# Patient Record
Sex: FEMALE | Race: WHITE | Employment: UNEMPLOYED | URBAN - METROPOLITAN AREA
[De-identification: names, ages, dates, MRNs, and addresses within clinical notes are randomized per-mention and may not be internally consistent; named-entity substitution may affect disease eponyms.]

---

## 2018-04-11 ENCOUNTER — OFFICE VISIT (OUTPATIENT)
Dept: FAMILY MEDICINE CLINIC | Age: 43
End: 2018-04-11
Payer: MEDICAID

## 2018-04-11 VITALS
HEART RATE: 80 BPM | TEMPERATURE: 98.1 F | HEIGHT: 64 IN | WEIGHT: 293 LBS | OXYGEN SATURATION: 97 % | DIASTOLIC BLOOD PRESSURE: 77 MMHG | RESPIRATION RATE: 16 BRPM | SYSTOLIC BLOOD PRESSURE: 127 MMHG | BODY MASS INDEX: 50.02 KG/M2

## 2018-04-11 DIAGNOSIS — B37.31 VAGINAL CANDIDA: ICD-10-CM

## 2018-04-11 DIAGNOSIS — J40 BRONCHITIS: Primary | ICD-10-CM

## 2018-04-11 DIAGNOSIS — H66.93 BILATERAL OTITIS MEDIA, UNSPECIFIED OTITIS MEDIA TYPE: ICD-10-CM

## 2018-04-11 PROCEDURE — 99212 OFFICE O/P EST SF 10 MIN: CPT | Performed by: NURSE PRACTITIONER

## 2018-04-11 PROCEDURE — 99213 OFFICE O/P EST LOW 20 MIN: CPT | Performed by: NURSE PRACTITIONER

## 2018-04-11 RX ORDER — METHYLPREDNISOLONE 4 MG/1
TABLET ORAL
Qty: 1 KIT | Refills: 0 | Status: SHIPPED | OUTPATIENT
Start: 2018-04-11 | End: 2018-04-18 | Stop reason: ALTCHOICE

## 2018-04-11 RX ORDER — DOXYCYCLINE HYCLATE 100 MG
100 TABLET ORAL 2 TIMES DAILY
Qty: 20 TABLET | Refills: 0 | Status: SHIPPED | OUTPATIENT
Start: 2018-04-11 | End: 2018-04-21

## 2018-04-11 RX ORDER — FLUCONAZOLE 150 MG/1
150 TABLET ORAL ONCE
Qty: 1 TABLET | Refills: 1 | Status: SHIPPED | OUTPATIENT
Start: 2018-04-11 | End: 2018-04-11

## 2018-04-11 RX ORDER — ALBUTEROL SULFATE 90 UG/1
2 AEROSOL, METERED RESPIRATORY (INHALATION) EVERY 6 HOURS PRN
Qty: 1 INHALER | Refills: 3 | Status: SHIPPED | OUTPATIENT
Start: 2018-04-11 | End: 2018-04-18 | Stop reason: ALTCHOICE

## 2018-04-11 RX ORDER — ACETAMINOPHEN 500 MG
1000 TABLET ORAL EVERY 6 HOURS PRN
COMMUNITY
End: 2019-09-16

## 2018-04-11 ASSESSMENT — PATIENT HEALTH QUESTIONNAIRE - PHQ9
SUM OF ALL RESPONSES TO PHQ QUESTIONS 1-9: 0
2. FEELING DOWN, DEPRESSED OR HOPELESS: 0
SUM OF ALL RESPONSES TO PHQ9 QUESTIONS 1 & 2: 0
1. LITTLE INTEREST OR PLEASURE IN DOING THINGS: 0

## 2018-04-18 ENCOUNTER — HOSPITAL ENCOUNTER (OUTPATIENT)
Dept: GENERAL RADIOLOGY | Age: 43
Discharge: HOME | End: 2018-04-20
Payer: MEDICAID

## 2018-04-18 ENCOUNTER — OFFICE VISIT (OUTPATIENT)
Dept: FAMILY MEDICINE CLINIC | Age: 43
End: 2018-04-18
Payer: MEDICAID

## 2018-04-18 VITALS
BODY MASS INDEX: 50.02 KG/M2 | RESPIRATION RATE: 16 BRPM | WEIGHT: 293 LBS | SYSTOLIC BLOOD PRESSURE: 117 MMHG | HEIGHT: 64 IN | TEMPERATURE: 98.3 F | OXYGEN SATURATION: 94 % | HEART RATE: 99 BPM | DIASTOLIC BLOOD PRESSURE: 82 MMHG

## 2018-04-18 DIAGNOSIS — N63.10 LUMP OF RIGHT BREAST: ICD-10-CM

## 2018-04-18 DIAGNOSIS — N63.10 BREAST MASS, RIGHT: ICD-10-CM

## 2018-04-18 DIAGNOSIS — N63.0 LUMP OR MASS IN BREAST: ICD-10-CM

## 2018-04-18 DIAGNOSIS — J20.9 ACUTE BRONCHITIS, UNSPECIFIED ORGANISM: ICD-10-CM

## 2018-04-18 PROCEDURE — 99214 OFFICE O/P EST MOD 30 MIN: CPT | Performed by: NURSE PRACTITIONER

## 2018-04-18 PROCEDURE — 76642 ULTRASOUND BREAST LIMITED: CPT

## 2018-04-18 PROCEDURE — 99212 OFFICE O/P EST SF 10 MIN: CPT | Performed by: NURSE PRACTITIONER

## 2018-04-18 PROCEDURE — G0279 TOMOSYNTHESIS, MAMMO: HCPCS

## 2018-04-18 PROCEDURE — 77065 DX MAMMO INCL CAD UNI: CPT

## 2018-04-21 ASSESSMENT — ENCOUNTER SYMPTOMS
WHEEZING: 0
DIARRHEA: 0
SHORTNESS OF BREATH: 0
CONSTIPATION: 0
BLURRED VISION: 0
VOMITING: 0
NAUSEA: 0
COUGH: 0
DOUBLE VISION: 0

## 2018-04-25 ASSESSMENT — ENCOUNTER SYMPTOMS
COUGH: 0
WHEEZING: 0
DIARRHEA: 0
CONSTIPATION: 0
DOUBLE VISION: 0
VOMITING: 0
BLURRED VISION: 0
SHORTNESS OF BREATH: 0
NAUSEA: 0

## 2018-05-05 ENCOUNTER — HOSPITAL ENCOUNTER (EMERGENCY)
Age: 43
Discharge: HOME | End: 2018-05-05
Attending: EMERGENCY MEDICINE
Payer: MEDICAID

## 2018-05-05 VITALS
RESPIRATION RATE: 18 BRPM | BODY MASS INDEX: 50.02 KG/M2 | DIASTOLIC BLOOD PRESSURE: 82 MMHG | HEART RATE: 96 BPM | SYSTOLIC BLOOD PRESSURE: 160 MMHG | OXYGEN SATURATION: 100 % | TEMPERATURE: 97.8 F | HEIGHT: 64 IN | WEIGHT: 293 LBS

## 2018-05-05 DIAGNOSIS — N60.11 FIBROCYSTIC BREAST CHANGES, RIGHT: Primary | ICD-10-CM

## 2018-05-05 LAB
ALBUMIN SERPL-MCNC: 3.8 G/DL (ref 3.5–5.2)
ALP BLD-CCNC: 83 U/L (ref 35–104)
ALT SERPL-CCNC: 28 U/L (ref 0–32)
ANION GAP SERPL CALCULATED.3IONS-SCNC: 13 MMOL/L (ref 7–16)
AST SERPL-CCNC: 14 U/L (ref 0–31)
BACTERIA: ABNORMAL /HPF
BILIRUB SERPL-MCNC: 0.3 MG/DL (ref 0–1.2)
BILIRUBIN URINE: NEGATIVE
BLOOD, URINE: ABNORMAL
BUN BLDV-MCNC: 11 MG/DL (ref 6–20)
CALCIUM SERPL-MCNC: 8.9 MG/DL (ref 8.6–10.2)
CHLORIDE BLD-SCNC: 102 MMOL/L (ref 98–107)
CLARITY: CLEAR
CO2: 21 MMOL/L (ref 22–29)
COLOR: YELLOW
CREAT SERPL-MCNC: 0.7 MG/DL (ref 0.5–1)
GFR AFRICAN AMERICAN: >60
GFR NON-AFRICAN AMERICAN: >60 ML/MIN/1.73
GLUCOSE BLD-MCNC: 120 MG/DL (ref 74–109)
GLUCOSE URINE: NEGATIVE MG/DL
HCT VFR BLD CALC: 43.8 % (ref 34–48)
HEMOGLOBIN: 14.1 G/DL (ref 11.5–15.5)
KETONES, URINE: NEGATIVE MG/DL
LEUKOCYTE ESTERASE, URINE: NEGATIVE
MCH RBC QN AUTO: 28 PG (ref 26–35)
MCHC RBC AUTO-ENTMCNC: 32.2 % (ref 32–34.5)
MCV RBC AUTO: 86.9 FL (ref 80–99.9)
NITRITE, URINE: NEGATIVE
PDW BLD-RTO: 13.3 FL (ref 11.5–15)
PH UA: 6 (ref 5–9)
PLATELET # BLD: 462 E9/L (ref 130–450)
PMV BLD AUTO: 9.6 FL (ref 7–12)
POTASSIUM SERPL-SCNC: 3.9 MMOL/L (ref 3.5–5)
PROTEIN UA: NEGATIVE MG/DL
RBC # BLD: 5.04 E12/L (ref 3.5–5.5)
RBC UA: ABNORMAL /HPF (ref 0–2)
SODIUM BLD-SCNC: 136 MMOL/L (ref 132–146)
SPECIFIC GRAVITY UA: >=1.03 (ref 1–1.03)
TOTAL PROTEIN: 8 G/DL (ref 6.4–8.3)
UROBILINOGEN, URINE: 0.2 E.U./DL
WBC # BLD: 16.3 E9/L (ref 4.5–11.5)
WBC UA: ABNORMAL /HPF (ref 0–5)

## 2018-05-05 PROCEDURE — 99283 EMERGENCY DEPT VISIT LOW MDM: CPT

## 2018-05-05 PROCEDURE — 81001 URINALYSIS AUTO W/SCOPE: CPT

## 2018-05-05 PROCEDURE — 85027 COMPLETE CBC AUTOMATED: CPT

## 2018-05-05 PROCEDURE — 36415 COLL VENOUS BLD VENIPUNCTURE: CPT

## 2018-05-05 PROCEDURE — 80053 COMPREHEN METABOLIC PANEL: CPT

## 2018-05-05 RX ORDER — 0.9 % SODIUM CHLORIDE 0.9 %
1000 INTRAVENOUS SOLUTION INTRAVENOUS ONCE
Status: DISCONTINUED | OUTPATIENT
Start: 2018-05-05 | End: 2018-05-05

## 2018-05-05 RX ORDER — HYDROCODONE BITARTRATE AND ACETAMINOPHEN 5; 325 MG/1; MG/1
1 TABLET ORAL EVERY 6 HOURS PRN
Qty: 5 TABLET | Refills: 0 | Status: SHIPPED | OUTPATIENT
Start: 2018-05-05 | End: 2018-05-08

## 2018-05-05 ASSESSMENT — PAIN SCALES - GENERAL: PAINLEVEL_OUTOF10: 7

## 2018-05-05 ASSESSMENT — PAIN DESCRIPTION - DESCRIPTORS: DESCRIPTORS: OTHER (COMMENT)

## 2018-05-05 ASSESSMENT — PAIN DESCRIPTION - FREQUENCY: FREQUENCY: INTERMITTENT

## 2018-05-05 ASSESSMENT — PAIN DESCRIPTION - LOCATION: LOCATION: BREAST

## 2018-05-10 ENCOUNTER — OFFICE VISIT (OUTPATIENT)
Dept: FAMILY MEDICINE CLINIC | Age: 43
End: 2018-05-10
Payer: MEDICAID

## 2018-05-10 VITALS
RESPIRATION RATE: 18 BRPM | SYSTOLIC BLOOD PRESSURE: 126 MMHG | OXYGEN SATURATION: 95 % | WEIGHT: 293 LBS | TEMPERATURE: 97.9 F | HEIGHT: 64 IN | DIASTOLIC BLOOD PRESSURE: 84 MMHG | HEART RATE: 80 BPM | BODY MASS INDEX: 50.02 KG/M2

## 2018-05-10 DIAGNOSIS — F32.A DEPRESSION, UNSPECIFIED DEPRESSION TYPE: ICD-10-CM

## 2018-05-10 DIAGNOSIS — F41.9 ANXIETY: Primary | ICD-10-CM

## 2018-05-10 PROCEDURE — 1036F TOBACCO NON-USER: CPT | Performed by: NURSE PRACTITIONER

## 2018-05-10 PROCEDURE — 99212 OFFICE O/P EST SF 10 MIN: CPT | Performed by: NURSE PRACTITIONER

## 2018-05-10 PROCEDURE — 99214 OFFICE O/P EST MOD 30 MIN: CPT | Performed by: NURSE PRACTITIONER

## 2018-05-10 PROCEDURE — G8417 CALC BMI ABV UP PARAM F/U: HCPCS | Performed by: NURSE PRACTITIONER

## 2018-05-10 PROCEDURE — G8427 DOCREV CUR MEDS BY ELIG CLIN: HCPCS | Performed by: NURSE PRACTITIONER

## 2018-05-10 RX ORDER — ESCITALOPRAM OXALATE 10 MG/1
10 TABLET ORAL DAILY
Qty: 30 TABLET | Refills: 1 | Status: SHIPPED | OUTPATIENT
Start: 2018-05-10 | End: 2018-07-09 | Stop reason: SDUPTHER

## 2018-05-10 RX ORDER — HYDROXYZINE PAMOATE 25 MG/1
25 CAPSULE ORAL NIGHTLY PRN
Qty: 30 CAPSULE | Refills: 0 | Status: SHIPPED | OUTPATIENT
Start: 2018-05-10 | End: 2018-05-24

## 2018-05-10 RX ORDER — SULFAMETHOXAZOLE AND TRIMETHOPRIM 800; 160 MG/1; MG/1
1 TABLET ORAL 2 TIMES DAILY
Refills: 0 | COMMUNITY
Start: 2018-04-30 | End: 2018-06-12 | Stop reason: ALTCHOICE

## 2018-05-10 ASSESSMENT — ENCOUNTER SYMPTOMS
CONSTIPATION: 0
VOMITING: 0
COUGH: 0
DIARRHEA: 0
WHEEZING: 0
BLURRED VISION: 0
NAUSEA: 0
SHORTNESS OF BREATH: 0
DOUBLE VISION: 0

## 2018-05-10 ASSESSMENT — ANXIETY QUESTIONNAIRES
5. BEING SO RESTLESS THAT IT IS HARD TO SIT STILL: 0-NOT AT ALL SURE
7. FEELING AFRAID AS IF SOMETHING AWFUL MIGHT HAPPEN: 3-NEARLY EVERY DAY
2. NOT BEING ABLE TO STOP OR CONTROL WORRYING: 3-NEARLY EVERY DAY
4. TROUBLE RELAXING: 2-OVER HALF THE DAYS
GAD7 TOTAL SCORE: 17
3. WORRYING TOO MUCH ABOUT DIFFERENT THINGS: 3-NEARLY EVERY DAY
6. BECOMING EASILY ANNOYED OR IRRITABLE: 3-NEARLY EVERY DAY
1. FEELING NERVOUS, ANXIOUS, OR ON EDGE: 3-NEARLY EVERY DAY

## 2018-05-14 ENCOUNTER — OFFICE VISIT (OUTPATIENT)
Dept: BREAST CENTER | Age: 43
End: 2018-05-14
Payer: MEDICAID

## 2018-05-14 VITALS
HEART RATE: 92 BPM | WEIGHT: 293 LBS | SYSTOLIC BLOOD PRESSURE: 136 MMHG | DIASTOLIC BLOOD PRESSURE: 72 MMHG | TEMPERATURE: 98 F | BODY MASS INDEX: 50.02 KG/M2 | HEIGHT: 64 IN | OXYGEN SATURATION: 98 % | RESPIRATION RATE: 16 BRPM

## 2018-05-14 DIAGNOSIS — N61.1 BREAST ABSCESS OF FEMALE: ICD-10-CM

## 2018-05-14 DIAGNOSIS — R92.2 DENSE BREAST TISSUE ON MAMMOGRAM: ICD-10-CM

## 2018-05-14 DIAGNOSIS — N63.10 MASS OF BREAST, RIGHT: ICD-10-CM

## 2018-05-14 DIAGNOSIS — Z80.3 FAMILY HISTORY OF BREAST CANCER IN MOTHER: ICD-10-CM

## 2018-05-14 DIAGNOSIS — N63.10 BREAST MASS, RIGHT: ICD-10-CM

## 2018-05-14 DIAGNOSIS — Z12.39 BREAST CANCER SCREENING, HIGH RISK PATIENT: Primary | ICD-10-CM

## 2018-05-14 PROCEDURE — G8417 CALC BMI ABV UP PARAM F/U: HCPCS | Performed by: SURGERY

## 2018-05-14 PROCEDURE — 99204 OFFICE O/P NEW MOD 45 MIN: CPT | Performed by: SURGERY

## 2018-05-14 PROCEDURE — 1036F TOBACCO NON-USER: CPT | Performed by: SURGERY

## 2018-05-14 PROCEDURE — G8427 DOCREV CUR MEDS BY ELIG CLIN: HCPCS | Performed by: SURGERY

## 2018-05-14 PROCEDURE — 99203 OFFICE O/P NEW LOW 30 MIN: CPT | Performed by: NURSE PRACTITIONER

## 2018-05-14 ASSESSMENT — ENCOUNTER SYMPTOMS
RHINORRHEA: 0
SHORTNESS OF BREATH: 0
BLOOD IN STOOL: 0
VOMITING: 0
CONSTIPATION: 0
CHOKING: 0
ABDOMINAL DISTENTION: 0
ABDOMINAL PAIN: 0
WHEEZING: 0
SINUS PRESSURE: 0
EYE ITCHING: 0
SORE THROAT: 0
BACK PAIN: 0
EYE DISCHARGE: 0
TROUBLE SWALLOWING: 0
SINUS PAIN: 0
CHEST TIGHTNESS: 0
NAUSEA: 0
VOICE CHANGE: 0
DIARRHEA: 0
COUGH: 0

## 2018-05-17 ENCOUNTER — HOSPITAL ENCOUNTER (OUTPATIENT)
Dept: GENERAL RADIOLOGY | Age: 43
Discharge: HOME | End: 2018-05-19
Payer: MEDICAID

## 2018-05-17 DIAGNOSIS — N63.10 BREAST MASS, RIGHT: ICD-10-CM

## 2018-05-17 DIAGNOSIS — N63.10 MASS OF BREAST, RIGHT: ICD-10-CM

## 2018-05-17 DIAGNOSIS — R92.2 DENSE BREAST TISSUE ON MAMMOGRAM: ICD-10-CM

## 2018-05-17 PROCEDURE — 2500000003 HC RX 250 WO HCPCS

## 2018-05-17 PROCEDURE — 2720000010 US BREAST BIOPSY W LOC DEVICE 1ST LESION RIGHT

## 2018-05-17 PROCEDURE — 77065 DX MAMMO INCL CAD UNI: CPT

## 2018-05-17 PROCEDURE — 88305 TISSUE EXAM BY PATHOLOGIST: CPT

## 2018-05-18 ENCOUNTER — TELEPHONE (OUTPATIENT)
Dept: ONCOLOGY | Age: 43
End: 2018-05-18

## 2018-05-22 ENCOUNTER — TELEPHONE (OUTPATIENT)
Dept: BREAST CENTER | Age: 43
End: 2018-05-22

## 2018-06-01 ENCOUNTER — HOSPITAL ENCOUNTER (OUTPATIENT)
Dept: GENERAL RADIOLOGY | Age: 43
End: 2018-06-01
Payer: MEDICAID

## 2018-06-01 ENCOUNTER — OFFICE VISIT (OUTPATIENT)
Dept: FAMILY MEDICINE CLINIC | Age: 43
End: 2018-06-01
Payer: MEDICAID

## 2018-06-01 ENCOUNTER — HOSPITAL ENCOUNTER (OUTPATIENT)
Age: 43
Discharge: HOME | End: 2018-06-03
Payer: MEDICAID

## 2018-06-01 ENCOUNTER — HOSPITAL ENCOUNTER (OUTPATIENT)
Dept: GENERAL RADIOLOGY | Age: 43
Discharge: HOME | End: 2018-06-03
Payer: MEDICAID

## 2018-06-01 ENCOUNTER — OFFICE VISIT (OUTPATIENT)
Dept: BREAST CENTER | Age: 43
End: 2018-06-01
Payer: MEDICAID

## 2018-06-01 ENCOUNTER — TELEPHONE (OUTPATIENT)
Dept: FAMILY MEDICINE CLINIC | Age: 43
End: 2018-06-01

## 2018-06-01 VITALS
HEIGHT: 63 IN | WEIGHT: 293 LBS | BODY MASS INDEX: 51.91 KG/M2 | HEART RATE: 103 BPM | DIASTOLIC BLOOD PRESSURE: 88 MMHG | RESPIRATION RATE: 16 BRPM | SYSTOLIC BLOOD PRESSURE: 122 MMHG | OXYGEN SATURATION: 98 % | TEMPERATURE: 98.8 F

## 2018-06-01 VITALS
OXYGEN SATURATION: 97 % | HEART RATE: 92 BPM | HEIGHT: 63 IN | TEMPERATURE: 98.3 F | DIASTOLIC BLOOD PRESSURE: 84 MMHG | BODY MASS INDEX: 51.91 KG/M2 | WEIGHT: 293 LBS | SYSTOLIC BLOOD PRESSURE: 116 MMHG

## 2018-06-01 DIAGNOSIS — N63.10 BREAST MASS, RIGHT: Primary | ICD-10-CM

## 2018-06-01 DIAGNOSIS — N64.4 BREAST PAIN, RIGHT: ICD-10-CM

## 2018-06-01 DIAGNOSIS — E55.9 VITAMIN D DEFICIENCY: ICD-10-CM

## 2018-06-01 DIAGNOSIS — E78.5 HYPERLIPIDEMIA, UNSPECIFIED HYPERLIPIDEMIA TYPE: ICD-10-CM

## 2018-06-01 DIAGNOSIS — F41.9 ANXIETY: ICD-10-CM

## 2018-06-01 DIAGNOSIS — R53.83 FATIGUE, UNSPECIFIED TYPE: ICD-10-CM

## 2018-06-01 DIAGNOSIS — N64.4 BREAST PAIN, RIGHT: Primary | ICD-10-CM

## 2018-06-01 DIAGNOSIS — F43.9 STRESS AT HOME: ICD-10-CM

## 2018-06-01 LAB
ALBUMIN SERPL-MCNC: 3.8 G/DL (ref 3.5–5.2)
ALP BLD-CCNC: 66 U/L (ref 35–104)
ALT SERPL-CCNC: 32 U/L (ref 0–32)
ANION GAP SERPL CALCULATED.3IONS-SCNC: 17 MMOL/L (ref 7–16)
AST SERPL-CCNC: 26 U/L (ref 0–31)
BASOPHILS ABSOLUTE: 0.04 E9/L (ref 0–0.2)
BASOPHILS RELATIVE PERCENT: 0.2 % (ref 0–2)
BILIRUB SERPL-MCNC: 1.2 MG/DL (ref 0–1.2)
BUN BLDV-MCNC: 8 MG/DL (ref 6–20)
CALCIUM SERPL-MCNC: 9 MG/DL (ref 8.6–10.2)
CHLORIDE BLD-SCNC: 101 MMOL/L (ref 98–107)
CHOLESTEROL, TOTAL: 148 MG/DL (ref 0–199)
CO2: 22 MMOL/L (ref 22–29)
CREAT SERPL-MCNC: 0.6 MG/DL (ref 0.5–1)
EOSINOPHILS ABSOLUTE: 0.14 E9/L (ref 0.05–0.5)
EOSINOPHILS RELATIVE PERCENT: 0.9 % (ref 0–6)
GFR AFRICAN AMERICAN: >60
GFR NON-AFRICAN AMERICAN: >60 ML/MIN/1.73
GLUCOSE BLD-MCNC: 94 MG/DL (ref 74–109)
HCT VFR BLD CALC: 46.2 % (ref 34–48)
HDLC SERPL-MCNC: 51 MG/DL
HEMOGLOBIN: 13.9 G/DL (ref 11.5–15.5)
IMMATURE GRANULOCYTES #: 0.07 E9/L
IMMATURE GRANULOCYTES %: 0.4 % (ref 0–5)
LDL CHOLESTEROL CALCULATED: 79 MG/DL (ref 0–99)
LYMPHOCYTES ABSOLUTE: 2.49 E9/L (ref 1.5–4)
LYMPHOCYTES RELATIVE PERCENT: 15.6 % (ref 20–42)
MCH RBC QN AUTO: 27.8 PG (ref 26–35)
MCHC RBC AUTO-ENTMCNC: 30.1 % (ref 32–34.5)
MCV RBC AUTO: 92.4 FL (ref 80–99.9)
MONOCYTES ABSOLUTE: 1.16 E9/L (ref 0.1–0.95)
MONOCYTES RELATIVE PERCENT: 7.2 % (ref 2–12)
NEUTROPHILS ABSOLUTE: 12.11 E9/L (ref 1.8–7.3)
NEUTROPHILS RELATIVE PERCENT: 75.7 % (ref 43–80)
PDW BLD-RTO: 14.6 FL (ref 11.5–15)
PLATELET # BLD: 381 E9/L (ref 130–450)
PMV BLD AUTO: 10.5 FL (ref 7–12)
POTASSIUM SERPL-SCNC: 3.9 MMOL/L (ref 3.5–5)
RBC # BLD: 5 E12/L (ref 3.5–5.5)
SODIUM BLD-SCNC: 140 MMOL/L (ref 132–146)
TOTAL PROTEIN: 7.9 G/DL (ref 6.4–8.3)
TRIGL SERPL-MCNC: 90 MG/DL (ref 0–149)
TSH SERPL DL<=0.05 MIU/L-ACNC: 2.47 UIU/ML (ref 0.27–4.2)
VITAMIN D 25-HYDROXY: 19 NG/ML (ref 30–100)
VLDLC SERPL CALC-MCNC: 18 MG/DL
WBC # BLD: 16 E9/L (ref 4.5–11.5)

## 2018-06-01 PROCEDURE — G8417 CALC BMI ABV UP PARAM F/U: HCPCS | Performed by: SURGERY

## 2018-06-01 PROCEDURE — G8427 DOCREV CUR MEDS BY ELIG CLIN: HCPCS | Performed by: NURSE PRACTITIONER

## 2018-06-01 PROCEDURE — G8417 CALC BMI ABV UP PARAM F/U: HCPCS | Performed by: NURSE PRACTITIONER

## 2018-06-01 PROCEDURE — 99215 OFFICE O/P EST HI 40 MIN: CPT | Performed by: SURGERY

## 2018-06-01 PROCEDURE — G8427 DOCREV CUR MEDS BY ELIG CLIN: HCPCS | Performed by: SURGERY

## 2018-06-01 PROCEDURE — 1036F TOBACCO NON-USER: CPT | Performed by: SURGERY

## 2018-06-01 PROCEDURE — 80053 COMPREHEN METABOLIC PANEL: CPT

## 2018-06-01 PROCEDURE — 99214 OFFICE O/P EST MOD 30 MIN: CPT | Performed by: NURSE PRACTITIONER

## 2018-06-01 PROCEDURE — 80061 LIPID PANEL: CPT

## 2018-06-01 PROCEDURE — 85025 COMPLETE CBC W/AUTO DIFF WBC: CPT

## 2018-06-01 PROCEDURE — 84443 ASSAY THYROID STIM HORMONE: CPT

## 2018-06-01 PROCEDURE — 76642 ULTRASOUND BREAST LIMITED: CPT

## 2018-06-01 PROCEDURE — 1036F TOBACCO NON-USER: CPT | Performed by: NURSE PRACTITIONER

## 2018-06-01 PROCEDURE — 82306 VITAMIN D 25 HYDROXY: CPT

## 2018-06-01 PROCEDURE — 99203 OFFICE O/P NEW LOW 30 MIN: CPT | Performed by: SURGERY

## 2018-06-01 RX ORDER — CLINDAMYCIN HYDROCHLORIDE 150 MG/1
300 CAPSULE ORAL 3 TIMES DAILY
Qty: 42 CAPSULE | Refills: 0 | Status: SHIPPED | OUTPATIENT
Start: 2018-06-01 | End: 2018-06-08

## 2018-06-01 ASSESSMENT — ENCOUNTER SYMPTOMS
SORE THROAT: 0
VOMITING: 0
SINUS PAIN: 0
EYE DISCHARGE: 0
EYE ITCHING: 0
DIARRHEA: 0
WHEEZING: 0
BACK PAIN: 0
SHORTNESS OF BREATH: 0
SINUS PRESSURE: 0
CHOKING: 0
VOICE CHANGE: 0
BLURRED VISION: 0
ABDOMINAL PAIN: 0
CONSTIPATION: 0
DIARRHEA: 0
VOMITING: 0
BLOOD IN STOOL: 0
RHINORRHEA: 0
CHEST TIGHTNESS: 0
ABDOMINAL DISTENTION: 0
CONSTIPATION: 0
WHEEZING: 0
TROUBLE SWALLOWING: 0
NAUSEA: 0
COUGH: 0
DOUBLE VISION: 0
NAUSEA: 0
SHORTNESS OF BREATH: 0
COUGH: 0

## 2018-06-01 NOTE — PROGRESS NOTES
Chief Complaint   Patient presents with    Anxiety     f/u medication    Breast Pain     Rt       HPI:  Patient presents today for  Establishment of PCP. Past Medical History:   Diagnosis Date    Anxiety     GERD (gastroesophageal reflux disease)     Obesity      Patient is known to me as I have seen her in a different office. Right breast pain again - reports that she had a biopsy done to her right breast on 5/17/19. Was told that it was not a malignancy. She is still having a \"pulling\" sensation to that breast. Feels as though her right breast is larger and more firm that it previously was. She is teary eyed today. Concerned that she might still have cancer. Anxiety - I have recently started patient on Lexapro. She reports that it is  working for her, however she has been under a lot of emotional stress lately. Her son has been recently diagnosed with type 1 diabetes. She is  from her children's father, and she recently found out that her mothers cancer has spread. Prior to Visit Medications    Medication Sig Taking? Authorizing Provider   escitalopram (LEXAPRO) 10 MG tablet Take 1 tablet by mouth daily Yes JIHAN Suggs CNP   acetaminophen (TYLENOL) 500 MG tablet Take 1,000 mg by mouth every 6 hours as needed for Pain Yes Historical Provider, MD   dicloxacillin (DYNAPEN) 500 MG capsule Take 1 capsule by mouth 4 times daily  Historical Provider, MD   sulfamethoxazole-trimethoprim (BACTRIM DS;SEPTRA DS) 800-160 MG per tablet Take 1 tablet by mouth 2 times daily  Historical Provider, MD   vitamin D (ERGOCALCIFEROL) 60350 units CAPS capsule Take 1 tab by mouth weekly  Juhi Perdomo MD         No Known Allergies      Review of Systems  Review of Systems   Constitutional: Positive for malaise/fatigue and weight loss. Negative for chills and fever. HENT: Negative for congestion and nosebleeds. Eyes: Negative for blurred vision and double vision.    Respiratory: Negative deficiency    - Vitamin D 25 Hydrox, D2 & D3; Future    Will discuss health maintenance at next OV  Return in about 4 weeks (around 6/29/2018) for Lab follow up, anxiety, Depression follow up.       JIHAN Whittaker - CNP

## 2018-06-04 ENCOUNTER — TELEPHONE (OUTPATIENT)
Dept: BREAST CENTER | Age: 43
End: 2018-06-04

## 2018-06-05 ENCOUNTER — HOSPITAL ENCOUNTER (OUTPATIENT)
Age: 43
Discharge: HOME | End: 2018-06-07
Payer: MEDICAID

## 2018-06-05 ENCOUNTER — OFFICE VISIT (OUTPATIENT)
Dept: BREAST CENTER | Age: 43
End: 2018-06-05
Payer: MEDICAID

## 2018-06-05 VITALS
DIASTOLIC BLOOD PRESSURE: 78 MMHG | OXYGEN SATURATION: 98 % | TEMPERATURE: 98.7 F | HEART RATE: 100 BPM | BODY MASS INDEX: 51.91 KG/M2 | HEIGHT: 63 IN | WEIGHT: 293 LBS | SYSTOLIC BLOOD PRESSURE: 126 MMHG | RESPIRATION RATE: 16 BRPM

## 2018-06-05 DIAGNOSIS — N61.1 BREAST ABSCESS OF FEMALE: Primary | ICD-10-CM

## 2018-06-05 DIAGNOSIS — N63.10 BREAST MASS, RIGHT: ICD-10-CM

## 2018-06-05 PROCEDURE — 99213 OFFICE O/P EST LOW 20 MIN: CPT | Performed by: SURGERY

## 2018-06-05 PROCEDURE — 87070 CULTURE OTHR SPECIMN AEROBIC: CPT

## 2018-06-05 PROCEDURE — 19020 MASTOTOMY EXPL DRG ABSC DP: CPT | Performed by: SURGERY

## 2018-06-05 PROCEDURE — 87205 SMEAR GRAM STAIN: CPT

## 2018-06-05 PROCEDURE — 87075 CULTR BACTERIA EXCEPT BLOOD: CPT

## 2018-06-05 RX ORDER — LIDOCAINE HYDROCHLORIDE 10 MG/ML
20 INJECTION, SOLUTION INFILTRATION; PERINEURAL ONCE
Status: COMPLETED | OUTPATIENT
Start: 2018-06-05 | End: 2018-06-05

## 2018-06-05 RX ADMIN — LIDOCAINE HYDROCHLORIDE 5 ML: 10 INJECTION, SOLUTION INFILTRATION; PERINEURAL at 15:24

## 2018-06-05 ASSESSMENT — ENCOUNTER SYMPTOMS
NAUSEA: 0
CONSTIPATION: 0
TROUBLE SWALLOWING: 0
RHINORRHEA: 0
DIARRHEA: 0
SINUS PAIN: 0
VOMITING: 0
BACK PAIN: 0
SHORTNESS OF BREATH: 0
EYE DISCHARGE: 0
BLOOD IN STOOL: 0
ABDOMINAL DISTENTION: 0
CHOKING: 0
SORE THROAT: 0
ABDOMINAL PAIN: 0
WHEEZING: 0
SINUS PRESSURE: 0
EYE ITCHING: 0
VOICE CHANGE: 0
COUGH: 0
CHEST TIGHTNESS: 0

## 2018-06-06 LAB — GRAM STAIN ORDERABLE: NORMAL

## 2018-06-07 LAB
GRAM STAIN RESULT: NORMAL
WOUND/ABSCESS: NORMAL

## 2018-06-11 LAB
ANAEROBIC CULTURE: ABNORMAL
ANAEROBIC CULTURE: ABNORMAL
ORGANISM: ABNORMAL

## 2018-06-12 ENCOUNTER — OFFICE VISIT (OUTPATIENT)
Dept: BREAST CENTER | Age: 43
End: 2018-06-12
Payer: MEDICAID

## 2018-06-12 VITALS
HEIGHT: 63 IN | OXYGEN SATURATION: 98 % | TEMPERATURE: 98.2 F | SYSTOLIC BLOOD PRESSURE: 136 MMHG | RESPIRATION RATE: 16 BRPM | WEIGHT: 293 LBS | HEART RATE: 86 BPM | DIASTOLIC BLOOD PRESSURE: 82 MMHG | BODY MASS INDEX: 51.91 KG/M2

## 2018-06-12 DIAGNOSIS — N61.1 BREAST ABSCESS OF FEMALE: ICD-10-CM

## 2018-06-12 PROCEDURE — 99213 OFFICE O/P EST LOW 20 MIN: CPT | Performed by: NURSE PRACTITIONER

## 2018-06-12 PROCEDURE — 99024 POSTOP FOLLOW-UP VISIT: CPT | Performed by: NURSE PRACTITIONER

## 2018-06-12 ASSESSMENT — ENCOUNTER SYMPTOMS
EYE ITCHING: 0
BACK PAIN: 0
NAUSEA: 0
WHEEZING: 0
ABDOMINAL PAIN: 0
VOMITING: 0
RHINORRHEA: 0
ABDOMINAL DISTENTION: 0
EYE DISCHARGE: 0
SORE THROAT: 0
COUGH: 0
VOICE CHANGE: 0
TROUBLE SWALLOWING: 0
SINUS PRESSURE: 0
CHEST TIGHTNESS: 0
BLOOD IN STOOL: 0
CHOKING: 0
DIARRHEA: 0
SHORTNESS OF BREATH: 0
SINUS PAIN: 0
CONSTIPATION: 0

## 2018-06-19 ENCOUNTER — ANESTHESIA EVENT (OUTPATIENT)
Dept: OPERATING ROOM | Age: 43
DRG: 363 | End: 2018-06-19
Payer: MEDICAID

## 2018-06-19 ENCOUNTER — OFFICE VISIT (OUTPATIENT)
Dept: BREAST CENTER | Age: 43
DRG: 363 | End: 2018-06-19
Payer: MEDICAID

## 2018-06-19 ENCOUNTER — TELEPHONE (OUTPATIENT)
Dept: BREAST CENTER | Age: 43
End: 2018-06-19

## 2018-06-19 ENCOUNTER — HOSPITAL ENCOUNTER (INPATIENT)
Age: 43
LOS: 3 days | Discharge: HOME HEALTH | DRG: 363 | End: 2018-06-22
Attending: SURGERY | Admitting: SURGERY
Payer: MEDICAID

## 2018-06-19 ENCOUNTER — OFFICE VISIT (OUTPATIENT)
Dept: SURGERY | Age: 43
DRG: 363 | End: 2018-06-19
Payer: MEDICAID

## 2018-06-19 ENCOUNTER — TELEPHONE (OUTPATIENT)
Dept: SURGERY | Age: 43
End: 2018-06-19

## 2018-06-19 VITALS
WEIGHT: 293 LBS | RESPIRATION RATE: 16 BRPM | TEMPERATURE: 98.7 F | BODY MASS INDEX: 51.91 KG/M2 | SYSTOLIC BLOOD PRESSURE: 130 MMHG | OXYGEN SATURATION: 98 % | HEIGHT: 63 IN | DIASTOLIC BLOOD PRESSURE: 74 MMHG | HEART RATE: 102 BPM

## 2018-06-19 DIAGNOSIS — N61.1 ABSCESS OF RIGHT BREAST: Primary | ICD-10-CM

## 2018-06-19 DIAGNOSIS — N61.1 BREAST ABSCESS OF FEMALE: ICD-10-CM

## 2018-06-19 LAB
ANION GAP SERPL CALCULATED.3IONS-SCNC: 17 MMOL/L (ref 7–16)
BASOPHILS ABSOLUTE: 0.06 E9/L (ref 0–0.2)
BASOPHILS RELATIVE PERCENT: 0.3 % (ref 0–2)
BUN BLDV-MCNC: 13 MG/DL (ref 6–20)
CALCIUM SERPL-MCNC: 8.9 MG/DL (ref 8.6–10.2)
CHLORIDE BLD-SCNC: 96 MMOL/L (ref 98–107)
CO2: 21 MMOL/L (ref 22–29)
CREAT SERPL-MCNC: 0.7 MG/DL (ref 0.5–1)
EOSINOPHILS ABSOLUTE: 0.06 E9/L (ref 0.05–0.5)
EOSINOPHILS RELATIVE PERCENT: 0.3 % (ref 0–6)
GFR AFRICAN AMERICAN: >60
GFR NON-AFRICAN AMERICAN: >60 ML/MIN/1.73
GLUCOSE BLD-MCNC: 92 MG/DL (ref 74–109)
HCT VFR BLD CALC: 42.8 % (ref 34–48)
HEMOGLOBIN: 13.8 G/DL (ref 11.5–15.5)
IMMATURE GRANULOCYTES #: 0.11 E9/L
IMMATURE GRANULOCYTES %: 0.6 % (ref 0–5)
LYMPHOCYTES ABSOLUTE: 2.69 E9/L (ref 1.5–4)
LYMPHOCYTES RELATIVE PERCENT: 14.2 % (ref 20–42)
MCH RBC QN AUTO: 26.8 PG (ref 26–35)
MCHC RBC AUTO-ENTMCNC: 32.2 % (ref 32–34.5)
MCV RBC AUTO: 83.1 FL (ref 80–99.9)
MONOCYTES ABSOLUTE: 1.33 E9/L (ref 0.1–0.95)
MONOCYTES RELATIVE PERCENT: 7 % (ref 2–12)
NEUTROPHILS ABSOLUTE: 14.66 E9/L (ref 1.8–7.3)
NEUTROPHILS RELATIVE PERCENT: 77.6 % (ref 43–80)
PDW BLD-RTO: 13.9 FL (ref 11.5–15)
PLATELET # BLD: 488 E9/L (ref 130–450)
PMV BLD AUTO: 9.5 FL (ref 7–12)
POTASSIUM REFLEX MAGNESIUM: 3.8 MMOL/L (ref 3.5–5)
RBC # BLD: 5.15 E12/L (ref 3.5–5.5)
SODIUM BLD-SCNC: 134 MMOL/L (ref 132–146)
WBC # BLD: 18.9 E9/L (ref 4.5–11.5)

## 2018-06-19 PROCEDURE — 6360000002 HC RX W HCPCS: Performed by: STUDENT IN AN ORGANIZED HEALTH CARE EDUCATION/TRAINING PROGRAM

## 2018-06-19 PROCEDURE — 99024 POSTOP FOLLOW-UP VISIT: CPT | Performed by: SURGERY

## 2018-06-19 PROCEDURE — 80048 BASIC METABOLIC PNL TOTAL CA: CPT

## 2018-06-19 PROCEDURE — 1200000000 HC SEMI PRIVATE

## 2018-06-19 PROCEDURE — 85025 COMPLETE CBC W/AUTO DIFF WBC: CPT

## 2018-06-19 PROCEDURE — 87040 BLOOD CULTURE FOR BACTERIA: CPT

## 2018-06-19 PROCEDURE — 2580000003 HC RX 258: Performed by: STUDENT IN AN ORGANIZED HEALTH CARE EDUCATION/TRAINING PROGRAM

## 2018-06-19 PROCEDURE — 36415 COLL VENOUS BLD VENIPUNCTURE: CPT

## 2018-06-19 PROCEDURE — 2580000003 HC RX 258: Performed by: SURGERY

## 2018-06-19 PROCEDURE — 6360000002 HC RX W HCPCS: Performed by: SURGERY

## 2018-06-19 PROCEDURE — 6370000000 HC RX 637 (ALT 250 FOR IP): Performed by: STUDENT IN AN ORGANIZED HEALTH CARE EDUCATION/TRAINING PROGRAM

## 2018-06-19 PROCEDURE — 99213 OFFICE O/P EST LOW 20 MIN: CPT | Performed by: NURSE PRACTITIONER

## 2018-06-19 PROCEDURE — 99212 OFFICE O/P EST SF 10 MIN: CPT | Performed by: SURGERY

## 2018-06-19 RX ORDER — SODIUM CHLORIDE 0.9 % (FLUSH) 0.9 %
10 SYRINGE (ML) INJECTION PRN
Status: DISCONTINUED | OUTPATIENT
Start: 2018-06-19 | End: 2018-06-22 | Stop reason: HOSPADM

## 2018-06-19 RX ORDER — ONDANSETRON 2 MG/ML
4 INJECTION INTRAMUSCULAR; INTRAVENOUS ONCE
Status: COMPLETED | OUTPATIENT
Start: 2018-06-20 | End: 2018-06-20

## 2018-06-19 RX ORDER — HYDROCODONE BITARTRATE AND ACETAMINOPHEN 5; 325 MG/1; MG/1
1 TABLET ORAL EVERY 6 HOURS PRN
Status: DISCONTINUED | OUTPATIENT
Start: 2018-06-19 | End: 2018-06-22 | Stop reason: HOSPADM

## 2018-06-19 RX ORDER — SODIUM CHLORIDE 0.9 % (FLUSH) 0.9 %
10 SYRINGE (ML) INJECTION EVERY 12 HOURS SCHEDULED
Status: DISCONTINUED | OUTPATIENT
Start: 2018-06-19 | End: 2018-06-22 | Stop reason: HOSPADM

## 2018-06-19 RX ORDER — DIPHENHYDRAMINE HYDROCHLORIDE 50 MG/ML
25 INJECTION INTRAMUSCULAR; INTRAVENOUS ONCE
Status: DISCONTINUED | OUTPATIENT
Start: 2018-06-19 | End: 2018-06-19

## 2018-06-19 RX ORDER — SODIUM CHLORIDE 9 MG/ML
INJECTION, SOLUTION INTRAVENOUS CONTINUOUS
Status: DISCONTINUED | OUTPATIENT
Start: 2018-06-20 | End: 2018-06-21

## 2018-06-19 RX ORDER — DIPHENHYDRAMINE HYDROCHLORIDE 50 MG/ML
25 INJECTION INTRAMUSCULAR; INTRAVENOUS EVERY 6 HOURS PRN
Status: DISCONTINUED | OUTPATIENT
Start: 2018-06-19 | End: 2018-06-22 | Stop reason: HOSPADM

## 2018-06-19 RX ORDER — ONDANSETRON 2 MG/ML
4 INJECTION INTRAMUSCULAR; INTRAVENOUS ONCE
Status: DISCONTINUED | OUTPATIENT
Start: 2018-06-19 | End: 2018-06-19

## 2018-06-19 RX ORDER — DIPHENHYDRAMINE HYDROCHLORIDE 50 MG/ML
25 INJECTION INTRAMUSCULAR; INTRAVENOUS ONCE
Status: COMPLETED | OUTPATIENT
Start: 2018-06-20 | End: 2018-06-20

## 2018-06-19 RX ORDER — ONDANSETRON 2 MG/ML
4 INJECTION INTRAMUSCULAR; INTRAVENOUS EVERY 6 HOURS PRN
Status: DISCONTINUED | OUTPATIENT
Start: 2018-06-19 | End: 2018-06-22 | Stop reason: HOSPADM

## 2018-06-19 RX ORDER — ESCITALOPRAM OXALATE 10 MG/1
10 TABLET ORAL NIGHTLY
Status: DISCONTINUED | OUTPATIENT
Start: 2018-06-19 | End: 2018-06-22 | Stop reason: HOSPADM

## 2018-06-19 RX ADMIN — Medication 10 ML: at 21:35

## 2018-06-19 RX ADMIN — Medication 10 ML: at 18:52

## 2018-06-19 RX ADMIN — VANCOMYCIN HYDROCHLORIDE 1750 MG: 10 INJECTION, POWDER, LYOPHILIZED, FOR SOLUTION INTRAVENOUS at 18:43

## 2018-06-19 RX ADMIN — DIPHENHYDRAMINE HYDROCHLORIDE 25 MG: 50 INJECTION, SOLUTION INTRAMUSCULAR; INTRAVENOUS at 21:35

## 2018-06-19 RX ADMIN — ESCITALOPRAM OXALATE 10 MG: 10 TABLET, FILM COATED ORAL at 23:12

## 2018-06-19 RX ADMIN — ONDANSETRON 4 MG: 2 INJECTION INTRAMUSCULAR; INTRAVENOUS at 21:02

## 2018-06-19 ASSESSMENT — PAIN SCALES - GENERAL: PAINLEVEL_OUTOF10: 8

## 2018-06-19 ASSESSMENT — PAIN DESCRIPTION - ORIENTATION: ORIENTATION: RIGHT

## 2018-06-19 ASSESSMENT — PAIN DESCRIPTION - PAIN TYPE: TYPE: ACUTE PAIN

## 2018-06-19 ASSESSMENT — PAIN DESCRIPTION - LOCATION: LOCATION: CHEST

## 2018-06-20 ENCOUNTER — ANESTHESIA (OUTPATIENT)
Dept: OPERATING ROOM | Age: 43
DRG: 363 | End: 2018-06-20
Payer: MEDICAID

## 2018-06-20 VITALS
OXYGEN SATURATION: 98 % | TEMPERATURE: 98.2 F | DIASTOLIC BLOOD PRESSURE: 88 MMHG | SYSTOLIC BLOOD PRESSURE: 158 MMHG | RESPIRATION RATE: 20 BRPM

## 2018-06-20 LAB — HCG(URINE) PREGNANCY TEST: NEGATIVE

## 2018-06-20 PROCEDURE — 87070 CULTURE OTHR SPECIMN AEROBIC: CPT

## 2018-06-20 PROCEDURE — 2720000010 HC SURG SUPPLY STERILE: Performed by: SURGERY

## 2018-06-20 PROCEDURE — 19020 MASTOTOMY EXPL DRG ABSC DP: CPT | Performed by: SURGERY

## 2018-06-20 PROCEDURE — 81025 URINE PREGNANCY TEST: CPT

## 2018-06-20 PROCEDURE — 7100000000 HC PACU RECOVERY - FIRST 15 MIN: Performed by: SURGERY

## 2018-06-20 PROCEDURE — 6360000002 HC RX W HCPCS: Performed by: STUDENT IN AN ORGANIZED HEALTH CARE EDUCATION/TRAINING PROGRAM

## 2018-06-20 PROCEDURE — 3600000002 HC SURGERY LEVEL 2 BASE: Performed by: SURGERY

## 2018-06-20 PROCEDURE — 2500000003 HC RX 250 WO HCPCS

## 2018-06-20 PROCEDURE — 87075 CULTR BACTERIA EXCEPT BLOOD: CPT

## 2018-06-20 PROCEDURE — 6360000002 HC RX W HCPCS

## 2018-06-20 PROCEDURE — 87205 SMEAR GRAM STAIN: CPT

## 2018-06-20 PROCEDURE — 3700000001 HC ADD 15 MINUTES (ANESTHESIA): Performed by: SURGERY

## 2018-06-20 PROCEDURE — 0HBT0ZX EXCISION OF RIGHT BREAST, OPEN APPROACH, DIAGNOSTIC: ICD-10-PCS | Performed by: SURGERY

## 2018-06-20 PROCEDURE — 2580000003 HC RX 258: Performed by: SURGERY

## 2018-06-20 PROCEDURE — 6370000000 HC RX 637 (ALT 250 FOR IP): Performed by: STUDENT IN AN ORGANIZED HEALTH CARE EDUCATION/TRAINING PROGRAM

## 2018-06-20 PROCEDURE — 3700000000 HC ANESTHESIA ATTENDED CARE: Performed by: SURGERY

## 2018-06-20 PROCEDURE — 3600000012 HC SURGERY LEVEL 2 ADDTL 15MIN: Performed by: SURGERY

## 2018-06-20 PROCEDURE — 2W14X6Z COMPRESSION OF CHEST WALL USING PRESSURE DRESSING: ICD-10-PCS | Performed by: SURGERY

## 2018-06-20 PROCEDURE — 2580000003 HC RX 258: Performed by: INTERNAL MEDICINE

## 2018-06-20 PROCEDURE — 2580000003 HC RX 258: Performed by: STUDENT IN AN ORGANIZED HEALTH CARE EDUCATION/TRAINING PROGRAM

## 2018-06-20 PROCEDURE — 6360000002 HC RX W HCPCS: Performed by: SURGERY

## 2018-06-20 PROCEDURE — 6360000002 HC RX W HCPCS: Performed by: INTERNAL MEDICINE

## 2018-06-20 PROCEDURE — 97605 NEG PRS WND THER DME<=50SQCM: CPT | Performed by: SURGERY

## 2018-06-20 PROCEDURE — 7100000001 HC PACU RECOVERY - ADDTL 15 MIN: Performed by: SURGERY

## 2018-06-20 PROCEDURE — 88302 TISSUE EXAM BY PATHOLOGIST: CPT

## 2018-06-20 PROCEDURE — 88305 TISSUE EXAM BY PATHOLOGIST: CPT

## 2018-06-20 PROCEDURE — 1200000000 HC SEMI PRIVATE

## 2018-06-20 RX ORDER — MIDAZOLAM HYDROCHLORIDE 1 MG/ML
INJECTION INTRAMUSCULAR; INTRAVENOUS PRN
Status: DISCONTINUED | OUTPATIENT
Start: 2018-06-20 | End: 2018-06-20 | Stop reason: SDUPTHER

## 2018-06-20 RX ORDER — PROPOFOL 10 MG/ML
INJECTION, EMULSION INTRAVENOUS PRN
Status: DISCONTINUED | OUTPATIENT
Start: 2018-06-20 | End: 2018-06-20 | Stop reason: SDUPTHER

## 2018-06-20 RX ORDER — GLYCOPYRROLATE 1 MG/5 ML
SYRINGE (ML) INTRAVENOUS PRN
Status: DISCONTINUED | OUTPATIENT
Start: 2018-06-20 | End: 2018-06-20 | Stop reason: SDUPTHER

## 2018-06-20 RX ORDER — DEXAMETHASONE SODIUM PHOSPHATE 10 MG/ML
INJECTION INTRAMUSCULAR; INTRAVENOUS PRN
Status: DISCONTINUED | OUTPATIENT
Start: 2018-06-20 | End: 2018-06-20 | Stop reason: SDUPTHER

## 2018-06-20 RX ORDER — NEOSTIGMINE METHYLSULFATE 1 MG/ML
INJECTION, SOLUTION INTRAVENOUS PRN
Status: DISCONTINUED | OUTPATIENT
Start: 2018-06-20 | End: 2018-06-20 | Stop reason: SDUPTHER

## 2018-06-20 RX ORDER — MORPHINE SULFATE 2 MG/ML
2 INJECTION, SOLUTION INTRAMUSCULAR; INTRAVENOUS EVERY 4 HOURS PRN
Status: DISCONTINUED | OUTPATIENT
Start: 2018-06-20 | End: 2018-06-22 | Stop reason: HOSPADM

## 2018-06-20 RX ORDER — ONDANSETRON 2 MG/ML
4 INJECTION INTRAMUSCULAR; INTRAVENOUS
Status: DISCONTINUED | OUTPATIENT
Start: 2018-06-20 | End: 2018-06-20 | Stop reason: HOSPADM

## 2018-06-20 RX ORDER — ONDANSETRON 2 MG/ML
INJECTION INTRAMUSCULAR; INTRAVENOUS PRN
Status: DISCONTINUED | OUTPATIENT
Start: 2018-06-20 | End: 2018-06-20 | Stop reason: SDUPTHER

## 2018-06-20 RX ORDER — FENTANYL CITRATE 50 UG/ML
INJECTION, SOLUTION INTRAMUSCULAR; INTRAVENOUS PRN
Status: DISCONTINUED | OUTPATIENT
Start: 2018-06-20 | End: 2018-06-20 | Stop reason: SDUPTHER

## 2018-06-20 RX ORDER — ROCURONIUM BROMIDE 10 MG/ML
INJECTION, SOLUTION INTRAVENOUS PRN
Status: DISCONTINUED | OUTPATIENT
Start: 2018-06-20 | End: 2018-06-20 | Stop reason: SDUPTHER

## 2018-06-20 RX ORDER — LIDOCAINE HYDROCHLORIDE 20 MG/ML
INJECTION, SOLUTION INFILTRATION; PERINEURAL PRN
Status: DISCONTINUED | OUTPATIENT
Start: 2018-06-20 | End: 2018-06-20 | Stop reason: SDUPTHER

## 2018-06-20 RX ADMIN — PIPERACILLIN SODIUM AND TAZOBACTAM SODIUM 3.38 G: 3; .375 INJECTION, POWDER, LYOPHILIZED, FOR SOLUTION INTRAVENOUS at 00:43

## 2018-06-20 RX ADMIN — ESCITALOPRAM OXALATE 10 MG: 10 TABLET, FILM COATED ORAL at 21:22

## 2018-06-20 RX ADMIN — HYDROCODONE BITARTRATE AND ACETAMINOPHEN 1 TABLET: 5; 325 TABLET ORAL at 12:18

## 2018-06-20 RX ADMIN — DEXAMETHASONE SODIUM PHOSPHATE 10 MG: 10 INJECTION INTRAMUSCULAR; INTRAVENOUS at 08:37

## 2018-06-20 RX ADMIN — ONDANSETRON 4 MG: 2 INJECTION INTRAMUSCULAR; INTRAVENOUS at 23:41

## 2018-06-20 RX ADMIN — MIDAZOLAM 2 MG: 1 INJECTION INTRAMUSCULAR; INTRAVENOUS at 08:13

## 2018-06-20 RX ADMIN — ONDANSETRON 4 MG: 2 INJECTION INTRAMUSCULAR; INTRAVENOUS at 09:08

## 2018-06-20 RX ADMIN — Medication 10 ML: at 11:45

## 2018-06-20 RX ADMIN — FENTANYL CITRATE 25 MCG: 50 INJECTION, SOLUTION INTRAMUSCULAR; INTRAVENOUS at 08:19

## 2018-06-20 RX ADMIN — HYDROCODONE BITARTRATE AND ACETAMINOPHEN 1 TABLET: 5; 325 TABLET ORAL at 18:34

## 2018-06-20 RX ADMIN — SODIUM CHLORIDE: 9 INJECTION, SOLUTION INTRAVENOUS at 21:24

## 2018-06-20 RX ADMIN — ENOXAPARIN SODIUM 40 MG: 40 INJECTION SUBCUTANEOUS at 13:34

## 2018-06-20 RX ADMIN — Medication 3 MG: at 09:09

## 2018-06-20 RX ADMIN — ROCURONIUM BROMIDE 40 MG: 10 INJECTION, SOLUTION INTRAVENOUS at 08:19

## 2018-06-20 RX ADMIN — FENTANYL CITRATE 25 MCG: 50 INJECTION, SOLUTION INTRAMUSCULAR; INTRAVENOUS at 09:27

## 2018-06-20 RX ADMIN — ONDANSETRON 4 MG: 2 INJECTION INTRAMUSCULAR; INTRAVENOUS at 07:05

## 2018-06-20 RX ADMIN — DIPHENHYDRAMINE HYDROCHLORIDE 25 MG: 50 INJECTION, SOLUTION INTRAMUSCULAR; INTRAVENOUS at 07:05

## 2018-06-20 RX ADMIN — SODIUM CHLORIDE: 9 INJECTION, SOLUTION INTRAVENOUS at 07:40

## 2018-06-20 RX ADMIN — Medication 10 ML: at 21:24

## 2018-06-20 RX ADMIN — Medication 0.6 MG: at 09:09

## 2018-06-20 RX ADMIN — PIPERACILLIN SODIUM AND TAZOBACTAM SODIUM 3.38 G: 3; .375 INJECTION, POWDER, LYOPHILIZED, FOR SOLUTION INTRAVENOUS at 11:45

## 2018-06-20 RX ADMIN — FENTANYL CITRATE 25 MCG: 50 INJECTION, SOLUTION INTRAMUSCULAR; INTRAVENOUS at 08:38

## 2018-06-20 RX ADMIN — VANCOMYCIN HYDROCHLORIDE 1750 MG: 10 INJECTION, POWDER, LYOPHILIZED, FOR SOLUTION INTRAVENOUS at 07:40

## 2018-06-20 RX ADMIN — FENTANYL CITRATE 25 MCG: 50 INJECTION, SOLUTION INTRAMUSCULAR; INTRAVENOUS at 09:15

## 2018-06-20 RX ADMIN — LIDOCAINE HYDROCHLORIDE 100 MG: 20 INJECTION, SOLUTION INFILTRATION; PERINEURAL at 08:19

## 2018-06-20 RX ADMIN — PROPOFOL 200 MG: 10 INJECTION, EMULSION INTRAVENOUS at 08:19

## 2018-06-20 RX ADMIN — SODIUM CHLORIDE: 9 INJECTION, SOLUTION INTRAVENOUS at 00:21

## 2018-06-20 RX ADMIN — DIPHENHYDRAMINE HYDROCHLORIDE 25 MG: 50 INJECTION, SOLUTION INTRAMUSCULAR; INTRAVENOUS at 23:41

## 2018-06-20 RX ADMIN — Medication 10 ML: at 00:21

## 2018-06-20 ASSESSMENT — PULMONARY FUNCTION TESTS
PIF_VALUE: 30
PIF_VALUE: 0
PIF_VALUE: 6
PIF_VALUE: 27
PIF_VALUE: 0
PIF_VALUE: 31
PIF_VALUE: 29
PIF_VALUE: 28
PIF_VALUE: 30
PIF_VALUE: 30
PIF_VALUE: 1
PIF_VALUE: 29
PIF_VALUE: 30
PIF_VALUE: 0
PIF_VALUE: 27
PIF_VALUE: 30
PIF_VALUE: 28
PIF_VALUE: 30
PIF_VALUE: 0
PIF_VALUE: 30
PIF_VALUE: 28
PIF_VALUE: 30
PIF_VALUE: 0
PIF_VALUE: 27
PIF_VALUE: 30
PIF_VALUE: 2
PIF_VALUE: 28
PIF_VALUE: 28
PIF_VALUE: 25
PIF_VALUE: 30
PIF_VALUE: 27
PIF_VALUE: 30
PIF_VALUE: 29
PIF_VALUE: 29
PIF_VALUE: 3
PIF_VALUE: 28
PIF_VALUE: 3
PIF_VALUE: 30
PIF_VALUE: 4
PIF_VALUE: 30
PIF_VALUE: 24
PIF_VALUE: 30
PIF_VALUE: 24
PIF_VALUE: 28
PIF_VALUE: 30
PIF_VALUE: 28
PIF_VALUE: 24
PIF_VALUE: 30
PIF_VALUE: 3
PIF_VALUE: 27
PIF_VALUE: 30
PIF_VALUE: 28
PIF_VALUE: 30
PIF_VALUE: 2
PIF_VALUE: 28
PIF_VALUE: 26
PIF_VALUE: 30
PIF_VALUE: 34
PIF_VALUE: 29
PIF_VALUE: 30

## 2018-06-20 ASSESSMENT — PAIN SCALES - GENERAL
PAINLEVEL_OUTOF10: 7
PAINLEVEL_OUTOF10: 0
PAINLEVEL_OUTOF10: 8
PAINLEVEL_OUTOF10: 0
PAINLEVEL_OUTOF10: 7
PAINLEVEL_OUTOF10: 0
PAINLEVEL_OUTOF10: 2
PAINLEVEL_OUTOF10: 5

## 2018-06-20 ASSESSMENT — PAIN DESCRIPTION - ORIENTATION: ORIENTATION: RIGHT

## 2018-06-20 ASSESSMENT — PAIN DESCRIPTION - DESCRIPTORS: DESCRIPTORS: DISCOMFORT

## 2018-06-20 ASSESSMENT — PAIN DESCRIPTION - LOCATION: LOCATION: BREAST

## 2018-06-20 ASSESSMENT — PAIN DESCRIPTION - PAIN TYPE: TYPE: ACUTE PAIN;SURGICAL PAIN

## 2018-06-21 LAB
ALBUMIN SERPL-MCNC: 2.8 G/DL (ref 3.5–5.2)
ALP BLD-CCNC: 75 U/L (ref 35–104)
ALT SERPL-CCNC: 73 U/L (ref 0–32)
ANION GAP SERPL CALCULATED.3IONS-SCNC: 14 MMOL/L (ref 7–16)
AST SERPL-CCNC: 33 U/L (ref 0–31)
BILIRUB SERPL-MCNC: 0.4 MG/DL (ref 0–1.2)
BUN BLDV-MCNC: 7 MG/DL (ref 6–20)
CALCIUM SERPL-MCNC: 8.1 MG/DL (ref 8.6–10.2)
CHLORIDE BLD-SCNC: 104 MMOL/L (ref 98–107)
CO2: 22 MMOL/L (ref 22–29)
CREAT SERPL-MCNC: 0.7 MG/DL (ref 0.5–1)
GFR AFRICAN AMERICAN: >60
GFR NON-AFRICAN AMERICAN: >60 ML/MIN/1.73
GLUCOSE BLD-MCNC: 105 MG/DL (ref 74–109)
HCT VFR BLD CALC: 36 % (ref 34–48)
HEMOGLOBIN: 11.9 G/DL (ref 11.5–15.5)
MCH RBC QN AUTO: 27.8 PG (ref 26–35)
MCHC RBC AUTO-ENTMCNC: 33.1 % (ref 32–34.5)
MCV RBC AUTO: 84.1 FL (ref 80–99.9)
PDW BLD-RTO: 14 FL (ref 11.5–15)
PLATELET # BLD: 462 E9/L (ref 130–450)
PMV BLD AUTO: 10.2 FL (ref 7–12)
POTASSIUM SERPL-SCNC: 3.8 MMOL/L (ref 3.5–5)
RBC # BLD: 4.28 E12/L (ref 3.5–5.5)
SODIUM BLD-SCNC: 140 MMOL/L (ref 132–146)
TOTAL PROTEIN: 6.7 G/DL (ref 6.4–8.3)
VANCOMYCIN TROUGH: 12.7 MCG/ML (ref 5–16)
WBC # BLD: 19.9 E9/L (ref 4.5–11.5)

## 2018-06-21 PROCEDURE — 6360000002 HC RX W HCPCS: Performed by: SURGERY

## 2018-06-21 PROCEDURE — 6370000000 HC RX 637 (ALT 250 FOR IP): Performed by: STUDENT IN AN ORGANIZED HEALTH CARE EDUCATION/TRAINING PROGRAM

## 2018-06-21 PROCEDURE — 80053 COMPREHEN METABOLIC PANEL: CPT

## 2018-06-21 PROCEDURE — 6360000002 HC RX W HCPCS: Performed by: STUDENT IN AN ORGANIZED HEALTH CARE EDUCATION/TRAINING PROGRAM

## 2018-06-21 PROCEDURE — 80202 ASSAY OF VANCOMYCIN: CPT

## 2018-06-21 PROCEDURE — 2580000003 HC RX 258: Performed by: STUDENT IN AN ORGANIZED HEALTH CARE EDUCATION/TRAINING PROGRAM

## 2018-06-21 PROCEDURE — 36415 COLL VENOUS BLD VENIPUNCTURE: CPT

## 2018-06-21 PROCEDURE — 1200000000 HC SEMI PRIVATE

## 2018-06-21 PROCEDURE — 85027 COMPLETE CBC AUTOMATED: CPT

## 2018-06-21 PROCEDURE — 2580000003 HC RX 258: Performed by: INTERNAL MEDICINE

## 2018-06-21 PROCEDURE — 6360000002 HC RX W HCPCS: Performed by: INTERNAL MEDICINE

## 2018-06-21 PROCEDURE — 2580000003 HC RX 258: Performed by: SURGERY

## 2018-06-21 RX ADMIN — PIPERACILLIN SODIUM AND TAZOBACTAM SODIUM 3.38 G: 3; .375 INJECTION, POWDER, LYOPHILIZED, FOR SOLUTION INTRAVENOUS at 00:08

## 2018-06-21 RX ADMIN — VANCOMYCIN HYDROCHLORIDE 1750 MG: 10 INJECTION, POWDER, LYOPHILIZED, FOR SOLUTION INTRAVENOUS at 13:55

## 2018-06-21 RX ADMIN — HYDROCODONE BITARTRATE AND ACETAMINOPHEN 1 TABLET: 5; 325 TABLET ORAL at 16:34

## 2018-06-21 RX ADMIN — Medication 10 ML: at 20:03

## 2018-06-21 RX ADMIN — ESCITALOPRAM OXALATE 10 MG: 10 TABLET, FILM COATED ORAL at 20:03

## 2018-06-21 RX ADMIN — HYDROCODONE BITARTRATE AND ACETAMINOPHEN 1 TABLET: 5; 325 TABLET ORAL at 07:05

## 2018-06-21 RX ADMIN — PIPERACILLIN SODIUM AND TAZOBACTAM SODIUM 3.38 G: 3; .375 INJECTION, POWDER, LYOPHILIZED, FOR SOLUTION INTRAVENOUS at 16:34

## 2018-06-21 RX ADMIN — PIPERACILLIN SODIUM AND TAZOBACTAM SODIUM 3.38 G: 3; .375 INJECTION, POWDER, LYOPHILIZED, FOR SOLUTION INTRAVENOUS at 08:46

## 2018-06-21 RX ADMIN — ENOXAPARIN SODIUM 40 MG: 40 INJECTION SUBCUTANEOUS at 08:47

## 2018-06-21 RX ADMIN — VANCOMYCIN HYDROCHLORIDE 1750 MG: 10 INJECTION, POWDER, LYOPHILIZED, FOR SOLUTION INTRAVENOUS at 00:08

## 2018-06-21 ASSESSMENT — PAIN DESCRIPTION - FREQUENCY
FREQUENCY: INTERMITTENT
FREQUENCY: INTERMITTENT

## 2018-06-21 ASSESSMENT — PAIN SCALES - GENERAL
PAINLEVEL_OUTOF10: 7
PAINLEVEL_OUTOF10: 0
PAINLEVEL_OUTOF10: 7

## 2018-06-21 ASSESSMENT — PAIN DESCRIPTION - DESCRIPTORS
DESCRIPTORS: DISCOMFORT
DESCRIPTORS: DISCOMFORT;SORE

## 2018-06-21 ASSESSMENT — PAIN DESCRIPTION - LOCATION
LOCATION: BREAST
LOCATION: BREAST

## 2018-06-21 ASSESSMENT — PAIN DESCRIPTION - ORIENTATION
ORIENTATION: RIGHT
ORIENTATION: RIGHT

## 2018-06-21 ASSESSMENT — PAIN DESCRIPTION - ONSET: ONSET: ON-GOING

## 2018-06-21 ASSESSMENT — PAIN DESCRIPTION - PAIN TYPE
TYPE: ACUTE PAIN
TYPE: ACUTE PAIN

## 2018-06-22 ENCOUNTER — TELEPHONE (OUTPATIENT)
Dept: BREAST CENTER | Age: 43
End: 2018-06-22

## 2018-06-22 VITALS
TEMPERATURE: 98.6 F | RESPIRATION RATE: 18 BRPM | BODY MASS INDEX: 50.85 KG/M2 | HEIGHT: 63 IN | OXYGEN SATURATION: 97 % | WEIGHT: 287 LBS | HEART RATE: 80 BPM | SYSTOLIC BLOOD PRESSURE: 98 MMHG | DIASTOLIC BLOOD PRESSURE: 62 MMHG

## 2018-06-22 LAB
ANAEROBIC CULTURE: NORMAL
CULTURE SURGICAL: NORMAL
GRAM STAIN RESULT: NORMAL
VANCOMYCIN TROUGH: 14.8 MCG/ML (ref 5–16)

## 2018-06-22 PROCEDURE — 6360000002 HC RX W HCPCS: Performed by: SURGERY

## 2018-06-22 PROCEDURE — 6370000000 HC RX 637 (ALT 250 FOR IP): Performed by: STUDENT IN AN ORGANIZED HEALTH CARE EDUCATION/TRAINING PROGRAM

## 2018-06-22 PROCEDURE — 2580000003 HC RX 258: Performed by: SURGERY

## 2018-06-22 PROCEDURE — 2580000003 HC RX 258: Performed by: INTERNAL MEDICINE

## 2018-06-22 PROCEDURE — 6360000002 HC RX W HCPCS: Performed by: INTERNAL MEDICINE

## 2018-06-22 PROCEDURE — 2580000003 HC RX 258: Performed by: STUDENT IN AN ORGANIZED HEALTH CARE EDUCATION/TRAINING PROGRAM

## 2018-06-22 PROCEDURE — 6360000002 HC RX W HCPCS: Performed by: STUDENT IN AN ORGANIZED HEALTH CARE EDUCATION/TRAINING PROGRAM

## 2018-06-22 RX ORDER — LEVOFLOXACIN 750 MG/1
750 TABLET ORAL DAILY
Qty: 11 TABLET | Refills: 0 | Status: SHIPPED | OUTPATIENT
Start: 2018-06-22 | End: 2018-07-03

## 2018-06-22 RX ORDER — DOXYCYCLINE HYCLATE 100 MG/1
100 CAPSULE ORAL 2 TIMES DAILY
Qty: 22 CAPSULE | Refills: 0 | Status: SHIPPED | OUTPATIENT
Start: 2018-06-22 | End: 2018-07-03

## 2018-06-22 RX ORDER — HYDROCODONE BITARTRATE AND ACETAMINOPHEN 5; 325 MG/1; MG/1
1 TABLET ORAL EVERY 6 HOURS PRN
Qty: 28 TABLET | Refills: 0 | Status: SHIPPED | OUTPATIENT
Start: 2018-06-22 | End: 2018-06-29

## 2018-06-22 RX ADMIN — HYDROCODONE BITARTRATE AND ACETAMINOPHEN 1 TABLET: 5; 325 TABLET ORAL at 09:55

## 2018-06-22 RX ADMIN — ONDANSETRON 4 MG: 2 INJECTION INTRAMUSCULAR; INTRAVENOUS at 06:18

## 2018-06-22 RX ADMIN — PIPERACILLIN SODIUM AND TAZOBACTAM SODIUM 3.38 G: 3; .375 INJECTION, POWDER, LYOPHILIZED, FOR SOLUTION INTRAVENOUS at 00:51

## 2018-06-22 RX ADMIN — DIPHENHYDRAMINE HYDROCHLORIDE 25 MG: 50 INJECTION, SOLUTION INTRAMUSCULAR; INTRAVENOUS at 06:21

## 2018-06-22 RX ADMIN — PIPERACILLIN SODIUM AND TAZOBACTAM SODIUM 3.38 G: 3; .375 INJECTION, POWDER, LYOPHILIZED, FOR SOLUTION INTRAVENOUS at 09:54

## 2018-06-22 RX ADMIN — Medication 10 ML: at 00:51

## 2018-06-22 RX ADMIN — ENOXAPARIN SODIUM 40 MG: 40 INJECTION SUBCUTANEOUS at 09:54

## 2018-06-22 RX ADMIN — HYDROCODONE BITARTRATE AND ACETAMINOPHEN 1 TABLET: 5; 325 TABLET ORAL at 01:13

## 2018-06-22 RX ADMIN — Medication 10 ML: at 09:59

## 2018-06-22 RX ADMIN — Medication 10 ML: at 06:18

## 2018-06-22 RX ADMIN — VANCOMYCIN HYDROCHLORIDE 1750 MG: 10 INJECTION, POWDER, LYOPHILIZED, FOR SOLUTION INTRAVENOUS at 06:53

## 2018-06-22 ASSESSMENT — PAIN DESCRIPTION - ORIENTATION
ORIENTATION: RIGHT
ORIENTATION: RIGHT

## 2018-06-22 ASSESSMENT — PAIN DESCRIPTION - LOCATION
LOCATION: BREAST
LOCATION: BREAST

## 2018-06-22 ASSESSMENT — PAIN DESCRIPTION - PAIN TYPE
TYPE: ACUTE PAIN
TYPE: ACUTE PAIN

## 2018-06-22 ASSESSMENT — PAIN SCALES - GENERAL
PAINLEVEL_OUTOF10: 7
PAINLEVEL_OUTOF10: 7
PAINLEVEL_OUTOF10: 4

## 2018-06-22 ASSESSMENT — PAIN DESCRIPTION - DESCRIPTORS
DESCRIPTORS: ACHING;DISCOMFORT;NAGGING
DESCRIPTORS: ACHING

## 2018-06-22 ASSESSMENT — PAIN DESCRIPTION - ONSET
ONSET: GRADUAL
ONSET: GRADUAL

## 2018-06-22 ASSESSMENT — PAIN DESCRIPTION - FREQUENCY
FREQUENCY: INTERMITTENT
FREQUENCY: INTERMITTENT

## 2018-06-23 LAB
CULTURE SURGICAL: ABNORMAL
CULTURE SURGICAL: ABNORMAL
GRAM STAIN RESULT: ABNORMAL
ORGANISM: ABNORMAL

## 2018-06-24 LAB
BLOOD CULTURE, ROUTINE: NORMAL
CULTURE, BLOOD 2: NORMAL

## 2018-06-25 DIAGNOSIS — R11.2 NAUSEA AND VOMITING, INTRACTABILITY OF VOMITING NOT SPECIFIED, UNSPECIFIED VOMITING TYPE: Primary | ICD-10-CM

## 2018-06-25 LAB — ANAEROBIC CULTURE: NORMAL

## 2018-06-25 RX ORDER — ONDANSETRON 4 MG/1
4 TABLET, FILM COATED ORAL EVERY 8 HOURS PRN
Qty: 30 TABLET | Refills: 1 | Status: SHIPPED | OUTPATIENT
Start: 2018-06-25 | End: 2019-02-25

## 2018-06-27 ENCOUNTER — TELEPHONE (OUTPATIENT)
Dept: SURGERY | Age: 43
End: 2018-06-27

## 2018-07-05 ENCOUNTER — TELEPHONE (OUTPATIENT)
Dept: FAMILY MEDICINE CLINIC | Age: 43
End: 2018-07-05

## 2018-07-10 ENCOUNTER — OFFICE VISIT (OUTPATIENT)
Dept: SURGERY | Age: 43
DRG: 363 | End: 2018-07-10
Payer: MEDICAID

## 2018-07-10 ENCOUNTER — PREP FOR PROCEDURE (OUTPATIENT)
Dept: SURGERY | Age: 43
End: 2018-07-10

## 2018-07-10 VITALS
TEMPERATURE: 98.5 F | RESPIRATION RATE: 16 BRPM | OXYGEN SATURATION: 97 % | HEART RATE: 92 BPM | SYSTOLIC BLOOD PRESSURE: 122 MMHG | DIASTOLIC BLOOD PRESSURE: 72 MMHG | BODY MASS INDEX: 52.61 KG/M2 | WEIGHT: 293 LBS

## 2018-07-10 DIAGNOSIS — N61.1 ABSCESS OF RIGHT BREAST: ICD-10-CM

## 2018-07-10 PROCEDURE — 1111F DSCHRG MED/CURRENT MED MERGE: CPT | Performed by: SURGERY

## 2018-07-10 PROCEDURE — 1036F TOBACCO NON-USER: CPT | Performed by: SURGERY

## 2018-07-10 PROCEDURE — G8417 CALC BMI ABV UP PARAM F/U: HCPCS | Performed by: SURGERY

## 2018-07-10 PROCEDURE — G8427 DOCREV CUR MEDS BY ELIG CLIN: HCPCS | Performed by: SURGERY

## 2018-07-10 PROCEDURE — 99213 OFFICE O/P EST LOW 20 MIN: CPT | Performed by: SURGERY

## 2018-07-10 PROCEDURE — 99211 OFF/OP EST MAY X REQ PHY/QHP: CPT | Performed by: SURGERY

## 2018-07-10 RX ORDER — SODIUM CHLORIDE 0.9 % (FLUSH) 0.9 %
10 SYRINGE (ML) INJECTION EVERY 12 HOURS SCHEDULED
Status: CANCELLED | OUTPATIENT
Start: 2018-07-10 | End: 2019-07-10

## 2018-07-10 RX ORDER — SODIUM CHLORIDE 9 MG/ML
INJECTION, SOLUTION INTRAVENOUS CONTINUOUS
Status: CANCELLED | OUTPATIENT
Start: 2018-07-10 | End: 2019-07-10

## 2018-07-10 RX ORDER — HYDROCODONE BITARTRATE AND ACETAMINOPHEN 5; 325 MG/1; MG/1
1 TABLET ORAL EVERY 6 HOURS PRN
Status: ON HOLD | COMMUNITY
End: 2018-07-13 | Stop reason: HOSPADM

## 2018-07-10 RX ORDER — SODIUM CHLORIDE 0.9 % (FLUSH) 0.9 %
10 SYRINGE (ML) INJECTION PRN
Status: CANCELLED | OUTPATIENT
Start: 2018-07-10 | End: 2019-07-10

## 2018-07-10 ASSESSMENT — ENCOUNTER SYMPTOMS
EYES NEGATIVE: 1
GASTROINTESTINAL NEGATIVE: 1
RESPIRATORY NEGATIVE: 1

## 2018-07-10 NOTE — TELEPHONE ENCOUNTER
She should taper the lexapro if she is going to stop taking them. I am hoping that they are able to provide her with another antibiotic. Please phone me instead of messaging me on here if there is a problem since I am on vacation.  Thank you

## 2018-07-10 NOTE — H&P
Temp 98.5 °F (36.9 °C) (Oral)   Resp 16   Wt 297 lb (134.7 kg)   LMP 06/18/2018   SpO2 97%   BMI 52.61 kg/m²   Physical Exam   Constitutional: She is oriented to person, place, and time. She appears well-developed and well-nourished. HENT:   Head: Normocephalic and atraumatic. Eyes: EOM are normal. Pupils are equal, round, and reactive to light. Neck: Normal range of motion. No tracheal deviation present. Cardiovascular: Normal rate and regular rhythm. Pulmonary/Chest: Effort normal and breath sounds normal. Right breast exhibits mass, skin change and tenderness. Right breast exhibits no inverted nipple and no nipple discharge. Breasts are asymmetrical. There is breast swelling. Genitourinary: There is breast tenderness. Musculoskeletal: Normal range of motion. She exhibits no edema. Neurological: She is alert and oriented to person, place, and time. Skin: Skin is warm and dry. Psychiatric: She has a normal mood and affect. Her behavior is normal. Judgment and thought content normal.     ASSESSMENT/PLAN:  1.   Right breast abscess with new area of abscess/necrosis  --plan for I&D in OR   --f/u with ID    Bashir Caro  7/10/2018  7:42 PM

## 2018-07-10 NOTE — PROGRESS NOTES
with you for the remainder of the day due to having had anesthesia. PARKING INSTRUCTIONS:   [x] Arrival [id number]   · [x] Parking lot 1 is located on [facility] (the corner of [facility] and [facility]). To enter, press the button and the gate will lift. A free token will be provided to exit the lot. One car per patient is allowed to park in this lot. All other cars are to park on [address] [place] [address] Street either in the parking garage or the handicap lot. [] Free  parking is available on [address] [place] [address]. · [] To reach the [facility] lobby from [address] [place] [address], upon entering the hospital, take elevator B to the 3rd floor. EDUCATION INSTRUCTIONS:      [] Knee or hip replacement booklet & exercise pamphlets given. [] Kaitlynn 78 placed in chart. [] Pre-admission Testing educational folder given  [] Incentive Spirometry,coughing & deep breathing exercises reviewed. []Medication information sheet(s)   []Fluoroscopy-Xray used in surgery reviewed with patient. Educational pamphlet placed in chart. []Pain: Post-op pain is normal and to be expected. You will be asked to rate your pain from 0-10(a zero is not acceptable-education is needed). Your post-op pain goal is:  [] Ask your nurse for your pain medication. [] Joint camp offered. [] Joint replacement booklets given. []Other     MEDICATION INSTRUCTIONS:   [x]Bring a complete list of your medications, please write the last time you took the medicine, give this list to the nurse. [x] Take the following medications the morning of surgery with 1-2 ounces of water: pain pill if needed  [x] Stop herbal supplements and vitamins 5 days before your surgery. [] DO NOT take any diabetic medicine the morning of surgery. Follow instructions for insulin the day before surgery. [] If you are diabetic and your blood sugar is low or you feel symptomatic, you may drink 1-2 ounces of apple juice or take a glucose tablet.   The

## 2018-07-10 NOTE — PROGRESS NOTES
[facility], [place]        Muriel Rodriguez  :  1975 MRN: [id number]       17 Time Session Began: noon Time Session Ended: 12:50pm    Session Type: 32812    Others Present: none    Intervention: Cognitive behavioral therapy    Patient Level of Functioning: Minimal Improvement    Current GAF  [Global Assessment of Functioning] (1-100): 58    Suicide/Homicide/Violence Ideation: No    If Yes, explain: none    No current outpatient prescriptions on file.       Change in Medication(s) Reported: no If Yes, explain: none    Patient/Family Education Provided:yes Patient/Family Displays Understanding: yes        Chief complaint in patient's own words: \"I get frustrated when things do not go my way\"    Progress Note containing chief complaint and symptoms/problems related to the complaint:    DARP (DATA, ACTION, RESPONSE, PLAN):   D.Patient discussed stress with her children's school district. Patient discussed many changes she does not like and feeling like the district is not listening to parents. Patient is looking at other schools for her children next year.  Patient discussed stress with school program and feeling that it is \"disorganized\".   Patient discussed working on getting along better with her mother and grandmother as this has been a major source of stress for her.  A. Writer focused on what patient can do to cope with her stress. Discussed looking at what she can change vs what she can not change.  R. Patient at times feels that only bad things happen to her, we discussed perspective and points of view.  P. Writer to follow with patient in 2 weeks.     Need for Community Resources Assessed: yes    Resources Needed:no    If Yes, what resources: none    Primary Diagnosis: Attention Deficit w/o Hyperactivity, depression  : 0 Unspecified    Treatment Plan: not changed    Discharge Plan: N/A    Next Appointment: 3/17/17 2pm      Phuong Wu LCSW         Relation Age of Onset    Cancer Maternal Grandmother 79        breast    Diabetes Maternal Grandmother     High Blood Pressure Maternal Grandmother     High Cholesterol Maternal Grandmother     High Cholesterol Mother     Cancer Mother 54        breast, colon--breast first then 3 years colon    Cancer Maternal Grandfather 60        throat     Social History   Substance Use Topics    Smoking status: Former Smoker     Years: 4.00     Quit date: 8/29/2008    Smokeless tobacco: Never Used    Alcohol use 0.0 oz/week      Comment: rarely     No Known Allergies  Current Outpatient Prescriptions   Medication Sig Dispense Refill    linezolid (ZYVOX) 600 MG tablet Take 1 tablet by mouth 2 times daily for 14 days 28 tablet 0    vitamin D (ERGOCALCIFEROL) 86262 units CAPS capsule Take 1 tab by mouth weekly (Patient taking differently: once a week Take 1 tab by mouth weekly) 12 capsule 2    escitalopram (LEXAPRO) 10 MG tablet TAKE 1 TABLET BY MOUTH DAILY 30 tablet 2    HYDROcodone-acetaminophen (NORCO) 5-325 MG per tablet Take 1 tablet by mouth every 6 hours as needed for Pain. .      ondansetron (ZOFRAN) 4 MG tablet Take 1 tablet by mouth every 8 hours as needed for Nausea or Vomiting 30 tablet 1    HYDROXYZINE HCL PO Take 25 mg by mouth nightly as needed       acetaminophen (TYLENOL) 500 MG tablet Take 1,000 mg by mouth every 6 hours as needed for Pain       No current facility-administered medications for this visit. Review of Systems   Constitutional: Negative for chills and fever. HENT: Negative. Eyes: Negative. Respiratory: Negative. Cardiovascular: Negative. Gastrointestinal: Negative. Genitourinary: Negative. Musculoskeletal: Negative. Skin:        New induration and erythema of the right breast   Neurological: Negative. Endo/Heme/Allergies: Negative. Psychiatric/Behavioral: Negative.       /72 (Site: Right Arm, Position: Sitting, Cuff Size: Large Adult)   Pulse 92

## 2018-07-11 ENCOUNTER — TELEPHONE (OUTPATIENT)
Dept: SURGERY | Age: 43
End: 2018-07-11

## 2018-07-11 ENCOUNTER — HOSPITAL ENCOUNTER (INPATIENT)
Age: 43
LOS: 5 days | Discharge: HOME HEALTH | DRG: 363 | End: 2018-07-16
Attending: SURGERY | Admitting: SURGERY
Payer: MEDICAID

## 2018-07-11 ENCOUNTER — ANESTHESIA EVENT (OUTPATIENT)
Dept: OPERATING ROOM | Age: 43
DRG: 363 | End: 2018-07-11
Payer: MEDICAID

## 2018-07-11 ENCOUNTER — ANESTHESIA (OUTPATIENT)
Dept: OPERATING ROOM | Age: 43
DRG: 363 | End: 2018-07-11
Payer: MEDICAID

## 2018-07-11 VITALS
DIASTOLIC BLOOD PRESSURE: 74 MMHG | SYSTOLIC BLOOD PRESSURE: 130 MMHG | OXYGEN SATURATION: 86 % | RESPIRATION RATE: 14 BRPM

## 2018-07-11 DIAGNOSIS — Z01.812 PRE-OPERATIVE LABORATORY EXAMINATION: Primary | ICD-10-CM

## 2018-07-11 DIAGNOSIS — F41.9 ANXIETY: ICD-10-CM

## 2018-07-11 DIAGNOSIS — G89.18 POST-OP PAIN: ICD-10-CM

## 2018-07-11 DIAGNOSIS — N61.1 BREAST ABSCESS: ICD-10-CM

## 2018-07-11 LAB — PREGNANCY TEST URINE, POC: NEGATIVE

## 2018-07-11 PROCEDURE — 7100000001 HC PACU RECOVERY - ADDTL 15 MIN: Performed by: SURGERY

## 2018-07-11 PROCEDURE — 1200000000 HC SEMI PRIVATE

## 2018-07-11 PROCEDURE — 87070 CULTURE OTHR SPECIMN AEROBIC: CPT

## 2018-07-11 PROCEDURE — 87015 SPECIMEN INFECT AGNT CONCNTJ: CPT

## 2018-07-11 PROCEDURE — 3700000000 HC ANESTHESIA ATTENDED CARE: Performed by: SURGERY

## 2018-07-11 PROCEDURE — 87206 SMEAR FLUORESCENT/ACID STAI: CPT

## 2018-07-11 PROCEDURE — 2700000000 HC OXYGEN THERAPY PER DAY

## 2018-07-11 PROCEDURE — 2W14X6Z COMPRESSION OF CHEST WALL USING PRESSURE DRESSING: ICD-10-PCS | Performed by: SURGERY

## 2018-07-11 PROCEDURE — 3600000012 HC SURGERY LEVEL 2 ADDTL 15MIN: Performed by: SURGERY

## 2018-07-11 PROCEDURE — 7100000000 HC PACU RECOVERY - FIRST 15 MIN: Performed by: SURGERY

## 2018-07-11 PROCEDURE — 6360000002 HC RX W HCPCS

## 2018-07-11 PROCEDURE — 87102 FUNGUS ISOLATION CULTURE: CPT

## 2018-07-11 PROCEDURE — 2500000003 HC RX 250 WO HCPCS

## 2018-07-11 PROCEDURE — 87077 CULTURE AEROBIC IDENTIFY: CPT

## 2018-07-11 PROCEDURE — 3700000001 HC ADD 15 MINUTES (ANESTHESIA): Performed by: SURGERY

## 2018-07-11 PROCEDURE — 2500000003 HC RX 250 WO HCPCS: Performed by: SURGERY

## 2018-07-11 PROCEDURE — 0HBT0ZZ EXCISION OF RIGHT BREAST, OPEN APPROACH: ICD-10-PCS | Performed by: SURGERY

## 2018-07-11 PROCEDURE — 2580000003 HC RX 258: Performed by: SURGERY

## 2018-07-11 PROCEDURE — 87186 SC STD MICRODIL/AGAR DIL: CPT

## 2018-07-11 PROCEDURE — 19020 MASTOTOMY EXPL DRG ABSC DP: CPT | Performed by: SURGERY

## 2018-07-11 PROCEDURE — 88304 TISSUE EXAM BY PATHOLOGIST: CPT

## 2018-07-11 PROCEDURE — 87116 MYCOBACTERIA CULTURE: CPT

## 2018-07-11 PROCEDURE — 2580000003 HC RX 258

## 2018-07-11 PROCEDURE — 87075 CULTR BACTERIA EXCEPT BLOOD: CPT

## 2018-07-11 PROCEDURE — 2720000010 HC SURG SUPPLY STERILE: Performed by: SURGERY

## 2018-07-11 PROCEDURE — 3600000002 HC SURGERY LEVEL 2 BASE: Performed by: SURGERY

## 2018-07-11 PROCEDURE — 6360000002 HC RX W HCPCS: Performed by: SURGERY

## 2018-07-11 PROCEDURE — 6360000002 HC RX W HCPCS: Performed by: ANESTHESIOLOGY

## 2018-07-11 PROCEDURE — 88305 TISSUE EXAM BY PATHOLOGIST: CPT

## 2018-07-11 PROCEDURE — 87205 SMEAR GRAM STAIN: CPT

## 2018-07-11 RX ORDER — LIDOCAINE HYDROCHLORIDE 20 MG/ML
INJECTION, SOLUTION EPIDURAL; INFILTRATION; INTRACAUDAL; PERINEURAL PRN
Status: DISCONTINUED | OUTPATIENT
Start: 2018-07-11 | End: 2018-07-11 | Stop reason: SDUPTHER

## 2018-07-11 RX ORDER — ONDANSETRON 2 MG/ML
4 INJECTION INTRAMUSCULAR; INTRAVENOUS EVERY 6 HOURS PRN
Status: DISCONTINUED | OUTPATIENT
Start: 2018-07-11 | End: 2018-07-16 | Stop reason: HOSPADM

## 2018-07-11 RX ORDER — SODIUM CHLORIDE 9 MG/ML
INJECTION, SOLUTION INTRAVENOUS CONTINUOUS PRN
Status: DISCONTINUED | OUTPATIENT
Start: 2018-07-11 | End: 2018-07-11 | Stop reason: SDUPTHER

## 2018-07-11 RX ORDER — ONDANSETRON 2 MG/ML
INJECTION INTRAMUSCULAR; INTRAVENOUS
Status: DISPENSED
Start: 2018-07-11 | End: 2018-07-12

## 2018-07-11 RX ORDER — MEPERIDINE HYDROCHLORIDE 50 MG/ML
12.5 INJECTION INTRAMUSCULAR; INTRAVENOUS; SUBCUTANEOUS EVERY 5 MIN PRN
Status: DISCONTINUED | OUTPATIENT
Start: 2018-07-11 | End: 2018-07-11 | Stop reason: HOSPADM

## 2018-07-11 RX ORDER — MIDAZOLAM HYDROCHLORIDE 1 MG/ML
INJECTION INTRAMUSCULAR; INTRAVENOUS PRN
Status: DISCONTINUED | OUTPATIENT
Start: 2018-07-11 | End: 2018-07-11 | Stop reason: SDUPTHER

## 2018-07-11 RX ORDER — HYDROCODONE BITARTRATE AND ACETAMINOPHEN 5; 325 MG/1; MG/1
2 TABLET ORAL EVERY 4 HOURS PRN
Status: DISCONTINUED | OUTPATIENT
Start: 2018-07-11 | End: 2018-07-16 | Stop reason: HOSPADM

## 2018-07-11 RX ORDER — ROCURONIUM BROMIDE 10 MG/ML
INJECTION, SOLUTION INTRAVENOUS PRN
Status: DISCONTINUED | OUTPATIENT
Start: 2018-07-11 | End: 2018-07-11 | Stop reason: SDUPTHER

## 2018-07-11 RX ORDER — OXYCODONE HYDROCHLORIDE AND ACETAMINOPHEN 5; 325 MG/1; MG/1
1 TABLET ORAL
Status: DISCONTINUED | OUTPATIENT
Start: 2018-07-11 | End: 2018-07-11 | Stop reason: HOSPADM

## 2018-07-11 RX ORDER — CLINDAMYCIN PHOSPHATE 900 MG/50ML
900 INJECTION INTRAVENOUS ONCE
Status: COMPLETED | OUTPATIENT
Start: 2018-07-11 | End: 2018-07-11

## 2018-07-11 RX ORDER — ACETAMINOPHEN 325 MG/1
650 TABLET ORAL EVERY 4 HOURS PRN
Status: DISCONTINUED | OUTPATIENT
Start: 2018-07-11 | End: 2018-07-16 | Stop reason: HOSPADM

## 2018-07-11 RX ORDER — DIPHENHYDRAMINE HYDROCHLORIDE 50 MG/ML
12.5 INJECTION INTRAMUSCULAR; INTRAVENOUS
Status: DISCONTINUED | OUTPATIENT
Start: 2018-07-11 | End: 2018-07-11 | Stop reason: HOSPADM

## 2018-07-11 RX ORDER — MORPHINE SULFATE 2 MG/ML
2 INJECTION, SOLUTION INTRAMUSCULAR; INTRAVENOUS
Status: DISCONTINUED | OUTPATIENT
Start: 2018-07-11 | End: 2018-07-13

## 2018-07-11 RX ORDER — SODIUM CHLORIDE 0.9 % (FLUSH) 0.9 %
10 SYRINGE (ML) INJECTION EVERY 12 HOURS SCHEDULED
Status: DISCONTINUED | OUTPATIENT
Start: 2018-07-11 | End: 2018-07-14 | Stop reason: SDUPTHER

## 2018-07-11 RX ORDER — FENTANYL CITRATE 50 UG/ML
50 INJECTION, SOLUTION INTRAMUSCULAR; INTRAVENOUS EVERY 5 MIN PRN
Status: COMPLETED | OUTPATIENT
Start: 2018-07-11 | End: 2018-07-11

## 2018-07-11 RX ORDER — HYDROCODONE BITARTRATE AND ACETAMINOPHEN 5; 325 MG/1; MG/1
1 TABLET ORAL EVERY 4 HOURS PRN
Status: DISCONTINUED | OUTPATIENT
Start: 2018-07-11 | End: 2018-07-16 | Stop reason: HOSPADM

## 2018-07-11 RX ORDER — FENTANYL CITRATE 50 UG/ML
25 INJECTION, SOLUTION INTRAMUSCULAR; INTRAVENOUS EVERY 5 MIN PRN
Status: DISCONTINUED | OUTPATIENT
Start: 2018-07-11 | End: 2018-07-11 | Stop reason: HOSPADM

## 2018-07-11 RX ORDER — DIPHENHYDRAMINE HYDROCHLORIDE 50 MG/ML
25 INJECTION INTRAMUSCULAR; INTRAVENOUS 2 TIMES DAILY
Status: DISCONTINUED | OUTPATIENT
Start: 2018-07-11 | End: 2018-07-16 | Stop reason: HOSPADM

## 2018-07-11 RX ORDER — ONDANSETRON 2 MG/ML
INJECTION INTRAMUSCULAR; INTRAVENOUS PRN
Status: DISCONTINUED | OUTPATIENT
Start: 2018-07-11 | End: 2018-07-11 | Stop reason: SDUPTHER

## 2018-07-11 RX ORDER — SODIUM CHLORIDE 0.9 % (FLUSH) 0.9 %
10 SYRINGE (ML) INJECTION EVERY 12 HOURS SCHEDULED
Status: DISCONTINUED | OUTPATIENT
Start: 2018-07-11 | End: 2018-07-11 | Stop reason: HOSPADM

## 2018-07-11 RX ORDER — PROPOFOL 10 MG/ML
INJECTION, EMULSION INTRAVENOUS PRN
Status: DISCONTINUED | OUTPATIENT
Start: 2018-07-11 | End: 2018-07-11 | Stop reason: SDUPTHER

## 2018-07-11 RX ORDER — PROMETHAZINE HYDROCHLORIDE 25 MG/ML
6.25 INJECTION, SOLUTION INTRAMUSCULAR; INTRAVENOUS
Status: DISCONTINUED | OUTPATIENT
Start: 2018-07-11 | End: 2018-07-11 | Stop reason: HOSPADM

## 2018-07-11 RX ORDER — ONDANSETRON 2 MG/ML
4 INJECTION INTRAMUSCULAR; INTRAVENOUS ONCE
Status: COMPLETED | OUTPATIENT
Start: 2018-07-11 | End: 2018-07-11

## 2018-07-11 RX ORDER — DEXAMETHASONE SODIUM PHOSPHATE 10 MG/ML
INJECTION, SOLUTION INTRAMUSCULAR; INTRAVENOUS PRN
Status: DISCONTINUED | OUTPATIENT
Start: 2018-07-11 | End: 2018-07-11 | Stop reason: SDUPTHER

## 2018-07-11 RX ORDER — ESCITALOPRAM OXALATE 10 MG/1
10 TABLET ORAL DAILY
Status: DISCONTINUED | OUTPATIENT
Start: 2018-07-12 | End: 2018-07-16 | Stop reason: HOSPADM

## 2018-07-11 RX ORDER — SODIUM CHLORIDE 0.9 % (FLUSH) 0.9 %
10 SYRINGE (ML) INJECTION PRN
Status: DISCONTINUED | OUTPATIENT
Start: 2018-07-11 | End: 2018-07-11 | Stop reason: HOSPADM

## 2018-07-11 RX ORDER — LINEZOLID 600 MG/1
600 TABLET, FILM COATED ORAL 2 TIMES DAILY
Status: DISCONTINUED | OUTPATIENT
Start: 2018-07-11 | End: 2018-07-12

## 2018-07-11 RX ORDER — SODIUM CHLORIDE 9 MG/ML
INJECTION, SOLUTION INTRAVENOUS CONTINUOUS
Status: DISCONTINUED | OUTPATIENT
Start: 2018-07-11 | End: 2018-07-11

## 2018-07-11 RX ORDER — SUCCINYLCHOLINE CHLORIDE 20 MG/ML
INJECTION INTRAMUSCULAR; INTRAVENOUS PRN
Status: DISCONTINUED | OUTPATIENT
Start: 2018-07-11 | End: 2018-07-11 | Stop reason: SDUPTHER

## 2018-07-11 RX ORDER — FENTANYL CITRATE 50 UG/ML
INJECTION, SOLUTION INTRAMUSCULAR; INTRAVENOUS PRN
Status: DISCONTINUED | OUTPATIENT
Start: 2018-07-11 | End: 2018-07-11 | Stop reason: SDUPTHER

## 2018-07-11 RX ORDER — MORPHINE SULFATE 4 MG/ML
4 INJECTION, SOLUTION INTRAMUSCULAR; INTRAVENOUS
Status: DISCONTINUED | OUTPATIENT
Start: 2018-07-11 | End: 2018-07-13

## 2018-07-11 RX ORDER — SODIUM CHLORIDE 0.9 % (FLUSH) 0.9 %
10 SYRINGE (ML) INJECTION PRN
Status: DISCONTINUED | OUTPATIENT
Start: 2018-07-11 | End: 2018-07-14 | Stop reason: SDUPTHER

## 2018-07-11 RX ADMIN — FENTANYL CITRATE 50 MCG: 50 INJECTION, SOLUTION INTRAMUSCULAR; INTRAVENOUS at 20:33

## 2018-07-11 RX ADMIN — FENTANYL CITRATE 50 MCG: 50 INJECTION, SOLUTION INTRAMUSCULAR; INTRAVENOUS at 19:00

## 2018-07-11 RX ADMIN — FENTANYL CITRATE 50 MCG: 50 INJECTION, SOLUTION INTRAMUSCULAR; INTRAVENOUS at 20:54

## 2018-07-11 RX ADMIN — SUCCINYLCHOLINE CHLORIDE 140 MG: 20 INJECTION, SOLUTION INTRAMUSCULAR; INTRAVENOUS at 18:19

## 2018-07-11 RX ADMIN — FENTANYL CITRATE 50 MCG: 50 INJECTION, SOLUTION INTRAMUSCULAR; INTRAVENOUS at 18:40

## 2018-07-11 RX ADMIN — ONDANSETRON 4 MG: 2 INJECTION INTRAMUSCULAR; INTRAVENOUS at 18:20

## 2018-07-11 RX ADMIN — MIDAZOLAM HYDROCHLORIDE 2 MG: 1 INJECTION, SOLUTION INTRAMUSCULAR; INTRAVENOUS at 18:17

## 2018-07-11 RX ADMIN — PROPOFOL 180 MG: 10 INJECTION, EMULSION INTRAVENOUS at 18:19

## 2018-07-11 RX ADMIN — CLINDAMYCIN PHOSPHATE 900 MG: 900 INJECTION INTRAVENOUS at 18:22

## 2018-07-11 RX ADMIN — FENTANYL CITRATE 50 MCG: 50 INJECTION, SOLUTION INTRAMUSCULAR; INTRAVENOUS at 19:58

## 2018-07-11 RX ADMIN — FENTANYL CITRATE 50 MCG: 50 INJECTION, SOLUTION INTRAMUSCULAR; INTRAVENOUS at 20:41

## 2018-07-11 RX ADMIN — SODIUM CHLORIDE: 9 INJECTION, SOLUTION INTRAVENOUS at 20:05

## 2018-07-11 RX ADMIN — FENTANYL CITRATE 50 MCG: 50 INJECTION, SOLUTION INTRAMUSCULAR; INTRAVENOUS at 21:00

## 2018-07-11 RX ADMIN — FENTANYL CITRATE 50 MCG: 50 INJECTION, SOLUTION INTRAMUSCULAR; INTRAVENOUS at 18:19

## 2018-07-11 RX ADMIN — ONDANSETRON 4 MG: 2 INJECTION INTRAMUSCULAR; INTRAVENOUS at 20:29

## 2018-07-11 RX ADMIN — SODIUM CHLORIDE: 9 INJECTION, SOLUTION INTRAVENOUS at 18:16

## 2018-07-11 RX ADMIN — LIDOCAINE HYDROCHLORIDE 80 MG: 20 INJECTION, SOLUTION EPIDURAL; INFILTRATION; INTRACAUDAL; PERINEURAL at 18:19

## 2018-07-11 RX ADMIN — SODIUM CHLORIDE: 9 INJECTION, SOLUTION INTRAVENOUS at 14:19

## 2018-07-11 RX ADMIN — DEXAMETHASONE SODIUM PHOSPHATE 10 MG: 10 INJECTION, SOLUTION INTRAMUSCULAR; INTRAVENOUS at 18:19

## 2018-07-11 RX ADMIN — ROCURONIUM BROMIDE 10 MG: 10 INJECTION, SOLUTION INTRAVENOUS at 18:33

## 2018-07-11 ASSESSMENT — PULMONARY FUNCTION TESTS
PIF_VALUE: 35
PIF_VALUE: 36
PIF_VALUE: 26
PIF_VALUE: 37
PIF_VALUE: 36
PIF_VALUE: 3
PIF_VALUE: 37
PIF_VALUE: 36
PIF_VALUE: 3
PIF_VALUE: 34
PIF_VALUE: 36
PIF_VALUE: 37
PIF_VALUE: 36
PIF_VALUE: 36
PIF_VALUE: 37
PIF_VALUE: 35
PIF_VALUE: 36
PIF_VALUE: 37
PIF_VALUE: 4
PIF_VALUE: 35
PIF_VALUE: 37
PIF_VALUE: 37
PIF_VALUE: 36
PIF_VALUE: 37
PIF_VALUE: 5
PIF_VALUE: 34
PIF_VALUE: 36
PIF_VALUE: 37
PIF_VALUE: 36
PIF_VALUE: 36
PIF_VALUE: 35
PIF_VALUE: 35
PIF_VALUE: 37
PIF_VALUE: 36
PIF_VALUE: 35
PIF_VALUE: 41
PIF_VALUE: 36
PIF_VALUE: 36
PIF_VALUE: 39
PIF_VALUE: 36
PIF_VALUE: 36
PIF_VALUE: 35
PIF_VALUE: 0
PIF_VALUE: 36
PIF_VALUE: 35
PIF_VALUE: 37
PIF_VALUE: 36
PIF_VALUE: 36
PIF_VALUE: 37
PIF_VALUE: 36
PIF_VALUE: 35
PIF_VALUE: 36
PIF_VALUE: 36
PIF_VALUE: 34
PIF_VALUE: 37
PIF_VALUE: 38
PIF_VALUE: 36
PIF_VALUE: 36
PIF_VALUE: 2
PIF_VALUE: 37
PIF_VALUE: 4
PIF_VALUE: 36
PIF_VALUE: 35
PIF_VALUE: 41
PIF_VALUE: 36
PIF_VALUE: 40
PIF_VALUE: 33
PIF_VALUE: 37
PIF_VALUE: 35
PIF_VALUE: 36
PIF_VALUE: 35
PIF_VALUE: 37
PIF_VALUE: 36
PIF_VALUE: 37
PIF_VALUE: 34
PIF_VALUE: 36
PIF_VALUE: 1
PIF_VALUE: 36
PIF_VALUE: 36
PIF_VALUE: 0
PIF_VALUE: 36
PIF_VALUE: 38
PIF_VALUE: 36
PIF_VALUE: 37
PIF_VALUE: 36
PIF_VALUE: 22
PIF_VALUE: 34
PIF_VALUE: 36
PIF_VALUE: 34

## 2018-07-11 ASSESSMENT — PAIN SCALES - GENERAL
PAINLEVEL_OUTOF10: 8
PAINLEVEL_OUTOF10: 7
PAINLEVEL_OUTOF10: 6
PAINLEVEL_OUTOF10: 8
PAINLEVEL_OUTOF10: 0
PAINLEVEL_OUTOF10: 7
PAINLEVEL_OUTOF10: 0
PAINLEVEL_OUTOF10: 3

## 2018-07-11 ASSESSMENT — PAIN DESCRIPTION - FREQUENCY
FREQUENCY: CONTINUOUS

## 2018-07-11 ASSESSMENT — PAIN DESCRIPTION - DESCRIPTORS
DESCRIPTORS: BURNING;CONSTANT
DESCRIPTORS: ACHING;BURNING;CONSTANT
DESCRIPTORS: BURNING
DESCRIPTORS: ACHING;BURNING;CONSTANT

## 2018-07-11 ASSESSMENT — PAIN DESCRIPTION - PAIN TYPE
TYPE: SURGICAL PAIN

## 2018-07-11 ASSESSMENT — PAIN DESCRIPTION - ORIENTATION
ORIENTATION: RIGHT

## 2018-07-11 ASSESSMENT — PAIN DESCRIPTION - ONSET: ONSET: ON-GOING

## 2018-07-11 ASSESSMENT — PAIN - FUNCTIONAL ASSESSMENT: PAIN_FUNCTIONAL_ASSESSMENT: 0-10

## 2018-07-11 ASSESSMENT — PAIN DESCRIPTION - LOCATION
LOCATION: BREAST

## 2018-07-11 NOTE — H&P (VIEW-ONLY)
Temp 98.5 °F (36.9 °C) (Oral)   Resp 16   Wt 297 lb (134.7 kg)   LMP 06/18/2018   SpO2 97%   BMI 52.61 kg/m²   Physical Exam   Constitutional: She is oriented to person, place, and time. She appears well-developed and well-nourished. HENT:   Head: Normocephalic and atraumatic. Eyes: EOM are normal. Pupils are equal, round, and reactive to light. Neck: Normal range of motion. No tracheal deviation present. Cardiovascular: Normal rate and regular rhythm. Pulmonary/Chest: Effort normal and breath sounds normal. Right breast exhibits mass, skin change and tenderness. Right breast exhibits no inverted nipple and no nipple discharge. Breasts are asymmetrical. There is breast swelling. Genitourinary: There is breast tenderness. Musculoskeletal: Normal range of motion. She exhibits no edema. Neurological: She is alert and oriented to person, place, and time. Skin: Skin is warm and dry. Psychiatric: She has a normal mood and affect. Her behavior is normal. Judgment and thought content normal.     ASSESSMENT/PLAN:  1.   Right breast abscess with new area of abscess/necrosis  --plan for I&D in OR   --f/u with ID    Melony Julio  7/10/2018  7:42 PM

## 2018-07-11 NOTE — PROGRESS NOTES
Admitted to preop, patient has no family present but will have ride home. Right breast dressing times 2. Side dressing dry with old drainage. Upper anterior dressing applied by patient for serous drainage from today. Small amount drainage on dressing.

## 2018-07-11 NOTE — ANESTHESIA PRE PROCEDURE
Forearm)  Induction: intravenous. BIS    Anesthetic plan and risks discussed with patient. Plan discussed with CRNA and attending.                   Zen Ann MD   7/11/2018

## 2018-07-12 LAB
ALBUMIN SERPL-MCNC: 3.2 G/DL (ref 3.5–5.2)
ALP BLD-CCNC: 67 U/L (ref 35–104)
ALT SERPL-CCNC: 23 U/L (ref 0–32)
ANION GAP SERPL CALCULATED.3IONS-SCNC: 13 MMOL/L (ref 7–16)
AST SERPL-CCNC: 14 U/L (ref 0–31)
BASOPHILS ABSOLUTE: 0.02 E9/L (ref 0–0.2)
BASOPHILS RELATIVE PERCENT: 0.1 % (ref 0–2)
BILIRUB SERPL-MCNC: 0.6 MG/DL (ref 0–1.2)
BUN BLDV-MCNC: 12 MG/DL (ref 6–20)
CALCIUM SERPL-MCNC: 8.3 MG/DL (ref 8.6–10.2)
CHLORIDE BLD-SCNC: 102 MMOL/L (ref 98–107)
CO2: 23 MMOL/L (ref 22–29)
CREAT SERPL-MCNC: 0.8 MG/DL (ref 0.5–1)
EOSINOPHILS ABSOLUTE: 0 E9/L (ref 0.05–0.5)
EOSINOPHILS RELATIVE PERCENT: 0 % (ref 0–6)
GFR AFRICAN AMERICAN: >60
GFR NON-AFRICAN AMERICAN: >60 ML/MIN/1.73
GLUCOSE BLD-MCNC: 134 MG/DL (ref 74–109)
GRAM STAIN ORDERABLE: NORMAL
GRAM STAIN ORDERABLE: NORMAL
HCT VFR BLD CALC: 31.4 % (ref 34–48)
HEMOGLOBIN: 10.2 G/DL (ref 11.5–15.5)
IMMATURE GRANULOCYTES #: 0.11 E9/L
IMMATURE GRANULOCYTES %: 0.6 % (ref 0–5)
LYMPHOCYTES ABSOLUTE: 1.42 E9/L (ref 1.5–4)
LYMPHOCYTES RELATIVE PERCENT: 8.2 % (ref 20–42)
MCH RBC QN AUTO: 26.8 PG (ref 26–35)
MCHC RBC AUTO-ENTMCNC: 32.5 % (ref 32–34.5)
MCV RBC AUTO: 82.6 FL (ref 80–99.9)
MONOCYTES ABSOLUTE: 0.49 E9/L (ref 0.1–0.95)
MONOCYTES RELATIVE PERCENT: 2.8 % (ref 2–12)
NEUTROPHILS ABSOLUTE: 15.29 E9/L (ref 1.8–7.3)
NEUTROPHILS RELATIVE PERCENT: 88.3 % (ref 43–80)
PDW BLD-RTO: 14.6 FL (ref 11.5–15)
PLATELET # BLD: 441 E9/L (ref 130–450)
PMV BLD AUTO: 10.3 FL (ref 7–12)
POTASSIUM REFLEX MAGNESIUM: 4.1 MMOL/L (ref 3.5–5)
RBC # BLD: 3.8 E12/L (ref 3.5–5.5)
SODIUM BLD-SCNC: 138 MMOL/L (ref 132–146)
TOTAL PROTEIN: 7.2 G/DL (ref 6.4–8.3)
WBC # BLD: 17.3 E9/L (ref 4.5–11.5)

## 2018-07-12 PROCEDURE — 6370000000 HC RX 637 (ALT 250 FOR IP): Performed by: STUDENT IN AN ORGANIZED HEALTH CARE EDUCATION/TRAINING PROGRAM

## 2018-07-12 PROCEDURE — 6360000002 HC RX W HCPCS: Performed by: STUDENT IN AN ORGANIZED HEALTH CARE EDUCATION/TRAINING PROGRAM

## 2018-07-12 PROCEDURE — 85025 COMPLETE CBC W/AUTO DIFF WBC: CPT

## 2018-07-12 PROCEDURE — 2580000003 HC RX 258: Performed by: STUDENT IN AN ORGANIZED HEALTH CARE EDUCATION/TRAINING PROGRAM

## 2018-07-12 PROCEDURE — 36415 COLL VENOUS BLD VENIPUNCTURE: CPT

## 2018-07-12 PROCEDURE — 99024 POSTOP FOLLOW-UP VISIT: CPT | Performed by: SURGERY

## 2018-07-12 PROCEDURE — 1200000000 HC SEMI PRIVATE

## 2018-07-12 PROCEDURE — 80053 COMPREHEN METABOLIC PANEL: CPT

## 2018-07-12 PROCEDURE — 6360000002 HC RX W HCPCS: Performed by: SURGERY

## 2018-07-12 RX ORDER — LEVOFLOXACIN 5 MG/ML
500 INJECTION, SOLUTION INTRAVENOUS EVERY 24 HOURS
Status: DISCONTINUED | OUTPATIENT
Start: 2018-07-12 | End: 2018-07-13

## 2018-07-12 RX ADMIN — DIPHENHYDRAMINE HYDROCHLORIDE 25 MG: 50 INJECTION, SOLUTION INTRAMUSCULAR; INTRAVENOUS at 11:12

## 2018-07-12 RX ADMIN — ESCITALOPRAM OXALATE 10 MG: 10 TABLET, FILM COATED ORAL at 21:19

## 2018-07-12 RX ADMIN — HYDROCODONE BITARTRATE AND ACETAMINOPHEN 1 TABLET: 5; 325 TABLET ORAL at 21:19

## 2018-07-12 RX ADMIN — Medication 10 ML: at 22:48

## 2018-07-12 RX ADMIN — ONDANSETRON 4 MG: 2 INJECTION INTRAMUSCULAR; INTRAVENOUS at 11:11

## 2018-07-12 RX ADMIN — VANCOMYCIN HYDROCHLORIDE 1500 MG: 10 INJECTION, POWDER, LYOPHILIZED, FOR SOLUTION INTRAVENOUS at 11:46

## 2018-07-12 RX ADMIN — Medication 10 ML: at 09:50

## 2018-07-12 RX ADMIN — LEVOFLOXACIN 500 MG: 5 INJECTION, SOLUTION INTRAVENOUS at 09:48

## 2018-07-12 RX ADMIN — HYDROCODONE BITARTRATE AND ACETAMINOPHEN 1 TABLET: 5; 325 TABLET ORAL at 02:26

## 2018-07-12 RX ADMIN — HYDROCODONE BITARTRATE AND ACETAMINOPHEN 1 TABLET: 5; 325 TABLET ORAL at 11:46

## 2018-07-12 RX ADMIN — ENOXAPARIN SODIUM 40 MG: 40 INJECTION SUBCUTANEOUS at 08:48

## 2018-07-12 RX ADMIN — HYDROCODONE BITARTRATE AND ACETAMINOPHEN 1 TABLET: 5; 325 TABLET ORAL at 16:17

## 2018-07-12 RX ADMIN — ESCITALOPRAM OXALATE 10 MG: 10 TABLET, FILM COATED ORAL at 04:45

## 2018-07-12 ASSESSMENT — PAIN DESCRIPTION - ORIENTATION: ORIENTATION: RIGHT

## 2018-07-12 ASSESSMENT — PAIN DESCRIPTION - ONSET: ONSET: GRADUAL

## 2018-07-12 ASSESSMENT — PAIN SCALES - GENERAL
PAINLEVEL_OUTOF10: 5
PAINLEVEL_OUTOF10: 5
PAINLEVEL_OUTOF10: 0
PAINLEVEL_OUTOF10: 6
PAINLEVEL_OUTOF10: 4

## 2018-07-12 ASSESSMENT — PAIN DESCRIPTION - PAIN TYPE: TYPE: SURGICAL PAIN

## 2018-07-12 ASSESSMENT — PAIN DESCRIPTION - DESCRIPTORS: DESCRIPTORS: ACHING;DISCOMFORT;NAGGING

## 2018-07-12 ASSESSMENT — PAIN DESCRIPTION - FREQUENCY: FREQUENCY: INTERMITTENT

## 2018-07-12 ASSESSMENT — PAIN DESCRIPTION - LOCATION: LOCATION: BREAST

## 2018-07-12 NOTE — PROGRESS NOTES
Pharmacy Consultation Note  (Antibiotic Dosing and Monitoring)    Initial consult date: 2018  Consulting physician: Dr. Eddie Gill  Drug(s): vancomycin   Indication: abscess, R breast  Age/Gender IBW DW  Allergy Information   43 y.o./F; 160 cm, 134.7 kg 52.9 kg 85.6 kg  Vancomycin = temp, abdominal upset, Red Man rxn           Date  WBC BUN/CR Drug/Dose Time   Given Level(s)   (Time) Comments    17.3 12/0.8 Vancomycin 1500 mg q12h 1146                                  Estimated Creatinine Clearance: 123 mL/min (based on SCr of 0.8 mg/dL). UO not documented. Temp max: Temp (24hrs), Av.3 °F (36.8 °C), Min:97.8 °F (36.6 °C), Max:98.6 °F (37 °C)    Cultures:  available culture and sensitivity results were reviewed in EPIC      Assessment:  · Consulted by Dr. Guy Cooper to dose/monitor vancomycin. · Goal trough level:  15-20 mcg/mL. · 44 yo/F, admitted 7/10 for I&D of R breast abscess after being seen in ID office on . · ID consulted and will see patient today, patient known to them. · Received clindamycin 900 mg IV x1 on  @ 1822. · Has had adverse reactions to vancomycin in the past (intolerances). · Surgery started IV levofloxacin today. Plan:  · Continue vancomycin 1500 mg q12h as ordered. Infectious Disease has been consulted. Clinical Pharmacy will defer to ID for ATB dosing and monitoring and sign-off. ID physician will re-consult Clinical Pharmacy if needed.     Mahala Buerger, Sabrina  2018  2:04 PM  Pager: [phone]

## 2018-07-12 NOTE — CONSULTS
sinus tenderness. Oropharynx: Oropharynx clear with no exudates seen  Neck: Neck supple. No jugular venous distension, lymphadenopathy or thyromegaly Trachea midline  Lungs: Lungs clear to auscultation bilaterally. No rhonchi, crackles or wheezes  Heart: S1 S2  Regular rate and rhythm. No rub, murmur or gallop  Abdomen: Abdomen soft, non-tender. BS normal. No masses, organomegaly  Extremities: No edema, Peripheral pulses palpable  Musculoskeletal: Muscular strength appears intact. No joint effusion, tenderness, swelling or warmth      DATA:    Labs:     Last 3 CBC:  Recent Labs      07/12/18   0623   WBC  17.3*   RBC  3.80   HGB  10.2*   PLT  441   MPV  10.3       Last 3 BMP  Recent Labs      07/12/18   0623   NA  138   K  4.1   CL  102   CO2  23   BUN  12   CREATININE  0.8   GLUCOSE  134*   CALCIUM  8.3*       LIVER PROFILE:  Recent Labs      07/12/18 0623   AST  14   ALT  23   LABALBU  3.2*     Microbiology :  No results for input(s): BC in the last 72 hours. No results for input(s): Erenest Erp in the last 72 hours. No results for input(s): LABURIN in the last 72 hours. No results for input(s): CULTRESP in the last 72 hours. No results for input(s): WNDABS in the last 72 hours.       Radiology :  No orders to display           Assessment and Plan:       Recurrent right breast abscess  - Recent I&D done on 5/23/4438  - Complicated abscess with loculations  - Surgical cultures grew only corynebacterium    Plan  - At this time patient states her very anxious and not sure about her future surgical plans  - At the surgical plan is for complete mastectomy because the breast tissue is not salvageable she wouldn't need any further antibiotics however if she is given another trial I would prefer to IV vancomycin started getting corynebacterium  - I requested the micro lab to do the susceptibility for corynebacterium which is a send out test and may not be back for a week  - Can discontinue Levaquin and add metronidazole    Thank you for your consult, please call for any Dot MD Dorcas

## 2018-07-12 NOTE — CARE COORDINATION
READMISSION ASSESSMENT      1. Before your last discharge, were you feeling improved? YES  2. When did you start not feeling well? 3. Where did you go after being discharged? HOME  4. Were you given instructions on medications and how to care for yourself? YES  5. If you went home did you have help? YES [facility]  6. Did you get your medications filled? 7. Did you follow up with your doctor at the office? YES  8. Who instructed you to come back to the hospital? (Self-referral, physicians office).  INFECTIOUS DISEASE DOCTOR

## 2018-07-12 NOTE — CARE COORDINATION
Met with patient in room to explain role and discuss transition of care. Patient  lives with her  two young children and is recently  from father of her children. She is active with [facility] for wound care/wound vac. [facility]air[facility] Paul[facility] Will need THOMAS orders at discharge. Patient expressed concerns regarding her medical situation , fear of possible need for mastectomy ,and arranging care for her children. CM listened and provided support. CM/SW will continue to follow.

## 2018-07-13 LAB
ALBUMIN SERPL-MCNC: 3.2 G/DL (ref 3.5–5.2)
ALP BLD-CCNC: 59 U/L (ref 35–104)
ALT SERPL-CCNC: 23 U/L (ref 0–32)
ANAEROBIC CULTURE: NORMAL
ANION GAP SERPL CALCULATED.3IONS-SCNC: 11 MMOL/L (ref 7–16)
AST SERPL-CCNC: 18 U/L (ref 0–31)
BASOPHILS ABSOLUTE: 0.03 E9/L (ref 0–0.2)
BASOPHILS RELATIVE PERCENT: 0.2 % (ref 0–2)
BILIRUB SERPL-MCNC: 0.3 MG/DL (ref 0–1.2)
BUN BLDV-MCNC: 13 MG/DL (ref 6–20)
CALCIUM SERPL-MCNC: 7.9 MG/DL (ref 8.6–10.2)
CHLORIDE BLD-SCNC: 103 MMOL/L (ref 98–107)
CO2: 27 MMOL/L (ref 22–29)
CREAT SERPL-MCNC: 0.8 MG/DL (ref 0.5–1)
EOSINOPHILS ABSOLUTE: 0.1 E9/L (ref 0.05–0.5)
EOSINOPHILS RELATIVE PERCENT: 0.7 % (ref 0–6)
GFR AFRICAN AMERICAN: >60
GFR NON-AFRICAN AMERICAN: >60 ML/MIN/1.73
GLUCOSE BLD-MCNC: 79 MG/DL (ref 74–109)
HCT VFR BLD CALC: 30.2 % (ref 34–48)
HEMOGLOBIN: 9.4 G/DL (ref 11.5–15.5)
IMMATURE GRANULOCYTES #: 0.09 E9/L
IMMATURE GRANULOCYTES %: 0.6 % (ref 0–5)
LYMPHOCYTES ABSOLUTE: 4.6 E9/L (ref 1.5–4)
LYMPHOCYTES RELATIVE PERCENT: 33 % (ref 20–42)
MCH RBC QN AUTO: 26.3 PG (ref 26–35)
MCHC RBC AUTO-ENTMCNC: 31.1 % (ref 32–34.5)
MCV RBC AUTO: 84.6 FL (ref 80–99.9)
MONOCYTES ABSOLUTE: 1.14 E9/L (ref 0.1–0.95)
MONOCYTES RELATIVE PERCENT: 8.2 % (ref 2–12)
NEUTROPHILS ABSOLUTE: 7.96 E9/L (ref 1.8–7.3)
NEUTROPHILS RELATIVE PERCENT: 57.3 % (ref 43–80)
PDW BLD-RTO: 14.6 FL (ref 11.5–15)
PLATELET # BLD: 430 E9/L (ref 130–450)
PMV BLD AUTO: 10 FL (ref 7–12)
POTASSIUM REFLEX MAGNESIUM: 4.3 MMOL/L (ref 3.5–5)
RBC # BLD: 3.57 E12/L (ref 3.5–5.5)
SODIUM BLD-SCNC: 141 MMOL/L (ref 132–146)
TOTAL PROTEIN: 6.4 G/DL (ref 6.4–8.3)
VANCOMYCIN TROUGH: 36.8 MCG/ML (ref 5–16)
WBC # BLD: 13.9 E9/L (ref 4.5–11.5)

## 2018-07-13 PROCEDURE — 6370000000 HC RX 637 (ALT 250 FOR IP): Performed by: INTERNAL MEDICINE

## 2018-07-13 PROCEDURE — 36415 COLL VENOUS BLD VENIPUNCTURE: CPT

## 2018-07-13 PROCEDURE — 85025 COMPLETE CBC W/AUTO DIFF WBC: CPT

## 2018-07-13 PROCEDURE — 6360000002 HC RX W HCPCS: Performed by: STUDENT IN AN ORGANIZED HEALTH CARE EDUCATION/TRAINING PROGRAM

## 2018-07-13 PROCEDURE — 99024 POSTOP FOLLOW-UP VISIT: CPT | Performed by: SURGERY

## 2018-07-13 PROCEDURE — 6370000000 HC RX 637 (ALT 250 FOR IP): Performed by: STUDENT IN AN ORGANIZED HEALTH CARE EDUCATION/TRAINING PROGRAM

## 2018-07-13 PROCEDURE — 2580000003 HC RX 258: Performed by: STUDENT IN AN ORGANIZED HEALTH CARE EDUCATION/TRAINING PROGRAM

## 2018-07-13 PROCEDURE — 1200000000 HC SEMI PRIVATE

## 2018-07-13 PROCEDURE — 80202 ASSAY OF VANCOMYCIN: CPT

## 2018-07-13 PROCEDURE — 80053 COMPREHEN METABOLIC PANEL: CPT

## 2018-07-13 RX ORDER — HYDROCODONE BITARTRATE AND ACETAMINOPHEN 5; 325 MG/1; MG/1
1 TABLET ORAL EVERY 6 HOURS PRN
Qty: 28 TABLET | Refills: 0 | Status: SHIPPED | OUTPATIENT
Start: 2018-07-13 | End: 2018-10-22 | Stop reason: SDUPTHER

## 2018-07-13 RX ORDER — LORAZEPAM 2 MG/ML
1 INJECTION INTRAMUSCULAR ONCE
Status: COMPLETED | OUTPATIENT
Start: 2018-07-13 | End: 2018-07-13

## 2018-07-13 RX ORDER — METRONIDAZOLE 500 MG/1
500 TABLET ORAL EVERY 8 HOURS SCHEDULED
Status: DISCONTINUED | OUTPATIENT
Start: 2018-07-13 | End: 2018-07-14

## 2018-07-13 RX ORDER — LORAZEPAM 1 MG/1
1 TABLET ORAL
Qty: 6 TABLET | Refills: 0 | Status: SHIPPED | OUTPATIENT
Start: 2018-07-13 | End: 2018-07-27

## 2018-07-13 RX ORDER — LORAZEPAM 2 MG/ML
INJECTION INTRAMUSCULAR
Status: DISPENSED
Start: 2018-07-13 | End: 2018-07-14

## 2018-07-13 RX ADMIN — LORAZEPAM 1 MG: 2 INJECTION INTRAMUSCULAR; INTRAVENOUS at 14:35

## 2018-07-13 RX ADMIN — ONDANSETRON 4 MG: 2 INJECTION INTRAMUSCULAR; INTRAVENOUS at 13:02

## 2018-07-13 RX ADMIN — VANCOMYCIN HYDROCHLORIDE 1500 MG: 10 INJECTION, POWDER, LYOPHILIZED, FOR SOLUTION INTRAVENOUS at 13:24

## 2018-07-13 RX ADMIN — HYDROCODONE BITARTRATE AND ACETAMINOPHEN 1 TABLET: 5; 325 TABLET ORAL at 21:28

## 2018-07-13 RX ADMIN — DIPHENHYDRAMINE HYDROCHLORIDE 25 MG: 50 INJECTION, SOLUTION INTRAMUSCULAR; INTRAVENOUS at 13:02

## 2018-07-13 RX ADMIN — HYDROCODONE BITARTRATE AND ACETAMINOPHEN 1 TABLET: 5; 325 TABLET ORAL at 14:22

## 2018-07-13 RX ADMIN — HYDROCODONE BITARTRATE AND ACETAMINOPHEN 1 TABLET: 5; 325 TABLET ORAL at 10:02

## 2018-07-13 RX ADMIN — Medication 10 ML: at 21:29

## 2018-07-13 RX ADMIN — ESCITALOPRAM OXALATE 10 MG: 10 TABLET, FILM COATED ORAL at 21:28

## 2018-07-13 RX ADMIN — DIPHENHYDRAMINE HYDROCHLORIDE 25 MG: 50 INJECTION, SOLUTION INTRAMUSCULAR; INTRAVENOUS at 01:00

## 2018-07-13 RX ADMIN — METRONIDAZOLE 500 MG: 500 TABLET, FILM COATED ORAL at 13:05

## 2018-07-13 ASSESSMENT — PAIN DESCRIPTION - PAIN TYPE
TYPE: SURGICAL PAIN

## 2018-07-13 ASSESSMENT — PAIN DESCRIPTION - ORIENTATION
ORIENTATION: RIGHT

## 2018-07-13 ASSESSMENT — PAIN DESCRIPTION - ONSET
ONSET: ON-GOING

## 2018-07-13 ASSESSMENT — PAIN DESCRIPTION - DESCRIPTORS
DESCRIPTORS: ACHING;CONSTANT;DISCOMFORT
DESCRIPTORS: ACHING;CONSTANT;DISCOMFORT
DESCRIPTORS: ACHING;DULL;DISCOMFORT

## 2018-07-13 ASSESSMENT — PAIN DESCRIPTION - FREQUENCY
FREQUENCY: CONTINUOUS

## 2018-07-13 ASSESSMENT — PAIN DESCRIPTION - LOCATION
LOCATION: BREAST

## 2018-07-13 ASSESSMENT — PAIN SCALES - GENERAL
PAINLEVEL_OUTOF10: 5
PAINLEVEL_OUTOF10: 3
PAINLEVEL_OUTOF10: 5
PAINLEVEL_OUTOF10: 3
PAINLEVEL_OUTOF10: 3
PAINLEVEL_OUTOF10: 6

## 2018-07-13 ASSESSMENT — PAIN DESCRIPTION - PROGRESSION
CLINICAL_PROGRESSION: NOT CHANGED

## 2018-07-13 NOTE — PROGRESS NOTES
ID Progress Note      Brief Interval History    Subjective:  Very anxious and tearful about her surgical plans  The patient is awake and alert. Tolerating medications. Reports no side effects. Afebrile. 10 ROS otherwise negative unless otherwise specified above. Objective:    Vitals:    07/13/18 0830   BP: (!) 142/70   Pulse: 66   Resp: 18   Temp: 97.8 °F (36.6 °C)   SpO2: 96%     General appearance: Alert, Awake, Oriented times 3, no distress  Skin:   Right breast extensive swelling, wound VAC in place  Eyes: Pink palpebral conjunctivae. PERRL  Ears: External ears normal. No tragal tenderness. TM intact  Nose/Sinuses: Nares normal. Septum midline. Mucosa normal. No sinus tenderness. Oropharynx: Oropharynx clear with no exudates seen  Neck: Neck supple. No jugular venous distension, lymphadenopathy or thyromegaly Trachea midline  Lungs: Lungs clear to auscultation bilaterally. No rhonchi, crackles or wheezes  Heart: S1 S2  Regular rate and rhythm. No rub, murmur or gallop  Abdomen: Abdomen soft, non-tender. BS normal. No masses, organomegaly  Extremities: No edema, Peripheral pulses palpable  Musculoskeletal: Muscular strength appears intact.  No joint effusion, tenderness, swelling or warmth    Labs:  Recent Labs      07/12/18   0623  07/13/18   0649   WBC  17.3*  13.9*   RBC  3.80  3.57   HGB  10.2*  9.4*   HCT  31.4*  30.2*   MCV  82.6  84.6   MCH  26.8  26.3   MCHC  32.5  31.1*   RDW  14.6  14.6   PLT  441  430   MPV  10.3  10.0     CMP:    Lab Results   Component Value Date     07/13/2018    K 4.3 07/13/2018     07/13/2018    CO2 27 07/13/2018    BUN 13 07/13/2018    CREATININE 0.8 07/13/2018    GFRAA >60 07/13/2018    LABGLOM >60 07/13/2018    GLUCOSE 79 07/13/2018    GLUCOSE 80 04/20/2012    PROT 6.4 07/13/2018    LABALBU 3.2 07/13/2018    LABALBU 3.6 04/20/2012    CALCIUM 7.9 07/13/2018    BILITOT 0.3 07/13/2018    ALKPHOS 59 07/13/2018    AST 18 07/13/2018    ALT 23 07/13/2018 Microbiology :  No results for input(s): BC in the last 72 hours. No results for input(s): Alek Friends in the last 72 hours. No results for input(s): LABURIN in the last 72 hours. No results for input(s): CULTRESP in the last 72 hours. No results for input(s): WNDABS in the last 72 hours.     Radiology :  No orders to display       Assessment and Plan:       Recurrent right breast abscess  - Recent I&D done on 5/38/4786  - Complicated abscess with loculations  - Surgical cultures grew only corynebacterium     Plan  - At this time patient states her very anxious and not sure about her future surgical plans  - At the surgical plan is for complete mastectomy because the breast tissue is not salvageable she wouldn't need any further antibiotics however if she is given another trial I would prefer to IV vancomycin started getting corynebacterium  - I requested the micro lab to do the susceptibility for corynebacterium which is a send out test and may not be back for a week  - Can discontinue Levaquin and add metronidazole  - Will discuss with Dr. Kalyan Sidhu and about final surgical plan  - HOLD picc line for now          Electronically signed by Coco Hermosillo MD on 7/13/2018 at 10:36 AM

## 2018-07-13 NOTE — PROGRESS NOTES
GENERAL SURGERY  DAILY PROGRESS NOTE  7/13/2018    Subjective:  Pt notes mild pain R breast. Denies nausea, vomiting. Afebrile. Objective:  BP (!) 142/70   Pulse 66   Temp 97.8 °F (36.6 °C) (Temporal)   Resp 18   Ht 5' 3\" (1.6 m)   Wt 297 lb (134.7 kg)   LMP 06/18/2018   SpO2 96%   BMI 52.61 kg/m²     General appearance: AAOX3. NAD   Head:NCAT, EOMI, PERRLA, conjunctiva pink   Neck: no masses, supple   Lungs: CTABL, Equal Air Entry Bilateral   Chest: R breast, wound vac in place, working, mild erythema, firm, mild tenderness   Heart:  RRR  Abdomen: soft, nondistended, nontender  Extremities:No edema, moves all extremities     Assessment/Plan:  44 y.o. female R breast abscess s/p I&D 7/11    Continue antibiotics  Wound vac to be changed today   Tolerating pain with PO medication   Dr. Kimber Kline to discuss options regarding further treatment      Note signed electronically by Ilana KEITH at 9:38 AM on 7/13/2018

## 2018-07-13 NOTE — PROGRESS NOTES
Patient's IV infiltrated, patient requested that this nurse remove the IV, unable to give antibiotics as ordered. This nurse was unable to get a successful IV site, other nurses were unsuccessful. Patient will be put on the IV team for an IV site.

## 2018-07-13 NOTE — PROGRESS NOTES
Exchanged wound vac at bedside. The wound had a healthy base. No necrosis noted. Skin edges healthy with no signs of ischemia. Placed 2 sponges wrapped in 2 petroleum gauze each for a total of 4 petroleum gauze dressings in the wound. Wound vac had a good seal.  Patient tolerated with some anxiety. Dr. Nika Robles was present and discussed treatment options with her. Patient initially stated she wants a mastectomy but agreed to take some time to think about her options. She will likely discharge home tomorrow.     Electronically signed by Hector Rene MD on 7/13/2018 at 3:16 PM

## 2018-07-14 LAB
ALBUMIN SERPL-MCNC: 3 G/DL (ref 3.5–5.2)
ALP BLD-CCNC: 54 U/L (ref 35–104)
ALT SERPL-CCNC: 17 U/L (ref 0–32)
ANION GAP SERPL CALCULATED.3IONS-SCNC: 10 MMOL/L (ref 7–16)
AST SERPL-CCNC: 9 U/L (ref 0–31)
BASOPHILS ABSOLUTE: 0.04 E9/L (ref 0–0.2)
BASOPHILS RELATIVE PERCENT: 0.3 % (ref 0–2)
BILIRUB SERPL-MCNC: 0.3 MG/DL (ref 0–1.2)
BUN BLDV-MCNC: 13 MG/DL (ref 6–20)
CALCIUM SERPL-MCNC: 8 MG/DL (ref 8.6–10.2)
CHLORIDE BLD-SCNC: 103 MMOL/L (ref 98–107)
CO2: 27 MMOL/L (ref 22–29)
CREAT SERPL-MCNC: 0.9 MG/DL (ref 0.5–1)
CULTURE SURGICAL: ABNORMAL
CULTURE SURGICAL: ABNORMAL
CULTURE SURGICAL: NORMAL
EOSINOPHILS ABSOLUTE: 0.23 E9/L (ref 0.05–0.5)
EOSINOPHILS RELATIVE PERCENT: 1.8 % (ref 0–6)
GFR AFRICAN AMERICAN: >60
GFR NON-AFRICAN AMERICAN: >60 ML/MIN/1.73
GLUCOSE BLD-MCNC: 82 MG/DL (ref 74–109)
GRAM STAIN RESULT: ABNORMAL
GRAM STAIN RESULT: NORMAL
HCT VFR BLD CALC: 29.2 % (ref 34–48)
HEMOGLOBIN: 9.1 G/DL (ref 11.5–15.5)
IMMATURE GRANULOCYTES #: 0.05 E9/L
IMMATURE GRANULOCYTES %: 0.4 % (ref 0–5)
LYMPHOCYTES ABSOLUTE: 3.59 E9/L (ref 1.5–4)
LYMPHOCYTES RELATIVE PERCENT: 27.6 % (ref 20–42)
MCH RBC QN AUTO: 26.5 PG (ref 26–35)
MCHC RBC AUTO-ENTMCNC: 31.2 % (ref 32–34.5)
MCV RBC AUTO: 85.1 FL (ref 80–99.9)
MONOCYTES ABSOLUTE: 1.1 E9/L (ref 0.1–0.95)
MONOCYTES RELATIVE PERCENT: 8.4 % (ref 2–12)
NEUTROPHILS ABSOLUTE: 8.02 E9/L (ref 1.8–7.3)
NEUTROPHILS RELATIVE PERCENT: 61.5 % (ref 43–80)
ORGANISM: ABNORMAL
PDW BLD-RTO: 14.8 FL (ref 11.5–15)
PLATELET # BLD: 384 E9/L (ref 130–450)
PMV BLD AUTO: 9.9 FL (ref 7–12)
POTASSIUM REFLEX MAGNESIUM: 4.4 MMOL/L (ref 3.5–5)
PROLACTIN: 64.48 NG/ML
RBC # BLD: 3.43 E12/L (ref 3.5–5.5)
SODIUM BLD-SCNC: 140 MMOL/L (ref 132–146)
TOTAL PROTEIN: 5.9 G/DL (ref 6.4–8.3)
VANCOMYCIN TROUGH: 11.1 MCG/ML (ref 5–16)
WBC # BLD: 13 E9/L (ref 4.5–11.5)

## 2018-07-14 PROCEDURE — 36415 COLL VENOUS BLD VENIPUNCTURE: CPT

## 2018-07-14 PROCEDURE — 6360000002 HC RX W HCPCS: Performed by: STUDENT IN AN ORGANIZED HEALTH CARE EDUCATION/TRAINING PROGRAM

## 2018-07-14 PROCEDURE — 80053 COMPREHEN METABOLIC PANEL: CPT

## 2018-07-14 PROCEDURE — 99024 POSTOP FOLLOW-UP VISIT: CPT | Performed by: SURGERY

## 2018-07-14 PROCEDURE — 1200000000 HC SEMI PRIVATE

## 2018-07-14 PROCEDURE — 6370000000 HC RX 637 (ALT 250 FOR IP): Performed by: STUDENT IN AN ORGANIZED HEALTH CARE EDUCATION/TRAINING PROGRAM

## 2018-07-14 PROCEDURE — 6370000000 HC RX 637 (ALT 250 FOR IP): Performed by: INTERNAL MEDICINE

## 2018-07-14 PROCEDURE — 85025 COMPLETE CBC W/AUTO DIFF WBC: CPT

## 2018-07-14 PROCEDURE — 2580000003 HC RX 258: Performed by: STUDENT IN AN ORGANIZED HEALTH CARE EDUCATION/TRAINING PROGRAM

## 2018-07-14 PROCEDURE — 84146 ASSAY OF PROLACTIN: CPT

## 2018-07-14 PROCEDURE — 2580000003 HC RX 258: Performed by: INTERNAL MEDICINE

## 2018-07-14 RX ORDER — SODIUM CHLORIDE 0.9 % (FLUSH) 0.9 %
10 SYRINGE (ML) INJECTION PRN
Status: DISCONTINUED | OUTPATIENT
Start: 2018-07-14 | End: 2018-07-16 | Stop reason: HOSPADM

## 2018-07-14 RX ORDER — SODIUM CHLORIDE 0.9 % (FLUSH) 0.9 %
10 SYRINGE (ML) INJECTION EVERY 12 HOURS SCHEDULED
Status: DISCONTINUED | OUTPATIENT
Start: 2018-07-14 | End: 2018-07-16 | Stop reason: HOSPADM

## 2018-07-14 RX ORDER — LIDOCAINE HYDROCHLORIDE 10 MG/ML
5 INJECTION, SOLUTION EPIDURAL; INFILTRATION; INTRACAUDAL; PERINEURAL ONCE
Status: DISCONTINUED | OUTPATIENT
Start: 2018-07-14 | End: 2018-07-16 | Stop reason: HOSPADM

## 2018-07-14 RX ADMIN — Medication 10 ML: at 23:22

## 2018-07-14 RX ADMIN — METRONIDAZOLE 500 MG: 500 TABLET, FILM COATED ORAL at 00:10

## 2018-07-14 RX ADMIN — VANCOMYCIN HYDROCHLORIDE 1500 MG: 10 INJECTION, POWDER, LYOPHILIZED, FOR SOLUTION INTRAVENOUS at 13:30

## 2018-07-14 RX ADMIN — VANCOMYCIN HYDROCHLORIDE 1500 MG: 10 INJECTION, POWDER, LYOPHILIZED, FOR SOLUTION INTRAVENOUS at 01:42

## 2018-07-14 RX ADMIN — ONDANSETRON 4 MG: 2 INJECTION INTRAMUSCULAR; INTRAVENOUS at 13:07

## 2018-07-14 RX ADMIN — ENOXAPARIN SODIUM 40 MG: 40 INJECTION SUBCUTANEOUS at 08:13

## 2018-07-14 RX ADMIN — ONDANSETRON 4 MG: 2 INJECTION INTRAMUSCULAR; INTRAVENOUS at 01:15

## 2018-07-14 RX ADMIN — METRONIDAZOLE 500 MG: 500 TABLET, FILM COATED ORAL at 05:56

## 2018-07-14 RX ADMIN — HYDROCODONE BITARTRATE AND ACETAMINOPHEN 1 TABLET: 5; 325 TABLET ORAL at 18:08

## 2018-07-14 RX ADMIN — DIPHENHYDRAMINE HYDROCHLORIDE 25 MG: 50 INJECTION, SOLUTION INTRAMUSCULAR; INTRAVENOUS at 01:14

## 2018-07-14 RX ADMIN — DIPHENHYDRAMINE HYDROCHLORIDE 25 MG: 50 INJECTION, SOLUTION INTRAMUSCULAR; INTRAVENOUS at 13:07

## 2018-07-14 RX ADMIN — ESCITALOPRAM OXALATE 10 MG: 10 TABLET, FILM COATED ORAL at 20:21

## 2018-07-14 RX ADMIN — HYDROCODONE BITARTRATE AND ACETAMINOPHEN 1 TABLET: 5; 325 TABLET ORAL at 04:27

## 2018-07-14 RX ADMIN — HYDROCODONE BITARTRATE AND ACETAMINOPHEN 1 TABLET: 5; 325 TABLET ORAL at 13:29

## 2018-07-14 ASSESSMENT — PAIN SCALES - GENERAL
PAINLEVEL_OUTOF10: 2
PAINLEVEL_OUTOF10: 0
PAINLEVEL_OUTOF10: 6
PAINLEVEL_OUTOF10: 0
PAINLEVEL_OUTOF10: 7
PAINLEVEL_OUTOF10: 5

## 2018-07-14 NOTE — PROGRESS NOTES
palpable  Musculoskeletal: Muscular strength appears intact. No joint effusion, tenderness, swelling or warmth    Labs:  Recent Labs      07/12/18   0623  07/13/18   0649  07/14/18   0707   WBC  17.3*  13.9*  13.0*   RBC  3.80  3.57  3.43*   HGB  10.2*  9.4*  9.1*   HCT  31.4*  30.2*  29.2*   MCV  82.6  84.6  85.1   MCH  26.8  26.3  26.5   MCHC  32.5  31.1*  31.2*   RDW  14.6  14.6  14.8   PLT  441  430  384   MPV  10.3  10.0  9.9     CMP:    Lab Results   Component Value Date     07/14/2018    K 4.4 07/14/2018     07/14/2018    CO2 27 07/14/2018    BUN 13 07/14/2018    CREATININE 0.9 07/14/2018    GFRAA >60 07/14/2018    LABGLOM >60 07/14/2018    GLUCOSE 82 07/14/2018    GLUCOSE 80 04/20/2012    PROT 5.9 07/14/2018    LABALBU 3.0 07/14/2018    LABALBU 3.6 04/20/2012    CALCIUM 8.0 07/14/2018    BILITOT 0.3 07/14/2018    ALKPHOS 54 07/14/2018    AST 9 07/14/2018    ALT 17 07/14/2018          Radiology :  No orders to display       Assessment and Plan:       1. Recurrent right breast abscess s/p I & D        Plan    1. Vancomycin 1500 mg IV q 12 hrs X 2 weeks   2.   PICC line insertion   ID follow up in 1 week       Electronically signed by Russel Ridley MD on 7/14/2018 at 1:55 PM

## 2018-07-15 LAB
ALBUMIN SERPL-MCNC: 3.1 G/DL (ref 3.5–5.2)
ALP BLD-CCNC: 57 U/L (ref 35–104)
ALT SERPL-CCNC: 18 U/L (ref 0–32)
ANION GAP SERPL CALCULATED.3IONS-SCNC: 11 MMOL/L (ref 7–16)
AST SERPL-CCNC: 13 U/L (ref 0–31)
BASOPHILS ABSOLUTE: 0.04 E9/L (ref 0–0.2)
BASOPHILS RELATIVE PERCENT: 0.3 % (ref 0–2)
BILIRUB SERPL-MCNC: 0.4 MG/DL (ref 0–1.2)
BUN BLDV-MCNC: 12 MG/DL (ref 6–20)
CALCIUM SERPL-MCNC: 8.5 MG/DL (ref 8.6–10.2)
CHLORIDE BLD-SCNC: 103 MMOL/L (ref 98–107)
CO2: 27 MMOL/L (ref 22–29)
CREAT SERPL-MCNC: 0.9 MG/DL (ref 0.5–1)
EOSINOPHILS ABSOLUTE: 0.33 E9/L (ref 0.05–0.5)
EOSINOPHILS RELATIVE PERCENT: 2.3 % (ref 0–6)
GFR AFRICAN AMERICAN: >60
GFR NON-AFRICAN AMERICAN: >60 ML/MIN/1.73
GLUCOSE BLD-MCNC: 91 MG/DL (ref 74–109)
HCT VFR BLD CALC: 30.9 % (ref 34–48)
HEMOGLOBIN: 9.5 G/DL (ref 11.5–15.5)
IMMATURE GRANULOCYTES #: 0.06 E9/L
IMMATURE GRANULOCYTES %: 0.4 % (ref 0–5)
INR BLD: 1.2
LYMPHOCYTES ABSOLUTE: 3.39 E9/L (ref 1.5–4)
LYMPHOCYTES RELATIVE PERCENT: 23.3 % (ref 20–42)
MCH RBC QN AUTO: 26.2 PG (ref 26–35)
MCHC RBC AUTO-ENTMCNC: 30.7 % (ref 32–34.5)
MCV RBC AUTO: 85.4 FL (ref 80–99.9)
MONOCYTES ABSOLUTE: 0.95 E9/L (ref 0.1–0.95)
MONOCYTES RELATIVE PERCENT: 6.5 % (ref 2–12)
NEUTROPHILS ABSOLUTE: 9.76 E9/L (ref 1.8–7.3)
NEUTROPHILS RELATIVE PERCENT: 67.2 % (ref 43–80)
PDW BLD-RTO: 14.9 FL (ref 11.5–15)
PLATELET # BLD: 417 E9/L (ref 130–450)
PMV BLD AUTO: 9.9 FL (ref 7–12)
POTASSIUM REFLEX MAGNESIUM: 3.9 MMOL/L (ref 3.5–5)
PROTHROMBIN TIME: 13.8 SEC (ref 9.3–12.4)
RBC # BLD: 3.62 E12/L (ref 3.5–5.5)
SODIUM BLD-SCNC: 141 MMOL/L (ref 132–146)
TOTAL PROTEIN: 6.6 G/DL (ref 6.4–8.3)
WBC # BLD: 14.5 E9/L (ref 4.5–11.5)

## 2018-07-15 PROCEDURE — 2580000003 HC RX 258: Performed by: STUDENT IN AN ORGANIZED HEALTH CARE EDUCATION/TRAINING PROGRAM

## 2018-07-15 PROCEDURE — 6370000000 HC RX 637 (ALT 250 FOR IP): Performed by: STUDENT IN AN ORGANIZED HEALTH CARE EDUCATION/TRAINING PROGRAM

## 2018-07-15 PROCEDURE — 80053 COMPREHEN METABOLIC PANEL: CPT

## 2018-07-15 PROCEDURE — 85025 COMPLETE CBC W/AUTO DIFF WBC: CPT

## 2018-07-15 PROCEDURE — 36415 COLL VENOUS BLD VENIPUNCTURE: CPT

## 2018-07-15 PROCEDURE — 2580000003 HC RX 258: Performed by: INTERNAL MEDICINE

## 2018-07-15 PROCEDURE — 99024 POSTOP FOLLOW-UP VISIT: CPT | Performed by: SURGERY

## 2018-07-15 PROCEDURE — 1200000000 HC SEMI PRIVATE

## 2018-07-15 PROCEDURE — 6360000002 HC RX W HCPCS: Performed by: STUDENT IN AN ORGANIZED HEALTH CARE EDUCATION/TRAINING PROGRAM

## 2018-07-15 PROCEDURE — 85610 PROTHROMBIN TIME: CPT

## 2018-07-15 RX ORDER — SODIUM CHLORIDE 9 MG/ML
INJECTION, SOLUTION INTRAVENOUS CONTINUOUS
Status: DISCONTINUED | OUTPATIENT
Start: 2018-07-15 | End: 2018-07-15

## 2018-07-15 RX ADMIN — DIPHENHYDRAMINE HYDROCHLORIDE 25 MG: 50 INJECTION, SOLUTION INTRAMUSCULAR; INTRAVENOUS at 01:00

## 2018-07-15 RX ADMIN — HYDROCODONE BITARTRATE AND ACETAMINOPHEN 1 TABLET: 5; 325 TABLET ORAL at 18:52

## 2018-07-15 RX ADMIN — VANCOMYCIN HYDROCHLORIDE 1500 MG: 10 INJECTION, POWDER, LYOPHILIZED, FOR SOLUTION INTRAVENOUS at 13:43

## 2018-07-15 RX ADMIN — SODIUM CHLORIDE: 9 INJECTION, SOLUTION INTRAVENOUS at 09:23

## 2018-07-15 RX ADMIN — Medication 10 ML: at 01:00

## 2018-07-15 RX ADMIN — HYDROCODONE BITARTRATE AND ACETAMINOPHEN 1 TABLET: 5; 325 TABLET ORAL at 09:29

## 2018-07-15 RX ADMIN — HYDROCODONE BITARTRATE AND ACETAMINOPHEN 1 TABLET: 5; 325 TABLET ORAL at 01:34

## 2018-07-15 RX ADMIN — ONDANSETRON 4 MG: 2 INJECTION INTRAMUSCULAR; INTRAVENOUS at 13:19

## 2018-07-15 RX ADMIN — ESCITALOPRAM OXALATE 10 MG: 10 TABLET, FILM COATED ORAL at 20:24

## 2018-07-15 RX ADMIN — Medication 10 ML: at 22:31

## 2018-07-15 RX ADMIN — HYDROCODONE BITARTRATE AND ACETAMINOPHEN 1 TABLET: 5; 325 TABLET ORAL at 13:42

## 2018-07-15 RX ADMIN — Medication 10 ML: at 08:43

## 2018-07-15 RX ADMIN — DIPHENHYDRAMINE HYDROCHLORIDE 25 MG: 50 INJECTION, SOLUTION INTRAMUSCULAR; INTRAVENOUS at 13:20

## 2018-07-15 ASSESSMENT — PAIN DESCRIPTION - ONSET: ONSET: GRADUAL

## 2018-07-15 ASSESSMENT — PAIN SCALES - GENERAL
PAINLEVEL_OUTOF10: 6
PAINLEVEL_OUTOF10: 9
PAINLEVEL_OUTOF10: 0
PAINLEVEL_OUTOF10: 6
PAINLEVEL_OUTOF10: 0
PAINLEVEL_OUTOF10: 4
PAINLEVEL_OUTOF10: 6

## 2018-07-15 ASSESSMENT — PAIN DESCRIPTION - LOCATION: LOCATION: BREAST

## 2018-07-15 ASSESSMENT — PAIN DESCRIPTION - FREQUENCY: FREQUENCY: INTERMITTENT

## 2018-07-15 ASSESSMENT — PAIN DESCRIPTION - ORIENTATION: ORIENTATION: RIGHT

## 2018-07-15 ASSESSMENT — PAIN DESCRIPTION - PAIN TYPE: TYPE: ACUTE PAIN

## 2018-07-15 ASSESSMENT — PAIN DESCRIPTION - DESCRIPTORS: DESCRIPTORS: ACHING;DISCOMFORT;NAGGING

## 2018-07-15 NOTE — PROGRESS NOTES
ID Progress Note      C/C : Recurrent Right breast abscess     Pt is awake and alert  Denies fever and chills  Reports mild pain   Afebrile       Scheduled Meds:   lidocaine 1 % injection  5 mL Intradermal Once    sodium chloride flush  10 mL Intravenous 2 times per day    escitalopram  10 mg Oral Daily    enoxaparin  40 mg Subcutaneous Daily    vancomycin  1,500 mg Intravenous Q12H    diphenhydrAMINE  25 mg Intravenous BID     Continuous Infusions:  PRN Meds:.sodium chloride flush, acetaminophen, HYDROcodone 5 mg - acetaminophen **OR** HYDROcodone 5 mg - acetaminophen, ondansetron    REVIEW OF SYSTEMS:       CONSTITUTIONAL:  Denies fever and chills  HEENT: denies blurring of vision or double vision, denies hearing problem  RESPIRATORY: denies cough . CARDIOVASCULAR:  Denies palpitation  GASTROINTESTINAL:  Denies abdomen pain, diarrhea or constipation. GENITOURINARY: Denies dysuria   INTEGUMENT: denies wound , rash  HEMATOLOGIC/LYMPHATIC:  Denies lymph node swelling, gum bleeding or easy bruising. MUSCULOSKELETAL:  Denies leg pain    NEUROLOGICAL:  Denies light headed, dizziness, loss of consciousness     Objective:    Vitals:    07/15/18 0841   BP: 111/62   Pulse: 75   Resp: 18   Temp: 96.5 °F (35.8 °C)   SpO2: 94%     General appearance: Alert, Awake, Oriented times 3, no distress  Skin: Right breast wound VAC in place  Eyes: Pink palpebral conjunctivae. PERRL  Ears: External ears normal. No tragal tenderness. TM intact  Nose/Sinuses: Nares normal. Septum midline. Mucosa normal. No sinus tenderness. Oropharynx: Oropharynx clear with no exudates seen  Neck: Neck supple. No jugular venous distension, lymphadenopathy or thyromegaly Trachea midline  Lungs: Lungs clear to auscultation bilaterally. No rhonchi, crackles or wheezes  Heart: S1 S2  Regular rate and rhythm. No rub, murmur or gallop  Abdomen: Abdomen soft, non-tender.  BS normal. No masses, organomegaly  Extremities: No edema, Peripheral pulses palpable  Musculoskeletal: Muscular strength appears intact. No joint effusion, tenderness, swelling or warmth    Labs:  Recent Labs      07/13/18   0649  07/14/18   0707  07/15/18   0723   WBC  13.9*  13.0*  14.5*   RBC  3.57  3.43*  3.62   HGB  9.4*  9.1*  9.5*   HCT  30.2*  29.2*  30.9*   MCV  84.6  85.1  85.4   MCH  26.3  26.5  26.2   MCHC  31.1*  31.2*  30.7*   RDW  14.6  14.8  14.9   PLT  430  384  417   MPV  10.0  9.9  9.9     CMP:    Lab Results   Component Value Date     07/15/2018    K 3.9 07/15/2018     07/15/2018    CO2 27 07/15/2018    BUN 12 07/15/2018    CREATININE 0.9 07/15/2018    GFRAA >60 07/15/2018    LABGLOM >60 07/15/2018    GLUCOSE 91 07/15/2018    GLUCOSE 80 04/20/2012    PROT 6.6 07/15/2018    LABALBU 3.1 07/15/2018    LABALBU 3.6 04/20/2012    CALCIUM 8.5 07/15/2018    BILITOT 0.4 07/15/2018    ALKPHOS 57 07/15/2018    AST 13 07/15/2018    ALT 18 07/15/2018          Radiology :  IR INTERVENTIONAL RADIOLOGY PROCEDURE REQUEST    (Results Pending)       Assessment and Plan:       1. Recurrent right breast abscess s/p I & D        Plan    1. Vancomycin 1500 mg IV q 12 hrs X 2 weeks   2.   PICC line insertion per IR tomorrow   ID follow up in 1 week       Electronically signed by Sarai Rivero MD on 7/15/2018 at 10:36 AM

## 2018-07-15 NOTE — PLAN OF CARE
Problem: Safety:  Goal: Free from accidental physical injury  Free from accidental physical injury   Outcome: Met This Shift      Problem: Pain:  Goal: Patient's pain/discomfort is manageable  Patient's pain/discomfort is manageable   Outcome: Met This Shift    Goal: Control of acute pain  Control of acute pain   Outcome: Met This Shift      Problem: Skin Integrity:  Goal: Skin integrity will stabilize  Skin integrity will stabilize   Outcome: Met This Shift

## 2018-07-15 NOTE — PROGRESS NOTES
Patient's IV infiltrated, removed at patient's request. This nurse tried to insert a new IV site but was unsuccessful.  Patient is put on IV team.

## 2018-07-15 NOTE — PROGRESS NOTES
Reviewed Surgical Residents evaluation and personally examined the patient. In addition, all diagnostics were reviewed. I agree with the Residents plan with the addition or modification as follows:    HPI  Breast abscess    VS  /62   Pulse 75   Temp 96.5 °F (35.8 °C) (Temporal)   Resp 18   Ht 5' 3\" (1.6 m)   Wt 297 lb (134.7 kg)   LMP 06/18/2018   SpO2 94%   BMI 52.61 kg/m²   Alert and oriented, purposeful movement in all extremities  Unlabored respirations and clear breath sounds  Regular cardiac rhythm with normal heart sounds  Flat, soft abdomen with bowel sounds. Problem and Plan:  Breast abscess post debridement and drainage with wound vac. Arranging home health and IV antibiotic therapy. Follow up with Dr Cecy Etienne.

## 2018-07-16 ENCOUNTER — APPOINTMENT (OUTPATIENT)
Dept: INTERVENTIONAL RADIOLOGY/VASCULAR | Age: 43
DRG: 363 | End: 2018-07-16
Attending: SURGERY
Payer: MEDICAID

## 2018-07-16 ENCOUNTER — TELEPHONE (OUTPATIENT)
Dept: SURGERY | Age: 43
End: 2018-07-16

## 2018-07-16 VITALS
OXYGEN SATURATION: 97 % | BODY MASS INDEX: 51.91 KG/M2 | SYSTOLIC BLOOD PRESSURE: 140 MMHG | HEIGHT: 63 IN | HEART RATE: 76 BPM | RESPIRATION RATE: 18 BRPM | TEMPERATURE: 97.8 F | DIASTOLIC BLOOD PRESSURE: 80 MMHG | WEIGHT: 293 LBS

## 2018-07-16 LAB
ALBUMIN SERPL-MCNC: 3.1 G/DL (ref 3.5–5.2)
ALP BLD-CCNC: 55 U/L (ref 35–104)
ALT SERPL-CCNC: 22 U/L (ref 0–32)
ANAEROBIC CULTURE: NORMAL
ANION GAP SERPL CALCULATED.3IONS-SCNC: 10 MMOL/L (ref 7–16)
AST SERPL-CCNC: 20 U/L (ref 0–31)
BASOPHILS ABSOLUTE: 0.04 E9/L (ref 0–0.2)
BASOPHILS RELATIVE PERCENT: 0.3 % (ref 0–2)
BILIRUB SERPL-MCNC: 0.4 MG/DL (ref 0–1.2)
BUN BLDV-MCNC: 11 MG/DL (ref 6–20)
CALCIUM SERPL-MCNC: 8.2 MG/DL (ref 8.6–10.2)
CHLORIDE BLD-SCNC: 103 MMOL/L (ref 98–107)
CO2: 27 MMOL/L (ref 22–29)
CREAT SERPL-MCNC: 0.8 MG/DL (ref 0.5–1)
EOSINOPHILS ABSOLUTE: 0.36 E9/L (ref 0.05–0.5)
EOSINOPHILS RELATIVE PERCENT: 2.5 % (ref 0–6)
GFR AFRICAN AMERICAN: >60
GFR NON-AFRICAN AMERICAN: >60 ML/MIN/1.73
GLUCOSE BLD-MCNC: 92 MG/DL (ref 74–109)
HCT VFR BLD CALC: 30.3 % (ref 34–48)
HEMOGLOBIN: 9.6 G/DL (ref 11.5–15.5)
IMMATURE GRANULOCYTES #: 0.07 E9/L
IMMATURE GRANULOCYTES %: 0.5 % (ref 0–5)
LYMPHOCYTES ABSOLUTE: 2.92 E9/L (ref 1.5–4)
LYMPHOCYTES RELATIVE PERCENT: 20.5 % (ref 20–42)
MCH RBC QN AUTO: 26.7 PG (ref 26–35)
MCHC RBC AUTO-ENTMCNC: 31.7 % (ref 32–34.5)
MCV RBC AUTO: 84.4 FL (ref 80–99.9)
MONOCYTES ABSOLUTE: 0.83 E9/L (ref 0.1–0.95)
MONOCYTES RELATIVE PERCENT: 5.8 % (ref 2–12)
NEUTROPHILS ABSOLUTE: 10.01 E9/L (ref 1.8–7.3)
NEUTROPHILS RELATIVE PERCENT: 70.4 % (ref 43–80)
PDW BLD-RTO: 15.1 FL (ref 11.5–15)
PLATELET # BLD: 397 E9/L (ref 130–450)
PMV BLD AUTO: 9.6 FL (ref 7–12)
POTASSIUM REFLEX MAGNESIUM: 4.3 MMOL/L (ref 3.5–5)
RBC # BLD: 3.59 E12/L (ref 3.5–5.5)
SODIUM BLD-SCNC: 140 MMOL/L (ref 132–146)
TOTAL PROTEIN: 6.3 G/DL (ref 6.4–8.3)
VANCOMYCIN TROUGH: 16.1 MCG/ML (ref 5–16)
WBC # BLD: 14.2 E9/L (ref 4.5–11.5)

## 2018-07-16 PROCEDURE — 36415 COLL VENOUS BLD VENIPUNCTURE: CPT

## 2018-07-16 PROCEDURE — 36569 INSJ PICC 5 YR+ W/O IMAGING: CPT | Performed by: RADIOLOGY

## 2018-07-16 PROCEDURE — 85025 COMPLETE CBC W/AUTO DIFF WBC: CPT

## 2018-07-16 PROCEDURE — 2500000003 HC RX 250 WO HCPCS: Performed by: SURGERY

## 2018-07-16 PROCEDURE — 02HV33Z INSERTION OF INFUSION DEVICE INTO SUPERIOR VENA CAVA, PERCUTANEOUS APPROACH: ICD-10-PCS | Performed by: RADIOLOGY

## 2018-07-16 PROCEDURE — 76937 US GUIDE VASCULAR ACCESS: CPT | Performed by: RADIOLOGY

## 2018-07-16 PROCEDURE — 2580000003 HC RX 258: Performed by: INTERNAL MEDICINE

## 2018-07-16 PROCEDURE — 80053 COMPREHEN METABOLIC PANEL: CPT

## 2018-07-16 PROCEDURE — 99024 POSTOP FOLLOW-UP VISIT: CPT | Performed by: SURGERY

## 2018-07-16 PROCEDURE — 6370000000 HC RX 637 (ALT 250 FOR IP): Performed by: STUDENT IN AN ORGANIZED HEALTH CARE EDUCATION/TRAINING PROGRAM

## 2018-07-16 PROCEDURE — 6360000002 HC RX W HCPCS: Performed by: SURGERY

## 2018-07-16 PROCEDURE — 77001 FLUOROGUIDE FOR VEIN DEVICE: CPT | Performed by: RADIOLOGY

## 2018-07-16 PROCEDURE — 80202 ASSAY OF VANCOMYCIN: CPT

## 2018-07-16 PROCEDURE — 6360000002 HC RX W HCPCS: Performed by: STUDENT IN AN ORGANIZED HEALTH CARE EDUCATION/TRAINING PROGRAM

## 2018-07-16 PROCEDURE — 2500000003 HC RX 250 WO HCPCS: Performed by: RADIOLOGY

## 2018-07-16 PROCEDURE — C1751 CATH, INF, PER/CENT/MIDLINE: HCPCS

## 2018-07-16 PROCEDURE — 2580000003 HC RX 258: Performed by: STUDENT IN AN ORGANIZED HEALTH CARE EDUCATION/TRAINING PROGRAM

## 2018-07-16 PROCEDURE — B518ZZA FLUOROSCOPY OF SUPERIOR VENA CAVA, GUIDANCE: ICD-10-PCS | Performed by: RADIOLOGY

## 2018-07-16 RX ORDER — SODIUM CHLORIDE 0.9 % (FLUSH) 0.9 %
10 SYRINGE (ML) INJECTION PRN
Status: DISCONTINUED | OUTPATIENT
Start: 2018-07-16 | End: 2018-07-16 | Stop reason: HOSPADM

## 2018-07-16 RX ORDER — LIDOCAINE HYDROCHLORIDE 10 MG/ML
5 INJECTION, SOLUTION EPIDURAL; INFILTRATION; INTRACAUDAL; PERINEURAL ONCE
Status: COMPLETED | OUTPATIENT
Start: 2018-07-16 | End: 2018-07-16

## 2018-07-16 RX ORDER — HEPARIN SODIUM (PORCINE) LOCK FLUSH IV SOLN 100 UNIT/ML 100 UNIT/ML
3 SOLUTION INTRAVENOUS EVERY 12 HOURS SCHEDULED
Status: DISCONTINUED | OUTPATIENT
Start: 2018-07-16 | End: 2018-07-16 | Stop reason: HOSPADM

## 2018-07-16 RX ORDER — HEPARIN SODIUM (PORCINE) LOCK FLUSH IV SOLN 100 UNIT/ML 100 UNIT/ML
3 SOLUTION INTRAVENOUS PRN
Status: DISCONTINUED | OUTPATIENT
Start: 2018-07-16 | End: 2018-07-16 | Stop reason: HOSPADM

## 2018-07-16 RX ADMIN — HYDROCODONE BITARTRATE AND ACETAMINOPHEN 1 TABLET: 5; 325 TABLET ORAL at 12:39

## 2018-07-16 RX ADMIN — LIDOCAINE HYDROCHLORIDE 5 ML: 10 INJECTION, SOLUTION EPIDURAL; INFILTRATION; INTRACAUDAL; PERINEURAL at 11:00

## 2018-07-16 RX ADMIN — VANCOMYCIN HYDROCHLORIDE 1500 MG: 10 INJECTION, POWDER, LYOPHILIZED, FOR SOLUTION INTRAVENOUS at 14:15

## 2018-07-16 RX ADMIN — ONDANSETRON 4 MG: 2 INJECTION INTRAMUSCULAR; INTRAVENOUS at 14:06

## 2018-07-16 RX ADMIN — HYDROCODONE BITARTRATE AND ACETAMINOPHEN 1 TABLET: 5; 325 TABLET ORAL at 14:05

## 2018-07-16 RX ADMIN — DIPHENHYDRAMINE HYDROCHLORIDE 25 MG: 50 INJECTION, SOLUTION INTRAMUSCULAR; INTRAVENOUS at 02:16

## 2018-07-16 RX ADMIN — DIPHENHYDRAMINE HYDROCHLORIDE 25 MG: 50 INJECTION, SOLUTION INTRAMUSCULAR; INTRAVENOUS at 14:06

## 2018-07-16 RX ADMIN — VANCOMYCIN HYDROCHLORIDE 1500 MG: 10 INJECTION, POWDER, LYOPHILIZED, FOR SOLUTION INTRAVENOUS at 02:32

## 2018-07-16 RX ADMIN — Medication 300 UNITS: at 12:46

## 2018-07-16 RX ADMIN — LIDOCAINE HYDROCHLORIDE 20 ML: 20 INJECTION, SOLUTION INFILTRATION; PERINEURAL at 11:57

## 2018-07-16 RX ADMIN — ONDANSETRON 4 MG: 2 INJECTION INTRAMUSCULAR; INTRAVENOUS at 02:19

## 2018-07-16 RX ADMIN — Medication 10 ML: at 14:08

## 2018-07-16 RX ADMIN — Medication 10 ML: at 02:16

## 2018-07-16 RX ADMIN — HYDROCODONE BITARTRATE AND ACETAMINOPHEN 1 TABLET: 5; 325 TABLET ORAL at 18:35

## 2018-07-16 RX ADMIN — Medication 10 ML: at 08:05

## 2018-07-16 ASSESSMENT — PAIN SCALES - GENERAL
PAINLEVEL_OUTOF10: 6
PAINLEVEL_OUTOF10: 7

## 2018-07-16 NOTE — PROGRESS NOTES
Brief Procedure Note    Patient Name: Aquilino Norwood   Medical Record Number: [id number]  Date: 7/16/2018   Time: 12:16 PM   Room/Bed: [address] [place] [address]    Preoperative diagnosis: Need for long-term intravenous access    Postoperative diagnosis: Same    Procedure: PICC line placement    Anesthesia: 10 mL lidocaine administered subcutaneously    Estimated blood loss: Minimal    Complications: None    Implants: None    Under sterile conditions and sonographic/fluoroscopic guidance, a single-lumen 5 Serbian PICC line was placed via the right basilic vein. The internal length of the PICC line measures 41 cm. The patient tolerated the procedure without complications and was transferred in stable condition. PICC line ready for immediate use.       Electronically signed by Apoorva Borges MD on 7/16/2018 at 12:16 PM

## 2018-07-16 NOTE — CARE COORDINATION
Social Work/Discharge Planning    Plan is for pt to discharge home and resume [facility] for wound care/wound vac and new IV ATBs. CM/SW following for any discharge needs.     Electronically signed by Jadyn Malloy on 7/16/2018 at 12:39 PM

## 2018-07-16 NOTE — PROGRESS NOTES
GENERAL SURGERY  DAILY PROGRESS NOTE  7/16/2018    Subjective:  PICC line unable to be placed yesterday. Pain well controlled.  Wound VAC remains in place    Objective:  /60   Pulse 74   Temp 98 °F (36.7 °C) (Temporal)   Resp 16   Ht 5' 3\" (1.6 m)   Wt 297 lb (134.7 kg)   LMP 06/18/2018   SpO2 96%   BMI 52.61 kg/m²     General appearance: laying in bed, no acute distress  Lungs: non-labored respirations, breath sounds present bilaterally  Heart: regular rate  Abdomen: soft, nondistended, nontender  R breast: wound VAC in place, no erythema note, minimal tenderness for palpation    Assessment/Plan:  44 y.o. female S/P R breast debridement and wound VAC placement    Possible DC today  IR asked to eval for possible PICC line placement per IV team  Discussed with patient and explained procedure might not be done today  Pain control  Will discuss with ID need for continued IV ABX  Discussed with rest of surgical team expressed need for urgency to be completed to avoid another day of hospitalization    Note signed electronically by Rosalie Alston M.D. at 3:49 AM on 7/16/2018

## 2018-07-16 NOTE — H&P
Interventional Radiology Preprocedure Note    I reviewed patient's most recent history and physical exam (in EPIC dated 7/10/18) and agree that there are no significant interval changes. Labs reviewed. Allergies review.     Patient consented for image guided PICC placement      Electronically signed by Magdalen Dakin, MD on 7/16/2018 at 12:15 PM

## 2018-07-16 NOTE — PROGRESS NOTES
GENERAL SURGERY  DAILY PROGRESS NOTE  7/16/2018    Subjective:  No acute issues, pain well controlled. IR team to be called this AM to perform procedure, not completed yeseterday    Objective:  /60   Pulse 74   Temp 98 °F (36.7 °C) (Temporal)   Resp 16   Ht 5' 3\" (1.6 m)   Wt 297 lb (134.7 kg)   LMP 06/18/2018   SpO2 96%   BMI 52.61 kg/m²     General appearance: laying in bed, no acute distress  Lungs: non-labored respirations, breath sounds present bilaterally  Heart: regular rate  Abdomen: soft, nondistended, nontender  R breast: wound VAC in place, no erythema note, minimal tenderness for palpation    Assessment/Plan:  44 y.o. female S/P R breast debridement and wound VAC placement    Possible DC today  IR asked to eval for possible PICC line placement per IV team, TO BE DONE TODAY  Pain control  ABX per ID  NPO for procedure  IVFs    Note signed electronically by Shantal Garcia M.D. at 7:50 AM on 7/16/2018    [address]     I have examined the patient, reviewed the record, and discussed the case with the APN/  Resident. I have reviewed all relevant labs and imaging data. Please refer to the  APN/ resident's note. I agree with the  assessment and plan with the following corrections/ additions. The following summarizes my clinical findings and independent assessment. CC: breast abscess    S. Pt to undergo PICC placement in IR today    O.  nad  Lungs clear  Rt breast wound--vac present  s1s2  Abdomen soft nt nd    ASSESSMENT:  Active Problems:    Breast abscess  Resolved Problems:    * No resolved hospital problems.  *       PLAN:  S/p debridement  Wound vac change today  IR for PICC  On Vancomycin per ID  DVT Proph: SCDS/ lovenox    DC home once PICC placed       Dennise Liu MD, FACS  7/16/2018  1:41 PM

## 2018-07-16 NOTE — TELEPHONE ENCOUNTER
MA contacted patient to let her know that per  her post op katerina be moved up. MA scheduled her katerina to 07/20/18 @ 11:15 in [facility] with . Patient verbalized understanding and appt date and time.       Electronically signed by Chino Ruiz MA on 7/16/18 at 8:42 AM

## 2018-07-16 NOTE — PROGRESS NOTES
1044 Received pt via cart to angio. Place on angio table  1106 [facility] notified of pt's readiness  1127 Procedure started  1133 5 Fr single lumen PICC inserted in right basilic vein. Trimmed length 41 cm.  1135 Procedure ended. Lumen with immediate blood return and flushes easily. Biopatch and stat lock applied before sterile dressing applied  1148 Hand off report called to [facility].  Awaiting transport for return to room 84-A

## 2018-07-19 ENCOUNTER — HOSPITAL ENCOUNTER (OUTPATIENT)
Age: 43
Discharge: HOME | End: 2018-07-21
Payer: MEDICAID

## 2018-07-19 LAB
ALBUMIN SERPL-MCNC: 3.3 G/DL (ref 3.5–5.2)
ALP BLD-CCNC: 66 U/L (ref 35–104)
ALT SERPL-CCNC: 23 U/L (ref 0–32)
ANION GAP SERPL CALCULATED.3IONS-SCNC: 20 MMOL/L (ref 7–16)
AST SERPL-CCNC: 18 U/L (ref 0–31)
BASOPHILS ABSOLUTE: 0.02 E9/L (ref 0–0.2)
BASOPHILS RELATIVE PERCENT: 0.2 % (ref 0–2)
BILIRUB SERPL-MCNC: 0.4 MG/DL (ref 0–1.2)
BUN BLDV-MCNC: 11 MG/DL (ref 6–20)
C-REACTIVE PROTEIN: 7.5 MG/DL (ref 0–0.4)
CALCIUM SERPL-MCNC: 8.8 MG/DL (ref 8.6–10.2)
CHLORIDE BLD-SCNC: 99 MMOL/L (ref 98–107)
CO2: 21 MMOL/L (ref 22–29)
CREAT SERPL-MCNC: 0.8 MG/DL (ref 0.5–1)
EOSINOPHILS ABSOLUTE: 0.46 E9/L (ref 0.05–0.5)
EOSINOPHILS RELATIVE PERCENT: 3.5 % (ref 0–6)
GFR AFRICAN AMERICAN: >60
GFR NON-AFRICAN AMERICAN: >60 ML/MIN/1.73
GLUCOSE BLD-MCNC: 80 MG/DL (ref 74–109)
HCT VFR BLD CALC: 33 % (ref 34–48)
HEMOGLOBIN: 9.9 G/DL (ref 11.5–15.5)
IMMATURE GRANULOCYTES #: 0.07 E9/L
IMMATURE GRANULOCYTES %: 0.5 % (ref 0–5)
LYMPHOCYTES ABSOLUTE: 3.37 E9/L (ref 1.5–4)
LYMPHOCYTES RELATIVE PERCENT: 25.5 % (ref 20–42)
MCH RBC QN AUTO: 26.1 PG (ref 26–35)
MCHC RBC AUTO-ENTMCNC: 30 % (ref 32–34.5)
MCV RBC AUTO: 87.1 FL (ref 80–99.9)
MONOCYTES ABSOLUTE: 0.71 E9/L (ref 0.1–0.95)
MONOCYTES RELATIVE PERCENT: 5.4 % (ref 2–12)
NEUTROPHILS ABSOLUTE: 8.58 E9/L (ref 1.8–7.3)
NEUTROPHILS RELATIVE PERCENT: 64.9 % (ref 43–80)
PDW BLD-RTO: 15.4 FL (ref 11.5–15)
PLATELET # BLD: 378 E9/L (ref 130–450)
PMV BLD AUTO: 9.9 FL (ref 7–12)
POTASSIUM SERPL-SCNC: 4.1 MMOL/L (ref 3.5–5)
RBC # BLD: 3.79 E12/L (ref 3.5–5.5)
SEDIMENTATION RATE, ERYTHROCYTE: 74 MM/HR (ref 0–20)
SODIUM BLD-SCNC: 140 MMOL/L (ref 132–146)
TOTAL PROTEIN: 6.9 G/DL (ref 6.4–8.3)
VANCOMYCIN TROUGH: 11.5 MCG/ML (ref 5–16)
WBC # BLD: 13.2 E9/L (ref 4.5–11.5)

## 2018-07-19 PROCEDURE — 85651 RBC SED RATE NONAUTOMATED: CPT

## 2018-07-19 PROCEDURE — 86140 C-REACTIVE PROTEIN: CPT

## 2018-07-19 PROCEDURE — 80053 COMPREHEN METABOLIC PANEL: CPT

## 2018-07-19 PROCEDURE — 80202 ASSAY OF VANCOMYCIN: CPT

## 2018-07-19 PROCEDURE — 85025 COMPLETE CBC W/AUTO DIFF WBC: CPT

## 2018-07-20 ENCOUNTER — OFFICE VISIT (OUTPATIENT)
Dept: BREAST CENTER | Age: 43
End: 2018-07-20
Payer: MEDICAID

## 2018-07-20 VITALS
BODY MASS INDEX: 51.91 KG/M2 | HEIGHT: 63 IN | RESPIRATION RATE: 16 BRPM | TEMPERATURE: 98.2 F | OXYGEN SATURATION: 98 % | WEIGHT: 293 LBS | HEART RATE: 84 BPM | DIASTOLIC BLOOD PRESSURE: 68 MMHG | SYSTOLIC BLOOD PRESSURE: 130 MMHG

## 2018-07-20 DIAGNOSIS — N61.1 BREAST ABSCESS: Primary | ICD-10-CM

## 2018-07-20 PROCEDURE — 99024 POSTOP FOLLOW-UP VISIT: CPT | Performed by: SURGERY

## 2018-07-20 RX ORDER — DIPHENHYDRAMINE HCL 25 MG
25 CAPSULE ORAL EVERY 6 HOURS PRN
COMMUNITY
End: 2019-02-25

## 2018-07-20 NOTE — LETTER
[facility]  [facility] [address]  Phone: [phone]  Fax: [phone]    Norberto Mcfarland MD        July 20, 2018     Berlin Heath, APRN - CNP  [address]  [place] [address]    Patient: Jennifer Jackson  MR Number: [id number]  YOB: 1975  Date of Visit: 7/20/2018    Dear Dr. Berlin Heath: Thank you for the request for consultation for Hossein Padilla to me for the evaluation of right breast abscess. Below are the relevant portions of my assessment and plan of care. Tosha Perez recently underwent a large I&D for a large abscess. She is home now and on IV vancomycin. She was seen today for wound vac change and the wound is healing. I expect it to take about a month to heal.  I will see her again next week. She has had this red rachel on her left knee after being on levaquin that I have asked her to have you and ID look at it. If you have questions, please do not hesitate to call me. I look forward to following Tosha Perez along with you.     Sincerely,        Norberto Mcfarland MD

## 2018-07-23 ENCOUNTER — TELEPHONE (OUTPATIENT)
Dept: ADMINISTRATIVE | Age: 43
End: 2018-07-23

## 2018-07-23 ENCOUNTER — HOSPITAL ENCOUNTER (OUTPATIENT)
Age: 43
Discharge: HOME | End: 2018-07-25
Payer: MEDICAID

## 2018-07-23 LAB
ALBUMIN SERPL-MCNC: 3.6 G/DL (ref 3.5–5.2)
ALP BLD-CCNC: 78 U/L (ref 35–104)
ALT SERPL-CCNC: 21 U/L (ref 0–32)
ANION GAP SERPL CALCULATED.3IONS-SCNC: 16 MMOL/L (ref 7–16)
AST SERPL-CCNC: 16 U/L (ref 0–31)
BASOPHILS ABSOLUTE: 0.05 E9/L (ref 0–0.2)
BASOPHILS RELATIVE PERCENT: 0.4 % (ref 0–2)
BILIRUB SERPL-MCNC: 0.3 MG/DL (ref 0–1.2)
BUN BLDV-MCNC: 14 MG/DL (ref 6–20)
C-REACTIVE PROTEIN: 4.9 MG/DL (ref 0–0.4)
CALCIUM SERPL-MCNC: 8.8 MG/DL (ref 8.6–10.2)
CHLORIDE BLD-SCNC: 100 MMOL/L (ref 98–107)
CO2: 23 MMOL/L (ref 22–29)
CREAT SERPL-MCNC: 0.7 MG/DL (ref 0.5–1)
EOSINOPHILS ABSOLUTE: 0.3 E9/L (ref 0.05–0.5)
EOSINOPHILS RELATIVE PERCENT: 2.4 % (ref 0–6)
GFR AFRICAN AMERICAN: >60
GFR NON-AFRICAN AMERICAN: >60 ML/MIN/1.73
GLUCOSE BLD-MCNC: 56 MG/DL (ref 74–109)
HCT VFR BLD CALC: 34.6 % (ref 34–48)
HEMOGLOBIN: 10.3 G/DL (ref 11.5–15.5)
IMMATURE GRANULOCYTES #: 0.09 E9/L
IMMATURE GRANULOCYTES %: 0.7 % (ref 0–5)
LYMPHOCYTES ABSOLUTE: 2.86 E9/L (ref 1.5–4)
LYMPHOCYTES RELATIVE PERCENT: 22.8 % (ref 20–42)
MCH RBC QN AUTO: 25.9 PG (ref 26–35)
MCHC RBC AUTO-ENTMCNC: 29.8 % (ref 32–34.5)
MCV RBC AUTO: 86.9 FL (ref 80–99.9)
MONOCYTES ABSOLUTE: 0.71 E9/L (ref 0.1–0.95)
MONOCYTES RELATIVE PERCENT: 5.7 % (ref 2–12)
NEUTROPHILS ABSOLUTE: 8.53 E9/L (ref 1.8–7.3)
NEUTROPHILS RELATIVE PERCENT: 68 % (ref 43–80)
PDW BLD-RTO: 15.6 FL (ref 11.5–15)
PLATELET # BLD: 389 E9/L (ref 130–450)
PMV BLD AUTO: 9.9 FL (ref 7–12)
POTASSIUM SERPL-SCNC: 4 MMOL/L (ref 3.5–5)
RBC # BLD: 3.98 E12/L (ref 3.5–5.5)
SEDIMENTATION RATE, ERYTHROCYTE: 83 MM/HR (ref 0–20)
SODIUM BLD-SCNC: 139 MMOL/L (ref 132–146)
TOTAL PROTEIN: 7.3 G/DL (ref 6.4–8.3)
VANCOMYCIN TROUGH: 12.6 MCG/ML (ref 5–16)
WBC # BLD: 12.5 E9/L (ref 4.5–11.5)

## 2018-07-23 PROCEDURE — 80202 ASSAY OF VANCOMYCIN: CPT

## 2018-07-23 PROCEDURE — 80053 COMPREHEN METABOLIC PANEL: CPT

## 2018-07-23 PROCEDURE — 86140 C-REACTIVE PROTEIN: CPT

## 2018-07-23 PROCEDURE — 85651 RBC SED RATE NONAUTOMATED: CPT

## 2018-07-23 PROCEDURE — 85025 COMPLETE CBC W/AUTO DIFF WBC: CPT

## 2018-07-25 LAB
Lab: NORMAL
REPORT: NORMAL
THIS TEST SENT TO: NORMAL

## 2018-07-25 NOTE — DISCHARGE SUMMARY
this  · additional instructions        CONTINUE taking these medications    acetaminophen 500 MG tablet  Commonly known as:  TYLENOL     escitalopram 10 MG tablet  Commonly known as:  LEXAPRO  TAKE 1 TABLET BY MOUTH DAILY     HYDROXYZINE HCL PO     ondansetron 4 MG tablet  Commonly known as:  ZOFRAN  Take 1 tablet by mouth every 8 hours as needed for Nausea or Vomiting        STOP taking these medications    HYDROcodone-acetaminophen 5-325 MG per tablet  Commonly known as:  NORCO     linezolid 600 MG tablet  Commonly known as:  Sam Bajwa        ASK your doctor about these medications    HYDROcodone-acetaminophen 5-325 MG per tablet  Commonly known as:  NORCO  Take 1 tablet by mouth every 6 hours as needed for Pain for up to 7 days. .  Ask about: Should I take this medication? Where to Get Your Medications      You can get these medications from any pharmacy    Bring a paper prescription for each of these medications  · HYDROcodone-acetaminophen 5-325 MG per tablet  · LORazepam 1 MG tablet  · vancomycin infusion         Disposition: D/C home with home health care    Patient Instructions:    Activity: Per discharge instructions  Diet per Discharge instructions   Wound Care: Per discharge instructions   Follow-up with per discharge instructions         Signed:  Lubna Taylor DO  7/25/2018  10:46 AM

## 2018-07-27 ENCOUNTER — OFFICE VISIT (OUTPATIENT)
Dept: FAMILY MEDICINE CLINIC | Age: 43
End: 2018-07-27
Payer: MEDICAID

## 2018-07-27 ENCOUNTER — HOSPITAL ENCOUNTER (OUTPATIENT)
Age: 43
Discharge: HOME | End: 2018-07-29
Payer: MEDICAID

## 2018-07-27 ENCOUNTER — OFFICE VISIT (OUTPATIENT)
Dept: BREAST CENTER | Age: 43
End: 2018-07-27
Payer: MEDICAID

## 2018-07-27 VITALS
DIASTOLIC BLOOD PRESSURE: 68 MMHG | HEART RATE: 77 BPM | HEIGHT: 63 IN | TEMPERATURE: 98.1 F | BODY MASS INDEX: 51.91 KG/M2 | SYSTOLIC BLOOD PRESSURE: 130 MMHG | OXYGEN SATURATION: 98 % | WEIGHT: 293 LBS

## 2018-07-27 VITALS
SYSTOLIC BLOOD PRESSURE: 124 MMHG | WEIGHT: 293 LBS | DIASTOLIC BLOOD PRESSURE: 74 MMHG | TEMPERATURE: 98.1 F | HEART RATE: 85 BPM | OXYGEN SATURATION: 98 % | BODY MASS INDEX: 51.91 KG/M2 | HEIGHT: 63 IN | RESPIRATION RATE: 16 BRPM

## 2018-07-27 DIAGNOSIS — N61.1 ABSCESS OF RIGHT BREAST: Primary | ICD-10-CM

## 2018-07-27 DIAGNOSIS — M79.89 LEG SWELLING: ICD-10-CM

## 2018-07-27 DIAGNOSIS — M79.605 LEFT LEG PAIN: ICD-10-CM

## 2018-07-27 LAB
ALBUMIN SERPL-MCNC: 3.7 G/DL (ref 3.5–5.2)
ALP BLD-CCNC: 70 U/L (ref 35–104)
ALT SERPL-CCNC: 16 U/L (ref 0–32)
ANION GAP SERPL CALCULATED.3IONS-SCNC: 13 MMOL/L (ref 7–16)
AST SERPL-CCNC: 15 U/L (ref 0–31)
BASOPHILS ABSOLUTE: 0.04 E9/L (ref 0–0.2)
BASOPHILS RELATIVE PERCENT: 0.3 % (ref 0–2)
BILIRUB SERPL-MCNC: 0.4 MG/DL (ref 0–1.2)
BUN BLDV-MCNC: 15 MG/DL (ref 6–20)
CALCIUM SERPL-MCNC: 8.7 MG/DL (ref 8.6–10.2)
CHLORIDE BLD-SCNC: 101 MMOL/L (ref 98–107)
CO2: 27 MMOL/L (ref 22–29)
CREAT SERPL-MCNC: 0.8 MG/DL (ref 0.5–1)
EOSINOPHILS ABSOLUTE: 0.25 E9/L (ref 0.05–0.5)
EOSINOPHILS RELATIVE PERCENT: 2.2 % (ref 0–6)
GFR AFRICAN AMERICAN: >60
GFR NON-AFRICAN AMERICAN: >60 ML/MIN/1.73
GLUCOSE BLD-MCNC: 97 MG/DL (ref 74–109)
HCT VFR BLD CALC: 33.7 % (ref 34–48)
HEMOGLOBIN: 10.2 G/DL (ref 11.5–15.5)
IMMATURE GRANULOCYTES #: 0.07 E9/L
IMMATURE GRANULOCYTES %: 0.6 % (ref 0–5)
LYMPHOCYTES ABSOLUTE: 2.78 E9/L (ref 1.5–4)
LYMPHOCYTES RELATIVE PERCENT: 24.2 % (ref 20–42)
MCH RBC QN AUTO: 26.3 PG (ref 26–35)
MCHC RBC AUTO-ENTMCNC: 30.3 % (ref 32–34.5)
MCV RBC AUTO: 86.9 FL (ref 80–99.9)
MONOCYTES ABSOLUTE: 0.5 E9/L (ref 0.1–0.95)
MONOCYTES RELATIVE PERCENT: 4.4 % (ref 2–12)
NEUTROPHILS ABSOLUTE: 7.84 E9/L (ref 1.8–7.3)
NEUTROPHILS RELATIVE PERCENT: 68.3 % (ref 43–80)
PDW BLD-RTO: 15.9 FL (ref 11.5–15)
PLATELET # BLD: 386 E9/L (ref 130–450)
PMV BLD AUTO: 10.1 FL (ref 7–12)
POTASSIUM SERPL-SCNC: 3.7 MMOL/L (ref 3.5–5)
RBC # BLD: 3.88 E12/L (ref 3.5–5.5)
SODIUM BLD-SCNC: 141 MMOL/L (ref 132–146)
TOTAL PROTEIN: 7.3 G/DL (ref 6.4–8.3)
WBC # BLD: 11.5 E9/L (ref 4.5–11.5)

## 2018-07-27 PROCEDURE — G8427 DOCREV CUR MEDS BY ELIG CLIN: HCPCS | Performed by: NURSE PRACTITIONER

## 2018-07-27 PROCEDURE — 1036F TOBACCO NON-USER: CPT | Performed by: NURSE PRACTITIONER

## 2018-07-27 PROCEDURE — G8417 CALC BMI ABV UP PARAM F/U: HCPCS | Performed by: NURSE PRACTITIONER

## 2018-07-27 PROCEDURE — 99214 OFFICE O/P EST MOD 30 MIN: CPT | Performed by: NURSE PRACTITIONER

## 2018-07-27 PROCEDURE — 99024 POSTOP FOLLOW-UP VISIT: CPT | Performed by: SURGERY

## 2018-07-27 PROCEDURE — 1111F DSCHRG MED/CURRENT MED MERGE: CPT | Performed by: NURSE PRACTITIONER

## 2018-07-27 PROCEDURE — 80053 COMPREHEN METABOLIC PANEL: CPT

## 2018-07-27 PROCEDURE — 85025 COMPLETE CBC W/AUTO DIFF WBC: CPT

## 2018-07-27 ASSESSMENT — ENCOUNTER SYMPTOMS
WHEEZING: 0
ROS SKIN COMMENTS: SEE HPI
NAUSEA: 0
SHORTNESS OF BREATH: 0
CONSTIPATION: 0
DOUBLE VISION: 0
DIARRHEA: 0
BLURRED VISION: 0
VOMITING: 0
COUGH: 0

## 2018-07-27 NOTE — PROGRESS NOTES
Chief Complaint   Patient presents with   Saeidar [facility]     has been ok       HPI:  Patient presents today for  Follow up of breast surgery. Patient had right breast surgery due to infection. She is currently being treated with a wound vac and IV antibiotics. She is following with Dr. Ana Nick for general surgery. She is thankful that it is not related to any breast cancer. She is following with ID as well. Labs and vanco levels are being monitored by ID. She has a positive attitude about this. She reports that the lexapro has been very helpful for her. She denies need for increase of dose at this time. She did have red man syndrome with her first dose of IV vanco. She can tolerated it but she needs benadryl and zofran prior to infusions. She reports that she is 12 days late for her period. She denies any possibility of being pregnant. Denies abdominal pain, cramping. No n/v. Denies fever, chills or other s/s of systemic infection. She is concerned that her left leg is swelling. She has tenderness to her left shin. No signs of trauma or injury to this leg. This has been giving her trouble for the past couple of weeks. Prior to Visit Medications    Medication Sig Taking? Authorizing Provider   diphenhydrAMINE (BENADRYL) 25 MG capsule Take 25 mg by mouth every 6 hours as needed for Itching Yes Historical Provider, MD   vancomycin (VANCOCIN) infusion Infuse 1,500 mg intravenously every 12 hours For 2 weeks Yes Rohit Dominique MD   LORazepam (ATIVAN) 1 MG tablet Take 1 tablet by mouth three times a week for 14 days. Take 1 pill 30 minutes prior to wound vacuum changes. Rossana Palm MD   escitalopram (LEXAPRO) 10 MG tablet TAKE 1 TABLET BY MOUTH DAILY Yes JIHAN Shoemaker - CNP   ondansetron (ZOFRAN) 4 MG tablet Take 1 tablet by mouth every 8 hours as needed for Nausea or Vomiting Yes Noé Wilson MD   HYDROXYZINE HCL PO Take 25 mg by mouth nightly as needed  Yes Historical Psychiatric: She has a normal mood and affect. Her behavior is normal.         Assessment/Plan:    1. Abscess of right breast  Follow with Dr. Emilee Arellano and ID    2. Leg swelling  Will image at this time  I am not suspicious for DVT - however may be a local phlebitis. Patient currently on antibiotics  - US Nonvascular Trunk Scan; Future  - US Duplex Lower Extremity Left Damian; Future    3. Left leg pain    - US Nonvascular Trunk Scan; Future  - US Duplex Lower Extremity Left Damian; Future    Return in about 3 weeks (around 8/17/2018) for knee pain.       Izaiah Childs, JIHAN - CNP

## 2018-07-30 ENCOUNTER — HOSPITAL ENCOUNTER (OUTPATIENT)
Age: 43
Discharge: HOME | End: 2018-08-01
Payer: MEDICAID

## 2018-07-30 LAB
ALBUMIN SERPL-MCNC: 4 G/DL (ref 3.5–5.2)
ALP BLD-CCNC: 74 U/L (ref 35–104)
ALT SERPL-CCNC: 13 U/L (ref 0–32)
ANION GAP SERPL CALCULATED.3IONS-SCNC: 17 MMOL/L (ref 7–16)
AST SERPL-CCNC: 12 U/L (ref 0–31)
BASOPHILS ABSOLUTE: 0.05 E9/L (ref 0–0.2)
BASOPHILS RELATIVE PERCENT: 0.4 % (ref 0–2)
BILIRUB SERPL-MCNC: 0.5 MG/DL (ref 0–1.2)
BUN BLDV-MCNC: 11 MG/DL (ref 6–20)
C-REACTIVE PROTEIN: 3.6 MG/DL (ref 0–0.4)
CALCIUM SERPL-MCNC: 9.1 MG/DL (ref 8.6–10.2)
CHLORIDE BLD-SCNC: 98 MMOL/L (ref 98–107)
CO2: 23 MMOL/L (ref 22–29)
CREAT SERPL-MCNC: 0.8 MG/DL (ref 0.5–1)
EOSINOPHILS ABSOLUTE: 0.23 E9/L (ref 0.05–0.5)
EOSINOPHILS RELATIVE PERCENT: 2.1 % (ref 0–6)
GFR AFRICAN AMERICAN: >60
GFR NON-AFRICAN AMERICAN: >60 ML/MIN/1.73
GLUCOSE BLD-MCNC: 85 MG/DL (ref 74–109)
HCT VFR BLD CALC: 35.6 % (ref 34–48)
HEMOGLOBIN: 10.9 G/DL (ref 11.5–15.5)
IMMATURE GRANULOCYTES #: 0.05 E9/L
IMMATURE GRANULOCYTES %: 0.4 % (ref 0–5)
LYMPHOCYTES ABSOLUTE: 2.76 E9/L (ref 1.5–4)
LYMPHOCYTES RELATIVE PERCENT: 24.7 % (ref 20–42)
MCH RBC QN AUTO: 26.3 PG (ref 26–35)
MCHC RBC AUTO-ENTMCNC: 30.6 % (ref 32–34.5)
MCV RBC AUTO: 85.8 FL (ref 80–99.9)
MONOCYTES ABSOLUTE: 0.73 E9/L (ref 0.1–0.95)
MONOCYTES RELATIVE PERCENT: 6.5 % (ref 2–12)
NEUTROPHILS ABSOLUTE: 7.35 E9/L (ref 1.8–7.3)
NEUTROPHILS RELATIVE PERCENT: 65.9 % (ref 43–80)
PDW BLD-RTO: 16.2 FL (ref 11.5–15)
PLATELET # BLD: 411 E9/L (ref 130–450)
PMV BLD AUTO: 10 FL (ref 7–12)
POTASSIUM SERPL-SCNC: 3.8 MMOL/L (ref 3.5–5)
RBC # BLD: 4.15 E12/L (ref 3.5–5.5)
SEDIMENTATION RATE, ERYTHROCYTE: 66 MM/HR (ref 0–20)
SODIUM BLD-SCNC: 138 MMOL/L (ref 132–146)
TOTAL PROTEIN: 7.3 G/DL (ref 6.4–8.3)
WBC # BLD: 11.2 E9/L (ref 4.5–11.5)

## 2018-07-30 PROCEDURE — 80053 COMPREHEN METABOLIC PANEL: CPT

## 2018-07-30 PROCEDURE — 86140 C-REACTIVE PROTEIN: CPT

## 2018-07-30 PROCEDURE — 85025 COMPLETE CBC W/AUTO DIFF WBC: CPT

## 2018-07-30 PROCEDURE — 85651 RBC SED RATE NONAUTOMATED: CPT

## 2018-07-30 PROCEDURE — 80202 ASSAY OF VANCOMYCIN: CPT

## 2018-07-30 PROCEDURE — 36415 COLL VENOUS BLD VENIPUNCTURE: CPT

## 2018-07-31 LAB — VANCOMYCIN TROUGH: 10.2 MCG/ML (ref 5–16)

## 2018-08-01 ENCOUNTER — TELEPHONE (OUTPATIENT)
Dept: BREAST CENTER | Age: 43
End: 2018-08-01

## 2018-08-01 ENCOUNTER — TELEPHONE (OUTPATIENT)
Dept: SURGERY | Age: 43
End: 2018-08-01

## 2018-08-01 NOTE — TELEPHONE ENCOUNTER
MA received a call from Jerome Abel with [facility] to give an update about patient, pt's picc line has been removed and antibiotics have been finished. Pt has \"hardness\" under her right breast, but no pain, redness or swelling so she is questioning weather or not it is just scar tissue? MA routing to Dr. Pantera Hernandez for advisement and CLAY Roa for informational purposes.     Jerome Abel (RN @ [place] [address]): [phone]    lectronically signed by Tiffanie Bae on 8/1/18 at 3:53 PM

## 2018-08-03 ENCOUNTER — HOSPITAL ENCOUNTER (OUTPATIENT)
Dept: GENERAL RADIOLOGY | Age: 43
Discharge: HOME | End: 2018-08-05
Payer: MEDICAID

## 2018-08-03 ENCOUNTER — OFFICE VISIT (OUTPATIENT)
Dept: BREAST CENTER | Age: 43
End: 2018-08-03
Payer: MEDICAID

## 2018-08-03 VITALS
OXYGEN SATURATION: 98 % | RESPIRATION RATE: 14 BRPM | HEIGHT: 63 IN | TEMPERATURE: 97.9 F | SYSTOLIC BLOOD PRESSURE: 134 MMHG | DIASTOLIC BLOOD PRESSURE: 72 MMHG | HEART RATE: 85 BPM | BODY MASS INDEX: 51.91 KG/M2 | WEIGHT: 293 LBS

## 2018-08-03 DIAGNOSIS — N63.10 BREAST MASS, RIGHT: ICD-10-CM

## 2018-08-03 DIAGNOSIS — N63.10 BREAST MASS, RIGHT: Primary | ICD-10-CM

## 2018-08-03 PROCEDURE — 99024 POSTOP FOLLOW-UP VISIT: CPT | Performed by: SURGERY

## 2018-08-03 PROCEDURE — 76642 ULTRASOUND BREAST LIMITED: CPT

## 2018-08-10 ENCOUNTER — HOSPITAL ENCOUNTER (OUTPATIENT)
Dept: INFUSION THERAPY | Age: 43
Setting detail: INFUSION SERIES
Discharge: HOME | End: 2018-08-10
Payer: MEDICAID

## 2018-08-10 ENCOUNTER — OFFICE VISIT (OUTPATIENT)
Dept: FAMILY MEDICINE CLINIC | Age: 43
End: 2018-08-10
Payer: MEDICAID

## 2018-08-10 ENCOUNTER — OFFICE VISIT (OUTPATIENT)
Dept: BREAST CENTER | Age: 43
End: 2018-08-10
Payer: MEDICAID

## 2018-08-10 VITALS
BODY MASS INDEX: 51.91 KG/M2 | OXYGEN SATURATION: 97 % | TEMPERATURE: 97.6 F | SYSTOLIC BLOOD PRESSURE: 124 MMHG | HEIGHT: 63 IN | WEIGHT: 293 LBS | DIASTOLIC BLOOD PRESSURE: 70 MMHG | HEART RATE: 87 BPM

## 2018-08-10 VITALS
SYSTOLIC BLOOD PRESSURE: 116 MMHG | TEMPERATURE: 97.7 F | RESPIRATION RATE: 20 BRPM | HEART RATE: 69 BPM | DIASTOLIC BLOOD PRESSURE: 83 MMHG | OXYGEN SATURATION: 98 %

## 2018-08-10 VITALS
HEIGHT: 63 IN | BODY MASS INDEX: 51.91 KG/M2 | HEART RATE: 69 BPM | RESPIRATION RATE: 16 BRPM | WEIGHT: 293 LBS | OXYGEN SATURATION: 98 % | SYSTOLIC BLOOD PRESSURE: 116 MMHG | TEMPERATURE: 97.7 F | DIASTOLIC BLOOD PRESSURE: 83 MMHG

## 2018-08-10 DIAGNOSIS — N61.1 BREAST ABSCESS OF FEMALE: ICD-10-CM

## 2018-08-10 DIAGNOSIS — M25.571 ACUTE RIGHT ANKLE PAIN: ICD-10-CM

## 2018-08-10 DIAGNOSIS — N61.1 ABSCESS OF RIGHT BREAST: ICD-10-CM

## 2018-08-10 DIAGNOSIS — N61.1 ABSCESS OF RIGHT BREAST: Primary | ICD-10-CM

## 2018-08-10 DIAGNOSIS — F41.9 ANXIETY: Primary | ICD-10-CM

## 2018-08-10 DIAGNOSIS — H60.501 ACUTE OTITIS EXTERNA OF RIGHT EAR, UNSPECIFIED TYPE: ICD-10-CM

## 2018-08-10 PROCEDURE — G8417 CALC BMI ABV UP PARAM F/U: HCPCS | Performed by: NURSE PRACTITIONER

## 2018-08-10 PROCEDURE — 99214 OFFICE O/P EST MOD 30 MIN: CPT | Performed by: NURSE PRACTITIONER

## 2018-08-10 PROCEDURE — 1111F DSCHRG MED/CURRENT MED MERGE: CPT | Performed by: NURSE PRACTITIONER

## 2018-08-10 PROCEDURE — 6360000002 HC RX W HCPCS: Performed by: INTERNAL MEDICINE

## 2018-08-10 PROCEDURE — 2580000003 HC RX 258: Performed by: INTERNAL MEDICINE

## 2018-08-10 PROCEDURE — G8427 DOCREV CUR MEDS BY ELIG CLIN: HCPCS | Performed by: NURSE PRACTITIONER

## 2018-08-10 PROCEDURE — 4130F TOPICAL PREP RX AOE: CPT | Performed by: NURSE PRACTITIONER

## 2018-08-10 PROCEDURE — 99024 POSTOP FOLLOW-UP VISIT: CPT | Performed by: SURGERY

## 2018-08-10 PROCEDURE — 1036F TOBACCO NON-USER: CPT | Performed by: NURSE PRACTITIONER

## 2018-08-10 PROCEDURE — 96365 THER/PROPH/DIAG IV INF INIT: CPT

## 2018-08-10 PROCEDURE — 2580000003 HC RX 258

## 2018-08-10 RX ORDER — SODIUM CHLORIDE 0.9 % (FLUSH) 0.9 %
SYRINGE (ML) INJECTION
Status: COMPLETED
Start: 2018-08-10 | End: 2018-08-10

## 2018-08-10 RX ADMIN — Medication 10 ML: at 10:18

## 2018-08-10 RX ADMIN — DALBAVANCIN 1500 MG: 500 INJECTION, POWDER, FOR SOLUTION INTRAVENOUS at 10:18

## 2018-08-10 ASSESSMENT — ENCOUNTER SYMPTOMS
SHORTNESS OF BREATH: 0
CONSTIPATION: 0
BLURRED VISION: 0
COUGH: 0
WHEEZING: 0
DIARRHEA: 0
DOUBLE VISION: 0
VOMITING: 0
NAUSEA: 0

## 2018-08-10 NOTE — PROGRESS NOTES
Chief Complaint   Patient presents with    Knee Pain     pt here to follow up for left knee pain after US, now having right ankle pain       HPI:  Patient presents today for  3 week follow up. She has been following with ID and general surgery. She did get another IV infusion today of antibiotics. Mood - she is doing ok with this. She has been on lexapro 10 mg without difficulty. She does get a headache if she takes it too late at night. Does not want to adjust this medication at this time. She continues to have the VAC dressing to her right breast. Tolerates well. Follows with specialists as scheduled. At the previous visit - she had concerns of a red raised area on her left leg. US was negative for DVT, phlebitis. Symptoms have since resolved. She is now concerned because her right ankle appears to be red and swollen. Discussed this with patient. She denies fever, chills. Does have tenderness tot he touch to that ankle joint. Explained to patient that she is receiving strong antibiotics - most likely not infectious etiology. Denies injury, trauma to that ankle. We will monitor at this time and she will let me know if symptoms progress. She also has complaints of right ear pain. No drainage noted per patient. Prior to Visit Medications    Medication Sig Taking? Authorizing Provider   collagenase (SANTYL) 250 UNIT/GM ointment Apply topically daily.  Yes Kasandra Luis MD   escitalopram (LEXAPRO) 10 MG tablet TAKE 1 TABLET BY MOUTH DAILY Yes JIHAN Mckeon - CNP   ondansetron (ZOFRAN) 4 MG tablet Take 1 tablet by mouth every 8 hours as needed for Nausea or Vomiting Yes Kasandra Luis MD   HYDROXYZINE HCL PO Take 25 mg by mouth nightly as needed  Yes Historical Provider, MD   vitamin D (ERGOCALCIFEROL) 66738 units CAPS capsule Take 1 tab by mouth weekly  Patient taking differently: once a week Take 1 tab by mouth weekly Yes Susana Carlisle MD   diphenhydrAMINE (BENADRYL) 25 MG capsule Take 25 mg by mouth every 6 hours as needed for Itching  Historical Provider, MD   acetaminophen (TYLENOL) 500 MG tablet Take 1,000 mg by mouth every 6 hours as needed for Pain  Historical Provider, MD         Allergies   Allergen Reactions    Vancomycin Nausea And Vomiting     Red man syndrome, had high fever and upset stomach         Review of Systems  Review of Systems   Constitutional: Negative for chills, fever and malaise/fatigue. HENT: Positive for ear pain (right). Negative for congestion and nosebleeds. Eyes: Negative for blurred vision and double vision. Respiratory: Negative for cough, shortness of breath and wheezing. Cardiovascular: Negative for chest pain, palpitations and leg swelling. Gastrointestinal: Negative for constipation, diarrhea, nausea and vomiting. Genitourinary: Negative for dysuria and urgency. Musculoskeletal: Positive for joint pain (right ankle pain ). Negative for neck pain. Neurological: Negative for dizziness and headaches. All other systems reviewed and are negative. VS:  /70   Pulse 87   Temp 97.6 °F (36.4 °C) (Oral)   Ht 5' 3\" (1.6 m)   Wt (!) 308 lb (139.7 kg)   LMP 06/18/2018   SpO2 97%   Breastfeeding? No   BMI 54.56 kg/m²     Patient's medical, social, and family history reviewed      Physical Exam  Physical Exam   Constitutional: She is oriented to person, place, and time. She appears well-developed and well-nourished. HENT:   Head: Normocephalic. Right Ear: There is swelling. No foreign bodies. Eyes: Pupils are equal, round, and reactive to light. Neck: Normal range of motion. Neck supple. No thyromegaly present. Cardiovascular: Normal rate, regular rhythm and intact distal pulses. Pulmonary/Chest: Effort normal and breath sounds normal.   Abdominal: Soft. Bowel sounds are normal.   Musculoskeletal: Normal range of motion. Right ankle: She exhibits swelling. She exhibits normal range of motion. Lymphadenopathy:     She has no cervical adenopathy. Neurological: She is alert and oriented to person, place, and time. Skin: Skin is warm and dry. Psychiatric: She has a normal mood and affect. Her behavior is normal.         Assessment/Plan:    1. Anxiety  Continue with current medication     2. Acute right ankle pain  Will monitor     3. Acute otitis externa of right ear, unspecified type  Will treat today   - ciprofloxacin-dexamethasone (CIPRODEX) 0.3-0.1 % otic suspension; Place 4 drops into the right ear 2 times daily for 7 days  Dispense: 1 Bottle; Refill: 0    4. Breast abscess of female  Follow with specialists as scheduled. Return in about 2 months (around 10/10/2018).       JIHAN Frausto - CNP

## 2018-08-13 LAB
FUNGUS (MYCOLOGY) CULTURE: NORMAL
FUNGUS STAIN: NORMAL

## 2018-08-26 RX ORDER — CIPROFLOXACIN AND DEXAMETHASONE 3; 1 MG/ML; MG/ML
4 SUSPENSION/ DROPS AURICULAR (OTIC) 2 TIMES DAILY
Qty: 1 BOTTLE | Refills: 0 | Status: SHIPPED | OUTPATIENT
Start: 2018-08-26 | End: 2018-09-02

## 2018-08-28 LAB
AFB CULTURE (MYCOBACTERIA): NORMAL
AFB SMEAR: NORMAL

## 2018-08-30 ENCOUNTER — TELEPHONE (OUTPATIENT)
Dept: FAMILY MEDICINE CLINIC | Age: 43
End: 2018-08-30

## 2018-08-31 ENCOUNTER — OFFICE VISIT (OUTPATIENT)
Dept: BREAST CENTER | Age: 43
End: 2018-08-31
Payer: MEDICAID

## 2018-08-31 VITALS
BODY MASS INDEX: 51.91 KG/M2 | RESPIRATION RATE: 14 BRPM | SYSTOLIC BLOOD PRESSURE: 128 MMHG | TEMPERATURE: 98.5 F | HEIGHT: 63 IN | OXYGEN SATURATION: 98 % | HEART RATE: 77 BPM | DIASTOLIC BLOOD PRESSURE: 74 MMHG | WEIGHT: 293 LBS

## 2018-08-31 DIAGNOSIS — N61.1 ABSCESS OF RIGHT BREAST: Primary | ICD-10-CM

## 2018-08-31 PROCEDURE — 99024 POSTOP FOLLOW-UP VISIT: CPT | Performed by: SURGERY

## 2018-08-31 NOTE — PROGRESS NOTES
[facility]  PROGRESS NOTE  ATTENDING NOTE    Chief Complaint   Patient presents with    Post-Op Check     Right breast post op, patient stop wound VAC Friday has been doing wet to dry dressing. Pateint doing good a little pain, no questions or concerns. S:  Doing well. She is no longer using the wound vac. She is now doing wet to dry dressings daily. She thinks the wound smells, but I smell no odor. /74 (Site: Left Arm, Position: Sitting, Cuff Size: Large Adult)   Pulse 77   Temp 98.5 °F (36.9 °C) (Oral)   Resp 14   Ht 5' 3\" (1.6 m)   Wt (!) 308 lb (139.7 kg)   SpO2 98%   BMI 54.56 kg/m²   Gen:  NAD  Right breast:  Good granulation tissue, the firmness to the breast has greatly decreased. She still has some nodularity to the lateral aspect of the wound. No nipple discharge. ASSESSMENT/PLAN:  1.   Right breast abscess--c/w wound care, wet to dry dressing daily  --RTC 1 month    Teodora Hudson MD, MSc  8/31/2018  10:42 AM

## 2018-09-06 ENCOUNTER — TELEPHONE (OUTPATIENT)
Dept: BREAST CENTER | Age: 43
End: 2018-09-06

## 2018-09-06 NOTE — TELEPHONE ENCOUNTER
MA received a call from patient stating she is having a lot of pain. She is getting burning, sharp, pulling pains in right breast. They start from the wound area and moves to the axilla and when that happens the hand goes numb, the numbness last about 2-3 minutes until the patient works. Patient states she is taking the pain medication around the clock as before she was taking nothing so she's not sure if its just healing and that's why in so much pain or if something is going on. Home care nurse comes tomorrow last visit was Tuesday. Patient doing wet to dry dressings, wound almost closed. Patient denies fevers, chills, patient states no changes to wound since last visit with you beside wound is closing up. Patient can be contacted at [phone]. MA routing message to  for advisement.         Electronically signed by Anjelica Lenz MA on 9/6/18 at 11:04 AM

## 2018-10-01 ENCOUNTER — TELEPHONE (OUTPATIENT)
Dept: BREAST CENTER | Age: 43
End: 2018-10-01

## 2018-10-01 NOTE — TELEPHONE ENCOUNTER
I can see her in general surgery clinic Tuesday or Thursday.     Have her check her temperature periodically until then

## 2018-10-01 NOTE — TELEPHONE ENCOUNTER
MA contacted patient to pass along the information per . Patient stated she has an appt with her PCP office tomorrow and if they feel like its an urgent matter she will call to take the appt on Thursday as an add on patient other wise she will keep the Friday appt in [facility].       Electronically signed by Jessica Laurent MA on 10/1/18 at 1:46 PM

## 2018-10-02 ENCOUNTER — HOSPITAL ENCOUNTER (OUTPATIENT)
Age: 43
Discharge: HOME | End: 2018-10-04
Payer: MEDICAID

## 2018-10-02 ENCOUNTER — OFFICE VISIT (OUTPATIENT)
Dept: FAMILY MEDICINE CLINIC | Age: 43
End: 2018-10-02
Payer: MEDICAID

## 2018-10-02 VITALS
RESPIRATION RATE: 18 BRPM | TEMPERATURE: 98.1 F | OXYGEN SATURATION: 98 % | SYSTOLIC BLOOD PRESSURE: 124 MMHG | HEART RATE: 82 BPM | BODY MASS INDEX: 54.56 KG/M2 | DIASTOLIC BLOOD PRESSURE: 70 MMHG | HEIGHT: 63 IN

## 2018-10-02 DIAGNOSIS — D64.9 ANEMIA, UNSPECIFIED TYPE: ICD-10-CM

## 2018-10-02 DIAGNOSIS — E55.9 VITAMIN D DEFICIENCY: ICD-10-CM

## 2018-10-02 DIAGNOSIS — R53.82 CHRONIC FATIGUE: ICD-10-CM

## 2018-10-02 DIAGNOSIS — N61.0 CELLULITIS OF BREAST: ICD-10-CM

## 2018-10-02 DIAGNOSIS — N61.0 CELLULITIS OF BREAST: Primary | ICD-10-CM

## 2018-10-02 DIAGNOSIS — N63.0 BREAST MASS IN FEMALE: ICD-10-CM

## 2018-10-02 LAB
ANION GAP SERPL CALCULATED.3IONS-SCNC: 16 MMOL/L (ref 7–16)
BASOPHILS ABSOLUTE: 0.05 E9/L (ref 0–0.2)
BASOPHILS RELATIVE PERCENT: 0.4 % (ref 0–2)
BUN BLDV-MCNC: 11 MG/DL (ref 6–20)
CALCIUM SERPL-MCNC: 8.9 MG/DL (ref 8.6–10.2)
CHLORIDE BLD-SCNC: 103 MMOL/L (ref 98–107)
CO2: 23 MMOL/L (ref 22–29)
CREAT SERPL-MCNC: 0.7 MG/DL (ref 0.5–1)
EOSINOPHILS ABSOLUTE: 0.13 E9/L (ref 0.05–0.5)
EOSINOPHILS RELATIVE PERCENT: 1 % (ref 0–6)
GFR AFRICAN AMERICAN: >60
GFR NON-AFRICAN AMERICAN: >60 ML/MIN/1.73
GLUCOSE BLD-MCNC: 92 MG/DL (ref 74–109)
HCT VFR BLD CALC: 44.9 % (ref 34–48)
HEMOGLOBIN: 13.6 G/DL (ref 11.5–15.5)
IMMATURE GRANULOCYTES #: 0.04 E9/L
IMMATURE GRANULOCYTES %: 0.3 % (ref 0–5)
LYMPHOCYTES ABSOLUTE: 2.8 E9/L (ref 1.5–4)
LYMPHOCYTES RELATIVE PERCENT: 21.9 % (ref 20–42)
MCH RBC QN AUTO: 26.7 PG (ref 26–35)
MCHC RBC AUTO-ENTMCNC: 30.3 % (ref 32–34.5)
MCV RBC AUTO: 88 FL (ref 80–99.9)
MONOCYTES ABSOLUTE: 0.73 E9/L (ref 0.1–0.95)
MONOCYTES RELATIVE PERCENT: 5.7 % (ref 2–12)
NEUTROPHILS ABSOLUTE: 9.04 E9/L (ref 1.8–7.3)
NEUTROPHILS RELATIVE PERCENT: 70.7 % (ref 43–80)
PDW BLD-RTO: 15.9 FL (ref 11.5–15)
PLATELET # BLD: 393 E9/L (ref 130–450)
PMV BLD AUTO: 10.3 FL (ref 7–12)
POTASSIUM SERPL-SCNC: 4.5 MMOL/L (ref 3.5–5)
RBC # BLD: 5.1 E12/L (ref 3.5–5.5)
SODIUM BLD-SCNC: 142 MMOL/L (ref 132–146)
VITAMIN D 25-HYDROXY: 17 NG/ML (ref 30–100)
WBC # BLD: 12.8 E9/L (ref 4.5–11.5)

## 2018-10-02 PROCEDURE — 99214 OFFICE O/P EST MOD 30 MIN: CPT | Performed by: NURSE PRACTITIONER

## 2018-10-02 PROCEDURE — 36415 COLL VENOUS BLD VENIPUNCTURE: CPT | Performed by: NURSE PRACTITIONER

## 2018-10-02 PROCEDURE — G8427 DOCREV CUR MEDS BY ELIG CLIN: HCPCS | Performed by: NURSE PRACTITIONER

## 2018-10-02 PROCEDURE — 80048 BASIC METABOLIC PNL TOTAL CA: CPT

## 2018-10-02 PROCEDURE — 87077 CULTURE AEROBIC IDENTIFY: CPT

## 2018-10-02 PROCEDURE — 1036F TOBACCO NON-USER: CPT | Performed by: NURSE PRACTITIONER

## 2018-10-02 PROCEDURE — G8484 FLU IMMUNIZE NO ADMIN: HCPCS | Performed by: NURSE PRACTITIONER

## 2018-10-02 PROCEDURE — 87186 SC STD MICRODIL/AGAR DIL: CPT

## 2018-10-02 PROCEDURE — 85025 COMPLETE CBC W/AUTO DIFF WBC: CPT

## 2018-10-02 PROCEDURE — 82306 VITAMIN D 25 HYDROXY: CPT

## 2018-10-02 PROCEDURE — G8417 CALC BMI ABV UP PARAM F/U: HCPCS | Performed by: NURSE PRACTITIONER

## 2018-10-02 PROCEDURE — 87070 CULTURE OTHR SPECIMN AEROBIC: CPT

## 2018-10-02 RX ORDER — SULFAMETHOXAZOLE AND TRIMETHOPRIM 800; 160 MG/1; MG/1
1 TABLET ORAL 2 TIMES DAILY
Qty: 20 TABLET | Refills: 0 | Status: SHIPPED | OUTPATIENT
Start: 2018-10-02 | End: 2018-10-12

## 2018-10-02 RX ORDER — CEPHALEXIN 500 MG/1
500 CAPSULE ORAL 4 TIMES DAILY
Qty: 40 CAPSULE | Refills: 0 | Status: SHIPPED | OUTPATIENT
Start: 2018-10-02 | End: 2018-10-12

## 2018-10-02 ASSESSMENT — ENCOUNTER SYMPTOMS
CONSTIPATION: 0
ABDOMINAL PAIN: 0
VOMITING: 0
ROS SKIN COMMENTS: SEE HPI
COUGH: 0
BLURRED VISION: 0
WHEEZING: 0
DIARRHEA: 0
NAUSEA: 0
SHORTNESS OF BREATH: 0
DOUBLE VISION: 0

## 2018-10-02 NOTE — PROGRESS NOTES
Eyes: Pupils are equal, round, and reactive to light. Neck: Normal range of motion. Neck supple. No thyromegaly present. Cardiovascular: Normal rate and regular rhythm. Pulmonary/Chest: Effort normal and breath sounds normal. She exhibits mass. Right breast exhibits skin change. Right breast exhibits no nipple discharge. Left breast exhibits mass. Right breast - area circled in picture, warm, erythematous  Incision to right breast with scan amount of purulent drainage noted. No opening to incision. Incision itself does not demonstrate active infection at this time. Left breast - 9-10 o'clock position - small, tender nodule palpable. Abdominal: Soft. Bowel sounds are normal.   Musculoskeletal: Normal range of motion. Lymphadenopathy:     She has no cervical adenopathy. Neurological: She is alert and oriented to person, place, and time. Skin: Skin is warm and dry. Psychiatric: She has a normal mood and affect. Her behavior is normal.         Assessment/Plan:    1. Cellulitis of breast  Will treat patient today  She is to follow with Dr. Pancho Stubbs at the end of this week  Instructed patient that if her symptoms do not imporve or if they worsen over the next 24-48 hours she is to call me right away. She verbalized understanding  - WOUND CULTURE; Future  - cephALEXin (KEFLEX) 500 MG capsule; Take 1 capsule by mouth 4 times daily for 10 days  Dispense: 40 capsule; Refill: 0  - sulfamethoxazole-trimethoprim (BACTRIM DS) 800-160 MG per tablet; Take 1 tablet by mouth 2 times daily for 10 days  Dispense: 20 tablet; Refill: 0  - mupirocin (BACTROBAN) 2 % ointment; Apply topically 3 times daily. Dispense: 1 g; Refill: 0    2. Breast mass in female  Will image  - [facility] US BREAST COMPLETE LEFT; Future    3. Chronic fatigue  Repeat labs today   - CBC Auto Differential; Future  - BASIC METABOLIC PANEL; Future    4.  Anemia, unspecified type    - CBC Auto Differential; Future  - BASIC METABOLIC PANEL;

## 2018-10-05 ENCOUNTER — OFFICE VISIT (OUTPATIENT)
Dept: BREAST CENTER | Age: 43
End: 2018-10-05
Payer: MEDICAID

## 2018-10-05 VITALS
HEART RATE: 82 BPM | DIASTOLIC BLOOD PRESSURE: 76 MMHG | TEMPERATURE: 97.3 F | HEIGHT: 63 IN | SYSTOLIC BLOOD PRESSURE: 132 MMHG | OXYGEN SATURATION: 98 % | RESPIRATION RATE: 16 BRPM | BODY MASS INDEX: 51.91 KG/M2 | WEIGHT: 293 LBS

## 2018-10-05 DIAGNOSIS — Z51.89 VISIT FOR WOUND CHECK: ICD-10-CM

## 2018-10-05 DIAGNOSIS — N61.1 ABSCESS OF RIGHT BREAST: ICD-10-CM

## 2018-10-05 DIAGNOSIS — N63.10 BREAST MASS, RIGHT: ICD-10-CM

## 2018-10-05 LAB — WOUND/ABSCESS: NORMAL

## 2018-10-05 PROCEDURE — 99213 OFFICE O/P EST LOW 20 MIN: CPT | Performed by: SURGERY

## 2018-10-05 PROCEDURE — 99024 POSTOP FOLLOW-UP VISIT: CPT | Performed by: SURGERY

## 2018-10-11 ENCOUNTER — OFFICE VISIT (OUTPATIENT)
Dept: FAMILY MEDICINE CLINIC | Age: 43
End: 2018-10-11
Payer: MEDICAID

## 2018-10-11 VITALS
SYSTOLIC BLOOD PRESSURE: 122 MMHG | OXYGEN SATURATION: 97 % | TEMPERATURE: 98.1 F | DIASTOLIC BLOOD PRESSURE: 68 MMHG | BODY MASS INDEX: 51.91 KG/M2 | WEIGHT: 293 LBS | HEART RATE: 70 BPM | HEIGHT: 63 IN

## 2018-10-11 DIAGNOSIS — J02.9 ACUTE VIRAL PHARYNGITIS: ICD-10-CM

## 2018-10-11 DIAGNOSIS — F32.A DEPRESSION, UNSPECIFIED DEPRESSION TYPE: ICD-10-CM

## 2018-10-11 DIAGNOSIS — J01.00 ACUTE MAXILLARY SINUSITIS, RECURRENCE NOT SPECIFIED: Primary | ICD-10-CM

## 2018-10-11 LAB — S PYO AG THROAT QL: NORMAL

## 2018-10-11 PROCEDURE — 87880 STREP A ASSAY W/OPTIC: CPT | Performed by: NURSE PRACTITIONER

## 2018-10-11 PROCEDURE — G8427 DOCREV CUR MEDS BY ELIG CLIN: HCPCS | Performed by: NURSE PRACTITIONER

## 2018-10-11 PROCEDURE — G8417 CALC BMI ABV UP PARAM F/U: HCPCS | Performed by: NURSE PRACTITIONER

## 2018-10-11 PROCEDURE — G8484 FLU IMMUNIZE NO ADMIN: HCPCS | Performed by: NURSE PRACTITIONER

## 2018-10-11 PROCEDURE — 1036F TOBACCO NON-USER: CPT | Performed by: NURSE PRACTITIONER

## 2018-10-11 PROCEDURE — 99214 OFFICE O/P EST MOD 30 MIN: CPT | Performed by: NURSE PRACTITIONER

## 2018-10-11 RX ORDER — AZITHROMYCIN 250 MG/1
TABLET, FILM COATED ORAL
Qty: 1 PACKET | Refills: 0 | Status: SHIPPED | OUTPATIENT
Start: 2018-10-11 | End: 2018-10-15

## 2018-10-11 RX ORDER — ESCITALOPRAM OXALATE 20 MG/1
20 TABLET ORAL DAILY
Qty: 30 TABLET | Refills: 3 | Status: SHIPPED | OUTPATIENT
Start: 2018-10-11 | End: 2019-02-19 | Stop reason: DRUGHIGH

## 2018-10-11 ASSESSMENT — ENCOUNTER SYMPTOMS
SHORTNESS OF BREATH: 0
COUGH: 0
VOMITING: 0
WHEEZING: 0
CONSTIPATION: 0
NAUSEA: 0
DOUBLE VISION: 0
SINUS PAIN: 1
BLURRED VISION: 0
DIARRHEA: 0

## 2018-10-22 ENCOUNTER — OFFICE VISIT (OUTPATIENT)
Dept: FAMILY MEDICINE CLINIC | Age: 43
End: 2018-10-22
Payer: MEDICAID

## 2018-10-22 VITALS
HEIGHT: 63 IN | OXYGEN SATURATION: 96 % | TEMPERATURE: 98.2 F | BODY MASS INDEX: 54.56 KG/M2 | SYSTOLIC BLOOD PRESSURE: 124 MMHG | HEART RATE: 95 BPM | RESPIRATION RATE: 18 BRPM | DIASTOLIC BLOOD PRESSURE: 82 MMHG

## 2018-10-22 DIAGNOSIS — R09.81 SINUS CONGESTION: ICD-10-CM

## 2018-10-22 DIAGNOSIS — G89.18 POST-OP PAIN: ICD-10-CM

## 2018-10-22 DIAGNOSIS — N61.1 BREAST ABSCESS: Primary | ICD-10-CM

## 2018-10-22 PROCEDURE — 99214 OFFICE O/P EST MOD 30 MIN: CPT | Performed by: NURSE PRACTITIONER

## 2018-10-22 PROCEDURE — G8417 CALC BMI ABV UP PARAM F/U: HCPCS | Performed by: NURSE PRACTITIONER

## 2018-10-22 PROCEDURE — 1036F TOBACCO NON-USER: CPT | Performed by: NURSE PRACTITIONER

## 2018-10-22 PROCEDURE — G8484 FLU IMMUNIZE NO ADMIN: HCPCS | Performed by: NURSE PRACTITIONER

## 2018-10-22 PROCEDURE — G8427 DOCREV CUR MEDS BY ELIG CLIN: HCPCS | Performed by: NURSE PRACTITIONER

## 2018-10-22 RX ORDER — HYDROCODONE BITARTRATE AND ACETAMINOPHEN 5; 325 MG/1; MG/1
1 TABLET ORAL EVERY 6 HOURS PRN
Qty: 28 TABLET | Refills: 0 | Status: SHIPPED | OUTPATIENT
Start: 2018-10-22 | End: 2018-10-29

## 2018-10-22 RX ORDER — LORATADINE 10 MG/1
10 TABLET ORAL DAILY
Qty: 30 TABLET | Refills: 1 | Status: SHIPPED | OUTPATIENT
Start: 2018-10-22 | End: 2019-09-16

## 2018-10-22 RX ORDER — FLUTICASONE PROPIONATE 50 MCG
1 SPRAY, SUSPENSION (ML) NASAL DAILY
Qty: 1 BOTTLE | Refills: 3 | Status: SHIPPED | OUTPATIENT
Start: 2018-10-22 | End: 2019-08-08 | Stop reason: ALTCHOICE

## 2018-10-22 ASSESSMENT — ENCOUNTER SYMPTOMS
CONSTIPATION: 0
VOMITING: 0
SHORTNESS OF BREATH: 0
DIARRHEA: 0
SINUS PAIN: 1
NAUSEA: 0
WHEEZING: 0
COUGH: 0

## 2018-10-22 NOTE — PROGRESS NOTES
Dispense: 28 tablet; Refill: 0    3. Sinus congestion    - loratadine (CLARITIN) 10 MG tablet; Take 1 tablet by mouth daily  Dispense: 30 tablet; Refill: 1  - fluticasone (FLONASE) 50 MCG/ACT nasal spray; 1 spray by Each Nare route daily  Dispense: 1 Bottle; Refill: 3      Return in about 3 months (around 1/22/2019), or if symptoms worsen or fail to improve.           Yamila Dunn, APRN - CNP

## 2018-10-26 RX ORDER — SODIUM CHLORIDE 0.9 % (FLUSH) 0.9 %
10 SYRINGE (ML) INJECTION PRN
Status: CANCELLED | OUTPATIENT
Start: 2018-10-29

## 2018-10-29 ENCOUNTER — HOSPITAL ENCOUNTER (OUTPATIENT)
Dept: INFUSION THERAPY | Age: 43
Setting detail: INFUSION SERIES
Discharge: HOME | End: 2018-10-29
Payer: MEDICAID

## 2018-10-29 VITALS
TEMPERATURE: 97.5 F | DIASTOLIC BLOOD PRESSURE: 64 MMHG | HEART RATE: 66 BPM | OXYGEN SATURATION: 98 % | SYSTOLIC BLOOD PRESSURE: 110 MMHG | RESPIRATION RATE: 18 BRPM

## 2018-10-29 DIAGNOSIS — N61.1 ABSCESS OF RIGHT BREAST: ICD-10-CM

## 2018-10-29 PROCEDURE — 2580000003 HC RX 258

## 2018-10-29 PROCEDURE — 96365 THER/PROPH/DIAG IV INF INIT: CPT

## 2018-10-29 PROCEDURE — 2580000003 HC RX 258: Performed by: INTERNAL MEDICINE

## 2018-10-29 RX ORDER — SODIUM CHLORIDE 0.9 % (FLUSH) 0.9 %
10 SYRINGE (ML) INJECTION PRN
Status: CANCELLED | OUTPATIENT
Start: 2018-10-29

## 2018-10-29 RX ORDER — SODIUM CHLORIDE 0.9 % (FLUSH) 0.9 %
SYRINGE (ML) INJECTION
Status: COMPLETED
Start: 2018-10-29 | End: 2018-10-29

## 2018-10-29 RX ORDER — SODIUM CHLORIDE 0.9 % (FLUSH) 0.9 %
10 SYRINGE (ML) INJECTION PRN
Status: DISCONTINUED | OUTPATIENT
Start: 2018-10-29 | End: 2018-10-30 | Stop reason: HOSPADM

## 2018-10-29 RX ADMIN — DEXTROSE MONOHYDRATE 1500 MG: 50 INJECTION, SOLUTION INTRAVENOUS at 10:21

## 2018-10-29 RX ADMIN — Medication 10 ML: at 10:22

## 2018-11-09 ENCOUNTER — OFFICE VISIT (OUTPATIENT)
Dept: BREAST CENTER | Age: 43
End: 2018-11-09
Payer: MEDICAID

## 2018-11-09 VITALS
SYSTOLIC BLOOD PRESSURE: 126 MMHG | TEMPERATURE: 98.3 F | OXYGEN SATURATION: 98 % | WEIGHT: 293 LBS | BODY MASS INDEX: 51.91 KG/M2 | HEART RATE: 74 BPM | DIASTOLIC BLOOD PRESSURE: 64 MMHG | RESPIRATION RATE: 16 BRPM | HEIGHT: 63 IN

## 2018-11-09 DIAGNOSIS — N61.1 BREAST ABSCESS: Primary | ICD-10-CM

## 2018-11-09 PROCEDURE — G8484 FLU IMMUNIZE NO ADMIN: HCPCS | Performed by: SURGERY

## 2018-11-09 PROCEDURE — 1036F TOBACCO NON-USER: CPT | Performed by: SURGERY

## 2018-11-09 PROCEDURE — G8417 CALC BMI ABV UP PARAM F/U: HCPCS | Performed by: SURGERY

## 2018-11-09 PROCEDURE — G8427 DOCREV CUR MEDS BY ELIG CLIN: HCPCS | Performed by: SURGERY

## 2018-11-09 PROCEDURE — 99214 OFFICE O/P EST MOD 30 MIN: CPT | Performed by: SURGERY

## 2018-11-09 RX ORDER — HYDROCODONE BITARTRATE AND ACETAMINOPHEN 5; 325 MG/1; MG/1
1 TABLET ORAL EVERY 6 HOURS PRN
COMMUNITY
End: 2019-02-25

## 2019-01-10 ASSESSMENT — ENCOUNTER SYMPTOMS
COUGH: 0
EYE ITCHING: 0
BLOOD IN STOOL: 0
WHEEZING: 0
VOICE CHANGE: 0
RHINORRHEA: 0
TROUBLE SWALLOWING: 0
VOMITING: 0
NAUSEA: 0
SINUS PRESSURE: 0
EYE DISCHARGE: 0
ABDOMINAL DISTENTION: 0
SORE THROAT: 0
SHORTNESS OF BREATH: 0
ABDOMINAL PAIN: 0
SINUS PAIN: 0
CHOKING: 0
CONSTIPATION: 0
DIARRHEA: 0
BACK PAIN: 0
CHEST TIGHTNESS: 0

## 2019-01-24 ENCOUNTER — HOSPITAL ENCOUNTER (OUTPATIENT)
Dept: GENERAL RADIOLOGY | Age: 44
Discharge: HOME | End: 2019-01-26
Payer: MEDICAID

## 2019-01-24 ENCOUNTER — OFFICE VISIT (OUTPATIENT)
Dept: BREAST CENTER | Age: 44
End: 2019-01-24
Payer: MEDICAID

## 2019-01-24 VITALS
DIASTOLIC BLOOD PRESSURE: 82 MMHG | HEIGHT: 63 IN | WEIGHT: 293 LBS | HEART RATE: 80 BPM | OXYGEN SATURATION: 98 % | TEMPERATURE: 97.9 F | RESPIRATION RATE: 16 BRPM | SYSTOLIC BLOOD PRESSURE: 132 MMHG | BODY MASS INDEX: 51.91 KG/M2

## 2019-01-24 DIAGNOSIS — E66.01 MORBID OBESITY (HCC): Primary | ICD-10-CM

## 2019-01-24 DIAGNOSIS — N61.1 BREAST ABSCESS: ICD-10-CM

## 2019-01-24 DIAGNOSIS — N61.1 ABSCESS OF RIGHT BREAST: ICD-10-CM

## 2019-01-24 PROCEDURE — G8427 DOCREV CUR MEDS BY ELIG CLIN: HCPCS | Performed by: NURSE PRACTITIONER

## 2019-01-24 PROCEDURE — G0279 TOMOSYNTHESIS, MAMMO: HCPCS

## 2019-01-24 PROCEDURE — 1036F TOBACCO NON-USER: CPT | Performed by: NURSE PRACTITIONER

## 2019-01-24 PROCEDURE — 99211 OFF/OP EST MAY X REQ PHY/QHP: CPT | Performed by: NURSE PRACTITIONER

## 2019-01-24 PROCEDURE — G8417 CALC BMI ABV UP PARAM F/U: HCPCS | Performed by: NURSE PRACTITIONER

## 2019-01-24 PROCEDURE — G8484 FLU IMMUNIZE NO ADMIN: HCPCS | Performed by: NURSE PRACTITIONER

## 2019-01-24 PROCEDURE — 99214 OFFICE O/P EST MOD 30 MIN: CPT | Performed by: NURSE PRACTITIONER

## 2019-02-19 ENCOUNTER — OFFICE VISIT (OUTPATIENT)
Dept: FAMILY MEDICINE CLINIC | Age: 44
End: 2019-02-19
Payer: MEDICAID

## 2019-02-19 VITALS
OXYGEN SATURATION: 98 % | HEART RATE: 85 BPM | SYSTOLIC BLOOD PRESSURE: 124 MMHG | BODY MASS INDEX: 51.91 KG/M2 | RESPIRATION RATE: 16 BRPM | DIASTOLIC BLOOD PRESSURE: 82 MMHG | TEMPERATURE: 97.9 F | HEIGHT: 63 IN | WEIGHT: 293 LBS

## 2019-02-19 DIAGNOSIS — F41.9 ANXIETY: Primary | ICD-10-CM

## 2019-02-19 DIAGNOSIS — J06.9 VIRAL URI: ICD-10-CM

## 2019-02-19 PROCEDURE — 99213 OFFICE O/P EST LOW 20 MIN: CPT | Performed by: NURSE PRACTITIONER

## 2019-02-19 PROCEDURE — G8427 DOCREV CUR MEDS BY ELIG CLIN: HCPCS | Performed by: NURSE PRACTITIONER

## 2019-02-19 PROCEDURE — G8417 CALC BMI ABV UP PARAM F/U: HCPCS | Performed by: NURSE PRACTITIONER

## 2019-02-19 PROCEDURE — G8484 FLU IMMUNIZE NO ADMIN: HCPCS | Performed by: NURSE PRACTITIONER

## 2019-02-19 PROCEDURE — 1036F TOBACCO NON-USER: CPT | Performed by: NURSE PRACTITIONER

## 2019-02-19 RX ORDER — ESCITALOPRAM OXALATE 10 MG/1
10 TABLET ORAL DAILY
Qty: 30 TABLET | Refills: 3 | Status: SHIPPED | OUTPATIENT
Start: 2019-02-19 | End: 2019-07-09 | Stop reason: SDUPTHER

## 2019-02-19 ASSESSMENT — PATIENT HEALTH QUESTIONNAIRE - PHQ9
DEPRESSION UNABLE TO ASSESS: FUNCTIONAL CAPACITY MOTIVATION LIMITS ACCURACY
1. LITTLE INTEREST OR PLEASURE IN DOING THINGS: 0
SUM OF ALL RESPONSES TO PHQ QUESTIONS 1-9: 0
2. FEELING DOWN, DEPRESSED OR HOPELESS: 0
SUM OF ALL RESPONSES TO PHQ9 QUESTIONS 1 & 2: 0
SUM OF ALL RESPONSES TO PHQ QUESTIONS 1-9: 0

## 2019-02-19 ASSESSMENT — ENCOUNTER SYMPTOMS
WHEEZING: 0
NAUSEA: 0
CONSTIPATION: 0
VOMITING: 0
SHORTNESS OF BREATH: 0
DIARRHEA: 0
COUGH: 0

## 2019-02-25 ENCOUNTER — OFFICE VISIT (OUTPATIENT)
Dept: FAMILY MEDICINE CLINIC | Age: 44
End: 2019-02-25
Payer: MEDICAID

## 2019-02-25 VITALS
RESPIRATION RATE: 16 BRPM | DIASTOLIC BLOOD PRESSURE: 70 MMHG | HEART RATE: 77 BPM | OXYGEN SATURATION: 97 % | HEIGHT: 63 IN | BODY MASS INDEX: 51.91 KG/M2 | SYSTOLIC BLOOD PRESSURE: 122 MMHG | TEMPERATURE: 98.1 F | WEIGHT: 293 LBS

## 2019-02-25 DIAGNOSIS — R59.0 CERVICAL ADENOPATHY: ICD-10-CM

## 2019-02-25 DIAGNOSIS — R09.81 NASAL CONGESTION: ICD-10-CM

## 2019-02-25 DIAGNOSIS — H66.91 RIGHT OTITIS MEDIA, UNSPECIFIED OTITIS MEDIA TYPE: Primary | ICD-10-CM

## 2019-02-25 PROCEDURE — G8484 FLU IMMUNIZE NO ADMIN: HCPCS | Performed by: NURSE PRACTITIONER

## 2019-02-25 PROCEDURE — 99213 OFFICE O/P EST LOW 20 MIN: CPT | Performed by: NURSE PRACTITIONER

## 2019-02-25 PROCEDURE — 1036F TOBACCO NON-USER: CPT | Performed by: NURSE PRACTITIONER

## 2019-02-25 PROCEDURE — G8417 CALC BMI ABV UP PARAM F/U: HCPCS | Performed by: NURSE PRACTITIONER

## 2019-02-25 PROCEDURE — G8427 DOCREV CUR MEDS BY ELIG CLIN: HCPCS | Performed by: NURSE PRACTITIONER

## 2019-02-25 RX ORDER — DOXYCYCLINE HYCLATE 100 MG/1
100 CAPSULE ORAL 2 TIMES DAILY
Qty: 20 CAPSULE | Refills: 0 | Status: SHIPPED | OUTPATIENT
Start: 2019-02-25 | End: 2019-03-07

## 2019-02-25 ASSESSMENT — ENCOUNTER SYMPTOMS
WHEEZING: 0
CONSTIPATION: 0
DIARRHEA: 0
SHORTNESS OF BREATH: 0
VOMITING: 0
NAUSEA: 0
COUGH: 0

## 2019-03-12 ENCOUNTER — TELEPHONE (OUTPATIENT)
Dept: ADMINISTRATIVE | Age: 44
End: 2019-03-12

## 2019-03-29 ENCOUNTER — TELEPHONE (OUTPATIENT)
Dept: BREAST CENTER | Age: 44
End: 2019-03-29

## 2019-03-29 ENCOUNTER — OFFICE VISIT (OUTPATIENT)
Dept: BREAST CENTER | Age: 44
End: 2019-03-29
Payer: MEDICAID

## 2019-03-29 ENCOUNTER — HOSPITAL ENCOUNTER (OUTPATIENT)
Dept: GENERAL RADIOLOGY | Age: 44
Discharge: HOME | End: 2019-03-31
Payer: MEDICAID

## 2019-03-29 VITALS
BODY MASS INDEX: 51.91 KG/M2 | WEIGHT: 293 LBS | DIASTOLIC BLOOD PRESSURE: 80 MMHG | HEART RATE: 92 BPM | HEIGHT: 63 IN | TEMPERATURE: 98.7 F | OXYGEN SATURATION: 97 % | RESPIRATION RATE: 16 BRPM | SYSTOLIC BLOOD PRESSURE: 136 MMHG

## 2019-03-29 DIAGNOSIS — N61.1 ABSCESS OF RIGHT BREAST: ICD-10-CM

## 2019-03-29 DIAGNOSIS — N63.0 BREAST LUMP: Primary | ICD-10-CM

## 2019-03-29 DIAGNOSIS — N63.10 BREAST MASS, RIGHT: ICD-10-CM

## 2019-03-29 DIAGNOSIS — N63.0 BREAST LUMP: ICD-10-CM

## 2019-03-29 DIAGNOSIS — E66.01 MORBID OBESITY (HCC): Primary | ICD-10-CM

## 2019-03-29 PROCEDURE — 1036F TOBACCO NON-USER: CPT | Performed by: SURGERY

## 2019-03-29 PROCEDURE — 99213 OFFICE O/P EST LOW 20 MIN: CPT | Performed by: SURGERY

## 2019-03-29 PROCEDURE — G8417 CALC BMI ABV UP PARAM F/U: HCPCS | Performed by: SURGERY

## 2019-03-29 PROCEDURE — 99213 OFFICE O/P EST LOW 20 MIN: CPT | Performed by: NURSE PRACTITIONER

## 2019-03-29 PROCEDURE — G8427 DOCREV CUR MEDS BY ELIG CLIN: HCPCS | Performed by: SURGERY

## 2019-03-29 PROCEDURE — 76642 ULTRASOUND BREAST LIMITED: CPT

## 2019-03-29 PROCEDURE — G8484 FLU IMMUNIZE NO ADMIN: HCPCS | Performed by: SURGERY

## 2019-03-29 ASSESSMENT — ENCOUNTER SYMPTOMS
NAUSEA: 0
SINUS PAIN: 0
BLOOD IN STOOL: 0
COUGH: 0
ABDOMINAL PAIN: 0
VOMITING: 0
SHORTNESS OF BREATH: 0
RHINORRHEA: 0
DIARRHEA: 0
TROUBLE SWALLOWING: 0
SORE THROAT: 0
ABDOMINAL DISTENTION: 0
CONSTIPATION: 0
CHOKING: 0
VOICE CHANGE: 0
EYE DISCHARGE: 0
SINUS PRESSURE: 0
CHEST TIGHTNESS: 0
BACK PAIN: 0
WHEEZING: 0
EYE ITCHING: 0

## 2019-07-08 DIAGNOSIS — F41.9 ANXIETY: ICD-10-CM

## 2019-07-09 RX ORDER — ESCITALOPRAM OXALATE 10 MG/1
10 TABLET ORAL DAILY
Qty: 30 TABLET | Refills: 3 | Status: SHIPPED | OUTPATIENT
Start: 2019-07-09 | End: 2019-11-14 | Stop reason: SDUPTHER

## 2019-08-08 ENCOUNTER — HOSPITAL ENCOUNTER (OUTPATIENT)
Age: 44
Discharge: HOME | End: 2019-08-10
Payer: MEDICAID

## 2019-08-08 ENCOUNTER — OFFICE VISIT (OUTPATIENT)
Dept: FAMILY MEDICINE CLINIC | Age: 44
End: 2019-08-08
Payer: MEDICAID

## 2019-08-08 VITALS
BODY MASS INDEX: 48.82 KG/M2 | DIASTOLIC BLOOD PRESSURE: 70 MMHG | HEART RATE: 70 BPM | SYSTOLIC BLOOD PRESSURE: 122 MMHG | OXYGEN SATURATION: 96 % | HEIGHT: 65 IN | WEIGHT: 293 LBS | TEMPERATURE: 98.1 F

## 2019-08-08 DIAGNOSIS — D64.9 ANEMIA, UNSPECIFIED TYPE: ICD-10-CM

## 2019-08-08 DIAGNOSIS — F41.9 ANXIETY: ICD-10-CM

## 2019-08-08 DIAGNOSIS — R53.83 FATIGUE, UNSPECIFIED TYPE: ICD-10-CM

## 2019-08-08 DIAGNOSIS — E55.9 VITAMIN D DEFICIENCY: Primary | ICD-10-CM

## 2019-08-08 DIAGNOSIS — E55.9 VITAMIN D DEFICIENCY: ICD-10-CM

## 2019-08-08 DIAGNOSIS — E66.01 MORBID OBESITY (HCC): ICD-10-CM

## 2019-08-08 DIAGNOSIS — R09.81 SINUS CONGESTION: ICD-10-CM

## 2019-08-08 PROBLEM — Z80.3 FAMILY HISTORY OF BREAST CANCER IN MOTHER: Status: RESOLVED | Noted: 2018-05-14 | Resolved: 2019-08-08

## 2019-08-08 PROBLEM — N61.1 BREAST ABSCESS OF FEMALE: Status: RESOLVED | Noted: 2018-06-05 | Resolved: 2019-08-08

## 2019-08-08 PROBLEM — N61.1 BREAST ABSCESS: Status: RESOLVED | Noted: 2018-07-11 | Resolved: 2019-08-08

## 2019-08-08 PROBLEM — N61.1 ABSCESS OF RIGHT BREAST: Status: RESOLVED | Noted: 2018-06-19 | Resolved: 2019-08-08

## 2019-08-08 LAB
ALBUMIN SERPL-MCNC: 4.2 G/DL (ref 3.5–5.2)
ALP BLD-CCNC: 79 U/L (ref 35–104)
ALT SERPL-CCNC: 18 U/L (ref 0–32)
ANION GAP SERPL CALCULATED.3IONS-SCNC: 18 MMOL/L (ref 7–16)
AST SERPL-CCNC: 15 U/L (ref 0–31)
BASOPHILS ABSOLUTE: 0.04 E9/L (ref 0–0.2)
BASOPHILS RELATIVE PERCENT: 0.3 % (ref 0–2)
BILIRUB SERPL-MCNC: 0.7 MG/DL (ref 0–1.2)
BUN BLDV-MCNC: 12 MG/DL (ref 6–20)
CALCIUM SERPL-MCNC: 9.2 MG/DL (ref 8.6–10.2)
CHLORIDE BLD-SCNC: 100 MMOL/L (ref 98–107)
CHOLESTEROL, TOTAL: 170 MG/DL (ref 0–199)
CO2: 21 MMOL/L (ref 22–29)
CREAT SERPL-MCNC: 0.7 MG/DL (ref 0.5–1)
EOSINOPHILS ABSOLUTE: 0.22 E9/L (ref 0.05–0.5)
EOSINOPHILS RELATIVE PERCENT: 1.9 % (ref 0–6)
GFR AFRICAN AMERICAN: >60
GFR NON-AFRICAN AMERICAN: >60 ML/MIN/1.73
GLUCOSE BLD-MCNC: 92 MG/DL (ref 74–99)
HBA1C MFR BLD: 5.6 % (ref 4–5.6)
HCT VFR BLD CALC: 42.9 % (ref 34–48)
HDLC SERPL-MCNC: 50 MG/DL
HEMOGLOBIN: 14 G/DL (ref 11.5–15.5)
IMMATURE GRANULOCYTES #: 0.05 E9/L
IMMATURE GRANULOCYTES %: 0.4 % (ref 0–5)
LDL CHOLESTEROL CALCULATED: 68 MG/DL (ref 0–99)
LYMPHOCYTES ABSOLUTE: 3.27 E9/L (ref 1.5–4)
LYMPHOCYTES RELATIVE PERCENT: 27.6 % (ref 20–42)
MCH RBC QN AUTO: 28.3 PG (ref 26–35)
MCHC RBC AUTO-ENTMCNC: 32.6 % (ref 32–34.5)
MCV RBC AUTO: 86.8 FL (ref 80–99.9)
MONOCYTES ABSOLUTE: 0.64 E9/L (ref 0.1–0.95)
MONOCYTES RELATIVE PERCENT: 5.4 % (ref 2–12)
NEUTROPHILS ABSOLUTE: 7.61 E9/L (ref 1.8–7.3)
NEUTROPHILS RELATIVE PERCENT: 64.4 % (ref 43–80)
PDW BLD-RTO: 14.5 FL (ref 11.5–15)
PLATELET # BLD: 359 E9/L (ref 130–450)
PMV BLD AUTO: 10.4 FL (ref 7–12)
POTASSIUM SERPL-SCNC: 4.2 MMOL/L (ref 3.5–5)
RBC # BLD: 4.94 E12/L (ref 3.5–5.5)
SODIUM BLD-SCNC: 139 MMOL/L (ref 132–146)
T4 FREE: 1.21 NG/DL (ref 0.93–1.7)
TOTAL PROTEIN: 8 G/DL (ref 6.4–8.3)
TRIGL SERPL-MCNC: 262 MG/DL (ref 0–149)
TSH SERPL DL<=0.05 MIU/L-ACNC: 3.8 UIU/ML (ref 0.27–4.2)
VITAMIN D 25-HYDROXY: 17 NG/ML (ref 30–100)
VLDLC SERPL CALC-MCNC: 52 MG/DL
WBC # BLD: 11.8 E9/L (ref 4.5–11.5)

## 2019-08-08 PROCEDURE — 83036 HEMOGLOBIN GLYCOSYLATED A1C: CPT

## 2019-08-08 PROCEDURE — 80061 LIPID PANEL: CPT

## 2019-08-08 PROCEDURE — 80053 COMPREHEN METABOLIC PANEL: CPT

## 2019-08-08 PROCEDURE — 85025 COMPLETE CBC W/AUTO DIFF WBC: CPT

## 2019-08-08 PROCEDURE — 99214 OFFICE O/P EST MOD 30 MIN: CPT | Performed by: NURSE PRACTITIONER

## 2019-08-08 PROCEDURE — 82306 VITAMIN D 25 HYDROXY: CPT

## 2019-08-08 PROCEDURE — 84439 ASSAY OF FREE THYROXINE: CPT

## 2019-08-08 PROCEDURE — 84443 ASSAY THYROID STIM HORMONE: CPT

## 2019-08-08 RX ORDER — SULFAMETHOXAZOLE AND TRIMETHOPRIM 800; 160 MG/1; MG/1
1 TABLET ORAL 2 TIMES DAILY
Qty: 20 TABLET | Refills: 0 | Status: SHIPPED | OUTPATIENT
Start: 2019-08-08 | End: 2019-08-18

## 2019-08-08 ASSESSMENT — ENCOUNTER SYMPTOMS
CONSTIPATION: 0
EYE PAIN: 0
SINUS PAIN: 1
NAUSEA: 0
DIARRHEA: 0
VOMITING: 0
CHEST TIGHTNESS: 1
COLOR CHANGE: 0
SHORTNESS OF BREATH: 0
WHEEZING: 0
COUGH: 0

## 2019-08-08 NOTE — PROGRESS NOTES
mouth daily Yes JIHAN Sorto - CNP   acetaminophen (TYLENOL) 500 MG tablet Take 1,000 mg by mouth every 6 hours as needed for Pain Yes Historical Provider, MD     Review of Systems:    Review of Systems   Constitutional: Positive for activity change, appetite change and fatigue (Lifestressors. ). Negative for chills and fever. HENT: Positive for congestion and sinus pain. Negative for ear pain and sneezing. Eyes: Negative for pain and visual disturbance (Wears contacts, due in November. ). Respiratory: Positive for chest tightness (Anxiety ridden. ). Negative for cough, shortness of breath and wheezing. Cardiovascular: Positive for leg swelling (Bilateral ankle, intermittent). Negative for chest pain and palpitations. Gastrointestinal: Negative for constipation, diarrhea, nausea and vomiting. Endocrine: Negative for cold intolerance, heat intolerance, polydipsia, polyphagia and polyuria. Genitourinary: Positive for menstrual problem (Irregular cycles last few months). Negative for difficulty urinating, vaginal bleeding and vaginal discharge. Musculoskeletal: Positive for joint swelling. Negative for arthralgias and myalgias. Extensive foot surgeries   Skin: Negative for color change, rash and wound. Neurological: Negative for dizziness, light-headedness and headaches (Intermittent). Hematological: Negative for adenopathy. Does not bruise/bleed easily. Psychiatric/Behavioral: Positive for agitation (Lifestressor. ) and sleep disturbance (Sleep habits related to kids. ). Negative for decreased concentration and suicidal ideas. The patient is nervous/anxious (Lifestressor).        BP Readings from Last 1 Encounters:   08/08/19 122/70    Recheck if >140/90  Hemoglobin A1C (%)   Date Value   08/08/2019 5.6     No results found for: LABMICR    Have you had your Pneumonia Vaccine N/A    Have you had a Colorectal Screening  N/A    Have you had a Screening Mammogram  yes    Have you seen

## 2019-08-09 DIAGNOSIS — E55.9 VITAMIN D DEFICIENCY: Primary | ICD-10-CM

## 2019-08-09 DIAGNOSIS — E78.2 MIXED HYPERLIPIDEMIA: ICD-10-CM

## 2019-08-09 RX ORDER — ROSUVASTATIN CALCIUM 10 MG/1
10 TABLET, COATED ORAL DAILY
Qty: 30 TABLET | Refills: 3 | Status: SHIPPED | OUTPATIENT
Start: 2019-08-09 | End: 2019-08-12

## 2019-08-09 RX ORDER — ERGOCALCIFEROL 1.25 MG/1
50000 CAPSULE ORAL WEEKLY
Qty: 4 CAPSULE | Refills: 2 | Status: SHIPPED | OUTPATIENT
Start: 2019-08-09 | End: 2019-10-15 | Stop reason: SDUPTHER

## 2019-08-09 RX ORDER — CHOLECALCIFEROL (VITAMIN D3) 50 MCG
2000 TABLET ORAL DAILY
Qty: 90 TABLET | Refills: 0 | Status: SHIPPED
Start: 2019-08-09 | End: 2020-03-16

## 2019-08-12 ENCOUNTER — TELEPHONE (OUTPATIENT)
Dept: FAMILY MEDICINE CLINIC | Age: 44
End: 2019-08-12

## 2019-08-12 DIAGNOSIS — E78.2 MIXED HYPERLIPIDEMIA: Primary | ICD-10-CM

## 2019-08-12 PROBLEM — N39.3 STRESS INCONTINENCE: Status: ACTIVE | Noted: 2017-09-19

## 2019-08-12 RX ORDER — ATORVASTATIN CALCIUM 10 MG/1
10 TABLET, FILM COATED ORAL DAILY
Qty: 90 TABLET | Refills: 1 | Status: SHIPPED | OUTPATIENT
Start: 2019-08-12 | End: 2019-10-03

## 2019-09-16 ENCOUNTER — OFFICE VISIT (OUTPATIENT)
Dept: FAMILY MEDICINE CLINIC | Age: 44
End: 2019-09-16
Payer: MEDICAID

## 2019-09-16 VITALS
DIASTOLIC BLOOD PRESSURE: 82 MMHG | HEIGHT: 63 IN | WEIGHT: 293 LBS | SYSTOLIC BLOOD PRESSURE: 128 MMHG | OXYGEN SATURATION: 95 % | RESPIRATION RATE: 18 BRPM | HEART RATE: 80 BPM | BODY MASS INDEX: 51.91 KG/M2 | TEMPERATURE: 98.1 F

## 2019-09-16 DIAGNOSIS — J40 SINOBRONCHITIS: Primary | ICD-10-CM

## 2019-09-16 DIAGNOSIS — J32.9 SINOBRONCHITIS: Primary | ICD-10-CM

## 2019-09-16 DIAGNOSIS — B00.9 HSV INFECTION: ICD-10-CM

## 2019-09-16 PROCEDURE — 1036F TOBACCO NON-USER: CPT | Performed by: PHYSICIAN ASSISTANT

## 2019-09-16 PROCEDURE — G8417 CALC BMI ABV UP PARAM F/U: HCPCS | Performed by: PHYSICIAN ASSISTANT

## 2019-09-16 PROCEDURE — 99213 OFFICE O/P EST LOW 20 MIN: CPT | Performed by: PHYSICIAN ASSISTANT

## 2019-09-16 PROCEDURE — G8427 DOCREV CUR MEDS BY ELIG CLIN: HCPCS | Performed by: PHYSICIAN ASSISTANT

## 2019-09-16 RX ORDER — ACYCLOVIR 400 MG/1
400 TABLET ORAL 3 TIMES DAILY
Qty: 21 TABLET | Refills: 0 | Status: SHIPPED | OUTPATIENT
Start: 2019-09-16 | End: 2019-09-23

## 2019-09-16 RX ORDER — ALBUTEROL SULFATE 90 UG/1
2 AEROSOL, METERED RESPIRATORY (INHALATION)
Qty: 1 INHALER | Refills: 0 | Status: SHIPPED
Start: 2019-09-16 | End: 2020-02-10

## 2019-09-16 RX ORDER — DOXYCYCLINE HYCLATE 100 MG/1
100 CAPSULE ORAL 2 TIMES DAILY
Qty: 20 CAPSULE | Refills: 0 | Status: SHIPPED | OUTPATIENT
Start: 2019-09-16 | End: 2019-09-26

## 2019-10-03 ENCOUNTER — OFFICE VISIT (OUTPATIENT)
Dept: BREAST CENTER | Age: 44
End: 2019-10-03
Payer: MEDICAID

## 2019-10-03 VITALS
HEART RATE: 83 BPM | OXYGEN SATURATION: 97 % | HEIGHT: 63 IN | SYSTOLIC BLOOD PRESSURE: 156 MMHG | TEMPERATURE: 98.6 F | RESPIRATION RATE: 16 BRPM | WEIGHT: 293 LBS | BODY MASS INDEX: 51.91 KG/M2 | DIASTOLIC BLOOD PRESSURE: 96 MMHG

## 2019-10-03 DIAGNOSIS — R92.2 DENSE BREAST: ICD-10-CM

## 2019-10-03 DIAGNOSIS — R92.2 DENSE BREAST TISSUE ON MAMMOGRAM: ICD-10-CM

## 2019-10-03 DIAGNOSIS — N63.10 BREAST MASS, RIGHT: ICD-10-CM

## 2019-10-03 DIAGNOSIS — F41.9 ANXIETY: ICD-10-CM

## 2019-10-03 DIAGNOSIS — Z91.89 AT HIGH RISK FOR BREAST CANCER: ICD-10-CM

## 2019-10-03 DIAGNOSIS — N63.0 BREAST LUMP: ICD-10-CM

## 2019-10-03 DIAGNOSIS — N61.1 ABSCESS OF RIGHT BREAST: Primary | ICD-10-CM

## 2019-10-03 DIAGNOSIS — Z80.3 FAMILY HISTORY OF BREAST CANCER IN FIRST DEGREE RELATIVE: ICD-10-CM

## 2019-10-03 DIAGNOSIS — E66.01 MORBID OBESITY (HCC): ICD-10-CM

## 2019-10-03 PROCEDURE — 1036F TOBACCO NON-USER: CPT | Performed by: NURSE PRACTITIONER

## 2019-10-03 PROCEDURE — 99213 OFFICE O/P EST LOW 20 MIN: CPT | Performed by: NURSE PRACTITIONER

## 2019-10-03 PROCEDURE — G8427 DOCREV CUR MEDS BY ELIG CLIN: HCPCS | Performed by: NURSE PRACTITIONER

## 2019-10-03 PROCEDURE — G8484 FLU IMMUNIZE NO ADMIN: HCPCS | Performed by: NURSE PRACTITIONER

## 2019-10-03 PROCEDURE — G8417 CALC BMI ABV UP PARAM F/U: HCPCS | Performed by: NURSE PRACTITIONER

## 2019-10-03 PROCEDURE — 99214 OFFICE O/P EST MOD 30 MIN: CPT | Performed by: NURSE PRACTITIONER

## 2019-10-03 ASSESSMENT — ENCOUNTER SYMPTOMS
SINUS PAIN: 0
CONSTIPATION: 0
SHORTNESS OF BREATH: 0
CHOKING: 0
RHINORRHEA: 0
EYE ITCHING: 0
TROUBLE SWALLOWING: 0
ABDOMINAL PAIN: 0
BLOOD IN STOOL: 0
SORE THROAT: 0
VOICE CHANGE: 0
NAUSEA: 0
SINUS PRESSURE: 0
COUGH: 0
BACK PAIN: 0
EYE DISCHARGE: 0
DIARRHEA: 0
VOMITING: 0
CHEST TIGHTNESS: 0
WHEEZING: 0
ABDOMINAL DISTENTION: 0

## 2019-10-04 ENCOUNTER — TELEPHONE (OUTPATIENT)
Dept: BREAST CENTER | Age: 44
End: 2019-10-04

## 2019-10-07 ENCOUNTER — TELEPHONE (OUTPATIENT)
Dept: BREAST CENTER | Age: 44
End: 2019-10-07

## 2019-10-14 ASSESSMENT — ENCOUNTER SYMPTOMS
WHEEZING: 0
DIARRHEA: 0
SHORTNESS OF BREATH: 0
CONSTIPATION: 0
SINUS PAIN: 0
VOMITING: 0
COUGH: 0
NAUSEA: 0
CHEST TIGHTNESS: 0
COLOR CHANGE: 0
EYE PAIN: 0

## 2019-10-15 ENCOUNTER — HOSPITAL ENCOUNTER (OUTPATIENT)
Age: 44
Discharge: HOME | End: 2019-10-17
Payer: MEDICAID

## 2019-10-15 ENCOUNTER — OFFICE VISIT (OUTPATIENT)
Dept: FAMILY MEDICINE CLINIC | Age: 44
End: 2019-10-15
Payer: MEDICAID

## 2019-10-15 VITALS
SYSTOLIC BLOOD PRESSURE: 138 MMHG | DIASTOLIC BLOOD PRESSURE: 80 MMHG | BODY MASS INDEX: 51.91 KG/M2 | HEIGHT: 63 IN | OXYGEN SATURATION: 98 % | WEIGHT: 293 LBS | TEMPERATURE: 98.3 F | HEART RATE: 70 BPM

## 2019-10-15 DIAGNOSIS — G89.29 CHRONIC RIGHT EAR PAIN: ICD-10-CM

## 2019-10-15 DIAGNOSIS — E55.9 VITAMIN D DEFICIENCY: ICD-10-CM

## 2019-10-15 DIAGNOSIS — H92.01 CHRONIC RIGHT EAR PAIN: ICD-10-CM

## 2019-10-15 DIAGNOSIS — R60.9 EDEMA, UNSPECIFIED TYPE: Primary | ICD-10-CM

## 2019-10-15 DIAGNOSIS — R60.9 EDEMA, UNSPECIFIED TYPE: ICD-10-CM

## 2019-10-15 DIAGNOSIS — S03.00XA DISLOCATION OF TEMPOROMANDIBULAR JOINT, INITIAL ENCOUNTER: ICD-10-CM

## 2019-10-15 LAB
RHEUMATOID FACTOR: <10 IU/ML (ref 0–13)
SEDIMENTATION RATE, ERYTHROCYTE: 28 MM/HR (ref 0–20)

## 2019-10-15 PROCEDURE — 99213 OFFICE O/P EST LOW 20 MIN: CPT | Performed by: NURSE PRACTITIONER

## 2019-10-15 PROCEDURE — 85651 RBC SED RATE NONAUTOMATED: CPT

## 2019-10-15 PROCEDURE — G8484 FLU IMMUNIZE NO ADMIN: HCPCS | Performed by: NURSE PRACTITIONER

## 2019-10-15 PROCEDURE — 86431 RHEUMATOID FACTOR QUANT: CPT

## 2019-10-15 PROCEDURE — 1036F TOBACCO NON-USER: CPT | Performed by: NURSE PRACTITIONER

## 2019-10-15 PROCEDURE — 86038 ANTINUCLEAR ANTIBODIES: CPT

## 2019-10-15 PROCEDURE — G8427 DOCREV CUR MEDS BY ELIG CLIN: HCPCS | Performed by: NURSE PRACTITIONER

## 2019-10-15 PROCEDURE — G8417 CALC BMI ABV UP PARAM F/U: HCPCS | Performed by: NURSE PRACTITIONER

## 2019-10-15 PROCEDURE — 36415 COLL VENOUS BLD VENIPUNCTURE: CPT | Performed by: NURSE PRACTITIONER

## 2019-10-15 RX ORDER — FUROSEMIDE 20 MG/1
20 TABLET ORAL DAILY
Qty: 30 TABLET | Refills: 0 | Status: SHIPPED | OUTPATIENT
Start: 2019-10-15 | End: 2019-12-17 | Stop reason: SDUPTHER

## 2019-10-15 RX ORDER — ERGOCALCIFEROL 1.25 MG/1
50000 CAPSULE ORAL WEEKLY
Qty: 12 CAPSULE | Refills: 1 | Status: SHIPPED
Start: 2019-10-15 | End: 2020-04-13

## 2019-10-16 ENCOUNTER — TELEPHONE (OUTPATIENT)
Dept: BREAST CENTER | Age: 44
End: 2019-10-16

## 2019-10-16 ENCOUNTER — TELEPHONE (OUTPATIENT)
Dept: FAMILY MEDICINE CLINIC | Age: 44
End: 2019-10-16

## 2019-10-16 LAB — ANTI-NUCLEAR ANTIBODY (ANA): NEGATIVE

## 2019-11-25 ENCOUNTER — TELEPHONE (OUTPATIENT)
Dept: BREAST CENTER | Age: 44
End: 2019-11-25

## 2019-11-27 ENCOUNTER — TELEPHONE (OUTPATIENT)
Dept: BREAST CENTER | Age: 44
End: 2019-11-27

## 2019-12-03 ENCOUNTER — TELEPHONE (OUTPATIENT)
Dept: BREAST CENTER | Age: 44
End: 2019-12-03

## 2019-12-17 DIAGNOSIS — R60.9 EDEMA, UNSPECIFIED TYPE: ICD-10-CM

## 2019-12-17 RX ORDER — FUROSEMIDE 20 MG/1
20 TABLET ORAL DAILY
Qty: 30 TABLET | Refills: 0 | Status: SHIPPED | OUTPATIENT
Start: 2019-12-17 | End: 2020-01-10

## 2019-12-21 ENCOUNTER — APPOINTMENT (OUTPATIENT)
Dept: GENERAL RADIOLOGY | Age: 44
End: 2019-12-21
Payer: OTHER MISCELLANEOUS

## 2019-12-21 ENCOUNTER — APPOINTMENT (OUTPATIENT)
Dept: CT IMAGING | Age: 44
End: 2019-12-21
Payer: OTHER MISCELLANEOUS

## 2019-12-21 ENCOUNTER — HOSPITAL ENCOUNTER (EMERGENCY)
Age: 44
Discharge: HOME | End: 2019-12-21
Attending: EMERGENCY MEDICINE
Payer: OTHER MISCELLANEOUS

## 2019-12-21 VITALS
RESPIRATION RATE: 18 BRPM | SYSTOLIC BLOOD PRESSURE: 122 MMHG | OXYGEN SATURATION: 97 % | DIASTOLIC BLOOD PRESSURE: 68 MMHG | WEIGHT: 293 LBS | BODY MASS INDEX: 62 KG/M2 | TEMPERATURE: 96.6 F | HEART RATE: 86 BPM
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
## 2019-12-21 LAB
ALBUMIN SERPL-MCNC: 3.7 G/DL (ref 3.5–5.2)
ALP BLD-CCNC: 76 U/L (ref 35–104)
ALT SERPL-CCNC: 21 U/L (ref 0–32)
ANION GAP SERPL CALCULATED.3IONS-SCNC: 11 MMOL/L (ref 7–16)
AST SERPL-CCNC: 24 U/L (ref 0–31)
BASOPHILS ABSOLUTE: 0.03 E9/L (ref 0–0.2)
BASOPHILS RELATIVE PERCENT: 0.3 % (ref 0–2)
BILIRUB SERPL-MCNC: 0.6 MG/DL (ref 0–1.2)
BUN BLDV-MCNC: 8 MG/DL (ref 6–20)
CALCIUM SERPL-MCNC: 8.7 MG/DL (ref 8.6–10.2)
CHLORIDE BLD-SCNC: 101 MMOL/L (ref 98–107)
CO2: 26 MMOL/L (ref 22–29)
CREAT SERPL-MCNC: 0.6 MG/DL (ref 0.5–1)
EOSINOPHILS ABSOLUTE: 0.15 E9/L (ref 0.05–0.5)
EOSINOPHILS RELATIVE PERCENT: 1.6 % (ref 0–6)
GFR AFRICAN AMERICAN: >60
GFR NON-AFRICAN AMERICAN: >60 ML/MIN/1.73
GLUCOSE BLD-MCNC: 111 MG/DL (ref 74–99)
HCG, URINE, POC: NEGATIVE
HCT VFR BLD CALC: 44.5 % (ref 34–48)
HEMOGLOBIN: 13.8 G/DL (ref 11.5–15.5)
IMMATURE GRANULOCYTES #: 0.03 E9/L
IMMATURE GRANULOCYTES %: 0.3 % (ref 0–5)
LACTIC ACID: 1.4 MMOL/L (ref 0.5–2.2)
LYMPHOCYTES ABSOLUTE: 2.1 E9/L (ref 1.5–4)
LYMPHOCYTES RELATIVE PERCENT: 22.2 % (ref 20–42)
Lab: NORMAL
MCH RBC QN AUTO: 27.3 PG (ref 26–35)
MCHC RBC AUTO-ENTMCNC: 31 % (ref 32–34.5)
MCV RBC AUTO: 88.1 FL (ref 80–99.9)
MONOCYTES ABSOLUTE: 0.85 E9/L (ref 0.1–0.95)
MONOCYTES RELATIVE PERCENT: 9 % (ref 2–12)
NEGATIVE QC PASS/FAIL: NORMAL
NEUTROPHILS ABSOLUTE: 6.3 E9/L (ref 1.8–7.3)
NEUTROPHILS RELATIVE PERCENT: 66.6 % (ref 43–80)
PDW BLD-RTO: 14.6 FL (ref 11.5–15)
PLATELET # BLD: 274 E9/L (ref 130–450)
PMV BLD AUTO: 9.8 FL (ref 7–12)
POSITIVE QC PASS/FAIL: NORMAL
POTASSIUM SERPL-SCNC: 4.1 MMOL/L (ref 3.5–5)
RBC # BLD: 5.05 E12/L (ref 3.5–5.5)
SODIUM BLD-SCNC: 138 MMOL/L (ref 132–146)
TOTAL PROTEIN: 7.8 G/DL (ref 6.4–8.3)
WBC # BLD: 9.5 E9/L (ref 4.5–11.5)

## 2019-12-21 PROCEDURE — 2580000003 HC RX 258: Performed by: EMERGENCY MEDICINE

## 2019-12-21 PROCEDURE — 71045 X-RAY EXAM CHEST 1 VIEW: CPT

## 2019-12-21 PROCEDURE — 6360000002 HC RX W HCPCS: Performed by: EMERGENCY MEDICINE

## 2019-12-21 PROCEDURE — 71260 CT THORAX DX C+: CPT

## 2019-12-21 PROCEDURE — 6370000000 HC RX 637 (ALT 250 FOR IP): Performed by: EMERGENCY MEDICINE

## 2019-12-21 PROCEDURE — 70450 CT HEAD/BRAIN W/O DYE: CPT

## 2019-12-21 PROCEDURE — 96374 THER/PROPH/DIAG INJ IV PUSH: CPT

## 2019-12-21 PROCEDURE — 74177 CT ABD & PELVIS W/CONTRAST: CPT

## 2019-12-21 PROCEDURE — 80053 COMPREHEN METABOLIC PANEL: CPT

## 2019-12-21 PROCEDURE — 6360000004 HC RX CONTRAST MEDICATION: Performed by: RADIOLOGY

## 2019-12-21 PROCEDURE — 99284 EMERGENCY DEPT VISIT MOD MDM: CPT

## 2019-12-21 PROCEDURE — 73564 X-RAY EXAM KNEE 4 OR MORE: CPT

## 2019-12-21 PROCEDURE — 85025 COMPLETE CBC W/AUTO DIFF WBC: CPT

## 2019-12-21 PROCEDURE — 36415 COLL VENOUS BLD VENIPUNCTURE: CPT

## 2019-12-21 PROCEDURE — 73000 X-RAY EXAM OF COLLAR BONE: CPT

## 2019-12-21 PROCEDURE — 83605 ASSAY OF LACTIC ACID: CPT

## 2019-12-21 RX ORDER — SODIUM CHLORIDE 9 MG/ML
INJECTION, SOLUTION INTRAVENOUS CONTINUOUS
Status: DISCONTINUED | OUTPATIENT
Start: 2019-12-21 | End: 2019-12-21 | Stop reason: HOSPADM

## 2019-12-21 RX ORDER — NAPROXEN 500 MG/1
500 TABLET ORAL 2 TIMES DAILY
Qty: 10 TABLET | Refills: 0 | Status: SHIPPED | OUTPATIENT
Start: 2019-12-21 | End: 2020-02-10

## 2019-12-21 RX ORDER — CYCLOBENZAPRINE HCL 5 MG
5 TABLET ORAL 2 TIMES DAILY PRN
Qty: 10 TABLET | Refills: 0 | Status: SHIPPED | OUTPATIENT
Start: 2019-12-21 | End: 2019-12-31

## 2019-12-21 RX ORDER — KETOROLAC TROMETHAMINE 30 MG/ML
15 INJECTION, SOLUTION INTRAMUSCULAR; INTRAVENOUS ONCE
Status: COMPLETED | OUTPATIENT
Start: 2019-12-21 | End: 2019-12-21

## 2019-12-21 RX ORDER — SODIUM CHLORIDE 0.9 % (FLUSH) 0.9 %
10 SYRINGE (ML) INJECTION PRN
Status: DISCONTINUED | OUTPATIENT
Start: 2019-12-21 | End: 2019-12-21 | Stop reason: HOSPADM

## 2019-12-21 RX ORDER — CODEINE PHOSPHATE AND GUAIFENESIN 10; 100 MG/5ML; MG/5ML
5 SOLUTION ORAL ONCE
Status: COMPLETED | OUTPATIENT
Start: 2019-12-21 | End: 2019-12-21

## 2019-12-21 RX ADMIN — GUAIFENESIN AND CODEINE PHOSPHATE 5 ML: 10; 100 LIQUID ORAL at 15:20

## 2019-12-21 RX ADMIN — IOPAMIDOL 110 ML: 755 INJECTION, SOLUTION INTRAVENOUS at 16:29

## 2019-12-21 RX ADMIN — KETOROLAC TROMETHAMINE 15 MG: 30 INJECTION, SOLUTION INTRAMUSCULAR; INTRAVENOUS at 17:24

## 2019-12-21 RX ADMIN — SODIUM CHLORIDE: 9 INJECTION, SOLUTION INTRAVENOUS at 12:30

## 2019-12-21 ASSESSMENT — PAIN DESCRIPTION - LOCATION: LOCATION: KNEE

## 2019-12-21 ASSESSMENT — PAIN SCALES - GENERAL
PAINLEVEL_OUTOF10: 8
PAINLEVEL_OUTOF10: 7

## 2019-12-21 ASSESSMENT — PAIN DESCRIPTION - PAIN TYPE: TYPE: ACUTE PAIN

## 2020-01-03 ENCOUNTER — TELEPHONE (OUTPATIENT)
Dept: BREAST CENTER | Age: 45
End: 2020-01-03

## 2020-01-03 NOTE — TELEPHONE ENCOUNTER
Spoke with patient in reference to scheduling her MBI in January 2020. She was unable to come last month due to no . She is aware to call us on day 1, around January 20th.

## 2020-01-08 ENCOUNTER — OFFICE VISIT (OUTPATIENT)
Dept: FAMILY MEDICINE CLINIC | Age: 45
End: 2020-01-08
Payer: MEDICAID

## 2020-01-08 VITALS
DIASTOLIC BLOOD PRESSURE: 80 MMHG | OXYGEN SATURATION: 95 % | HEART RATE: 94 BPM | TEMPERATURE: 97.9 F | HEIGHT: 63 IN | WEIGHT: 293 LBS | SYSTOLIC BLOOD PRESSURE: 128 MMHG | BODY MASS INDEX: 51.91 KG/M2 | RESPIRATION RATE: 18 BRPM

## 2020-01-08 PROCEDURE — 1036F TOBACCO NON-USER: CPT | Performed by: NURSE PRACTITIONER

## 2020-01-08 PROCEDURE — G8417 CALC BMI ABV UP PARAM F/U: HCPCS | Performed by: NURSE PRACTITIONER

## 2020-01-08 PROCEDURE — 99213 OFFICE O/P EST LOW 20 MIN: CPT | Performed by: NURSE PRACTITIONER

## 2020-01-08 PROCEDURE — G8427 DOCREV CUR MEDS BY ELIG CLIN: HCPCS | Performed by: NURSE PRACTITIONER

## 2020-01-08 PROCEDURE — G8484 FLU IMMUNIZE NO ADMIN: HCPCS | Performed by: NURSE PRACTITIONER

## 2020-01-08 RX ORDER — BENZONATATE 100 MG/1
100 CAPSULE ORAL 3 TIMES DAILY PRN
Qty: 21 CAPSULE | Refills: 0 | Status: SHIPPED | OUTPATIENT
Start: 2020-01-08 | End: 2020-01-15

## 2020-01-08 RX ORDER — DEXTROMETHORPHAN HYDROBROMIDE AND PROMETHAZINE HYDROCHLORIDE 15; 6.25 MG/5ML; MG/5ML
10 SYRUP ORAL 4 TIMES DAILY PRN
Qty: 240 ML | Refills: 0 | Status: SHIPPED | OUTPATIENT
Start: 2020-01-08 | End: 2020-01-18

## 2020-01-08 RX ORDER — METHYLPREDNISOLONE 4 MG/1
TABLET ORAL
Qty: 1 KIT | Refills: 0 | Status: SHIPPED | OUTPATIENT
Start: 2020-01-08 | End: 2020-01-14

## 2020-01-08 RX ORDER — GUAIFENESIN AND DEXTROMETHORPHAN HYDROBROMIDE 600; 30 MG/1; MG/1
30 TABLET, EXTENDED RELEASE ORAL DAILY PRN
Qty: 28 TABLET | Refills: 2 | Status: SHIPPED
Start: 2020-01-08 | End: 2020-02-10
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
## 2020-01-14 ENCOUNTER — OFFICE VISIT (OUTPATIENT)
Dept: FAMILY MEDICINE CLINIC | Age: 45
End: 2020-01-14
Payer: MEDICAID

## 2020-01-14 ENCOUNTER — HOSPITAL ENCOUNTER (EMERGENCY)
Age: 45
Discharge: HOME | End: 2020-01-15
Payer: MEDICAID

## 2020-01-14 ENCOUNTER — TELEPHONE (OUTPATIENT)
Dept: FAMILY MEDICINE CLINIC | Age: 45
End: 2020-01-14

## 2020-01-14 ENCOUNTER — APPOINTMENT (OUTPATIENT)
Dept: GENERAL RADIOLOGY | Age: 45
End: 2020-01-14
Payer: MEDICAID

## 2020-01-14 ENCOUNTER — HOSPITAL ENCOUNTER (OUTPATIENT)
Age: 45
Discharge: HOME | End: 2020-01-16
Payer: MEDICAID

## 2020-01-14 VITALS
HEART RATE: 77 BPM | HEIGHT: 63 IN | RESPIRATION RATE: 15 BRPM | SYSTOLIC BLOOD PRESSURE: 134 MMHG | WEIGHT: 293 LBS | BODY MASS INDEX: 51.91 KG/M2 | OXYGEN SATURATION: 97 % | DIASTOLIC BLOOD PRESSURE: 88 MMHG | TEMPERATURE: 98 F

## 2020-01-14 LAB
ADENOVIRUS BY PCR: NOT DETECTED
ALBUMIN SERPL-MCNC: 4.1 G/DL (ref 3.5–5.2)
ALP BLD-CCNC: 82 U/L (ref 35–104)
ALT SERPL-CCNC: 18 U/L (ref 0–32)
ANION GAP SERPL CALCULATED.3IONS-SCNC: 17 MMOL/L (ref 7–16)
AST SERPL-CCNC: 11 U/L (ref 0–31)
BASOPHILS ABSOLUTE: 0.06 E9/L (ref 0–0.2)
BASOPHILS RELATIVE PERCENT: 0.3 % (ref 0–2)
BILIRUB SERPL-MCNC: 0.6 MG/DL (ref 0–1.2)
BILIRUBIN, POC: NORMAL
BLOOD URINE, POC: NORMAL
BORDETELLA PARAPERTUSSIS BY PCR: NOT DETECTED
BORDETELLA PERTUSSIS BY PCR: NOT DETECTED
BUN BLDV-MCNC: 16 MG/DL (ref 6–20)
CALCIUM SERPL-MCNC: 9.2 MG/DL (ref 8.6–10.2)
CHLAMYDOPHILIA PNEUMONIAE BY PCR: NOT DETECTED
CHLORIDE BLD-SCNC: 95 MMOL/L (ref 98–107)
CLARITY, POC: NORMAL
CO2: 24 MMOL/L (ref 22–29)
COLOR, POC: NORMAL
CORONAVIRUS 229E BY PCR: NOT DETECTED
CORONAVIRUS HKU1 BY PCR: NOT DETECTED
CORONAVIRUS NL63 BY PCR: NOT DETECTED
CORONAVIRUS OC43 BY PCR: NOT DETECTED
CREAT SERPL-MCNC: 0.8 MG/DL (ref 0.5–1)
EOSINOPHILS ABSOLUTE: 0.11 E9/L (ref 0.05–0.5)
EOSINOPHILS RELATIVE PERCENT: 0.6 % (ref 0–6)
GFR AFRICAN AMERICAN: >60
GFR NON-AFRICAN AMERICAN: >60 ML/MIN/1.73
GLUCOSE BLD-MCNC: 94 MG/DL (ref 74–99)
GLUCOSE URINE, POC: NORMAL
HCT VFR BLD CALC: 49.1 % (ref 34–48)
HEMOGLOBIN: 14.7 G/DL (ref 11.5–15.5)
HUMAN METAPNEUMOVIRUS BY PCR: NOT DETECTED
HUMAN RHINOVIRUS/ENTEROVIRUS BY PCR: NOT DETECTED
IMMATURE GRANULOCYTES #: 0.15 E9/L
IMMATURE GRANULOCYTES %: 0.8 % (ref 0–5)
INFLUENZA A BY PCR: NOT DETECTED
INFLUENZA B BY PCR: NOT DETECTED
KETONES, POC: NORMAL
LEUKOCYTE EST, POC: NORMAL
LYMPHOCYTES ABSOLUTE: 5.23 E9/L (ref 1.5–4)
LYMPHOCYTES RELATIVE PERCENT: 27.6 % (ref 20–42)
MCH RBC QN AUTO: 27.2 PG (ref 26–35)
MCHC RBC AUTO-ENTMCNC: 29.9 % (ref 32–34.5)
MCV RBC AUTO: 90.9 FL (ref 80–99.9)
MONOCYTES ABSOLUTE: 1.16 E9/L (ref 0.1–0.95)
MONOCYTES RELATIVE PERCENT: 6.1 % (ref 2–12)
MYCOPLASMA PNEUMONIAE BY PCR: NOT DETECTED
NEUTROPHILS ABSOLUTE: 12.26 E9/L (ref 1.8–7.3)
NEUTROPHILS RELATIVE PERCENT: 64.6 % (ref 43–80)
NITRITE, POC: NORMAL
PARAINFLUENZA VIRUS 1 BY PCR: NOT DETECTED
PARAINFLUENZA VIRUS 2 BY PCR: NOT DETECTED
PARAINFLUENZA VIRUS 3 BY PCR: NOT DETECTED
PARAINFLUENZA VIRUS 4 BY PCR: NOT DETECTED
PDW BLD-RTO: 14.9 FL (ref 11.5–15)
PH, POC: 5.5
PLATELET # BLD: 371 E9/L (ref 130–450)
PMV BLD AUTO: 9.9 FL (ref 7–12)
POTASSIUM SERPL-SCNC: 4.6 MMOL/L (ref 3.5–5)
PROTEIN, POC: NORMAL
RBC # BLD: 5.4 E12/L (ref 3.5–5.5)
RESPIRATORY SYNCYTIAL VIRUS BY PCR: NOT DETECTED
SODIUM BLD-SCNC: 136 MMOL/L (ref 132–146)
SPECIFIC GRAVITY, POC: 1.02
TOTAL PROTEIN: 8.3 G/DL (ref 6.4–8.3)
UROBILINOGEN, POC: 0.2
WBC # BLD: 19 E9/L (ref 4.5–11.5)

## 2020-01-14 PROCEDURE — 85025 COMPLETE CBC W/AUTO DIFF WBC: CPT

## 2020-01-14 PROCEDURE — 0100U HC RESPIRPTHGN MULT REV TRANS & AMP PRB TECH 21 TRGT: CPT

## 2020-01-14 PROCEDURE — 81002 URINALYSIS NONAUTO W/O SCOPE: CPT | Performed by: NURSE PRACTITIONER

## 2020-01-14 PROCEDURE — G8484 FLU IMMUNIZE NO ADMIN: HCPCS | Performed by: NURSE PRACTITIONER

## 2020-01-14 PROCEDURE — 71046 X-RAY EXAM CHEST 2 VIEWS: CPT

## 2020-01-14 PROCEDURE — 80053 COMPREHEN METABOLIC PANEL: CPT

## 2020-01-14 PROCEDURE — 99214 OFFICE O/P EST MOD 30 MIN: CPT | Performed by: NURSE PRACTITIONER

## 2020-01-14 PROCEDURE — 87088 URINE BACTERIA CULTURE: CPT

## 2020-01-14 PROCEDURE — 96372 THER/PROPH/DIAG INJ SC/IM: CPT | Performed by: NURSE PRACTITIONER

## 2020-01-14 PROCEDURE — 1036F TOBACCO NON-USER: CPT | Performed by: NURSE PRACTITIONER

## 2020-01-14 PROCEDURE — G8417 CALC BMI ABV UP PARAM F/U: HCPCS | Performed by: NURSE PRACTITIONER

## 2020-01-14 PROCEDURE — G8427 DOCREV CUR MEDS BY ELIG CLIN: HCPCS | Performed by: NURSE PRACTITIONER

## 2020-01-14 PROCEDURE — 99284 EMERGENCY DEPT VISIT MOD MDM: CPT

## 2020-01-14 RX ORDER — DEXAMETHASONE SODIUM PHOSPHATE 4 MG/ML
4 INJECTION, SOLUTION INTRA-ARTICULAR; INTRALESIONAL; INTRAMUSCULAR; INTRAVENOUS; SOFT TISSUE ONCE
Status: COMPLETED | OUTPATIENT
Start: 2020-01-14 | End: 2020-01-14

## 2020-01-14 RX ORDER — KETOROLAC TROMETHAMINE 30 MG/ML
60 INJECTION, SOLUTION INTRAMUSCULAR; INTRAVENOUS ONCE
Status: COMPLETED | OUTPATIENT
Start: 2020-01-14 | End: 2020-01-14

## 2020-01-14 RX ADMIN — DEXAMETHASONE SODIUM PHOSPHATE 4 MG: 4 INJECTION, SOLUTION INTRA-ARTICULAR; INTRALESIONAL; INTRAMUSCULAR; INTRAVENOUS; SOFT TISSUE at 15:33

## 2020-01-14 RX ADMIN — KETOROLAC TROMETHAMINE 60 MG: 30 INJECTION, SOLUTION INTRAMUSCULAR; INTRAVENOUS at 15:32

## 2020-01-14 ASSESSMENT — PATIENT HEALTH QUESTIONNAIRE - PHQ9
SUM OF ALL RESPONSES TO PHQ9 QUESTIONS 1 & 2: 0
SUM OF ALL RESPONSES TO PHQ QUESTIONS 1-9: 0
SUM OF ALL RESPONSES TO PHQ QUESTIONS 1-9: 0
1. LITTLE INTEREST OR PLEASURE IN DOING THINGS: 0
2. FEELING DOWN, DEPRESSED OR HOPELESS: 0

## 2020-01-14 NOTE — PROGRESS NOTES
of hematoma/fluid (Right, 6/20/2018); and pr explo/drain breast abscess (Right, 7/11/2018). Social History:  reports that she quit smoking about 11 years ago. She quit after 4.00 years of use. She has never used smokeless tobacco. She reports current alcohol use. She reports that she does not use drugs. Family History: family history includes Cancer (age of onset: 54) in her mother; Cancer (age of onset: 61) in her maternal grandfather; Cancer (age of onset: 79) in her maternal grandmother; Diabetes in her maternal grandmother; High Blood Pressure in her maternal grandmother; High Cholesterol in her maternal grandmother and mother. Allergies: Vancomycin    Physical Exam         VS:  /88   Pulse 77   Temp 98 °F (36.7 °C)   Resp 15   Ht 5' 3\" (1.6 m)   Wt (!) 340 lb (154.2 kg)   SpO2 97%   BMI 60.23 kg/m²    Oxygen Saturation Interpretation: Normal.    Constitutional:  Alert, development consistent with age. Ears:  External Ears: Bilateral pinna normal. TM's without erythema or perforation bilaterally. Canals normal bilaterally. Moderate serous fluid noted. Nose:   There is moderate  bilateral injection to middle turbinates bilaterally. Mouth: Normal to inspection. Throat: Mild posterior pharyngeal erythema with mild post nasal drip present. No exudate. Neck:  Supple. There is no anterior cervical adenopathy. Lungs:   Breath sounds: Non-labored, equal, and without adventitious sounds. No wheezing, rales, or rhonchi. Dry, non-productive cough during examination. Heart:  Regular rate and rhythm, normal heart sounds, without pathological murmurs, ectopy, gallops, or rubs. Skin:  Normal turgor. Warm, dry, without visible rash. Neurological:  Alert and oriented. Motor functions intact. Responds to verbal commands.      Lab / Imaging Results   (All laboratory and radiology results have been personally reviewed by myself)  Labs:  Results for orders placed or performed in visit on 01/14/20

## 2020-01-15 ENCOUNTER — APPOINTMENT (OUTPATIENT)
Dept: CT IMAGING | Age: 45
End: 2020-01-15
Payer: MEDICAID

## 2020-01-15 VITALS
HEART RATE: 77 BPM | TEMPERATURE: 97.8 F | OXYGEN SATURATION: 95 % | DIASTOLIC BLOOD PRESSURE: 72 MMHG | SYSTOLIC BLOOD PRESSURE: 136 MMHG | RESPIRATION RATE: 16 BRPM

## 2020-01-15 LAB
ALBUMIN SERPL-MCNC: 3.7 G/DL (ref 3.5–5.2)
ALP BLD-CCNC: 75 U/L (ref 35–104)
ALT SERPL-CCNC: 21 U/L (ref 0–32)
ANION GAP SERPL CALCULATED.3IONS-SCNC: 13 MMOL/L (ref 7–16)
AST SERPL-CCNC: 11 U/L (ref 0–31)
BACTERIA: ABNORMAL /HPF
BASOPHILS ABSOLUTE: 0.04 E9/L (ref 0–0.2)
BASOPHILS RELATIVE PERCENT: 0.2 % (ref 0–2)
BILIRUB SERPL-MCNC: 0.4 MG/DL (ref 0–1.2)
BILIRUBIN URINE: NEGATIVE
BLOOD, URINE: NORMAL
BUN BLDV-MCNC: 20 MG/DL (ref 6–20)
CALCIUM SERPL-MCNC: 8.9 MG/DL (ref 8.6–10.2)
CHLORIDE BLD-SCNC: 102 MMOL/L (ref 98–107)
CLARITY: CLEAR
CO2: 22 MMOL/L (ref 22–29)
COLOR: YELLOW
CREAT SERPL-MCNC: 0.6 MG/DL (ref 0.5–1)
EOSINOPHILS ABSOLUTE: 0.01 E9/L (ref 0.05–0.5)
EOSINOPHILS RELATIVE PERCENT: 0 % (ref 0–6)
EPITHELIAL CELLS, UA: ABNORMAL /HPF
GFR AFRICAN AMERICAN: >60
GFR NON-AFRICAN AMERICAN: >60 ML/MIN/1.73
GLUCOSE BLD-MCNC: 104 MG/DL (ref 74–99)
GLUCOSE URINE: NEGATIVE MG/DL
HCG, URINE, POC: NEGATIVE
HCT VFR BLD CALC: 47 % (ref 34–48)
HEMOGLOBIN: 14.7 G/DL (ref 11.5–15.5)
IMMATURE GRANULOCYTES #: 0.19 E9/L
IMMATURE GRANULOCYTES %: 0.8 % (ref 0–5)
KETONES, URINE: NEGATIVE MG/DL
LACTIC ACID: 1.3 MMOL/L (ref 0.5–2.2)
LEUKOCYTE ESTERASE, URINE: NEGATIVE
LYMPHOCYTES ABSOLUTE: 2.85 E9/L (ref 1.5–4)
LYMPHOCYTES RELATIVE PERCENT: 12.3 % (ref 20–42)
Lab: NORMAL
MCH RBC QN AUTO: 27.5 PG (ref 26–35)
MCHC RBC AUTO-ENTMCNC: 31.3 % (ref 32–34.5)
MCV RBC AUTO: 88 FL (ref 80–99.9)
MONOCYTES ABSOLUTE: 1.4 E9/L (ref 0.1–0.95)
MONOCYTES RELATIVE PERCENT: 6 % (ref 2–12)
NEGATIVE QC PASS/FAIL: NORMAL
NEUTROPHILS ABSOLUTE: 18.77 E9/L (ref 1.8–7.3)
NEUTROPHILS RELATIVE PERCENT: 80.7 % (ref 43–80)
NITRITE, URINE: NEGATIVE
PDW BLD-RTO: 15.1 FL (ref 11.5–15)
PH UA: 6 (ref 5–9)
PLATELET # BLD: 373 E9/L (ref 130–450)
PMV BLD AUTO: 10 FL (ref 7–12)
POSITIVE QC PASS/FAIL: NORMAL
POTASSIUM SERPL-SCNC: 4.3 MMOL/L (ref 3.5–5)
PROTEIN UA: NEGATIVE MG/DL
RBC # BLD: 5.34 E12/L (ref 3.5–5.5)
RBC UA: ABNORMAL /HPF (ref 0–2)
REASON FOR REJECTION: NORMAL
REJECTED TEST: NORMAL
SODIUM BLD-SCNC: 137 MMOL/L (ref 132–146)
SPECIFIC GRAVITY UA: 1.01 (ref 1–1.03)
TOTAL PROTEIN: 7.4 G/DL (ref 6.4–8.3)
TROPONIN: <0.01 NG/ML (ref 0–0.03)
UROBILINOGEN, URINE: 0.2 E.U./DL
WBC # BLD: 23.3 E9/L (ref 4.5–11.5)
WBC UA: ABNORMAL /HPF (ref 0–5)

## 2020-01-15 PROCEDURE — 6360000002 HC RX W HCPCS: Performed by: NURSE PRACTITIONER

## 2020-01-15 PROCEDURE — 71275 CT ANGIOGRAPHY CHEST: CPT

## 2020-01-15 PROCEDURE — 85025 COMPLETE CBC W/AUTO DIFF WBC: CPT

## 2020-01-15 PROCEDURE — 87040 BLOOD CULTURE FOR BACTERIA: CPT

## 2020-01-15 PROCEDURE — 36415 COLL VENOUS BLD VENIPUNCTURE: CPT

## 2020-01-15 PROCEDURE — 96375 TX/PRO/DX INJ NEW DRUG ADDON: CPT

## 2020-01-15 PROCEDURE — 74177 CT ABD & PELVIS W/CONTRAST: CPT

## 2020-01-15 PROCEDURE — 84484 ASSAY OF TROPONIN QUANT: CPT

## 2020-01-15 PROCEDURE — 81001 URINALYSIS AUTO W/SCOPE: CPT

## 2020-01-15 PROCEDURE — 6360000004 HC RX CONTRAST MEDICATION: Performed by: RADIOLOGY

## 2020-01-15 PROCEDURE — 83605 ASSAY OF LACTIC ACID: CPT

## 2020-01-15 PROCEDURE — 80053 COMPREHEN METABOLIC PANEL: CPT

## 2020-01-15 PROCEDURE — 96374 THER/PROPH/DIAG INJ IV PUSH: CPT

## 2020-01-15 PROCEDURE — 2580000003 HC RX 258: Performed by: NURSE PRACTITIONER

## 2020-01-15 PROCEDURE — 6370000000 HC RX 637 (ALT 250 FOR IP): Performed by: NURSE PRACTITIONER

## 2020-01-15 RX ORDER — MORPHINE SULFATE 4 MG/ML
4 INJECTION, SOLUTION INTRAMUSCULAR; INTRAVENOUS ONCE
Status: COMPLETED | OUTPATIENT
Start: 2020-01-15 | End: 2020-01-15

## 2020-01-15 RX ORDER — AMOXICILLIN AND CLAVULANATE POTASSIUM 875; 125 MG/1; MG/1
1 TABLET, FILM COATED ORAL 2 TIMES DAILY
Qty: 14 TABLET | Refills: 0 | Status: SHIPPED | OUTPATIENT
Start: 2020-01-15 | End: 2020-01-22

## 2020-01-15 RX ORDER — 0.9 % SODIUM CHLORIDE 0.9 %
1000 INTRAVENOUS SOLUTION INTRAVENOUS ONCE
Status: COMPLETED | OUTPATIENT
Start: 2020-01-15 | End: 2020-01-15

## 2020-01-15 RX ORDER — ONDANSETRON 2 MG/ML
4 INJECTION INTRAMUSCULAR; INTRAVENOUS ONCE
Status: COMPLETED | OUTPATIENT
Start: 2020-01-15 | End: 2020-01-15

## 2020-01-15 RX ORDER — FLUTICASONE PROPIONATE 50 MCG
1 SPRAY, SUSPENSION (ML) NASAL DAILY
Qty: 1 BOTTLE | Refills: 0 | Status: SHIPPED | OUTPATIENT
Start: 2020-01-15 | End: 2020-02-10

## 2020-01-15 RX ORDER — AMOXICILLIN AND CLAVULANATE POTASSIUM 875; 125 MG/1; MG/1
1 TABLET, FILM COATED ORAL ONCE
Status: COMPLETED | OUTPATIENT
Start: 2020-01-15 | End: 2020-01-15

## 2020-01-15 RX ADMIN — MORPHINE SULFATE 4 MG: 4 INJECTION, SOLUTION INTRAMUSCULAR; INTRAVENOUS at 01:14

## 2020-01-15 RX ADMIN — ONDANSETRON 4 MG: 2 INJECTION INTRAMUSCULAR; INTRAVENOUS at 01:14

## 2020-01-15 RX ADMIN — AMOXICILLIN AND CLAVULANATE POTASSIUM 1 TABLET: 875; 125 TABLET, FILM COATED ORAL at 02:53

## 2020-01-15 RX ADMIN — SODIUM CHLORIDE 1000 ML: 9 INJECTION, SOLUTION INTRAVENOUS at 01:14

## 2020-01-15 RX ADMIN — IOPAMIDOL 110 ML: 755 INJECTION, SOLUTION INTRAVENOUS at 01:55

## 2020-01-15 ASSESSMENT — PAIN SCALES - GENERAL: PAINLEVEL_OUTOF10: 7

## 2020-01-15 NOTE — ED NOTES
Bed: 05  Expected date:   Expected time:   Means of arrival:   Comments:  triage     Diana Goldstein RN  01/14/20 [id number]

## 2020-01-15 NOTE — ED PROVIDER NOTES
Independent   HPI:  1/15/20, Time: 1:27 AM         Irby Skiff is a 38 y.o. female presenting to the ED for sinus pain and pressure, continued chest congestion and lower abdominal pain. Patient reports that overall she has not been feeling very well since the end of December. Patient reports that she was diagnosed with sinus infection was on antibiotics as well as then bronchitis also on antibiotics and steroids. Patient reports that she just completed a prescription of steroids today and saw her primary care physician where she was provided once again with a steroid injection and had routine blood work. Patient reports that she received a phone call from her primary care physician stating that she needed to go to the emergency department due to an elevated white blood cell count of 19. Patient does report pain diffuse to the left upper and lower quadrants. Patient denies any vomiting denies any diarrhea, she does report still having occasional dry cough. But she does complain mainly of sinus pain and pressure. She also reports feeling very flushed but does not have a temp and denies noticing a fever. She reports she is urinating and having normal bowel movements. Patient also reports that her primary care physician also did a rapid influenza and states that it was negative. Review of Systems:   Pertinent positives and negatives are stated within HPI, all other systems reviewed and are negative.          --------------------------------------------- PAST HISTORY ---------------------------------------------  Past Medical History:  has a past medical history of Abscess of right breast, Anxiety, Breast abscess, Elderly multigravida with antepartum condition or complication, Excessive fetal growth affecting management of mother, antepartum, GERD (gastroesophageal reflux disease), Hyperlipidemia, Obesity, Obesity, and Obesity complicating pregnancy, childbirth, or puerperium, antepartum.     Past 35.0 pg    MCHC 31.3 (L) 32.0 - 34.5 %    RDW 15.1 (H) 11.5 - 15.0 fL    Platelets 628 253 - 578 E9/L    MPV 10.0 7.0 - 12.0 fL    Neutrophils % 80.7 (H) 43.0 - 80.0 %    Immature Granulocytes % 0.8 0.0 - 5.0 %    Lymphocytes % 12.3 (L) 20.0 - 42.0 %    Monocytes % 6.0 2.0 - 12.0 %    Eosinophils % 0.0 0.0 - 6.0 %    Basophils % 0.2 0.0 - 2.0 %    Neutrophils Absolute 18.77 (H) 1.80 - 7.30 E9/L    Immature Granulocytes # 0.19 E9/L    Lymphocytes Absolute 2.85 1.50 - 4.00 E9/L    Monocytes Absolute 1.40 (H) 0.10 - 0.95 E9/L    Eosinophils Absolute 0.01 (L) 0.05 - 0.50 E9/L    Basophils Absolute 0.04 0.00 - 0.20 E9/L   Microscopic Urinalysis   Result Value Ref Range    WBC, UA NONE 0 - 5 /HPF    RBC, UA 0-1 0 - 2 /HPF    Epi Cells RARE /HPF    Bacteria, UA RARE (A) /HPF   SPECIMEN REJECTION   Result Value Ref Range    Rejected Test CMP TROP     Reason for Rejection see below    Lactic Acid, Plasma   Result Value Ref Range    Lactic Acid 1.3 0.5 - 2.2 mmol/L   Comprehensive Metabolic Panel   Result Value Ref Range    Sodium 137 132 - 146 mmol/L    Potassium 4.3 3.5 - 5.0 mmol/L    Chloride 102 98 - 107 mmol/L    CO2 22 22 - 29 mmol/L    Anion Gap 13 7 - 16 mmol/L    Glucose 104 (H) 74 - 99 mg/dL    BUN 20 6 - 20 mg/dL    CREATININE 0.6 0.5 - 1.0 mg/dL    GFR Non-African American >60 >=60 mL/min/1.73    GFR African American >60     Calcium 8.9 8.6 - 10.2 mg/dL    Total Protein 7.4 6.4 - 8.3 g/dL    Alb 3.7 3.5 - 5.2 g/dL    Total Bilirubin 0.4 0.0 - 1.2 mg/dL    Alkaline Phosphatase 75 35 - 104 U/L    ALT 21 0 - 32 U/L    AST 11 0 - 31 U/L   Troponin   Result Value Ref Range    Troponin <0.01 0.00 - 0.03 ng/mL   POC Pregnancy Urine   Result Value Ref Range    HCG, Urine, POC Negative Negative    Lot Number [id number]     Positive QC Pass/Fail Pass     Negative QC Pass/Fail Pass        RADIOLOGY:  Interpreted by Radiologist.  CT ABDOMEN PELVIS W IV CONTRAST Additional Contrast? None   Final Result   There is no evidence of appendicitis, colitis, bowel obstruction or bowel    perforation. Fatty metamorphosis of the liver is noted. No acute pathology of the abdomen and pelvis. This report has been electronically signed by Tristan Alonso MD.      CTA CHEST W CONTRAST   Final Result   No central pulmonary embolus is seen. However secondary to poor bolus    opacification of the peripheral arteries, a peripheral pulmonary embolus cannot    be excluded on this examination. .      No evidence of pneumonia or pulmonary edema. No dissection. This report has been electronically signed by Tristan Alonso MD.      XR CHEST STANDARD (2 VW)    (Results Pending)       ------------------------- NURSING NOTES AND VITALS REVIEWED ---------------------------   The nursing notes within the ED encounter and vital signs as below have been reviewed. BP (!) 157/98   Pulse 118   Temp 96.8 °F (36 °C) (Infrared)   Resp 18   SpO2 96%   Oxygen Saturation Interpretation: Normal      ---------------------------------------------------PHYSICAL EXAM--------------------------------------      Constitutional/General: Alert and oriented x3, well appearing, non toxic in NAD  Head: Normocephalic and atraumatic, patient with point tenderness to frontal and maxillary sinus cavities. Also mild erythema noted to inferior nasal turbinates with swelling. No active nasal drainage. Eyes: PERRL, EOMI  Mouth: Oropharynx clear, handling secretions, no trismus  Neck: Supple, full ROM,   Pulmonary: Lungs clear to auscultation bilaterally, no wheezes, rales, or rhonchi. Not in respiratory distress, occasional dry harsh nonproductive cough otherwise lungs clear, no wheezing. Cardiovascular:  Regular rate and rhythm, no murmurs, gallops, or rubs. 2+ distal pulses  Abdomen: Soft, non tender, non distended, very mild point tenderness to left lower quadrant, otherwise no unusual rigidity. Extremities: Moves all extremities x 4.  Warm and well

## 2020-01-16 LAB — URINE CULTURE, ROUTINE: NORMAL

## 2020-01-20 LAB
BLOOD CULTURE, ROUTINE: NORMAL
CULTURE, BLOOD 2: NORMAL

## 2020-01-27 ENCOUNTER — TELEPHONE (OUTPATIENT)
Dept: BREAST CENTER | Age: 45
End: 2020-01-27

## 2020-02-05 ENCOUNTER — HOSPITAL ENCOUNTER (OUTPATIENT)
Dept: NUCLEAR MEDICINE | Age: 45
Discharge: HOME | End: 2020-02-05
Payer: MEDICAID

## 2020-02-05 ENCOUNTER — TELEPHONE (OUTPATIENT)
Dept: BREAST CENTER | Age: 45
End: 2020-02-05

## 2020-02-10 ENCOUNTER — OFFICE VISIT (OUTPATIENT)
Dept: BARIATRICS/WEIGHT MGMT | Age: 45
End: 2020-02-10
Payer: MEDICAID

## 2020-02-10 VITALS
WEIGHT: 293 LBS | RESPIRATION RATE: 20 BRPM | TEMPERATURE: 97.3 F | HEART RATE: 79 BPM | DIASTOLIC BLOOD PRESSURE: 86 MMHG | BODY MASS INDEX: 51.91 KG/M2 | HEIGHT: 63 IN | SYSTOLIC BLOOD PRESSURE: 156 MMHG

## 2020-02-10 PROCEDURE — G8427 DOCREV CUR MEDS BY ELIG CLIN: HCPCS | Performed by: INTERNAL MEDICINE

## 2020-02-10 PROCEDURE — G8417 CALC BMI ABV UP PARAM F/U: HCPCS | Performed by: INTERNAL MEDICINE

## 2020-02-10 PROCEDURE — G8484 FLU IMMUNIZE NO ADMIN: HCPCS | Performed by: INTERNAL MEDICINE

## 2020-02-10 PROCEDURE — 99204 OFFICE O/P NEW MOD 45 MIN: CPT | Performed by: INTERNAL MEDICINE

## 2020-02-10 PROCEDURE — 1036F TOBACCO NON-USER: CPT | Performed by: INTERNAL MEDICINE

## 2020-02-10 PROCEDURE — 99203 OFFICE O/P NEW LOW 30 MIN: CPT

## 2020-02-10 RX ORDER — ESCITALOPRAM OXALATE 10 MG/1
10 TABLET ORAL DAILY
Qty: 90 TABLET | Refills: 0 | Status: SHIPPED
Start: 2020-02-10 | End: 2020-06-16 | Stop reason: SDUPTHER

## 2020-02-10 NOTE — PROGRESS NOTES
CC -   Fatigue, Obesity    Background -   Medical problems: Chronic fatigue, Obesity, GERD, history of complicated mastitis needing reconstructive surgery now, Vit D def  Medications: Lexapro, Vit D3    Fatigue:  Present since early 30's, mod in severity, + daytime sleepiness, + snoring, has not had a sleep study test, states her excess weight is making her fatigue worse  Obesity: Present since late HS years, BMI 63.5, Wt 358.4 lbs; HS grad 175 lbs; Highest wt: 358 lb; Lowest wt 180 lbs; Pattern of wt gain: Fluctuations between weight loss with anxiety and rapid gain afterwards; Wt gain past yr: 14 lbs (gained 55 lbs between June 2018 and Feb 2019); Most wt lost: none; Diets: none     Diet:    Number of meals per day - 2    Number of snacks per day - graze (lunch meat, cheese, stuffed olives(with cheese))    Meal volume - 12\" plate, occ seconds    Fast food/convenience store - 3-4x/week    Restaurants (not fast food) - 1x/week   Sweets - 2d/week   Chips - 1d/week   Crackers/pretzels - 2d/week   Nuts - 0d/week   Peanut Butter - 2d/week (on bread)   Popcorn - 0d/week   Dried fruit - 0d/week   Whole fruit - 7d/week (1 piece)   Breakfast cereal - 1d/week   Granola/Protein/Energy bar - 0d/week   Sugar sweetened beverages - 56 oz reg soda, 2 cups coffee/day each with one Tbsp Malaysian vanilla, 16 oz 2% milk   Protein - No supplements   Fiber - No supplements     Exercise:    Gym membership - none    Walking - none    Running - none    Resistance - none    Aerobic class - none    Vital signs:  BP (!) 156/86 (Site: Left Upper Arm, Position: Sitting, Cuff Size: Medium Adult)   Pulse 79   Temp 97.3 °F (36.3 °C) (Temporal)   Resp 20   Ht 5' 3\" (1.6 m)   Wt (!) 358 lb 6.4 oz (162.6 kg)   LMP 01/21/2020   BMI 63.49 kg/m²     I spent 45 minutes in a face to face encounter with Bailey Reny today.    >30 minutes of this was in education and counseling regarding the six types of weight loss intervention, psychology vs

## 2020-02-10 NOTE — TELEPHONE ENCOUNTER
Last Appointment:  1/14/2020  Future Appointments   Date Time Provider Tiana Batista   2/10/2020  9:00 AM Oliva Mcgowan MD Surg Weight [facility]   2/19/2020  9:00 AM [address] [facility] [address], APRN - CNP [facility]

## 2020-02-10 NOTE — PATIENT INSTRUCTIONS
Breakfast -     one high protein shake                            + 20 grams of fiber (12 tablespoons of the original, plain Fiber One cereal or 4 tablespoons of Benefiber; for both of these, start with 1/4th the target amount and every week add this amount until reaching the target)     Lunch -           one high protein shake     Dinner -          one frozen meal                           + one snack item     Shake options (<200 lynsey, >22 grams/protein) :  Nectar, Pure Protein, Premier, Boost Max, Ensure Max, BeneProtein and [employer] (which is lactose-free) are milk-based options; Nectar, Premier Protein Clear, IsoPure Protein Drink, and Protein 2 O are water-based options; other drink options can be found in the protein powder report on the [address] [place] [address] website [employer] Protein Clear, the water-based option, comes in a 20 oz bottle with 20 grams of protein and 90 calories. So you have to drink three each day which increases the cost.)  (Disclaimer: Dietary supplements rarely have their listed ingredients and the amount of each verified by a third party other. Sometimes they give verification for their claims to be GMO and gluten free and to be organic.  However, even such verifications as these may still be untrustworthy.)     Snack options (<100 lynsey, no sweets): fruit, low fat/high protein [place] yogurt, mozzarella cheese stick, nuts, salad with dressing, peanut butter, chips/crackers/pretzels     Frozen meal options (<350 lynsey):  Weight Watchers Smart Ones, [employer] Cuisine, Healthy Choice, Elena's, Sunita's     Food substitutes for the shakes:  4 oz of baked, grilled or broiled chicken, turkey or fish, 10 egg whites     Take a multivitamin daily     Walk 30 min every day    See me back in 6-8 weeks

## 2020-02-12 ASSESSMENT — ENCOUNTER SYMPTOMS
DIARRHEA: 0
SORE THROAT: 0
SINUS PRESSURE: 0
NAUSEA: 0
WHEEZING: 0
SINUS PAIN: 0
TROUBLE SWALLOWING: 0
ABDOMINAL DISTENTION: 0
CHEST TIGHTNESS: 0
RHINORRHEA: 0
EYE DISCHARGE: 0
EYE ITCHING: 0
VOMITING: 0
CONSTIPATION: 0
COUGH: 0
VOICE CHANGE: 0
CHOKING: 0
SHORTNESS OF BREATH: 0
BLOOD IN STOOL: 0
ABDOMINAL PAIN: 0
BACK PAIN: 0

## 2020-02-12 NOTE — PROGRESS NOTES
Subjective:    -Right breast complicated abscess initially noted 2018.   -Tyrer-zick Risk Evaluation was performed. Pt has a 44.2 % lifetime risk (to age 80) of developing breast cancer (average woman's risk is 12.0%). Patient ID: Eladia June is a 38 y.o. female. HPI   History and Physical    Patient's Name/Date of Birth: Eladia June / 1975    Eladia June presents for follow up evaluation of a complicated right breast abscess s/p multiple I & D per Dr. Rina Otoole. PCP: JIHAN Acuna - CNP. Gynecologist: King John MD.    The patient had developed an inflammatory mass in May 2018. It was exquisitely tender and centered around her prior right upper outer quadrant biopsy site. Prior breast biopsy in this area: \"Organizing fat necrosis with chronic active inflammation and hemorrhage; negative for malignancy. \"  On original presentation the mass had overlying red skin and was very tender. The patient has noted an increased size of the mass since presentation. Patient does routinely do self breast exams. Patient denies nipple discharge, but has noted some retraction of the nipple. Remote Bactrim DS and Dicloxacillin X 12 days with symptomatic improvement. Residual lump, tender, however erythema resolved. Palpable breast lump 2018, thought to be cyst, improved with antibiotics. Biopsy negative. Patient had hx one breast biopsy on same mass--fat necrosis/hematoma. Patient denies a personal history of breast cancer. Breast cancer risk factors include mother with Stage III breast cancer at age 47. Currently on Ibrance for metastatic disease. Maternal Grandmother with post-menopausal breast cancer in her 66's. Age of menarche was 7. Patient denies hormonal therapy. Patient is . Age of first live birth was 36. Patient did breast feed.     Estimated body mass index is 63.49 kg/m² as calculated from the following:    Height as of 2/10/20: 5' 3\" duct ectasia seen in the right breast in the upper outer quadrant region and in the retro-areolar region.       IMPRESSION:   Finding 1:  No mammographic evidence of malignancy.       Finding 2:  Simple cysts and areas of duct ectasia in the right breast are benign.       Screening mammogram in 1 year is recommended.       =======================================   BI-RADS Category 2:  Benign   =======================================       Right breast ultrasound guided biopsy at the 10:00 position on May 17, 2018:  Right breast, 10:00 core needle biopsy: Organizing fat necrosis with  chronic active inflammation and hemorrhage; negative for malignancy    Comment:     Intradepartmental consultation is obtained. 6/1/18, Right breast ultrasound [facility]:     ULTRASOUND FINDINGS:           Finding 1:   Sonography was performed in the right breast using a radial and anti-radial approach. There is a sonographic appearance consistent with edema and skin thickening seen in the right breast at 9 o'clock, at 10 o'clock and at 11 o'clock.       This finding is most consistent with cellulitis.       No drainable fluid collection seen.       No sonographic evidence of suspicious solid or cystic mass lesions.  No focal areas of suspicious atypical echogenicity.       IMPRESSION:   Sonographic appearance consistent with edema and skin thickening in the right breast is benign.       Appropriate antibiotic therapy is advised.       Screening mammogram in 3 months is recommended.       =======================================   BI-RADS Category 2:  Benign     Incision and drainage and culturing and packing performed 6/5/18.       Past Medical History:   Diagnosis Date    Abscess of right breast 6/19/2018    Anxiety     Breast abscess 7/11/2018    Fatigue     GERD (gastroesophageal reflux disease)     Hyperlipidemia     Obesity 6/4/2013       Past Surgical History:   Procedure Laterality Date    ANKLE FRACTURE SURGERY 4/20/2012    right with hardware, subsequently removed   [address]      2003    CYSTOSCOPY  7/25/2014    retrograde pyelogram, ureteroscopy, laser lithotripsy, left stent placement    ENDOSCOPY, COLON, DIAGNOSTIC      1998    ND DRAINAGE OF HEMATOMA/FLUID Right 6/20/2018    RIGHT BREAST INCISION AND DRAINAGE POSSIBLE WOUND VAC performed by Leah Ch MD at [address] [place] [address] Right 7/11/2018    INCISION AND DRAINAGE RIGHT BREAST, WITH APPLICATION OF WOUND VAC (PATIENT HSS WOUND VAC WILL BRING IT)  performed by Leah Ch MD at [facility]    Left leg       Current Outpatient Medications   Medication Sig Dispense Refill    escitalopram (LEXAPRO) 10 MG tablet TAKE 1 TABLET BY MOUTH DAILY 90 tablet 0    vitamin D (ERGOCALCIFEROL) 63423 units CAPS capsule Take 1 capsule by mouth once a week for 24 doses Take 50,000 IU every Sunday and 2,000 IU everyday except Sunday. 12 capsule 1    Cholecalciferol (VITAMIN D) 2000 units TABS tablet Take 1 tablet by mouth daily 90 tablet 0     No current facility-administered medications for this visit.         Allergies   Allergen Reactions    Vancomycin Nausea And Vomiting     Red man syndrome, had high fever and upset stomach       Family History   Problem Relation Age of Onset    Cancer Maternal Grandmother 79        breast    Diabetes Maternal Grandmother     High Blood Pressure Maternal Grandmother     High Cholesterol Maternal Grandmother     High Cholesterol Mother     Cancer Mother 54        breast, colon--breast first then 3 years colon    Cancer Maternal Grandfather 60        throat       Social History     Socioeconomic History    Marital status: Single     Spouse name: Not on file    Number of children: Not on file    Years of education: Not on file    Highest education level: Not on file   Occupational History    Not on file   Social Needs    Financial COMPARISON:   The present examination has been compared to prior imaging studies performed at [address] [place] [address] on 2016 and 2017.       CAD:   This exam was reviewed using the [employer] Computer Aided Detection (CAD)       TISSUE DENSITY:   The breast is heterogeneously dense (Type 3 density).             MAMMOGRAM FINDINGS:   Finding 1:  No suspicious masses, areas of suspicious architectural distortion, suspicious calcifications, or additional suspicious findings are identified.  There are no significant changes from the prior study.       ULTRASOUND FINDINGS:   Finding 1:  Sonography was performed in the right breast using a radial and anti-radial approach.       Finding 2:  There are multiple simple cysts and areas of duct ectasia seen in the right breast in the upper outer quadrant region and in the retro-areolar region.       IMPRESSION:   Finding 1:  No mammographic evidence of malignancy.       Finding 2:  Simple cysts and areas of duct ectasia in the right breast are benign.       Screening mammogram in 1 year is recommended.       =======================================   BI-RADS Category 2:  Benign   =======================================     -She was seen by gynecology, King John M.D. who attempted aspiration of the right breast mass on 2018; no aspirate obtained. She completed a course of double antibiotic therapy with Bactrim DS and Dicloxacillin X 12 days with symptomatic improvement. Residual lump and nipple retraction. Patient denies  previous breast biopsy. Patient denies a personal history of breast cancer. Breast cancer risk factors include mother with Stage III breast cancer at age 54; Currently age 63 on Endocrine therapy. Maternal Grandmother with post-menopausal breast cancer in her 66's. Age of menarche was 7. Patient denies hormonal therapy. Patient is . Age of first live birth was 36. Patient did breast feed.   Right breast ultrasound guided biopsy at the 10:00 position on May 17, 2018:  Right breast, 10:00 core needle biopsy: Organizing fat necrosis with  chronic active inflammation and hemorrhage; negative for malignancy    Comment:     Intradepartmental consultation is obtained. 6/1/18, Right breast ultrasound [facility]:  Benign (2)      -An MBI of the bilateral breasts was ordered, however she has not had it done due to various issues at home. Clinically, she continues to well. She does have significant stress at home with her two boys have IDDM. No clinically suspicious findings. On the right, the breast is smaller with surgery defect. Reviewed and re-calculated her Teresita Sheller risk score, result:  44.2% lifetime risk. -Genetics:  She had genetic testing per Dr. Andria Nino. Reports BRCA 1& 2 were negative. She may benefit from expanded genetic testing; will discuss further on her next visit.    -Chemoprevention: We briefly discussed chemoprevention with Tamoxifen; However, given her co-morbidities, it is likely the risks would outweigh the benefits. She is scheduled to see her GYN provider next week and will discuss further with Dr. Andria Nino. In light of her risk score; she would benefit from bilateral breast MRI but her current body habitus (BMI 63.9) precludes it at this time.      -A bilateral diagnostic mammogram done today, 02/18/2020:  Negative (BI-RADS 1). -Seen By Dr Aylin Chan for her underlying obesity:  On VLCD for weight loss; she has concerns over the cost of the program and was encouraged to discuss further with Dr. Jyotsna Ospina. Plan:       1. Continue monthly breast self examination; detailed instructions reviewed today. Bring any changes to your physician's attention. 2. Continue healthy diet and exercise routinely as tolerated; follow with Endocrinology. 3. Avoid alcohol. 4. Limit caffeine intake. 5. Repeat mammogram 1 year.   6. Continue follow up with Primary Care,

## 2020-02-13 ENCOUNTER — TELEPHONE (OUTPATIENT)
Dept: BREAST CENTER | Age: 45
End: 2020-02-13

## 2020-02-13 NOTE — TELEPHONE ENCOUNTER
Left message with call back number. Reviewed information with NP, patient to be scheduled for her yearly mammogram (bilateral diagnostic mammogram) and office visit same day.

## 2020-02-18 ENCOUNTER — HOSPITAL ENCOUNTER (OUTPATIENT)
Dept: GENERAL RADIOLOGY | Age: 45
Discharge: HOME | End: 2020-02-20
Payer: MEDICAID

## 2020-02-18 ENCOUNTER — OFFICE VISIT (OUTPATIENT)
Dept: BREAST CENTER | Age: 45
End: 2020-02-18
Payer: MEDICAID

## 2020-02-18 VITALS
HEART RATE: 83 BPM | OXYGEN SATURATION: 98 % | DIASTOLIC BLOOD PRESSURE: 80 MMHG | WEIGHT: 293 LBS | RESPIRATION RATE: 18 BRPM | BODY MASS INDEX: 51.91 KG/M2 | TEMPERATURE: 98.5 F | HEIGHT: 63 IN | SYSTOLIC BLOOD PRESSURE: 140 MMHG

## 2020-02-18 PROCEDURE — G8484 FLU IMMUNIZE NO ADMIN: HCPCS | Performed by: NURSE PRACTITIONER

## 2020-02-18 PROCEDURE — G0279 TOMOSYNTHESIS, MAMMO: HCPCS

## 2020-02-18 PROCEDURE — G8427 DOCREV CUR MEDS BY ELIG CLIN: HCPCS | Performed by: NURSE PRACTITIONER

## 2020-02-18 PROCEDURE — 99214 OFFICE O/P EST MOD 30 MIN: CPT | Performed by: NURSE PRACTITIONER

## 2020-02-18 PROCEDURE — 99213 OFFICE O/P EST LOW 20 MIN: CPT | Performed by: NURSE PRACTITIONER

## 2020-02-18 PROCEDURE — G8417 CALC BMI ABV UP PARAM F/U: HCPCS | Performed by: NURSE PRACTITIONER

## 2020-02-18 PROCEDURE — 1036F TOBACCO NON-USER: CPT | Performed by: NURSE PRACTITIONER

## 2020-02-20 ENCOUNTER — TELEPHONE (OUTPATIENT)
Dept: BREAST CENTER | Age: 45
End: 2020-02-20

## 2020-03-03 ENCOUNTER — TELEPHONE (OUTPATIENT)
Dept: FAMILY MEDICINE CLINIC | Age: 45
End: 2020-03-03

## 2020-03-03 NOTE — TELEPHONE ENCOUNTER
Spoke with patient she may walk in not sure
Went to Urgent care in [place] but antibiotic not working doesn't think it is strong enough (amoxacillin)  Can she have something stronger called in
23 year old, ambulatory c/o midepigastric pain with radiation to left back side since last night. pt c/o sob with pain. pt reports being nauseas and extremely anxious. pt reports feeling chills 2 days ago. pt denies any difficulty urinating or having BM's. pt denies headache upon assessment , but reports hx of frequent headaches/migraines. pt to have lab/line as ordered.

## 2020-03-04 ENCOUNTER — TELEPHONE (OUTPATIENT)
Dept: BARIATRICS/WEIGHT MGMT | Age: 45
End: 2020-03-04

## 2020-03-11 ENCOUNTER — TELEPHONE (OUTPATIENT)
Dept: FAMILY MEDICINE CLINIC | Age: 45
End: 2020-03-11

## 2020-03-11 NOTE — TELEPHONE ENCOUNTER
Chari Leventhal called today and stated that her anxiety is really bad and wants to know if you can lynsey in her anxiety medication with an increase. she also stated she would like you to call her.

## 2020-03-16 RX ORDER — FUROSEMIDE 20 MG/1
TABLET ORAL
Qty: 30 TABLET | Refills: 1 | Status: SHIPPED
Start: 2020-03-16 | End: 2020-09-04

## 2020-03-16 RX ORDER — CHOLECALCIFEROL (VITAMIN D3) 125 MCG
CAPSULE ORAL
Qty: 90 TABLET | Refills: 0 | Status: SHIPPED
Start: 2020-03-16 | End: 2020-08-30

## 2020-03-18 ENCOUNTER — TELEPHONE (OUTPATIENT)
Dept: FAMILY MEDICINE CLINIC | Age: 45
End: 2020-03-18

## 2020-03-18 RX ORDER — HYDROXYZINE PAMOATE 25 MG/1
25 CAPSULE ORAL 2 TIMES DAILY PRN
Qty: 60 CAPSULE | Refills: 0 | Status: SHIPPED
Start: 2020-03-18 | End: 2021-07-14 | Stop reason: SDUPTHER

## 2020-04-13 RX ORDER — ERGOCALCIFEROL 1.25 MG/1
CAPSULE ORAL
Qty: 12 CAPSULE | Refills: 1 | Status: SHIPPED
Start: 2020-04-13 | End: 2020-06-16 | Stop reason: SDUPTHER

## 2020-04-13 NOTE — TELEPHONE ENCOUNTER
Last Appointment:  1/14/2020  Future Appointments   Date Time Provider Tiana Batista   6/12/2020 12:30 PM Farhana Luke MD Surg Weight [facility]

## 2020-06-04 ENCOUNTER — TELEPHONE (OUTPATIENT)
Dept: BREAST CENTER | Age: 45
End: 2020-06-04

## 2020-06-18 NOTE — TELEPHONE ENCOUNTER
Last Appointment:  1/14/2020  Future Appointments   Date Time Provider Tiana Batista   7/15/2020  9:45 AM Tate Breen MD Surg Weight [facility]

## 2020-06-19 RX ORDER — ESCITALOPRAM OXALATE 10 MG/1
10 TABLET ORAL DAILY
Qty: 90 TABLET | Refills: 0 | Status: SHIPPED
Start: 2020-06-19 | End: 2020-09-04 | Stop reason: SDUPTHER

## 2020-06-19 RX ORDER — ERGOCALCIFEROL 1.25 MG/1
50000 CAPSULE ORAL WEEKLY
Qty: 12 CAPSULE | Refills: 1 | Status: SHIPPED
Start: 2020-06-19 | End: 2021-05-18 | Stop reason: SDUPTHER

## 2020-07-13 ENCOUNTER — TELEPHONE (OUTPATIENT)
Dept: BREAST CENTER | Age: 45
End: 2020-07-13

## 2020-07-15 ENCOUNTER — TELEPHONE (OUTPATIENT)
Dept: BARIATRICS/WEIGHT MGMT | Age: 45
End: 2020-07-15

## 2020-07-23 ENCOUNTER — TELEPHONE (OUTPATIENT)
Dept: BREAST CENTER | Age: 45
End: 2020-07-23

## 2020-07-23 NOTE — TELEPHONE ENCOUNTER
Spoke with patient in regards to her menstrual cycle days to schedule her MRI. Unfortunately, her cycles have been irregular.  Patient states she will call us as soon as she has  day 1 of next month

## 2020-08-11 ENCOUNTER — TELEPHONE (OUTPATIENT)
Dept: BREAST CENTER | Age: 45
End: 2020-08-11

## 2020-08-11 NOTE — TELEPHONE ENCOUNTER
Patient called to let us know she started her menstrual cycle today. She is due for a breast MBI for high risk status. The MBI machine is down at this time but we will hold onto the order in case anything changes within her window. Patient states she thinks she might be starting to go through menopause because her menstrual cycles are becoming irregular and more spread out with the last one being ~36 days ago. I told the patient that we will get back to her if anything changes with the MBI in the next few days.

## 2020-08-14 ENCOUNTER — TELEPHONE (OUTPATIENT)
Dept: BREAST CENTER | Age: 45
End: 2020-08-14

## 2020-08-14 NOTE — TELEPHONE ENCOUNTER
Contacted patient insurance - [facility] -- for authorization for MBI [id number]. Patient information and case# [id number] has been forwarded to Physician review. Awaiting approval -  Nurse reviewer stated she would rachel urgent do to patient starting her cycle.   Could take up to 3 business days

## 2020-08-14 NOTE — TELEPHONE ENCOUNTER
Spoke with patient explaining that schedulers will call her on Monday to schedule her MBI for possibly Tuesday. Making patient aware if she needs to obtain  for her soon.  Patient very appreciative for phone call

## 2020-08-14 NOTE — TELEPHONE ENCOUNTER
Spoke with nuclear who stated MBI machine is working. Called Schedulers to let them know Ramón Oneill has started her menstrual cycle and needs scheduled.  Stated they will call her to make appointment

## 2020-08-19 ENCOUNTER — TELEPHONE (OUTPATIENT)
Dept: BREAST CENTER | Age: 45
End: 2020-08-19

## 2020-08-19 ENCOUNTER — HOSPITAL ENCOUNTER (OUTPATIENT)
Dept: NUCLEAR MEDICINE | Age: 45
Discharge: HOME | End: 2020-08-21
Payer: MEDICAID

## 2020-08-19 PROCEDURE — 78800 RP LOCLZJ TUM 1 AREA 1 D IMG: CPT

## 2020-08-19 PROCEDURE — 3430000000 HC RX DIAGNOSTIC RADIOPHARMACEUTICAL: Performed by: RADIOLOGY

## 2020-08-19 PROCEDURE — A9500 TC99M SESTAMIBI: HCPCS | Performed by: RADIOLOGY

## 2020-08-19 RX ADMIN — Medication 8 MILLICURIE: at 10:50

## 2020-08-24 ENCOUNTER — HOSPITAL ENCOUNTER (OUTPATIENT)
Dept: GENERAL RADIOLOGY | Age: 45
Discharge: HOME | End: 2020-08-26
Payer: MEDICAID

## 2020-08-24 PROCEDURE — 76642 ULTRASOUND BREAST LIMITED: CPT

## 2020-09-02 ENCOUNTER — TELEPHONE (OUTPATIENT)
Dept: BREAST CENTER | Age: 45
End: 2020-09-02

## 2020-09-02 ASSESSMENT — ENCOUNTER SYMPTOMS
CHEST TIGHTNESS: 0
VOMITING: 0
NAUSEA: 0
COLOR CHANGE: 0
WHEEZING: 0
SINUS PAIN: 0
DIARRHEA: 0
SHORTNESS OF BREATH: 0
CONSTIPATION: 0
COUGH: 0
EYE PAIN: 0

## 2020-09-02 NOTE — PROGRESS NOTES
TeleMedicine Patient Consent    This visit was performed as a virtual video visit using a synchronous, two-way, audio-video telehealth technology platform. Patient identification was verified at the start of the visit, including the patient's telephone number and physical location. I discussed with the patient the nature of our telehealth visits, that:     1. Due to the nature of an audio- video modality, the only components of a physical exam that could be done are the elements supported by direct observation. 2. I would evaluate the patient and recommend diagnostics and treatments based on my assessment. 3. If it was felt that the patient should be evaluated in clinic or an emergency room setting, then they would be directed there. 4. Our sessions are not being recorded and that personal health information is protected. 5. Our team would provide follow up care in person if/when the patient needs it. Patient does agree to proceed with telemedicine consultation. Patient's location: home address in [facility]  Physician  location home address in [facility] other people involved in call n/a    HPI:  Patient comes in today for   Chief Complaint   Patient presents with    Fatigue     not feeling well just tired wants to check in hasn't had appt in a while   Has not been trying anything for diet & exercise. Not sure if she has gained weight, she went to Dr. Ama Puckett for weight loss surgery. He suggested Slim-Fast shakes but she states she can not afford them. Continues to drink Pepsi daily. She reports being lazy and not utilizing her gym equipment or going outside to exercise. She is anxious about COVID & her 2 Type I diabetic sons catching it so has remained home as much as possible. Admits to some panic attacks. Requesting face to face appointment for labs & vitals. Has follow up appointment for biopsy with breast care center, which is also making her anxious.  She had seen Dr. French Sorenson in the past & would like to see her again if tests are positive. Prior to Visit Medications    Medication Sig Taking? Authorizing Provider   escitalopram (LEXAPRO) 10 MG tablet Take 1 tablet by mouth daily Yes JIHAN Dawson CNP   Cholecalciferol (VITAMIN D3) 50 MCG (2000 UT) TABS TAKE 1 TABLET BY MOUTH DAILY Yes JIHAN Dawson CNP   vitamin D (ERGOCALCIFEROL) 1.25 MG (40192 UT) CAPS capsule Take 1 capsule by mouth once a week Yes JIHAN Dawson CNP         Allergies   Allergen Reactions    Vancomycin Nausea And Vomiting     Red man syndrome, had high fever and upset stomach     Review of Systems    Review of Systems   Constitutional: Positive for activity change, appetite change and fatigue. Negative for chills and fever. HENT: Negative for congestion, ear pain, sinus pain and sneezing. Eyes: Negative for pain and visual disturbance. Respiratory: Negative for cough, chest tightness, shortness of breath and wheezing. Cardiovascular: Positive for palpitations (During anxiety attacks). Negative for chest pain and leg swelling. Gastrointestinal: Negative for constipation, diarrhea, nausea and vomiting. Endocrine: Negative for cold intolerance, heat intolerance and polyuria. Genitourinary: Negative for difficulty urinating. Musculoskeletal: Negative for arthralgias and myalgias. Skin: Negative for color change and rash. Neurological: Negative for dizziness, light-headedness and headaches. Hematological: Negative for adenopathy. Does not bruise/bleed easily. Psychiatric/Behavioral: Positive for agitation and sleep disturbance. Negative for decreased concentration and suicidal ideas. The patient is nervous/anxious. VS:  There were no vitals taken for this visit. Pt is unable to provide vitals for today's visit, unless already in HPI. Physical Exam    Physical Exam  Vitals reviewed: Pt was not able to provide vital signs for today's appointment, unless indicated in HPI.    Constitutional: Readings from Last 1 Encounters:   02/18/20 (!) 140/80    Recheck if >140/90  Hemoglobin A1C (%)   Date Value   08/08/2019 5.6     No results found for: LABMICR    Plan:    ASSESSMENT/PLAN:    1. Anxiety  - escitalopram (LEXAPRO) 10 MG tablet; Take 1 tablet by mouth daily  Dispense: 90 tablet; Refill: 1  - T4, Free; Future  - TSH without Reflex; Future    2. Morbid obesity ([address]  - The patient is asked to make an attempt to improve diet and exercise patterns to aid in medical management of this problem. - Continue to follow with Dr. Fawn Saldivar, suggested Weight Watchers    3. Stress incontinence (stable)  - kegal exercises recommended, reduce Pepsi intake    4. Vitamin D deficiency  - Vitamin D 25 Hydroxy; Future    5. Palpitations  - CBC Auto Differential; Future  - Comprehensive Metabolic Panel; Future  - Hemoglobin A1C; Future  - T4, Free; Future  - TSH without Reflex; Future  - Reduce caffeine intake  - The patient is asked to make an attempt to improve diet and exercise patterns to aid in medical management of this problem. 6. Fatigue, unspecified type  - CBC Auto Differential; Future  - Comprehensive Metabolic Panel; Future  - Hemoglobin A1C; Future  - T4, Free; Future  - TSH without Reflex; Future  - Vitamin B12 & Folate; Future  - Vitamin D 25 Hydroxy; Future    7. Hypertriglyceridemia  - Hemoglobin A1C; Future  - Lipid Panel; Future  - The patient is asked to make an attempt to improve diet and exercise patterns to aid in medical management of this problem. - F/u for lab work in 1 week & appointment for 1 1/2 months    Greater than 15  Minutes was spent with patient and more than 50% of the time was spent face to facecounseling and educating regarding diagnoses     Time spent: Greater than 15    This visit was completed virtually using Doxy. me

## 2020-09-02 NOTE — TELEPHONE ENCOUNTER
Discussed Dr. Delbert Sanchez review of imaging and recommendations with the patient. Dr. Cayetano Don would like to proceed with the initial recommendation and schedule an ultrasound guided biopsy based on the MBI and US findings. Patient notified. Order placed and will be scheduled.

## 2020-09-04 ENCOUNTER — VIRTUAL VISIT (OUTPATIENT)
Dept: FAMILY MEDICINE CLINIC | Age: 45
End: 2020-09-04
Payer: MEDICAID

## 2020-09-04 PROCEDURE — G8427 DOCREV CUR MEDS BY ELIG CLIN: HCPCS | Performed by: NURSE PRACTITIONER

## 2020-09-04 PROCEDURE — 99213 OFFICE O/P EST LOW 20 MIN: CPT | Performed by: NURSE PRACTITIONER

## 2020-09-04 RX ORDER — ESCITALOPRAM OXALATE 10 MG/1
10 TABLET ORAL DAILY
Qty: 90 TABLET | Refills: 1 | Status: SHIPPED
Start: 2020-09-04 | End: 2021-03-27

## 2020-09-17 ENCOUNTER — HOSPITAL ENCOUNTER (OUTPATIENT)
Dept: GENERAL RADIOLOGY | Age: 45
Discharge: HOME | End: 2020-09-19
Payer: MEDICAID

## 2020-09-17 PROCEDURE — A4648 IMPLANTABLE TISSUE MARKER: HCPCS

## 2020-09-17 PROCEDURE — 77065 DX MAMMO INCL CAD UNI: CPT

## 2020-09-17 PROCEDURE — 2500000003 HC RX 250 WO HCPCS

## 2020-09-17 PROCEDURE — 88305 TISSUE EXAM BY PATHOLOGIST: CPT

## 2020-09-23 ENCOUNTER — TELEPHONE (OUTPATIENT)
Dept: BREAST CENTER | Age: 45
End: 2020-09-23

## 2020-09-23 NOTE — TELEPHONE ENCOUNTER
Notified Michelle Perez of her left breast, 2 o'clock position core needle biopsy results (benign). Diagnosis:   Left breast, 2:00 core needle biopsy: Benign breast parenchyma with focal   usual ductal hyperplasia and fibrosis     Comment:     There is no evidence of malignancy in the tissue submitted   for review.  Correlation with clinical and imaging findings is   recommended.  Intradepartmental consultation is obtained. She reports mild erythema of the incision site, offered appointment but declined. She is using topical antibacterial ointment and assures us she will call if it does not improve. Otherwise, she will be due for repeat bilateral mammogram in February 2021 with a follow-up appointment same day. Verbalizes understanding and agrees. Praneeth Chowdary, RN, MSN, APRN-CNP, [address]  Advanced Oncology Certified Nurse Practitioner  Department of Breast Surgery  [facility]/  [facility] in collaboration with Dr. Ezra Zheng.  Ghassan/Dr. Micah Dowell APRN-CNP

## 2020-09-23 NOTE — TELEPHONE ENCOUNTER
spoke with patient regarding scheduling an appointment follow up in Feb with Mammogram prior.   Feb. 17, 2021 at 1100

## 2020-09-28 ENCOUNTER — TELEPHONE (OUTPATIENT)
Dept: FAMILY MEDICINE CLINIC | Age: 45
End: 2020-09-28

## 2020-09-28 NOTE — TELEPHONE ENCOUNTER
Patient called requesting a orthopaedic referral for her right knee.  can not see her anymore, his office does not accept her insurance.

## 2020-10-05 ENCOUNTER — TELEPHONE (OUTPATIENT)
Dept: FAMILY MEDICINE CLINIC | Age: 45
End: 2020-10-05

## 2020-11-20 ENCOUNTER — VIRTUAL VISIT (OUTPATIENT)
Dept: FAMILY MEDICINE CLINIC | Age: 45
End: 2020-11-20
Payer: MEDICAID

## 2020-11-20 PROCEDURE — 99213 OFFICE O/P EST LOW 20 MIN: CPT | Performed by: NURSE PRACTITIONER

## 2020-11-20 PROCEDURE — G8427 DOCREV CUR MEDS BY ELIG CLIN: HCPCS | Performed by: NURSE PRACTITIONER

## 2020-11-20 RX ORDER — CHOLECALCIFEROL (VITAMIN D3) 125 MCG
1 CAPSULE ORAL DAILY
Qty: 90 TABLET | Refills: 1 | Status: SHIPPED
Start: 2020-11-20 | End: 2021-05-18 | Stop reason: SDUPTHER

## 2020-11-20 RX ORDER — AZITHROMYCIN 250 MG/1
250 TABLET, FILM COATED ORAL SEE ADMIN INSTRUCTIONS
Qty: 6 TABLET | Refills: 0 | Status: SHIPPED | OUTPATIENT
Start: 2020-11-20 | End: 2020-11-25

## 2020-11-20 ASSESSMENT — ENCOUNTER SYMPTOMS
COLOR CHANGE: 0
CONSTIPATION: 0
SHORTNESS OF BREATH: 0
EYE PAIN: 0
CHEST TIGHTNESS: 0
DIARRHEA: 0
VOMITING: 0
WHEEZING: 0
NAUSEA: 0
COUGH: 0
SINUS PAIN: 1

## 2020-11-20 NOTE — PROGRESS NOTES
TeleMedicine Patient Consent    This visit was performed as a virtual video visit using a synchronous, two-way, audio-video telehealth technology platform. Patient identification was verified at the start of the visit, including the patient's telephone number and physical location. I discussed with the patient the nature of our telehealth visits, that:     1. Due to the nature of an audio- video modality, the only components of a physical exam that could be done are the elements supported by direct observation. 2. I would evaluate the patient and recommend diagnostics and treatments based on my assessment. 3. If it was felt that the patient should be evaluated in clinic or an emergency room setting, then they would be directed there. 4. Our sessions are not being recorded and that personal health information is protected. 5. Our team would provide follow up care in person if/when the patient needs it. Patient does agree to proceed with telemedicine consultation. Patient's location: home address in [facility]  Physician  location other other people involved in call n/a    HPI:  Patient comes in today for   Chief Complaint   Patient presents with    1 Month Follow-Up   . Admits that has not obtained blood work due to being safe at home with boys during [employer], still reports fatigue & reports heavy menstrual cycles. Requesting hormone blood work, but will request appointment with her gynecologist for that. Takes Vit D 50,000 units 1x/week, will again start 2000 units as well jaya. Reports constant headache behind ears & right ear pain related to sinus pressure. Has not tried any OTC for relief. Had previously followed with Dr. Kamryn Banda for ortho, has copy of MRI and records. Has not contacted insurance for names of providers in her network. Reports improvement of symptoms today. Prior to Visit Medications    Medication Sig Taking?  Authorizing Provider   Cholecalciferol (VITAMIN D3) 50 MCG (2000 UT) TABS well-developed and normal weight. She is not ill-appearing, toxic-appearing or diaphoretic. HENT:      Head: Normocephalic and atraumatic. Right Ear: External ear normal.      Left Ear: External ear normal.      Nose: Nose normal.      Mouth/Throat:      Mouth: Mucous membranes are moist.      Pharynx: Oropharynx is clear. Eyes:      Extraocular Movements: Extraocular movements intact. Conjunctiva/sclera: Conjunctivae normal.      Pupils: Pupils are equal, round, and reactive to light. Neck:      Musculoskeletal: Normal range of motion and neck supple. Thyroid: No thyromegaly. Vascular: No JVD. Cardiovascular:      Rate and Rhythm: Normal rate and regular rhythm. Pulses: Normal pulses. Heart sounds: Normal heart sounds. No murmur. Pulmonary:      Effort: Pulmonary effort is normal. No respiratory distress. Breath sounds: Normal breath sounds. No wheezing. Comments: No cough noted during examination. Chest:      Chest wall: No tenderness. Abdominal:      General: Bowel sounds are normal. There is no distension. Palpations: Abdomen is soft. Tenderness: There is no abdominal tenderness. Genitourinary:     Comments: Deferred. Musculoskeletal: Normal range of motion. General: No swelling, tenderness or deformity. Right lower leg: No edema. Left lower leg: No edema. Lymphadenopathy:      Cervical: No cervical adenopathy. Skin:     General: Skin is warm and dry. Capillary Refill: Capillary refill takes less than 2 seconds. Findings: No bruising, erythema or rash. Neurological:      General: No focal deficit present. Mental Status: She is alert and oriented to person, place, and time. Mental status is at baseline. Cranial Nerves: No cranial nerve deficit. Sensory: No sensory deficit. Motor: No weakness or abnormal muscle tone.       Coordination: Coordination normal.      Gait: Gait normal.      Deep Tendon Reflexes: Reflexes normal.   Psychiatric:         Mood and Affect: Mood normal.         Behavior: Behavior normal.         Thought Content: Thought content normal.         Judgment: Judgment normal.     Health Maintenance    BP Readings from Last 1 Encounters:   02/18/20 (!) 140/80    Recheck if >140/90  Hemoglobin A1C (%)   Date Value   08/08/2019 5.6     No results found for: LABMICR    BP Readings from Last 1 Encounters:   02/18/20 (!) 140/80    Recheck if >140/90  Hemoglobin A1C (%)   Date Value   08/08/2019 5.6     No results found for: LABMICR    Plan:    Audley Hamman was seen today for 1 month follow-up. Diagnoses and all orders for this visit:    Vitamin D deficiency  -     Cholecalciferol (VITAMIN D3) 50 MCG (2000 UT) TABS; Take 1 Units by mouth daily  - Spoke of benefits will obtain lab work when able to come into the office    Acute non-recurrent pansinusitis/Otalgia, unspecified laterality (acute)  -     azithromycin (ZITHROMAX) 250 MG tablet; Take 1 tablet by mouth See Admin Instructions for 5 days 500mg on day 1 followed by 250mg on days 2 - 5    Anxiety (stable)  - Managing well with Lexapro 10 mg daily    Chronic pain of right knee (improved)    - Right knee pain, has improved, low-grade sprain & meniscus tear. - Notify us of ortho specialist so we can make referral    Greater than 15  Minutes was spent with patient and more than 50% of the time was spent face to facecounseling and educating regarding diagnoses     This video visit was provided as a focused evaluation during the [facility] -19 pandemic/national emergency. A comprehensive review of all previous patient history and testing was not conducted. Pertinent findings were elicited during the visit. Time spent: Greater than 15    This visit was completed virtually using Doxy. me

## 2021-02-16 DIAGNOSIS — N64.59 ABNORMAL BREAST FINDING: Primary | ICD-10-CM

## 2021-02-16 DIAGNOSIS — R92.8 ABNORMAL FINDING ON BREAST IMAGING: Primary | ICD-10-CM

## 2021-03-27 DIAGNOSIS — F41.9 ANXIETY: ICD-10-CM

## 2021-03-27 RX ORDER — ESCITALOPRAM OXALATE 10 MG/1
10 TABLET ORAL DAILY
Qty: 90 TABLET | Refills: 1 | Status: SHIPPED
Start: 2021-03-27 | End: 2021-09-10 | Stop reason: SDUPTHER

## 2021-04-14 ENCOUNTER — TELEPHONE (OUTPATIENT)
Dept: BREAST CENTER | Age: 46
End: 2021-04-14

## 2021-04-14 NOTE — TELEPHONE ENCOUNTER
Called patient and left detailed message with call back number to reschedule her imaging and clinical appointment.   Will await call back

## 2021-05-06 ENCOUNTER — TELEPHONE (OUTPATIENT)
Dept: FAMILY MEDICINE CLINIC | Age: 46
End: 2021-05-06

## 2021-05-13 ENCOUNTER — NURSE ONLY (OUTPATIENT)
Dept: FAMILY MEDICINE CLINIC | Age: 46
End: 2021-05-13
Payer: MEDICAID

## 2021-05-13 DIAGNOSIS — R53.83 FATIGUE, UNSPECIFIED TYPE: ICD-10-CM

## 2021-05-13 DIAGNOSIS — E78.1 HYPERTRIGLYCERIDEMIA: ICD-10-CM

## 2021-05-13 DIAGNOSIS — E55.9 VITAMIN D DEFICIENCY: ICD-10-CM

## 2021-05-13 DIAGNOSIS — F41.9 ANXIETY: ICD-10-CM

## 2021-05-13 DIAGNOSIS — R00.2 PALPITATIONS: ICD-10-CM

## 2021-05-13 LAB
ALBUMIN SERPL-MCNC: 3.9 G/DL (ref 3.5–5.2)
ALP BLD-CCNC: 83 U/L (ref 35–104)
ALT SERPL-CCNC: 19 U/L (ref 0–32)
ANION GAP SERPL CALCULATED.3IONS-SCNC: 13 MMOL/L (ref 7–16)
AST SERPL-CCNC: 20 U/L (ref 0–31)
BASOPHILS ABSOLUTE: 0.05 E9/L (ref 0–0.2)
BASOPHILS RELATIVE PERCENT: 0.4 % (ref 0–2)
BILIRUB SERPL-MCNC: 0.6 MG/DL (ref 0–1.2)
BUN BLDV-MCNC: 11 MG/DL (ref 6–20)
CALCIUM SERPL-MCNC: 8.9 MG/DL (ref 8.6–10.2)
CHLORIDE BLD-SCNC: 100 MMOL/L (ref 98–107)
CHOLESTEROL, TOTAL: 172 MG/DL (ref 0–199)
CO2: 26 MMOL/L (ref 22–29)
CREAT SERPL-MCNC: 0.7 MG/DL (ref 0.5–1)
EOSINOPHILS ABSOLUTE: 0.18 E9/L (ref 0.05–0.5)
EOSINOPHILS RELATIVE PERCENT: 1.5 % (ref 0–6)
FOLATE: 8.2 NG/ML (ref 4.8–24.2)
GFR AFRICAN AMERICAN: >60
GFR NON-AFRICAN AMERICAN: >60 ML/MIN/1.73
GLUCOSE BLD-MCNC: 95 MG/DL (ref 74–99)
HBA1C MFR BLD: 5.8 % (ref 4–5.6)
HCT VFR BLD CALC: 45.7 % (ref 34–48)
HDLC SERPL-MCNC: 43 MG/DL
HEMOGLOBIN: 14.1 G/DL (ref 11.5–15.5)
IMMATURE GRANULOCYTES #: 0.04 E9/L
IMMATURE GRANULOCYTES %: 0.3 % (ref 0–5)
LDL CHOLESTEROL CALCULATED: 80 MG/DL (ref 0–99)
LYMPHOCYTES ABSOLUTE: 2.7 E9/L (ref 1.5–4)
LYMPHOCYTES RELATIVE PERCENT: 22.7 % (ref 20–42)
MCH RBC QN AUTO: 28 PG (ref 26–35)
MCHC RBC AUTO-ENTMCNC: 30.9 % (ref 32–34.5)
MCV RBC AUTO: 90.7 FL (ref 80–99.9)
MONOCYTES ABSOLUTE: 0.56 E9/L (ref 0.1–0.95)
MONOCYTES RELATIVE PERCENT: 4.7 % (ref 2–12)
NEUTROPHILS ABSOLUTE: 8.36 E9/L (ref 1.8–7.3)
NEUTROPHILS RELATIVE PERCENT: 70.4 % (ref 43–80)
PDW BLD-RTO: 14.7 FL (ref 11.5–15)
PLATELET # BLD: 335 E9/L (ref 130–450)
PMV BLD AUTO: 10.5 FL (ref 7–12)
POTASSIUM SERPL-SCNC: 4.4 MMOL/L (ref 3.5–5)
RBC # BLD: 5.04 E12/L (ref 3.5–5.5)
SODIUM BLD-SCNC: 139 MMOL/L (ref 132–146)
T4 FREE: 1.31 NG/DL (ref 0.93–1.7)
TOTAL PROTEIN: 7.8 G/DL (ref 6.4–8.3)
TRIGL SERPL-MCNC: 243 MG/DL (ref 0–149)
TSH SERPL DL<=0.05 MIU/L-ACNC: 3.24 UIU/ML (ref 0.27–4.2)
VITAMIN B-12: 434 PG/ML (ref 211–946)
VITAMIN D 25-HYDROXY: 18 NG/ML (ref 30–100)
VLDLC SERPL CALC-MCNC: 49 MG/DL
WBC # BLD: 11.9 E9/L (ref 4.5–11.5)

## 2021-05-13 PROCEDURE — 36415 COLL VENOUS BLD VENIPUNCTURE: CPT | Performed by: NURSE PRACTITIONER

## 2021-05-18 ENCOUNTER — OFFICE VISIT (OUTPATIENT)
Dept: FAMILY MEDICINE CLINIC | Age: 46
End: 2021-05-18
Payer: MEDICAID

## 2021-05-18 VITALS
DIASTOLIC BLOOD PRESSURE: 82 MMHG | HEART RATE: 87 BPM | RESPIRATION RATE: 16 BRPM | WEIGHT: 293 LBS | HEIGHT: 63 IN | SYSTOLIC BLOOD PRESSURE: 136 MMHG | BODY MASS INDEX: 51.91 KG/M2 | TEMPERATURE: 98.3 F | OXYGEN SATURATION: 97 %

## 2021-05-18 DIAGNOSIS — G89.29 CHRONIC PAIN OF RIGHT KNEE: ICD-10-CM

## 2021-05-18 DIAGNOSIS — E55.9 VITAMIN D DEFICIENCY: ICD-10-CM

## 2021-05-18 DIAGNOSIS — F41.9 ANXIETY: Primary | ICD-10-CM

## 2021-05-18 DIAGNOSIS — R00.2 PALPITATIONS: ICD-10-CM

## 2021-05-18 DIAGNOSIS — E78.1 HYPERTRIGLYCERIDEMIA: ICD-10-CM

## 2021-05-18 DIAGNOSIS — M25.561 CHRONIC PAIN OF RIGHT KNEE: ICD-10-CM

## 2021-05-18 PROCEDURE — G8417 CALC BMI ABV UP PARAM F/U: HCPCS | Performed by: NURSE PRACTITIONER

## 2021-05-18 PROCEDURE — 1036F TOBACCO NON-USER: CPT | Performed by: NURSE PRACTITIONER

## 2021-05-18 PROCEDURE — 99214 OFFICE O/P EST MOD 30 MIN: CPT | Performed by: NURSE PRACTITIONER

## 2021-05-18 PROCEDURE — G8427 DOCREV CUR MEDS BY ELIG CLIN: HCPCS | Performed by: NURSE PRACTITIONER

## 2021-05-18 RX ORDER — ERGOCALCIFEROL 1.25 MG/1
50000 CAPSULE ORAL WEEKLY
Qty: 12 CAPSULE | Refills: 1 | Status: SHIPPED
Start: 2021-05-18 | End: 2022-05-02

## 2021-05-18 RX ORDER — CHOLECALCIFEROL (VITAMIN D3) 125 MCG
1 CAPSULE ORAL DAILY
Qty: 90 TABLET | Refills: 1 | Status: SHIPPED
Start: 2021-05-18 | End: 2021-07-09 | Stop reason: ALTCHOICE

## 2021-05-18 ASSESSMENT — ENCOUNTER SYMPTOMS
COUGH: 0
EYE PAIN: 0
VOMITING: 0
SINUS PAIN: 0
SHORTNESS OF BREATH: 1
CHEST TIGHTNESS: 0
COLOR CHANGE: 0
DIARRHEA: 0
NAUSEA: 0
WHEEZING: 0
CONSTIPATION: 0

## 2021-05-18 ASSESSMENT — PATIENT HEALTH QUESTIONNAIRE - PHQ9
SUM OF ALL RESPONSES TO PHQ QUESTIONS 1-9: 0
SUM OF ALL RESPONSES TO PHQ9 QUESTIONS 1 & 2: 0
SUM OF ALL RESPONSES TO PHQ QUESTIONS 1-9: 0
2. FEELING DOWN, DEPRESSED OR HOPELESS: 0
1. LITTLE INTEREST OR PLEASURE IN DOING THINGS: 0

## 2021-05-18 NOTE — PROGRESS NOTES
2021    Era Lou (:  1975) is a 40 y.o. female, here for a     Chief Complaint   Patient presents with   Carson Edgar     She has a new gynecologist, Dr. James Dover, with [facility], follows next week. She has irregular cycles. Anxiety is managed well with 10 mg Lexapro. She has been tucked at home taking care of her 2 boys, home-schooling & staying well. She is exhausted. ASSESSMENT/PLAN:    1. Anxiety  - Managed on Lexapro 10 mg  - The current medical regimen is effective;  continue present plan and medications. 2. Hypertriglyceridemia  - The patient is asked to make an attempt to improve diet and exercise patterns to aid in medical management of this problem. 3. Palpitations  - Improving, intermittently during panic attacks  - If continue, consider Holter monitoring    4. Chronic pain of right knee  - Stable, no improvement with additional weight    5. BMI 60.0-69.9, adult (HCC)  -     metFORMIN (GLUCOPHAGE) 500 MG tablet; Take 1 tablet by mouth daily (with breakfast), Disp-30 tablet, R-0 Normal    6. Vitamin D deficiency  -     vitamin D (ERGOCALCIFEROL) 1.25 MG (93679 UT) CAPS capsule; Take 1 capsule by mouth once a week, Disp-12 capsule, R-1Normal  -     Cholecalciferol (VITAMIN D3) 50 MCG (2000 UT) TABS; Take 1 Units by mouth daily, Disp-90 tablet, R-1Normal    HCC:   No acute findings today meriting change in management plan.     Health Maintenance   Topic Date Due    Hepatitis C screen  Never done    COVID-19 Vaccine (1) Never done    DTaP/Tdap/Td vaccine (1 - Tdap) Never done    Cervical cancer screen  2018    A1C test (Diabetic or Prediabetic)  2022    Lipid screen  2026    Flu vaccine  Completed    HIV screen  Completed    Hepatitis A vaccine  Aged Out    Hepatitis B vaccine  Aged Out    Hib vaccine  Aged Out    Meningococcal (ACWY) vaccine  Aged Out    Pneumococcal 0-64 years Vaccine  Aged Out     - Health Maintenance reviewed today She is alert and oriented to person, place, and time. Mental status is at baseline. Cranial Nerves: No cranial nerve deficit. Sensory: No sensory deficit. Motor: No weakness. Coordination: Coordination normal.      Gait: Gait normal.   Psychiatric:         Mood and Affect: Mood normal.         Behavior: Behavior normal.         Thought Content: Thought content normal.         Judgment: Judgment normal.         Prior to Visit Medications    Medication Sig Taking? Authorizing Provider   metFORMIN (GLUCOPHAGE) 500 MG tablet Take 1 tablet by mouth daily (with breakfast) Yes Valda Boas, APRN - CNP   vitamin D (ERGOCALCIFEROL) 1.25 MG (42568 UT) CAPS capsule Take 1 capsule by mouth once a week Yes Valda Boas, APRN - CNP   Cholecalciferol (VITAMIN D3) 50 MCG (2000 UT) TABS Take 1 Units by mouth daily Yes Valda Boas, APRN - CNP   escitalopram (LEXAPRO) 10 MG tablet TAKE 1 TABLET BY MOUTH DAILY Yes Valda Boas, APRN - CNP      Allergies   Allergen Reactions    Vancomycin Nausea And Vomiting     Red man syndrome, had high fever and upset stomach     ADVANCE DIRECTIVE: N, <no information>    Immunization History   Administered Date(s) Administered    Influenza Virus Vaccine 09/19/2017, 10/25/2018    Influenza, MDCK, Preservative free 02/21/2020    Influenza, Quadv, IM, (6 mo and older Fluzone, Flulaval, Fluarix and 3 yrs and older Afluria) 01/12/2017    MMR 05/09/2014       No follow-ups on file. An electronic signature was used to authenticate this note.     --Valda Boas, APRN - CNP on 5/18/2021 at 11:27 AM

## 2021-06-23 NOTE — TELEPHONE ENCOUNTER
Last Appointment:  5/18/2021  Future Appointments   Date Time Provider Tiana Batista   7/1/2021 12:00 PM [facility] RM 1 SEYZ SHENA [facility]   [facility]   7/1/2021  1:30 PM JIHAN Murphy CNP [facility]   8/23/2021 10:15 AM JIHAN Ren  [address]

## 2021-07-01 ENCOUNTER — HOSPITAL ENCOUNTER (OUTPATIENT)
Dept: GENERAL RADIOLOGY | Age: 46
Discharge: HOME | End: 2021-07-03
Payer: MEDICAID

## 2021-07-01 DIAGNOSIS — R92.8 ABNORMAL MAMMOGRAM: ICD-10-CM

## 2021-07-01 DIAGNOSIS — N64.59 ABNORMAL BREAST FINDING: ICD-10-CM

## 2021-07-01 DIAGNOSIS — R92.8 ABNORMAL FINDING ON BREAST IMAGING: ICD-10-CM

## 2021-07-01 PROCEDURE — 77065 DX MAMMO INCL CAD UNI: CPT

## 2021-07-01 PROCEDURE — 88360 TUMOR IMMUNOHISTOCHEM/MANUAL: CPT

## 2021-07-01 PROCEDURE — 88305 TISSUE EXAM BY PATHOLOGIST: CPT

## 2021-07-01 PROCEDURE — 76642 ULTRASOUND BREAST LIMITED: CPT

## 2021-07-01 PROCEDURE — G0279 TOMOSYNTHESIS, MAMMO: HCPCS

## 2021-07-01 PROCEDURE — 2500000003 HC RX 250 WO HCPCS

## 2021-07-01 PROCEDURE — A4648 IMPLANTABLE TISSUE MARKER: HCPCS

## 2021-07-01 PROCEDURE — 88341 IMHCHEM/IMCYTCHM EA ADD ANTB: CPT

## 2021-07-01 PROCEDURE — 88342 IMHCHEM/IMCYTCHM 1ST ANTB: CPT

## 2021-07-09 ENCOUNTER — OFFICE VISIT (OUTPATIENT)
Dept: BREAST CENTER | Age: 46
End: 2021-07-09
Payer: MEDICAID

## 2021-07-09 ENCOUNTER — HOSPITAL ENCOUNTER (OUTPATIENT)
Dept: GENERAL RADIOLOGY | Age: 46
Discharge: HOME | End: 2021-07-11
Payer: MEDICAID

## 2021-07-09 ENCOUNTER — TELEPHONE (OUTPATIENT)
Dept: GENERAL RADIOLOGY | Age: 46
End: 2021-07-09

## 2021-07-09 VITALS
HEART RATE: 89 BPM | WEIGHT: 293 LBS | SYSTOLIC BLOOD PRESSURE: 154 MMHG | OXYGEN SATURATION: 95 % | HEIGHT: 63 IN | TEMPERATURE: 97.1 F | DIASTOLIC BLOOD PRESSURE: 93 MMHG | RESPIRATION RATE: 15 BRPM | BODY MASS INDEX: 51.91 KG/M2

## 2021-07-09 DIAGNOSIS — R92.8 ABNORMAL FINDING ON BREAST IMAGING: Primary | ICD-10-CM

## 2021-07-09 DIAGNOSIS — R92.8 ABNORMAL FINDING ON BREAST IMAGING: ICD-10-CM

## 2021-07-09 DIAGNOSIS — C50.912 MALIGNANT NEOPLASM OF LEFT FEMALE BREAST, UNSPECIFIED ESTROGEN RECEPTOR STATUS, UNSPECIFIED SITE OF BREAST (HCC): Primary | ICD-10-CM

## 2021-07-09 LAB
ALBUMIN SERPL-MCNC: 4 G/DL (ref 3.5–5.2)
ALP BLD-CCNC: 86 U/L (ref 35–104)
ALT SERPL-CCNC: 20 U/L (ref 0–32)
ANION GAP SERPL CALCULATED.3IONS-SCNC: 14 MMOL/L (ref 7–16)
AST SERPL-CCNC: 16 U/L (ref 0–31)
BASOPHILS ABSOLUTE: 0.03 E9/L (ref 0–0.2)
BASOPHILS RELATIVE PERCENT: 0.2 % (ref 0–2)
BILIRUB SERPL-MCNC: 0.9 MG/DL (ref 0–1.2)
BUN BLDV-MCNC: 11 MG/DL (ref 6–20)
CALCIUM SERPL-MCNC: 8.9 MG/DL (ref 8.6–10.2)
CHLORIDE BLD-SCNC: 99 MMOL/L (ref 98–107)
CO2: 23 MMOL/L (ref 22–29)
CREAT SERPL-MCNC: 0.7 MG/DL (ref 0.5–1)
EOSINOPHILS ABSOLUTE: 0.09 E9/L (ref 0.05–0.5)
EOSINOPHILS RELATIVE PERCENT: 0.6 % (ref 0–6)
GFR AFRICAN AMERICAN: >60
GFR NON-AFRICAN AMERICAN: >60 ML/MIN/1.73
GLUCOSE BLD-MCNC: 96 MG/DL (ref 74–99)
HCT VFR BLD CALC: 45.9 % (ref 34–48)
HEMOGLOBIN: 14.4 G/DL (ref 11.5–15.5)
IMMATURE GRANULOCYTES #: 0.06 E9/L
IMMATURE GRANULOCYTES %: 0.4 % (ref 0–5)
LYMPHOCYTES ABSOLUTE: 2.88 E9/L (ref 1.5–4)
LYMPHOCYTES RELATIVE PERCENT: 19.9 % (ref 20–42)
MCH RBC QN AUTO: 27.5 PG (ref 26–35)
MCHC RBC AUTO-ENTMCNC: 31.4 % (ref 32–34.5)
MCV RBC AUTO: 87.6 FL (ref 80–99.9)
MONOCYTES ABSOLUTE: 0.78 E9/L (ref 0.1–0.95)
MONOCYTES RELATIVE PERCENT: 5.4 % (ref 2–12)
NEUTROPHILS ABSOLUTE: 10.61 E9/L (ref 1.8–7.3)
NEUTROPHILS RELATIVE PERCENT: 73.5 % (ref 43–80)
PDW BLD-RTO: 14.6 FL (ref 11.5–15)
PLATELET # BLD: 383 E9/L (ref 130–450)
PMV BLD AUTO: 10 FL (ref 7–12)
POTASSIUM SERPL-SCNC: 3.7 MMOL/L (ref 3.5–5)
RBC # BLD: 5.24 E12/L (ref 3.5–5.5)
SODIUM BLD-SCNC: 136 MMOL/L (ref 132–146)
TOTAL PROTEIN: 7.6 G/DL (ref 6.4–8.3)
WBC # BLD: 14.5 E9/L (ref 4.5–11.5)

## 2021-07-09 PROCEDURE — 99203 OFFICE O/P NEW LOW 30 MIN: CPT | Performed by: SURGERY

## 2021-07-09 PROCEDURE — 1036F TOBACCO NON-USER: CPT | Performed by: SURGERY

## 2021-07-09 PROCEDURE — G8417 CALC BMI ABV UP PARAM F/U: HCPCS | Performed by: SURGERY

## 2021-07-09 PROCEDURE — 71046 X-RAY EXAM CHEST 2 VIEWS: CPT

## 2021-07-09 PROCEDURE — 99215 OFFICE O/P EST HI 40 MIN: CPT | Performed by: SURGERY

## 2021-07-09 PROCEDURE — G8427 DOCREV CUR MEDS BY ELIG CLIN: HCPCS | Performed by: SURGERY

## 2021-07-09 NOTE — LETTER
[facility]  [address] [address]  Phone: [phone]  Fax: [phone]    Elsi Umana MD    July 12, 2021     Sai Harrington, APRN - CNP  [address] [facility]    Patient: Umesh Gant   MR Number: [id number]   YOB: 1975   Date of Visit: 7/9/2021       Dear Sai Harrington: Thank you for referring Skyler Rogers to me for evaluation/treatment. Below are the relevant portions of my assessment and plan of care. Chadwick Kaye has been diagnosed with left breast cancer. Her case will be discussed at multidisciplinary tumor board. I have sent her to plastic surgery to discuss her options. There is concern given her obesity and size of her breast with surgery and with radiation. Final decision will be made after consultation with specialist.     If you have questions, please do not hesitate to call me. I look forward to following Chadwick Kaye along with you.     Sincerely,        Elsi Umana MD

## 2021-07-09 NOTE — PROGRESS NOTES
[facility]  HISTORY & PHYSICAL  ATTENDING NOTE    Patient's Name/Date of Birth: Elmer Gabriel / 1975    Date: 2021     Chief Complaint   Patient presents with   Ardyth Moritz Consultation     NEW BREAST CANCER: Biopsy [facility] 21--- Left breast Invasive, moderately differentiated mammary carcinoma (grade 2) -- ER & AR (+) HER2 (-)-CXR & labs        Elmer Gabriel presents for evaluation of a mammographic abnormality. PCP: JIHAN Sidhu - CNP. Gynecologist: Dr. Stone Thomson). Referred by:  N/a    The mammogram was performed at [facility] on 2021. The patient has not noted a change in BSE since presentation. Patient denies nipple discharge. Patient denies a personal history of breast cancer. Breast cancer risk factors include family hx on mother's side, mom with breast CA, family hx of colon CA and age and gender. Ashkenazi Shinto Ancestry: No.    OBSTETRIC RELATED HISTORY:  Age of menarche was 16. Age of menopause N/A  Patient admits to hormonal therapy. OCPsx 7-10y  Patient is . Age of first live birth was 36. Patient did breast feed. Is patient interested in fertility information about fertility preservation? No    CANCER SURVEILLANCE HISTORY:  Mammograms: Yes   Breast MRI's: Yes --MBI done  Breast Biopsies: Yes   Colonoscopy: No   GI Polyps: Not Applicable   EGD: Yes   Pelvic Exam: Yes   Pap Smear: Yes   Dermatology: No   Lung screening: no  H/O DVT:  no  H/O Radiation:  no        Estimated body mass index is 63.77 kg/m² as calculated from the following:    Height as of this encounter: 5' 3\" (1.6 m). Weight as of this encounter: 360 lb (163.3 kg). Bra Size: 48DDD    Because violence is so common, we ask all our patients: are you in a relationship or do you live with a person who threatens, hurts, or controls you:  no    Patient drinks moderate caffeinated beverages. Patient does not smoke cigarettes.  Patient does not use recreational drugs. Past Medical History:   Diagnosis Date    Abscess of right breast 6/19/2018    Anxiety     Breast abscess 7/11/2018    Fatigue     GERD (gastroesophageal reflux disease)     Hyperlipidemia     Obesity 6/4/2013       Past Surgical History:   Procedure Laterality Date    ANKLE FRACTURE SURGERY  4/20/2012    right with hardware, subsequently removed   Gunzing 9 BREAST BIOPSY Left 2020    BREAST BIOPSY Right 2018    CHOLECYSTECTOMY      2003    CYSTOSCOPY  7/25/2014    retrograde pyelogram, ureteroscopy, laser lithotripsy, left stent placement    ENDOSCOPY, COLON, DIAGNOSTIC      1998    OR DRAINAGE OF HEMATOMA/FLUID Right 6/20/2018    RIGHT BREAST INCISION AND DRAINAGE POSSIBLE WOUND VAC performed by Nicole Romo MD at [address] [place] [address] Right 7/11/2018    INCISION AND DRAINAGE RIGHT BREAST, WITH APPLICATION OF WOUND VAC (PATIENT HSS WOUND VAC WILL BRING IT)  performed by [facility] at [facility]    Left leg    US BREAST NEEDLE BIOPSY LEFT  9/17/2020    [facility]    US BREAST NEEDLE BIOPSY LEFT Left 7/1/2021    US BREAST NEEDLE BIOPSY LEFT 7/1/2021 [facility]       Current Outpatient Medications   Medication Sig Dispense Refill    vitamin D (ERGOCALCIFEROL) 1.25 MG (78324 UT) CAPS capsule Take 1 capsule by mouth once a week 12 capsule 1    escitalopram (LEXAPRO) 10 MG tablet TAKE 1 TABLET BY MOUTH DAILY 90 tablet 1     No current facility-administered medications for this visit.        Family History   Problem Relation Age of Onset    Diabetes Maternal Grandmother     High Blood Pressure Maternal Grandmother     High Cholesterol Maternal Grandmother     Breast Cancer Maternal Grandmother 79    High Cholesterol Mother     Breast Cancer Mother 54    Colon Cancer Mother     Stomach Cancer Mother     Cancer Mother         stomach, colon, intestine    Cancer Maternal Grandfather 60        throat     Ashkenazi Sikh Ancestry: No    Allergies   Allergen Reactions    Vancomycin Nausea And Vomiting     Red man syndrome, had high fever and upset stomach       Social History     Socioeconomic History    Marital status: Single     Spouse name: Not on file    Number of children: Not on file    Years of education: Not on file    Highest education level: Not on file   Occupational History    Not on file   Tobacco Use    Smoking status: Former Smoker     Years: 4.00     Quit date: 2008     Years since quittin.8    Smokeless tobacco: Never Used   Vaping Use    Vaping Use: Never used   Substance and Sexual Activity    Alcohol use: Yes     Alcohol/week: 0.0 standard drinks     Comment: rarely    Drug use: No    Sexual activity: Not on file   Other Topics Concern    Not on file   Social History Narrative    Not on file     Social Determinants of Health     Financial Resource Strain:     Difficulty of Paying Living Expenses:    Food Insecurity:     Worried About Running Out of Food in the Last Year:     [address] in the Last Year:    Transportation Needs:     Lack of Transportation (Medical):  Lack of Transportation (Non-Medical):    Physical Activity:     Days of Exercise per Week:     Minutes of Exercise per Session:    Stress:     Feeling of Stress :    Social Connections:     Frequency of Communication with Friends and Family:     Frequency of Social Gatherings with Friends and Family:     Attends Anglican Services:     Active Member of Clubs or Organizations:     Attends Club or Organization Meetings:     Marital Status:    Intimate Partner Violence:     Fear of Current or Ex-Partner:     Emotionally Abused:     Physically Abused:     Sexually Abused:        Occupation:     Review of Systems    ECOG PS:  1  Covid vaccination? Yes Moderna  Flu Vaccination?  Yes    BP (!) 154/93 (Site: Right Upper Arm, Position: Sitting, Cuff Size: Medium Adult)   Pulse 89   Temp 97.1 °F (36.2 °C) (Temporal)   Resp 15   Ht 5' 3\" (1.6 m)   Wt (!) 360 lb (163.3 kg)   SpO2 95%   BMI 63.77 kg/m²   Physical Exam    MAMMOGRAM:  EXAMINATION:   DIAGNOSTIC DIGITAL BILATERAL BREASTS MAMMOGRAM WITH TOMOSYNTHESIS; TARGETED   ULTRASOUND OF THE LEFT BREAST, 7/1/2021 12:28 pm       TECHNIQUE:   Diagnostic mammography of the bilateral breasts was performed with   tomosynthesis.  2D standard and 3D tomosynthesis combination imaging   performed through both breasts.  Computer aided detection was utilized in the   interpretation of this exam.; Target ultrasound of the left breast was   performed.       Views:       COMPARISON:   09/17/2020 08/24/2020, 02/18/2020       HISTORY:   ORDERING SYSTEM PROVIDED HISTORY: Abnormal breast finding   TECHNOLOGIST PROVIDED HISTORY:   Reason for exam:->yearly mammogram - also doing a follow up ultrasound same   day from an abnormal MBI       FINDINGS:   Right breast appears unremarkable.       On the left, mammographic images at the site of the clip demonstrate a   heterogeneous density.  This corresponds to a ill-defined hypoechoic region   at the 2 o'clock position.  It maximally measures 2.0 cm.  It is not   significantly different than the study 08/24/2020.  Prior pathology was   reviewed.           Impression   Based on the ultrasound appearance, elevated Tyrer Cuzick score, and patient   inability to tolerate MRI exam, recommend ultrasound-guided biopsy       BIRADS:   BIRADS - CATEGORY 0       Incomplete: Needs Additional Imaging Evaluation       OVERALL ASSESSMENT - INCOMPLETE:NEED ADDITIONAL IMAGING EVALUATION.       A letter of notification will be sent to the patient regarding the results.       RISK ASSESSMENT:       During this patient's visit, information obtained was used to generate a   lifetime risk assessment using the Tyrer-Cuzick model (also called the [address]   International Breast Cancer Intervention study Breast Cancer Risk Evaluation   Tool).       LIFETIME RISK:       Patient has a Tyrer-Cuzick score of: 30.1%           ULTRASOUND:  EXAMINATION:   DIAGNOSTIC DIGITAL BILATERAL BREASTS MAMMOGRAM WITH TOMOSYNTHESIS; TARGETED   ULTRASOUND OF THE LEFT BREAST, 7/1/2021 12:28 pm       TECHNIQUE:   Diagnostic mammography of the bilateral breasts was performed with   tomosynthesis.  2D standard and 3D tomosynthesis combination imaging   performed through both breasts.  Computer aided detection was utilized in the   interpretation of this exam.; Target ultrasound of the left breast was   performed.       Views:       COMPARISON:   09/17/2020 08/24/2020, 02/18/2020       HISTORY:   ORDERING SYSTEM PROVIDED HISTORY: Abnormal breast finding   TECHNOLOGIST PROVIDED HISTORY:   Reason for exam:->yearly mammogram - also doing a follow up ultrasound same   day from an abnormal MBI       FINDINGS:   Right breast appears unremarkable.       On the left, mammographic images at the site of the clip demonstrate a   heterogeneous density.  This corresponds to a ill-defined hypoechoic region   at the 2 o'clock position.  It maximally measures 2.0 cm.  It is not   significantly different than the study 08/24/2020. Ronaldo Sainz pathology was   reviewed.           Impression   Based on the ultrasound appearance, elevated Tyrer Cuzick score, and patient   inability to tolerate MRI exam, recommend ultrasound-guided biopsy       BIRADS:   BIRADS - CATEGORY 0       Incomplete: Needs Additional Imaging Evaluation         PATHOLOGY:    Diagnosis:   Left breast, 2:00 core needle biopsy: Invasive, moderately differentiated   mammary carcinoma (grade 2)     Comment:     Microscopic examination shows an invasive carcinoma with   linear growth pattern dispersed throughout fibrous stroma.  The tumor has   a [place] histologic score of 3 (tubule formation) +2 (nuclear   pleomorphism) +1 (mitotic count) = 6.  The tumor cells are immunoreactive   with pankeratin and E-cadherin which favors ductal origin.  The p63   highlights myoepithelial cells in benign ducts.  Intradepartmental   consultation is obtained. Breast Cancer Marker Studies:     Estrogen Receptors (ER):   -Positive (>10%of cells demonstrate nuclear positivity):   Percentage of cells positive: 60%   Intensity: Strong     Progesterone Receptors (FL):   -Positive:   Percentage of cells positive: 60%   Intensity: Strong     Hormone receptor studies are performed by immunohistochemistry on   formalin-fixed, paraffin-embedded tissue (Roche Benchmark Immunostainer,   Nikolai anti-ER clone [id number], anti-FL clone [id number], polymer-based detection   chemistry). ER and FL are evaluated based on the percentage of cells   showing nuclear staining with >1% considered positive for each. Her-2/markie (c-erb B-2) protein expression: Negative/1+     ASSESSMENT/PLAN:  Left breast cancer, mammary carcinoma, ER/FL+ HER2- grade 2  --CBC, CMP, CXR  --referral to plastic surgery for options and possible [place] mastectomy  --US axilla  --I had a long discussion with Orysia Mortimer about her breast size and her obesity and options for surgery. I am concerned about lumpectomy having radiation given her size. I was also explained to her that she is at limited options for reconstruction given her size we will have her see Dr. Robb Everett to go over options. I discussed that she might opt for [place] mastectomy as it will still preserve her breast mound but also eliminate the need for radiation depending on final pathology. This case will also be discussed at multidisciplinary tumor board.     Johnnie Blevins MD, MSc, FACS  7/9/2021  2:44 PM

## 2021-07-09 NOTE — TELEPHONE ENCOUNTER
Call to patient regarding her recent  breast biopsy results. Instructed in detail on her breast biopsy pathology findings including cancer type [facility]- likely ductal origin) and hormone receptor status (ER+, SD+, Her2-). Instructed on next steps including breast surgery consultation with Dr. Ashley Lundberg this afternoon. Instructed to report to the breast center  at 12:00pm  for labwork and xray. Instructed that she will meet with  around 1pm  for surgical consultation. I answered her questions to her apparent satisfaction. She will be provided with packet containing a guide to the breast cancer pathology report, handout on the diagnosis and treatment of invasive ductal carcinoma , exercises after breast surgery booklet,  Francine's Sister Support group information, clinical trials brochure, list of outpatient counseling agencies, and list of local oncology practices at her appointment with Dr. Ashley Lundberg.  Electronically signed by Al Lind RN, BSN on 7/9/2021 at 8:57 AM

## 2021-07-13 ENCOUNTER — TELEPHONE (OUTPATIENT)
Dept: FAMILY MEDICINE CLINIC | Age: 46
End: 2021-07-13

## 2021-07-13 DIAGNOSIS — F41.9 ANXIETY: ICD-10-CM

## 2021-07-13 NOTE — TELEPHONE ENCOUNTER
FYI: Patient had left a message wanting to speak with breanna / no other info given. Called patient back and left message to Return call.

## 2021-07-14 ENCOUNTER — OFFICE VISIT (OUTPATIENT)
Dept: SURGERY | Age: 46
End: 2021-07-14
Payer: MEDICAID

## 2021-07-14 ENCOUNTER — HOSPITAL ENCOUNTER (OUTPATIENT)
Dept: GENERAL RADIOLOGY | Age: 46
Discharge: HOME | End: 2021-07-16
Payer: MEDICAID

## 2021-07-14 VITALS
TEMPERATURE: 97.2 F | SYSTOLIC BLOOD PRESSURE: 122 MMHG | OXYGEN SATURATION: 96 % | HEART RATE: 88 BPM | HEIGHT: 63 IN | DIASTOLIC BLOOD PRESSURE: 86 MMHG | RESPIRATION RATE: 20 BRPM | BODY MASS INDEX: 63.77 KG/M2

## 2021-07-14 DIAGNOSIS — C50.912 MALIGNANT NEOPLASM OF LEFT FEMALE BREAST, UNSPECIFIED ESTROGEN RECEPTOR STATUS, UNSPECIFIED SITE OF BREAST (HCC): ICD-10-CM

## 2021-07-14 DIAGNOSIS — Z85.3 HISTORY OF LEFT BREAST CANCER: Primary | ICD-10-CM

## 2021-07-14 DIAGNOSIS — E66.01 MORBID OBESITY (HCC): ICD-10-CM

## 2021-07-14 PROCEDURE — 76882 US LMTD JT/FCL EVL NVASC XTR: CPT

## 2021-07-14 PROCEDURE — 1036F TOBACCO NON-USER: CPT | Performed by: PLASTIC SURGERY

## 2021-07-14 PROCEDURE — G8417 CALC BMI ABV UP PARAM F/U: HCPCS | Performed by: PLASTIC SURGERY

## 2021-07-14 PROCEDURE — 99203 OFFICE O/P NEW LOW 30 MIN: CPT | Performed by: PLASTIC SURGERY

## 2021-07-14 PROCEDURE — G8427 DOCREV CUR MEDS BY ELIG CLIN: HCPCS | Performed by: PLASTIC SURGERY

## 2021-07-14 RX ORDER — HYDROXYZINE PAMOATE 25 MG/1
25 CAPSULE ORAL 2 TIMES DAILY PRN
Qty: 60 CAPSULE | Refills: 5 | Status: SHIPPED | OUTPATIENT
Start: 2021-07-14 | End: 2021-08-13

## 2021-07-14 NOTE — PROGRESS NOTES
Department of Plastic Surgery - Adult  Attending Consult Note          CHIEF COMPLAINT:  History of Left Breast Cancer    History Obtained From:  patient    HISTORY OF PRESENT ILLNESS:                The patient is a 40 y.o. female who presents with History of left Breast Cancer. She is here today to discuss her options on breast reconstruction. The patient has met with Dr. Alber Jacques and has elected to proceed with mastectomy. She has not treatments of chemotherapy remaining and will likely not be receiving radiation after mastectomy.   The patients Breast Cancer History is obtained from the patient and also notes from the care team.      Past Medical History:    Past Medical History:   Diagnosis Date    Abscess of right breast 6/19/2018    Anxiety     Breast abscess 7/11/2018    Fatigue     GERD (gastroesophageal reflux disease)     Hyperlipidemia     Obesity 6/4/2013     Past Surgical History:    Past Surgical History:   Procedure Laterality Date    ANKLE FRACTURE SURGERY  4/20/2012    right with hardware, subsequently removed   [facility] Left 2020    BREAST BIOPSY Right 2018    CHOLECYSTECTOMY      2003    CYSTOSCOPY  7/25/2014    retrograde pyelogram, ureteroscopy, laser lithotripsy, left stent placement    ENDOSCOPY, COLON, DIAGNOSTIC      1998    [place] DRAINAGE OF HEMATOMA/FLUID Right 6/20/2018    RIGHT BREAST INCISION AND DRAINAGE POSSIBLE WOUND VAC performed by Linnea Ospina MD at [address] [place] [address] Right 7/11/2018    INCISION AND DRAINAGE RIGHT BREAST, WITH APPLICATION OF WOUND VAC (PATIENT HSS WOUND VAC WILL BRING IT)  performed by Linnea Ospina MD at [facility]    Left leg    US BREAST NEEDLE BIOPSY LEFT  9/17/2020    US BREAST NEEDLE BIOPSY LEFT 9/17/2020 [facility]    US BREAST NEEDLE BIOPSY LEFT Left 7/1/2021    US BREAST NEEDLE BIOPSY LEFT 7/1/2021 [facility]     Current Medications: Current Outpatient Medications   Medication Sig Dispense Refill    vitamin D (ERGOCALCIFEROL) 1.25 MG (23462 UT) CAPS capsule Take 1 capsule by mouth once a week 12 capsule 1    escitalopram (LEXAPRO) 10 MG tablet TAKE 1 TABLET BY MOUTH DAILY 90 tablet 1     No current facility-administered medications for this visit. Allergies:  Vancomycin    Social History:   Social History     Socioeconomic History    Marital status: Single     Spouse name: Not on file    Number of children: Not on file    Years of education: Not on file    Highest education level: Not on file   Occupational History    Not on file   Tobacco Use    Smoking status: Former Smoker     Years: 4.00     Quit date: 2008     Years since quittin.8    Smokeless tobacco: Never Used   Vaping Use    Vaping Use: Never used   Substance and Sexual Activity    Alcohol use: Yes     Alcohol/week: 0.0 standard drinks     Comment: rarely    Drug use: No    Sexual activity: Not on file   Other Topics Concern    Not on file   Social History Narrative    Not on file     Social Determinants of Health     Financial Resource Strain:     Difficulty of Paying Living Expenses:    Food Insecurity:     Worried About Running Out of Food in the Last Year:     [address] in the Last Year:    Transportation Needs:     Lack of Transportation (Medical):      Lack of Transportation (Non-Medical):    Physical Activity:     Days of Exercise per Week:     Minutes of Exercise per Session:    Stress:     Feeling of Stress :    Social Connections:     Frequency of Communication with Friends and Family:     Frequency of Social Gatherings with Friends and Family:     Attends [employer] Services:     Active Member of Clubs or Organizations:     Attends Club or Organization Meetings:     Marital Status:    Intimate Partner Violence:     Fear of Current or Ex-Partner:     Emotionally Abused:     Physically Abused:     Sexually Abused: Family History:   Family History   Problem Relation Age of Onset    Diabetes Maternal Grandmother     High Blood Pressure Maternal Grandmother     High Cholesterol Maternal Grandmother     Breast Cancer Maternal Grandmother 79    High Cholesterol Mother     Breast Cancer Mother 54    Colon Cancer Mother     Stomach Cancer Mother     Cancer Mother         stomach, colon, intestine    Cancer Maternal Grandfather 60        throat       REVIEW OF SYSTEMS:      CONSTITUTIONAL:  negative for  fevers, chills, sweats and fatigue  EYES: negative for dipolpia or acute vision loss. RESPIRATORY:  negative for  dry cough, cough with sputum, dyspnea, wheezing and chest pain  HENT:negative for pain, headache, difficulty swallowing or nose bleeds. CARDIOVASCULAR:  negative for  chest pain, dyspnea, palpitations, syncope  GASTROINTESTINAL:  negative for nausea, vomiting, change in bowel habits, diarrhea, constipation and abdominal pain  EXTREMITIES: negative for edema  MUSCULOSKELETAL: negative for muscle weakness  SKIN: positive for lesionnegative for itching or rashes. HEME: negative for easy brusing or bleeding  BEHAVIOR/PSYCH:  negative for poor appetite, increased appetite, decreased sleep and poor concentration  PSYCH: Alert and oriented to person place and time    I have reviewed the chief complaint and history of present illness including (ROS and PFSH) and vital documentation by my staff and I agree with their documentation and have added when applicable.     PHYSICAL EXAM:          VITALS:  /86 (Site: Left Lower Arm, Position: Sitting, Cuff Size: Medium Adult)   Pulse 88   Temp 97.2 °F (36.2 °C) (Skin)   Resp 20   Ht 5' 3\" (1.6 m)   LMP 07/04/2021   SpO2 96%   BMI 63.77 kg/m²   CONSTITUTIONAL:  awake, alert, cooperative, no apparent distress, and appears stated age  EYES: PERRLA, EOMI, no signs of occular infection  MUSCULOSKELETAL: negative for flaccid muscle tone or spastic movements. NEURO: Cranial nerves II-XII grossly intact. No signs of agitated mood. LUNGS:  No increased work of breathing, good air exchange, clear to auscultation bilaterally, no crackles or wheezing  CARDIOVASCULAR:  Normal apical impulse, regular rate and rhythm,   ABDOMEN:  Normal bowel sounds, soft, non-distended, non-tender,     LEFT BREAST: Rash is not noted, There are no masses palpated, no axillary lymphadenopathy, no nipple discharge. No breast pain. There are no previous scars    RIGHT BREAST: Rash is not noted, There are no masses palpated , no axillary lymphadenopathy, no nipple discharge. No breast pain.  There are  previous scars        DATA:    Radiology Review:    EXAMINATION:   DIAGNOSTIC DIGITAL BILATERAL BREASTS MAMMOGRAM WITH TOMOSYNTHESIS; TARGETED   ULTRASOUND OF THE LEFT BREAST, 7/1/2021 12:28 pm       TECHNIQUE:   Diagnostic mammography of the bilateral breasts was performed with   tomosynthesis.  2D standard and 3D tomosynthesis combination imaging   performed through both breasts.  Computer aided detection was utilized in the   interpretation of this exam.; Target ultrasound of the left breast was   performed.       Views:       COMPARISON:   09/17/2020 08/24/2020, 02/18/2020       HISTORY:   ORDERING SYSTEM PROVIDED HISTORY: Abnormal breast finding   TECHNOLOGIST PROVIDED HISTORY:   Reason for exam:->yearly mammogram - also doing a follow up ultrasound same   day from an abnormal MBI       FINDINGS:   Right breast appears unremarkable.       On the left, mammographic images at the site of the clip demonstrate a   heterogeneous density.  This corresponds to a ill-defined hypoechoic region   at the 2 o'clock position.  It maximally measures 2.0 cm.  It is not   significantly different than the study 08/24/2020. Zebedee Krabbe pathology was   reviewed.           Impression   Based on the ultrasound appearance, elevated Tyrer Cuzick score, and patient   inability to tolerate MRI exam, recommend understands that this will require at least 2 surgeries and a matching procedure for the unaffected breast. She understands that she may not be a candidate for this procedure if she is having radiation therapy. If radiation therapy is part of her treatment, she understands that there can be wound healing complications and/or infections that can necessitate the removal of the expander and implant. The patient is aware that according to some data there can be up to 85% complication rate and 38% implant extrusion rate with radiation following immediate expander placement. Depending upon tumor location and size, she may have placement of the   expanders with need for immediate chest wall irradiation, ( margins are close/invoved). If margins are clear then expander filling may begin with chest radiation therapy being deferred for several weeks to months thereafter. She is aware that post-radiotherapy filling is more uncomfortable as the irradiated/irritated skin becomes fibrotic, dry, and less easily stretched without discomfort. On the other hand if margins are good, expansion could take place until desired results achieved, then radiotherapy could be given. This is less uncomfortable for the patient. We also discussed TRAM or Latissimus flap reconstruction. She understands that the abdomen may have bulging and she may have some functional deficit and weakness. She will have a \"tummy tuck\" type scar. If the Latissimus is used she may have functional deficit and weakness as well as a scar. I also educated her on free flap reconstruction with QUINTON flaps. She would have to go to another institution that provides this service if she is interested in pursuing her options. She understands that these can be lengthy surgeries with higher anesthesia risks and higher liklihood of anastomotic complications.     I have informed the patient that no matter which option is performed, that symmetry and similar shape between each breast will be the goal. However, a reconstructed breast will never appear the same as a native breast and to expect sone differences between each breasts. There is a likleyhood for needing more than one surgery for revisions. The patient was educated on the risks involving (Breast Implant Associated-Anaplastic Large Cell Lymphoma (SHANE-ALCL)). SHANE-ALCL is not a breast cancer, but a rare and treatable T-cell lymphoma that usually develops as a fluid swelling around breast implants. The lifetime risk for this disease appears to be about 1 case for every 30,000 textured implants. Thus far, there have been no confirmed cases of SHANE-ALCL in women who have had only smooth-surface breast implants. The FDA is not recommending removal of textured implants. Rather, the FDA recommends, as do I, that every woman conduct regular self-examination. The patient was educated that if she does develop SHANE-ALCL she may require additional treatment such as radiation or chemotherapy along with removal of the breast implant and surrounding scar tissue. The risks, benefits and options were discussed with the pt. The risks included but not limited to pain, bleeding, infection, heavy scarring, damage to surrounding structures,  and need for further procedures. Other risks including but not limited to asymmetry, loss of nipple or/and areola, loss of sensation to nipple and areola, inability to breast feed, seroma, hematoma, implant failure, capsular contracture, and fat necrosis were also discussed with the patient. All of her questions were answered. .    I explained to the patient that at this time she is not a surgical candidate for implant-based reconstruction due to her morbid obesity. I educated the patient that she would need to have her mastectomy completed first before we can discuss any possible reconstruction in the future after body mass index optimization.     The patient understands she will have her left nipple and areola removed during her mastectomy to the left breast with local tissue rearrangement of the dermis and subcutaneous tissue only to give her a left breast mound. Patient voices understanding    I also discussed with the patient today that she will likely develop some form of wounding postoperatively second to her elevated body mass index. Patient voices understanding    She will most likely require a matching procedure to the contralateral breast.     Plastic and Reconstructive surgical procedure-left oncoplastic breast reduction with postmastectomy tissue rearrangement    All of her questions were answered to her satisfaction and she agrees to proceed with the operation. Face-to-face time greater than 45 minutes, greater than 50% in counseling, education, and coordination of care. Photos were obtained. Chaperone present        This document is generated, in part, by voice recognition software and thus  syntax and grammatical errors are possible.     Harsh Thomas MD  4:13 PM  7/14/2021

## 2021-07-19 ENCOUNTER — TELEPHONE (OUTPATIENT)
Dept: BREAST CENTER | Age: 46
End: 2021-07-19

## 2021-07-19 NOTE — TELEPHONE ENCOUNTER
----- Message from Biju Anglin MD sent at 7/16/2021  2:30 PM EDT -----  Regarding: surgery to schedule  Please schedule Virgilio Faria for a left goldilocks mastectomy with sentinel lymph node biopsy possible ALND. This will be in conjunction with Dr. Micaela Lauren office. (This is like a reduction, but I won't need it localized because it is a full mastectomy.

## 2021-07-26 ENCOUNTER — TELEPHONE (OUTPATIENT)
Dept: SURGERY | Age: 46
End: 2021-07-26

## 2021-07-26 NOTE — TELEPHONE ENCOUNTER
Pt has choose not to move forward with  office. Pt needs left mastectomy with SLNB, poss ALND. MA will move forward with getting patient scheduled.        Electronically signed by Jamia Ngo MA on 7/26/21 at 3:47 PM EDT

## 2021-07-27 ENCOUNTER — TELEPHONE (OUTPATIENT)
Dept: SURGERY | Age: 46
End: 2021-07-27

## 2021-07-27 DIAGNOSIS — C50.912 MALIGNANT NEOPLASM OF LEFT FEMALE BREAST, UNSPECIFIED ESTROGEN RECEPTOR STATUS, UNSPECIFIED SITE OF BREAST (HCC): Primary | ICD-10-CM

## 2021-07-27 NOTE — CARE COORDINATION
Impression: Tear film insufficiency of bilateral lacrimal glands: H04.123. Plan: Recommend artificial tears at least 4 times a day and gel drop or tear ointment at bedtime, Omega 3 fatty acids (2-3,000 mg) daily. Orders for Home IV antibiotics were faxed to [facility]. [address] from [facility] notified at [phone].

## 2021-07-27 NOTE — TELEPHONE ENCOUNTER
Per Tigre at [address] [facility] office patient does not want to proceed with  procedure last discussed in our office.

## 2021-08-04 ENCOUNTER — TELEPHONE (OUTPATIENT)
Dept: BREAST CENTER | Age: 46
End: 2021-08-04

## 2021-08-04 NOTE — TELEPHONE ENCOUNTER
CLAY See contacted [facility] via web for prior authorization of outpatient procedure. No prior authorization needed for left simple mastectomy W SLNB, poss ALND ([id number]) with Dr. Indu Tubbs. MA scanned authorization form in media tab.     Electronically signed by Nia Rhodes MA on 8/4/21 at 1:47 PM EDT

## 2021-08-09 ENCOUNTER — OFFICE VISIT (OUTPATIENT)
Dept: FAMILY MEDICINE CLINIC | Age: 46
End: 2021-08-09
Payer: MEDICAID

## 2021-08-09 ENCOUNTER — TELEPHONE (OUTPATIENT)
Dept: SURGERY | Age: 46
End: 2021-08-09

## 2021-08-09 VITALS
HEART RATE: 95 BPM | DIASTOLIC BLOOD PRESSURE: 68 MMHG | WEIGHT: 293 LBS | TEMPERATURE: 98.2 F | RESPIRATION RATE: 16 BRPM | OXYGEN SATURATION: 96 % | HEIGHT: 63 IN | SYSTOLIC BLOOD PRESSURE: 116 MMHG | BODY MASS INDEX: 51.91 KG/M2

## 2021-08-09 DIAGNOSIS — H66.90 EAR INFECTION: Primary | ICD-10-CM

## 2021-08-09 PROCEDURE — G8417 CALC BMI ABV UP PARAM F/U: HCPCS | Performed by: STUDENT IN AN ORGANIZED HEALTH CARE EDUCATION/TRAINING PROGRAM

## 2021-08-09 PROCEDURE — 1036F TOBACCO NON-USER: CPT | Performed by: STUDENT IN AN ORGANIZED HEALTH CARE EDUCATION/TRAINING PROGRAM

## 2021-08-09 PROCEDURE — G8427 DOCREV CUR MEDS BY ELIG CLIN: HCPCS | Performed by: STUDENT IN AN ORGANIZED HEALTH CARE EDUCATION/TRAINING PROGRAM

## 2021-08-09 PROCEDURE — 99213 OFFICE O/P EST LOW 20 MIN: CPT | Performed by: STUDENT IN AN ORGANIZED HEALTH CARE EDUCATION/TRAINING PROGRAM

## 2021-08-09 RX ORDER — AMOXICILLIN 500 MG/1
500 CAPSULE ORAL 2 TIMES DAILY
Qty: 20 CAPSULE | Refills: 0 | Status: SHIPPED | OUTPATIENT
Start: 2021-08-09 | End: 2021-08-19

## 2021-08-09 RX ORDER — FLUTICASONE PROPIONATE 50 MCG
2 SPRAY, SUSPENSION (ML) NASAL DAILY
Qty: 3 BOTTLE | Refills: 1 | Status: SHIPPED
Start: 2021-08-09 | End: 2022-01-31

## 2021-08-09 SDOH — ECONOMIC STABILITY: FOOD INSECURITY: WITHIN THE PAST 12 MONTHS, YOU WORRIED THAT YOUR FOOD WOULD RUN OUT BEFORE YOU GOT MONEY TO BUY MORE.: NEVER TRUE

## 2021-08-09 SDOH — ECONOMIC STABILITY: FOOD INSECURITY: WITHIN THE PAST 12 MONTHS, THE FOOD YOU BOUGHT JUST DIDN'T LAST AND YOU DIDN'T HAVE MONEY TO GET MORE.: NEVER TRUE

## 2021-08-09 ASSESSMENT — SOCIAL DETERMINANTS OF HEALTH (SDOH): HOW HARD IS IT FOR YOU TO PAY FOR THE VERY BASICS LIKE FOOD, HOUSING, MEDICAL CARE, AND HEATING?: NOT HARD AT ALL

## 2021-08-09 NOTE — PROGRESS NOTES
21  Chloe Ambrosio : 1975 Sex: female  Age 40 y.o. Subjective:  Chief Complaint   Patient presents with    Cerumen Impaction     pt has been dx with breast cancer, dizziness, balance is off, possible otalgia, Mastectomy 2021       HPI:   Chloe Ambrosio , 40 y.o. female presents to the clinic for evaluation of right ear pain, congestion,  x 7 days. The patient has taken tylenol for symptoms. The patient reports no known ill exposure. The patient denies acute loss of taste or smell, headache, cough, sore throat, rash. The patient denies body aches, chills, fever, and fatigue. The patient also denies chest pain, abdominal pain, shortness of breath, wheezing, and nausea / vomiting / diarrhea. ROS:   Unless otherwise stated in this report the patient's positive and negative responses for review of systems for constitutional, eyes, ENT, cardiovascular, respiratory, gastrointestinal, neurological, , musculoskeletal, and integument systems and related systems to the presenting problem are either stated in the history of present illness or were not pertinent or were negative for the symptoms and/or complaints related to the presenting medical problem. Positives and pertinent negatives as per HPI. All others reviewed and are negative.       PMH:     Past Medical History:   Diagnosis Date    Abscess of right breast 2018    Anxiety     Breast abscess 2018    Fatigue     GERD (gastroesophageal reflux disease)     Hyperlipidemia     Obesity 2013       Past Surgical History:   Procedure Laterality Date    ANKLE FRACTURE SURGERY  2012    right with hardware, subsequently removed   Gunzing 9 BREAST BIOPSY Left 2020    BREAST BIOPSY Right     CHOLECYSTECTOMY      2003    CYSTOSCOPY  2014    retrograde pyelogram, ureteroscopy, laser lithotripsy, left stent placement    ENDOSCOPY, COLON, DIAGNOSTIC          [place] DRAINAGE OF HEMATOMA/FLUID Right 2018    RIGHT BREAST INCISION AND DRAINAGE POSSIBLE WOUND VAC performed by Zainab Sánchez MD at [place] [address] EXPLO/DRAIN BREAST ABSCESS Right 2018    INCISION AND DRAINAGE RIGHT BREAST, WITH APPLICATION OF WOUND VAC (PATIENT HSS WOUND VAC WILL BRING IT)  performed by Zainab S[facility]chez MD at [facility]    Left leg    US BREAST NEEDLE BIOPSY LEFT  2020    US BREAST NEEDLE BIOPSY LEFT 2020 [facility]    US BREAST NEEDLE BIOPSY LEFT Left 2021    US BREAST NEEDLE BIOPSY LEFT 2021 [facility]       Family History   Problem Relation Age of Onset    Diabetes Maternal Grandmother     High Blood Pressure Maternal Grandmother     High Cholesterol Maternal Grandmother     Breast Cancer Maternal Grandmother 79    High Cholesterol Mother     Breast Cancer Mother 54    Colon Cancer Mother     Stomach Cancer Mother     Cancer Mother         stomach, colon, intestine    Cancer Maternal Grandfather 60        throat       Medications:     Current Outpatient Medications:     amoxicillin (AMOXIL) 500 MG capsule, Take 1 capsule by mouth 2 times daily for 10 days, Disp: 20 capsule, Rfl: 0    fluticasone (FLONASE) 50 MCG/ACT nasal spray, 2 sprays by Each Nostril route daily, Disp: 3 Bottle, Rfl: 1    hydrOXYzine (VISTARIL) 25 MG capsule, Take 1 capsule by mouth 2 times daily as needed for Anxiety, Disp: 60 capsule, Rfl: 5    vitamin D (ERGOCALCIFEROL) 1.25 MG (99853 UT) CAPS capsule, Take 1 capsule by mouth once a week, Disp: 12 capsule, Rfl: 1    escitalopram (LEXAPRO) 10 MG tablet, TAKE 1 TABLET BY MOUTH DAILY, Disp: 90 tablet, Rfl: 1    Allergies:      Allergies   Allergen Reactions    Vancomycin Nausea And Vomiting     Red man syndrome, had high fever and upset stomach       Social History:     Social History     Tobacco Use    Smoking status: Former Smoker     Years: 4.00     Quit date: 2008     Years since quittin.9    Smokeless have been personally reviewed by myself)  Labs:  No results found for this visit on 08/09/21. Imaging: All Radiology results interpreted by Radiologist unless otherwise noted. No orders to display       Assessment / Plan:   The patient's vitals, allergies, medications, and past medical history have been reviewed. ST. JOYA ORTIZ was seen today for cerumen impaction. Diagnoses and all orders for this visit:    Ear infection  -     amoxicillin (AMOXIL) 500 MG capsule; Take 1 capsule by mouth 2 times daily for 10 days  -     fluticasone (FLONASE) 50 MCG/ACT nasal spray; 2 sprays by Each Nostril route daily      Discussed calling her doctor prior to starting the antibiotics to make sure she can still do the procedure     - Increase fluids and rest. Symptomatic relief discussed including Tylenol prn pain/fever. Schedule virtual f/u with PCP in 2-3 days. Red flag symptoms were discussed with the patient today. The patient is directed to go the ED if symptoms worsen or change. Pt verbalizes understanding and is in agreement with plan of care. All questions answered.     SIGNATURE: Katya Ferris DO

## 2021-08-09 NOTE — TELEPHONE ENCOUNTER
MA contacted patient and relayed  message. Pt verbalized understanding.          Electronically signed by Padmini Grayson MA on 8/9/21 at 1:14 PM EDT

## 2021-08-09 NOTE — TELEPHONE ENCOUNTER
MA received a call from pt stating that she just left urgent care and will be on amoxicillin 500 BID for 10 days  For an ear infection and she has a breast surgery with Dr. Howard Starr on  8/18/21, pt states that she will still be ion antibiotic at that time ans wants to know if it is okay to take? MA routing to Dr. Howard Starr for advisement.     Pt can be reaches at [phone]      Electronically signed by Derek Knox on 8/9/21 at 1:01 PM EDT

## 2021-08-16 ENCOUNTER — PREP FOR PROCEDURE (OUTPATIENT)
Dept: SURGERY | Age: 46
End: 2021-08-16

## 2021-08-16 RX ORDER — SODIUM CHLORIDE 0.9 % (FLUSH) 0.9 %
10 SYRINGE (ML) INJECTION PRN
Status: CANCELLED | OUTPATIENT
Start: 2021-08-16

## 2021-08-16 RX ORDER — SODIUM CHLORIDE 9 MG/ML
25 INJECTION, SOLUTION INTRAVENOUS PRN
Status: CANCELLED | OUTPATIENT
Start: 2021-08-16

## 2021-08-16 RX ORDER — SODIUM CHLORIDE 0.9 % (FLUSH) 0.9 %
10 SYRINGE (ML) INJECTION EVERY 12 HOURS SCHEDULED
Status: CANCELLED | OUTPATIENT
Start: 2021-08-16

## 2021-08-16 RX ORDER — SODIUM CHLORIDE 9 MG/ML
INJECTION, SOLUTION INTRAVENOUS CONTINUOUS
Status: CANCELLED | OUTPATIENT
Start: 2021-08-16

## 2021-08-16 NOTE — PROGRESS NOTES
Elizabeth 37 PRE-ADMISSION TESTING GENERAL INSTRUCTIONS- [facility]-phone number:[phone]    GENERAL INSTRUCTIONS  [x] Antibacterial Soap shower Night before and/or AM of Surgery  [] Ricardo wipe instruction sheet and wipes given. [x] Nothing by mouth after midnight, including gum, candy, mints, or water. [x] You may brush your teeth, gargle, but do NOT swallow water. []Hibiclens shower  the night before and the morning of surgery. Do not use             Hibiclens on your face or head. [x]No smoking, chewing tobacco, illegal drugs, or alcohol within 24 hours of your surgery. [x] Jewelry, valuables or body piercing's should not be brought to the hospital. All body and/or tongue piercing's must be removed prior to arriving to hospital.  ALL hair pins must be removed. [x] Do not wear makeup, lotions, powders, deodorant. Nail polish as directed by the nurse. [x] Arrange transportation with a responsible adult  to and from the hospital. If you do not have a responsible adult  to transport you, you will need to make arrangements with a medical transportation company (i.e. [employer]. A Uber/taxi/bus is not appropriate unless you are accompanied by a responsible adult ). Arrange for someone to be with you for the remainder of the day and for 24 hours after your procedure due to having had anesthesia. Who will be your  for transportation?___FRIEND_______________   Who will be staying with you for 24 hrs after your procedure?______SONS____________  [x] Bring insurance card and photo ID.  [] Transfusion Bracelet: Please bring with you to hospital, day of surgery  [] Bring urine specimen day of surgery. Any small container is acceptable. [] Use inhalers the morning of surgery and bring with you to hospital.  [] Bring copy of living will or healthcare power of  papers to be placed in your electronic record.   [] CPAP/BI-PAP: Please bring your machine if you are to spend the night in the hospital.     PARKING INSTRUCTIONS:   [x] Arrival Time:__1000___________  · [x] Parking lot '\"I\"  is located on [facility] (the corner of [facility] and [facility]). To enter, press the button and the gate will lift. A free token will be provided to exit the lot. One car per patient is allowed to park in this lot. All other cars are to park on [address] [place] [address] either in the parking garage or the handicap lot. [] To reach the [facility] lobby from [address] [place] [address], upon entering the hospital, take elevator B to the 3rd floor. EDUCATION INSTRUCTIONS:      [] Knee or hip replacement booklet & exercise pamphlets given. [] Kaitlynn 78 placed in chart. [] Pre-admission Testing educational folder given  [] Incentive Spirometry,coughing & deep breathing exercises reviewed. []Medication information sheet(s)   []Fluoroscopy-Xray used in surgery reviewed with patient. Educational pamphlet placed in chart. [x]Pain: Post-op pain is normal and to be expected. You will be asked to rate your pain from 0-10(a zero is not acceptable-education is needed). Your post-op pain goal is:5  [] Ask your nurse for your pain medication. [] Joint camp offered. [] Joint replacement booklets given. [] Other:___________________________    MEDICATION INSTRUCTIONS:   [x]Bring a complete list of your medications, please write the last time you took the medicine, give this list to the nurse. [x] Take the following medications the morning of surgery with 1-2 ounces of water:   [] Stop herbal supplements and vitamins 5 days before your surgery. [] DO NOT take any diabetic medicine the morning of surgery. Follow instructions for insulin the day before surgery. [] If you are diabetic and your blood sugar is low or you feel symptomatic, you may drink 1-2 ounces of apple juice or take a glucose tablet.   The morning of your procedure, you may call the pre-op area if you have concerns about your blood sugar [phone]. [] Use your inhalers the morning of surgery. Bring your emergency inhaler with you day of surgery. [] Follow physician instructions regarding any blood thinners you may be taking. WHAT TO EXPECT:  [x] The day of surgery you will be greeted and checked in by the [employer].  In addition, you will be registered in the [place] by a Patient Access Representative. Please bring your photo ID and insurance card. A nurse will greet you in accordance to the time you are needed in the pre-op area to prepare you for surgery. Please do not be discouraged if you are not greeted in the order you arrive as there are many variables that are involved in patient preparation. Your patience is greatly appreciated as you wait for your nurse. Please bring in items such as: books, magazines, newspapers, electronics, or any other items  to occupy your time in the waiting area. [x]  Delays may occur with surgery and staff will make a sincere effort to keep you informed of delays. If any delays occur with your procedure, we apologize ahead of time for your inconvenience as we recognize the value of your time.

## 2021-08-16 NOTE — H&P
[facility]  HISTORY & PHYSICAL  ATTENDING NOTE     Patient's Name/Date of Birth: Odalis Cantor / 1975     Date: 2021           Chief Complaint   Patient presents with    Consultation       NEW BREAST CANCER: Biopsy [facility] 21--- Left breast Invasive, moderately differentiated mammary carcinoma (grade 2) -- ER & NM (+) HER2 (-)-CXR & labs         Odalis Cantor presents for evaluation of a mammographic abnormality.     PCP: JIHAN Alex - CNP. Gynecologist: Dr. Britany Merino [facility]).     Referred by:  N/a     The mammogram was performed at [facility] on 2021. The patient has not noted a change in BSE since presentation. Patient denies nipple discharge. Patient denies a personal history of breast cancer. Breast cancer risk factors include family hx on mother's side, mom with breast CA, family hx of colon CA and age and gender. Ashkenazi Taoism Ancestry: No.     OBSTETRIC RELATED HISTORY:  Age of menarche was 16. Age of menopause N/A  Patient admits to hormonal therapy. OCPsx 7-10y  Patient is . Age of first live birth was 36. Patient did breast feed. Is patient interested in fertility information about fertility preservation? No     CANCER SURVEILLANCE HISTORY:  Mammograms: Yes   Breast MRI's: Yes --MBI done  Breast Biopsies: Yes   Colonoscopy: No   GI Polyps: Not Applicable   EGD: Yes   Pelvic Exam: Yes   Pap Smear: Yes   Dermatology: No   Lung screening: no  H/O DVT:  no  H/O Radiation:  no           Estimated body mass index is 63.77 kg/m² as calculated from the following:    Height as of this encounter: 5' 3\" (1.6 m). Weight as of this encounter: 360 lb (163.3 kg). Bra Size: 48DDD     Because violence is so common, we ask all our patients: are you in a relationship or do you live with a person who threatens, hurts, or controls you:  no     Patient drinks moderate caffeinated beverages. Patient does not smoke cigarettes.  Patient does not use recreational drugs.        Past Medical History        Past Medical History:   Diagnosis Date    Abscess of right breast 6/19/2018    Anxiety      Breast abscess 7/11/2018    Fatigue      GERD (gastroesophageal reflux disease)      Hyperlipidemia      Obesity 6/4/2013            Past Surgical History         Past Surgical History:   Procedure Laterality Date    ANKLE FRACTURE SURGERY   4/20/2012     right with hardware, subsequently removed    APPENDECTOMY        APPENDECTOMY   1984    BREAST BIOPSY Left 2020    BREAST BIOPSY Right 2018    CHOLECYSTECTOMY         2003    CYSTOSCOPY   7/25/2014     retrograde pyelogram, ureteroscopy, laser lithotripsy, left stent placement    ENDOSCOPY, COLON, DIAGNOSTIC         1998    AR DRAINAGE OF HEMATOMA/FLUID Right 6/20/2018     RIGHT BREAST INCISION AND DRAINAGE POSSIBLE WOUND VAC performed by Kalyan Villagomez MD at [address] [place] [address] Right 7/11/2018     INCISION AND DRAINAGE RIGHT BREAST, WITH APPLICATION OF WOUND VAC (PATIENT HSS WOUND VAC WILL BRING IT)  performed by Kalyan Villagomez MD at [facility]     Left leg    US BREAST NEEDLE BIOPSY LEFT   9/17/2020     US BREAST NEEDLE BIOPSY LEFT 9/17/2020 [facility]    US BREAST NEEDLE BIOPSY LEFT Left 7/1/2021     US BREAST NEEDLE BIOPSY LEFT 7/1/2021 [facility]            Current Facility-Administered Medications          Current Outpatient Medications   Medication Sig Dispense Refill    vitamin D (ERGOCALCIFEROL) 1.25 MG (94547 UT) CAPS capsule Take 1 capsule by mouth once a week 12 capsule 1    escitalopram (LEXAPRO) 10 MG tablet TAKE 1 TABLET BY MOUTH DAILY 90 tablet 1      No current facility-administered medications for this visit.            Family History         Family History   Problem Relation Age of Onset    Diabetes Maternal Grandmother      High Blood Pressure Maternal Grandmother      High Cholesterol Maternal Grandmother      Breast Cancer Maternal Grandmother 79    High Cholesterol Mother      Breast Cancer Mother 54    Colon Cancer Mother      Stomach Cancer Mother      Cancer Mother           stomach, colon, intestine    Cancer Maternal Grandfather 61         throat         Ashkenazi Adventist Ancestry: No           Allergies   Allergen Reactions    Vancomycin Nausea And Vomiting       Red man syndrome, had high fever and upset stomach         Social History               Socioeconomic History    Marital status: Single       Spouse name: Not on file    Number of children: Not on file    Years of education: Not on file    Highest education level: Not on file   Occupational History    Not on file   Tobacco Use    Smoking status: Former Smoker       Years: 4.00       Quit date: 2008       Years since quittin.8    Smokeless tobacco: Never Used   Vaping Use    Vaping Use: Never used   Substance and Sexual Activity    Alcohol use: Yes       Alcohol/week: 0.0 standard drinks       Comment: rarely    Drug use: No    Sexual activity: Not on file   Other Topics Concern    Not on file   Social History Narrative    Not on file      Social Determinants of Health          Financial Resource Strain:     Difficulty of Paying Living Expenses:    Food Insecurity:     Worried About Running Out of Food in the Last Year:     [address] in the Last Year:    Transportation Needs:     Lack of Transportation (Medical):      Lack of Transportation (Non-Medical):    Physical Activity:     Days of Exercise per Week:     Minutes of Exercise per Session:    Stress:     Feeling of Stress :    Social Connections:     Frequency of Communication with Friends and Family:     Frequency of Social Gatherings with Friends and Family:     Attends [employer] Services:     Active Member of Clubs or Organizations:     Attends Club or Organization Meetings:     Marital Status:    Intimate Partner Violence:     Fear of Current or Ex-Partner:  Emotionally Abused:     Physically Abused:     Sexually Abused:             Occupation:      Review of Systems     ECOG PS:  1  Covid vaccination? Yes Moderna  Flu Vaccination?  Yes     BP (!) 154/93 (Site: Right Upper Arm, Position: Sitting, Cuff Size: Medium Adult)   Pulse 89   Temp 97.1 °F (36.2 °C) (Temporal)   Resp 15   Ht 5' 3\" (1.6 m)   Wt (!) 360 lb (163.3 kg)   SpO2 95%   BMI 63.77 kg/m²   Physical Exam     MAMMOGRAM:  EXAMINATION:   DIAGNOSTIC DIGITAL BILATERAL BREASTS MAMMOGRAM WITH TOMOSYNTHESIS; TARGETED   ULTRASOUND OF THE LEFT BREAST, 7/1/2021 12:28 pm       TECHNIQUE:   Diagnostic mammography of the bilateral breasts was performed with   tomosynthesis.  2D standard and 3D tomosynthesis combination imaging   performed through both breasts.  Computer aided detection was utilized in the   interpretation of this exam.; Target ultrasound of the left breast was   performed.       Views:       COMPARISON:   09/17/2020 08/24/2020, 02/18/2020       HISTORY:   ORDERING SYSTEM PROVIDED HISTORY: Abnormal breast finding   TECHNOLOGIST PROVIDED HISTORY:   Reason for exam:->yearly mammogram - also doing a follow up ultrasound same   day from an abnormal MBI       FINDINGS:   Right breast appears unremarkable.       On the left, mammographic images at the site of the clip demonstrate a   heterogeneous density.  This corresponds to a ill-defined hypoechoic region   at the 2 o'clock position.  It maximally measures 2.0 cm.  It is not   significantly different than the study 08/24/2020.  Prior pathology was   reviewed.           Impression   Based on the ultrasound appearance, elevated Tyrer Cuzick score, and patient   inability to tolerate MRI exam, recommend ultrasound-guided biopsy       BIRADS:   BIRADS - CATEGORY 0       Incomplete: Needs Additional Imaging Evaluation       OVERALL ASSESSMENT - INCOMPLETE:NEED ADDITIONAL IMAGING EVALUATION.       A letter of notification will be sent to the patient regarding the results.       RISK ASSESSMENT:       During this patient's visit, information obtained was used to generate a   lifetime risk assessment using the Tyrer-Cuzick model (also called the RICARDO   International Breast Cancer Intervention study Breast Cancer Risk Evaluation   Tool).       LIFETIME RISK:       Patient has a Tyrer-Cuzick score of: 30.1%               ULTRASOUND:  EXAMINATION:   DIAGNOSTIC DIGITAL BILATERAL BREASTS MAMMOGRAM WITH TOMOSYNTHESIS; TARGETED   ULTRASOUND OF THE LEFT BREAST, 7/1/2021 12:28 pm       TECHNIQUE:   Diagnostic mammography of the bilateral breasts was performed with   tomosynthesis.  2D standard and 3D tomosynthesis combination imaging   performed through both breasts.  Computer aided detection was utilized in the   interpretation of this exam.; Target ultrasound of the left breast was   performed.       Views:       COMPARISON:   09/17/2020 08/24/2020, 02/18/2020       HISTORY:   ORDERING SYSTEM PROVIDED HISTORY: Abnormal breast finding   TECHNOLOGIST PROVIDED HISTORY:   Reason for exam:->yearly mammogram - also doing a follow up ultrasound same   day from an abnormal MBI       FINDINGS:   Right breast appears unremarkable.       On the left, mammographic images at the site of the clip demonstrate a   heterogeneous density.  This corresponds to a ill-defined hypoechoic region   at the 2 o'clock position.  It maximally measures 2.0 cm.  It is not   significantly different than the [facility]/2020. [facility]   [facility].           Impression   Based on the ultrasound appearance, elevated Tyrer Cuzick score, and patient   inability to tolerate MRI exam, recommend ultrasound-guided biopsy       BIRADS:   BIRADS - CATEGORY 0       Incomplete: Needs Additional Imaging Evaluation          PATHOLOGY:    Diagnosis:   Left breast, 2:00 core needle biopsy: Invasive, moderately differentiated   mammary carcinoma (grade 2)     Comment:     Microscopic examination shows an invasive carcinoma with   linear growth pattern dispersed throughout fibrous stroma.  The tumor has   a Stacia histologic score of 3 (tubule formation) +2 (nuclear   pleomorphism) +1 (mitotic count) = 6.  The tumor cells are immunoreactive   with pankeratin and E-cadherin which favors ductal origin.  The p63   highlights myoepithelial cells in benign ducts.  Intradepartmental   consultation is obtained. Breast Cancer Marker Studies:     Estrogen Receptors (ER):   -Positive (>10%of cells demonstrate nuclear positivity):   Percentage of cells positive: 60%   Intensity: Strong     Progesterone Receptors (CA):   -Positive:   Percentage of cells positive: 60%   Intensity: Strong     Hormone receptor studies are performed by immunohistochemistry on   formalin-fixed, paraffin-embedded tissue (Roche Benchmark Immunostainer,   [place] anti-ER clone [id number], anti-CA clone [id number], polymer-based detection   chemistry). ER and CA are evaluated based on the percentage of cells   showing nuclear staining with >1% considered positive for each. Her-2/markie (c-erb B-2) protein expression: Negative/1+      ASSESSMENT/PLAN:  Left breast cancer, mammary carcinoma, ER/CA+ HER2- grade 2  --CBC, CMP, CXR  --referral to plastic surgery for options and possible [place] mastectomy  --US axilla  --I had a long discussion with Farhana Landry about her breast size and her obesity and options for surgery. I am concerned about lumpectomy having radiation given her size. I was also explained to her that she is at limited options for reconstruction given her size we will have her see Dr. Raji Musa to go over options. I discussed that she might opt for [place] mastectomy as it will still preserve her breast mound but also eliminate the need for radiation depending on final pathology.   This case will also be discussed at multidisciplinary tumor board.  Anette Henning MD, MSc, FACS  7/9/2021  2:44 PM

## 2021-08-16 NOTE — H&P (VIEW-ONLY)
[facility]  HISTORY & PHYSICAL  ATTENDING NOTE     Patient's Name/Date of Birth: Emiliano Mcgee / 1975     Date: 2021           Chief Complaint   Patient presents with    Consultation       NEW BREAST CANCER: Biopsy [facility] 21--- Left breast Invasive, moderately differentiated mammary carcinoma (grade 2) -- ER & NM (+) HER2 (-)-CXR & labs         Emiliano Mcgee presents for evaluation of a mammographic abnormality.     PCP: JIHAN Godfrey - CNP. Gynecologist: Dr. Roberto Carlos Valdovinos [facility]).     Referred by:  N/a     The mammogram was performed at [facility] on 2021. The patient has not noted a change in BSE since presentation. Patient denies nipple discharge. Patient denies a personal history of breast cancer. Breast cancer risk factors include family hx on mother's side, mom with breast CA, family hx of colon CA and age and gender. Ashkenazi Christianity Ancestry: No.     OBSTETRIC RELATED HISTORY:  Age of menarche was 16. Age of menopause N/A  Patient admits to hormonal therapy. OCPsx 7-10y  Patient is . Age of first live birth was 36. Patient did breast feed. Is patient interested in fertility information about fertility preservation? No     CANCER SURVEILLANCE HISTORY:  Mammograms: Yes   Breast MRI's: Yes --MBI done  Breast Biopsies: Yes   Colonoscopy: No   GI Polyps: Not Applicable   EGD: Yes   Pelvic Exam: Yes   Pap Smear: Yes   Dermatology: No   Lung screening: no  H/O DVT:  no  H/O Radiation:  no           Estimated body mass index is 63.77 kg/m² as calculated from the following:    Height as of this encounter: 5' 3\" (1.6 m). Weight as of this encounter: 360 lb (163.3 kg). Bra Size: 48DDD     Because violence is so common, we ask all our patients: are you in a relationship or do you live with a person who threatens, hurts, or controls you:  no     Patient drinks moderate caffeinated beverages. Patient does not smoke cigarettes.  Patient does not use recreational drugs.        Past Medical History        Past Medical History:   Diagnosis Date    Abscess of right breast 6/19/2018    Anxiety      Breast abscess 7/11/2018    Fatigue      GERD (gastroesophageal reflux disease)      Hyperlipidemia      Obesity 6/4/2013            Past Surgical History         Past Surgical History:   Procedure Laterality Date    ANKLE FRACTURE SURGERY   4/20/2012     right with hardware, subsequently removed    APPENDECTOMY        APPENDECTOMY   1984    BREAST BIOPSY Left 2020    BREAST BIOPSY Right 2018    CHOLECYSTECTOMY         2003    CYSTOSCOPY   7/25/2014     retrograde pyelogram, ureteroscopy, laser lithotripsy, left stent placement    ENDOSCOPY, COLON, DIAGNOSTIC         1998    NE DRAINAGE OF HEMATOMA/FLUID Right 6/20/2018     RIGHT BREAST INCISION AND DRAINAGE POSSIBLE WOUND VAC performed by Raffy Barrientos MD at [address] [place] [address] Right 7/11/2018     INCISION AND DRAINAGE RIGHT BREAST, WITH APPLICATION OF WOUND VAC (PATIENT HSS WOUND VAC WILL BRING IT)  performed by [facility]os MD at [facility]     Left leg    US BREAST NEEDLE BIOPSY LEFT   9/17/2020     US BREAST NEEDLE BIOPSY LEFT 9/17/2020 [facility]    US BREAST NEEDLE BIOPSY LEFT Left 7/1/2021     US BREAST NEEDLE BIOPSY LEFT 7/1/2021 [facility]            Current Facility-Administered Medications          Current Outpatient Medications   Medication Sig Dispense Refill    vitamin D (ERGOCALCIFEROL) 1.25 MG (32254 UT) CAPS capsule Take 1 capsule by mouth once a week 12 capsule 1    escitalopram (LEXAPRO) 10 MG tablet TAKE 1 TABLET BY MOUTH DAILY 90 tablet 1      No current facility-administered medications for this visit.            Family History         Family History   Problem Relation Age of Onset    Diabetes Maternal Grandmother      High Blood Pressure Maternal Grandmother      High Cholesterol Maternal Grandmother      Breast Cancer Maternal Grandmother 79    High Cholesterol Mother      Breast Cancer Mother 54    Colon Cancer Mother      Stomach Cancer Mother      Cancer Mother           stomach, colon, intestine    Cancer Maternal Grandfather 61         throat         Ashkenazi Yazidism Ancestry: No           Allergies   Allergen Reactions    Vancomycin Nausea And Vomiting       Red man syndrome, had high fever and upset stomach         Social History               Socioeconomic History    Marital status: Single       Spouse name: Not on file    Number of children: Not on file    Years of education: Not on file    Highest education level: Not on file   Occupational History    Not on file   Tobacco Use    Smoking status: Former Smoker       Years: 4.00       Quit date: 2008       Years since quittin.8    Smokeless tobacco: Never Used   Vaping Use    Vaping Use: Never used   Substance and Sexual Activity    Alcohol use: Yes       Alcohol/week: 0.0 standard drinks       Comment: rarely    Drug use: No    Sexual activity: Not on file   Other Topics Concern    Not on file   Social History Narrative    Not on file      Social Determinants of Health          Financial Resource Strain:     Difficulty of Paying Living Expenses:    Food Insecurity:     Worried About Running Out of Food in the Last Year:     [address] in the Last Year:    Transportation Needs:     Lack of Transportation (Medical):      Lack of Transportation (Non-Medical):    Physical Activity:     Days of Exercise per Week:     Minutes of Exercise per Session:    Stress:     Feeling of Stress :    Social Connections:     Frequency of Communication with Friends and Family:     Frequency of Social Gatherings with Friends and Family:     Attends Roman Catholic Services:     Active Member of Clubs or Organizations:     Attends Club or Organization Meetings:     Marital Status:    Intimate Partner Violence:     Fear of Current or Ex-Partner:  Emotionally Abused:     Physically Abused:     Sexually Abused:             Occupation:      Review of Systems     ECOG PS:  1  Covid vaccination? Yes Moderna  Flu Vaccination?  Yes     BP (!) 154/93 (Site: Right Upper Arm, Position: Sitting, Cuff Size: Medium Adult)   Pulse 89   Temp 97.1 °F (36.2 °C) (Temporal)   Resp 15   Ht 5' 3\" (1.6 m)   Wt (!) 360 lb (163.3 kg)   SpO2 95%   BMI 63.77 kg/m²   Physical Exam     MAMMOGRAM:  EXAMINATION:   DIAGNOSTIC DIGITAL BILATERAL BREASTS MAMMOGRAM WITH TOMOSYNTHESIS; TARGETED   ULTRASOUND OF THE LEFT BREAST, 7/1/2021 12:28 pm       TECHNIQUE:   Diagnostic mammography of the bilateral breasts was performed with   tomosynthesis.  2D standard and 3D tomosynthesis combination imaging   performed through both breasts.  Computer aided detection was utilized in the   interpretation of this exam.; Target ultrasound of the left breast was   performed.       Views:       COMPARISON:   09/17/2020 08/24/2020, 02/18/2020       HISTORY:   ORDERING SYSTEM PROVIDED HISTORY: Abnormal breast finding   TECHNOLOGIST PROVIDED HISTORY:   Reason for exam:->yearly mammogram - also doing a follow up ultrasound same   day from an abnormal MBI       FINDINGS:   Right breast appears unremarkable.       On the left, mammographic images at the site of the clip demonstrate a   heterogeneous density.  This corresponds to a ill-defined hypoechoic region   at the 2 o'clock position.  It maximally measures 2.0 cm.  It is not   significantly different than the study 08/24/2020.  Prior pathology was   reviewed.           Impression   Based on the ultrasound appearance, elevated Tyrer Cuzick score, and patient   inability to tolerate MRI exam, recommend ultrasound-guided biopsy       BIRADS:   BIRADS - CATEGORY 0       Incomplete: Needs Additional Imaging Evaluation       OVERALL ASSESSMENT - INCOMPLETE:NEED ADDITIONAL IMAGING EVALUATION.       A letter of notification will be sent to the patient regarding the results.       RISK ASSESSMENT:       During this patient's visit, information obtained was used to generate a   lifetime risk assessment using the Tyrer-Cuzick model (also called the RICARDO   International Breast Cancer Intervention study Breast Cancer Risk Evaluation   Tool).       LIFETIME RISK:       Patient has a Tyrer-Cuzick score of: 30.1%               ULTRASOUND:  EXAMINATION:   DIAGNOSTIC DIGITAL BILATERAL BREASTS MAMMOGRAM WITH TOMOSYNTHESIS; TARGETED   ULTRASOUND OF THE LEFT BREAST, 7/1/2021 12:28 pm       TECHNIQUE:   Diagnostic mammography of the bilateral breasts was performed with   tomosynthesis.  2D standard and 3D tomosynthesis combination imaging   performed through both breasts.  Computer aided detection was utilized in the   interpretation of this exam.; Target ultrasound of the left breast was   performed.       Views:       COMPARISON:   09/17/2020 08/24/2020, 02/18/2020       HISTORY:   ORDERING SYSTEM PROVIDED HISTORY: Abnormal breast finding   TECHNOLOGIST PROVIDED HISTORY:   Reason for exam:->yearly mammogram - also doing a follow up ultrasound same   day from an abnormal MBI       FINDINGS:   Right breast appears unremarkable.       On the left, mammographic images at the site of the clip demonstrate a   heterogeneous density.  This corresponds to a ill-defined hypoechoic region   at the 2 o'clock position.  It maximally measures 2.0 cm.  It is not   significantly different than the study 08/24/2020. Yonny Forrest pathology was   reviewed.           Impression   Based on the ultrasound appearance, elevated Tyrer Cuzick score, and patient   inability to tolerate MRI exam, recommend ultrasound-guided biopsy       BIRADS:   BIRADS - CATEGORY 0       Incomplete: Needs Additional Imaging Evaluation          PATHOLOGY:    Diagnosis:   Left breast, 2:00 core needle biopsy: Invasive, moderately differentiated   mammary carcinoma (grade 2)     Comment:     Microscopic examination shows an invasive carcinoma with   linear growth pattern dispersed throughout fibrous stroma.  The tumor has   a [place] histologic score of 3 (tubule formation) +2 (nuclear   pleomorphism) +1 (mitotic count) = 6.  The tumor cells are immunoreactive   with pankeratin and E-cadherin which favors ductal origin.  The p63   highlights myoepithelial cells in benign ducts.  Intradepartmental   consultation is obtained. Breast Cancer Marker Studies:     Estrogen Receptors (ER):   -Positive (>10%of cells demonstrate nuclear positivity):   Percentage of cells positive: 60%   Intensity: Strong     Progesterone Receptors (CA):   -Positive:   Percentage of cells positive: 60%   Intensity: Strong     Hormone receptor studies are performed by immunohistochemistry on   formalin-fixed, paraffin-embedded tissue (Roche Benchmark Immunostainer,   [place] anti-ER clone [id number], anti-CA clone [id number], polymer-based detection   chemistry). ER and CA are evaluated based on the percentage of cells   showing nuclear staining with >1% considered positive for each. Her-2/markie (c-erb B-2) protein expression: Negative/1+      ASSESSMENT/PLAN:  Left breast cancer, mammary carcinoma, ER/CA+ HER2- grade 2  --CBC, CMP, CXR  --referral to plastic surgery for options and possible [place] mastectomy  --US axilla  --I had a long discussion with Glenny Rachael about her breast size and her obesity and options for surgery. I am concerned about lumpectomy having radiation given her size. I was also explained to her that she is at limited options for reconstruction given her size we will have her see Dr. David Urrutia to go over options. I discussed that she might opt for [place] mastectomy as it will still preserve her breast mound but also eliminate the need for radiation depending on final pathology.   This case will also be discussed at multidisciplinary tumor board.  Melody Nevarez MD, MSc, FACS  7/9/2021  2:44 PM

## 2021-08-18 ENCOUNTER — HOSPITAL ENCOUNTER (OUTPATIENT)
Dept: NUCLEAR MEDICINE | Age: 46
Discharge: HOME | End: 2021-08-20
Payer: MEDICAID

## 2021-08-18 ENCOUNTER — ANESTHESIA EVENT (OUTPATIENT)
Dept: OPERATING ROOM | Age: 46
End: 2021-08-18
Payer: MEDICAID

## 2021-08-18 ENCOUNTER — ANESTHESIA (OUTPATIENT)
Dept: OPERATING ROOM | Age: 46
End: 2021-08-18
Payer: MEDICAID

## 2021-08-18 ENCOUNTER — HOSPITAL ENCOUNTER (OUTPATIENT)
Age: 46
Setting detail: OBSERVATION
Discharge: HOME HEALTH | End: 2021-08-19
Attending: SURGERY | Admitting: SURGERY
Payer: MEDICAID

## 2021-08-18 VITALS
OXYGEN SATURATION: 90 % | TEMPERATURE: 99.7 F | RESPIRATION RATE: 25 BRPM | DIASTOLIC BLOOD PRESSURE: 67 MMHG | SYSTOLIC BLOOD PRESSURE: 131 MMHG

## 2021-08-18 DIAGNOSIS — G89.18 POST-OP PAIN: ICD-10-CM

## 2021-08-18 DIAGNOSIS — C50.912 MALIGNANT NEOPLASM OF LEFT FEMALE BREAST, UNSPECIFIED ESTROGEN RECEPTOR STATUS, UNSPECIFIED SITE OF BREAST (HCC): ICD-10-CM

## 2021-08-18 DIAGNOSIS — Z01.818 PRE-OP EXAM: Primary | ICD-10-CM

## 2021-08-18 PROBLEM — Z17.0 MALIGNANT NEOPLASM OF UPPER-OUTER QUADRANT OF LEFT BREAST IN FEMALE, ESTROGEN RECEPTOR POSITIVE (HCC): Status: ACTIVE | Noted: 2021-08-18

## 2021-08-18 PROBLEM — C50.412 MALIGNANT NEOPLASM OF UPPER-OUTER QUADRANT OF LEFT BREAST IN FEMALE, ESTROGEN RECEPTOR POSITIVE (HCC): Status: ACTIVE | Noted: 2021-08-18

## 2021-08-18 PROBLEM — Z90.10 ACUTE PAIN AFTER MASTECTOMY: Status: ACTIVE | Noted: 2021-08-18

## 2021-08-18 LAB
BASOPHILS ABSOLUTE: 0.05 E9/L (ref 0–0.2)
BASOPHILS RELATIVE PERCENT: 0.4 % (ref 0–2)
EOSINOPHILS ABSOLUTE: 0.15 E9/L (ref 0.05–0.5)
EOSINOPHILS RELATIVE PERCENT: 1.2 % (ref 0–6)
HCG, URINE, POC: NEGATIVE
HCT VFR BLD CALC: 42.5 % (ref 34–48)
HEMOGLOBIN: 13.7 G/DL (ref 11.5–15.5)
IMMATURE GRANULOCYTES #: 0.05 E9/L
IMMATURE GRANULOCYTES %: 0.4 % (ref 0–5)
LYMPHOCYTES ABSOLUTE: 3.31 E9/L (ref 1.5–4)
LYMPHOCYTES RELATIVE PERCENT: 25.4 % (ref 20–42)
Lab: NORMAL
MCH RBC QN AUTO: 27.3 PG (ref 26–35)
MCHC RBC AUTO-ENTMCNC: 32.2 % (ref 32–34.5)
MCV RBC AUTO: 84.8 FL (ref 80–99.9)
MONOCYTES ABSOLUTE: 0.63 E9/L (ref 0.1–0.95)
MONOCYTES RELATIVE PERCENT: 4.8 % (ref 2–12)
NEGATIVE QC PASS/FAIL: NORMAL
NEUTROPHILS ABSOLUTE: 8.85 E9/L (ref 1.8–7.3)
NEUTROPHILS RELATIVE PERCENT: 67.8 % (ref 43–80)
PDW BLD-RTO: 14.6 FL (ref 11.5–15)
PLATELET # BLD: 384 E9/L (ref 130–450)
PMV BLD AUTO: 10 FL (ref 7–12)
POSITIVE QC PASS/FAIL: NORMAL
RBC # BLD: 5.01 E12/L (ref 3.5–5.5)
WBC # BLD: 13 E9/L (ref 4.5–11.5)

## 2021-08-18 PROCEDURE — 6360000002 HC RX W HCPCS: Performed by: SURGERY

## 2021-08-18 PROCEDURE — 3600000013 HC SURGERY LEVEL 3 ADDTL 15MIN: Performed by: SURGERY

## 2021-08-18 PROCEDURE — 6360000002 HC RX W HCPCS

## 2021-08-18 PROCEDURE — 38525 BIOPSY/REMOVAL LYMPH NODES: CPT | Performed by: SURGERY

## 2021-08-18 PROCEDURE — A9520 TC99 TILMANOCEPT DIAG 0.5MCI: HCPCS | Performed by: RADIOLOGY

## 2021-08-18 PROCEDURE — 6370000000 HC RX 637 (ALT 250 FOR IP)

## 2021-08-18 PROCEDURE — 3700000001 HC ADD 15 MINUTES (ANESTHESIA): Performed by: SURGERY

## 2021-08-18 PROCEDURE — 38900 IO MAP OF SENT LYMPH NODE: CPT | Performed by: SURGERY

## 2021-08-18 PROCEDURE — 2500000003 HC RX 250 WO HCPCS: Performed by: SURGERY

## 2021-08-18 PROCEDURE — 2580000003 HC RX 258

## 2021-08-18 PROCEDURE — 88305 TISSUE EXAM BY PATHOLOGIST: CPT

## 2021-08-18 PROCEDURE — 88307 TISSUE EXAM BY PATHOLOGIST: CPT

## 2021-08-18 PROCEDURE — 2580000003 HC RX 258: Performed by: SURGERY

## 2021-08-18 PROCEDURE — 19303 MAST SIMPLE COMPLETE: CPT | Performed by: SURGERY

## 2021-08-18 PROCEDURE — 3700000000 HC ANESTHESIA ATTENDED CARE: Performed by: SURGERY

## 2021-08-18 PROCEDURE — 7100000001 HC PACU RECOVERY - ADDTL 15 MIN: Performed by: SURGERY

## 2021-08-18 PROCEDURE — 38792 RA TRACER ID OF SENTINL NODE: CPT

## 2021-08-18 PROCEDURE — 7100000000 HC PACU RECOVERY - FIRST 15 MIN: Performed by: SURGERY

## 2021-08-18 PROCEDURE — 2709999900 HC NON-CHARGEABLE SUPPLY: Performed by: SURGERY

## 2021-08-18 PROCEDURE — 85025 COMPLETE CBC W/AUTO DIFF WBC: CPT

## 2021-08-18 PROCEDURE — 36415 COLL VENOUS BLD VENIPUNCTURE: CPT

## 2021-08-18 PROCEDURE — 3430000000 HC RX DIAGNOSTIC RADIOPHARMACEUTICAL: Performed by: RADIOLOGY

## 2021-08-18 PROCEDURE — 88342 IMHCHEM/IMCYTCHM 1ST ANTB: CPT

## 2021-08-18 PROCEDURE — 94660 CPAP INITIATION&MGMT: CPT

## 2021-08-18 PROCEDURE — G0378 HOSPITAL OBSERVATION PER HR: HCPCS

## 2021-08-18 PROCEDURE — 2780000010 HC IMPLANT OTHER: Performed by: SURGERY

## 2021-08-18 PROCEDURE — 6360000002 HC RX W HCPCS: Performed by: ANESTHESIOLOGY

## 2021-08-18 PROCEDURE — 6370000000 HC RX 637 (ALT 250 FOR IP): Performed by: SURGERY

## 2021-08-18 PROCEDURE — 3600000003 HC SURGERY LEVEL 3 BASE: Performed by: SURGERY

## 2021-08-18 PROCEDURE — 88341 IMHCHEM/IMCYTCHM EA ADD ANTB: CPT

## 2021-08-18 PROCEDURE — 2500000003 HC RX 250 WO HCPCS

## 2021-08-18 PROCEDURE — 2720000010 HC SURG SUPPLY STERILE: Performed by: SURGERY

## 2021-08-18 RX ORDER — MIDAZOLAM HYDROCHLORIDE 1 MG/ML
INJECTION INTRAMUSCULAR; INTRAVENOUS
Status: COMPLETED
Start: 2021-08-18 | End: 2021-08-18

## 2021-08-18 RX ORDER — BUPIVACAINE HYDROCHLORIDE AND EPINEPHRINE 2.5; 5 MG/ML; UG/ML
INJECTION, SOLUTION EPIDURAL; INFILTRATION; INTRACAUDAL; PERINEURAL PRN
Status: DISCONTINUED | OUTPATIENT
Start: 2021-08-18 | End: 2021-08-18 | Stop reason: ALTCHOICE

## 2021-08-18 RX ORDER — ONDANSETRON 4 MG/1
4 TABLET, ORALLY DISINTEGRATING ORAL EVERY 8 HOURS PRN
Status: DISCONTINUED | OUTPATIENT
Start: 2021-08-18 | End: 2021-08-19 | Stop reason: HOSPADM

## 2021-08-18 RX ORDER — MORPHINE SULFATE 2 MG/ML
2 INJECTION, SOLUTION INTRAMUSCULAR; INTRAVENOUS EVERY 5 MIN PRN
Status: DISCONTINUED | OUTPATIENT
Start: 2021-08-18 | End: 2021-08-18 | Stop reason: HOSPADM

## 2021-08-18 RX ORDER — HYDROCODONE BITARTRATE AND ACETAMINOPHEN 5; 325 MG/1; MG/1
2 TABLET ORAL PRN
Status: DISCONTINUED | OUTPATIENT
Start: 2021-08-18 | End: 2021-08-18 | Stop reason: HOSPADM

## 2021-08-18 RX ORDER — MEPERIDINE HYDROCHLORIDE 25 MG/ML
12.5 INJECTION INTRAMUSCULAR; INTRAVENOUS; SUBCUTANEOUS EVERY 5 MIN PRN
Status: DISCONTINUED | OUTPATIENT
Start: 2021-08-18 | End: 2021-08-18 | Stop reason: HOSPADM

## 2021-08-18 RX ORDER — SODIUM CHLORIDE 9 MG/ML
25 INJECTION, SOLUTION INTRAVENOUS PRN
Status: DISCONTINUED | OUTPATIENT
Start: 2021-08-18 | End: 2021-08-19 | Stop reason: HOSPADM

## 2021-08-18 RX ORDER — PROPOFOL 10 MG/ML
INJECTION, EMULSION INTRAVENOUS PRN
Status: DISCONTINUED | OUTPATIENT
Start: 2021-08-18 | End: 2021-08-18 | Stop reason: SDUPTHER

## 2021-08-18 RX ORDER — SODIUM CHLORIDE, SODIUM LACTATE, POTASSIUM CHLORIDE, CALCIUM CHLORIDE 600; 310; 30; 20 MG/100ML; MG/100ML; MG/100ML; MG/100ML
INJECTION, SOLUTION INTRAVENOUS CONTINUOUS PRN
Status: DISCONTINUED | OUTPATIENT
Start: 2021-08-18 | End: 2021-08-18 | Stop reason: SDUPTHER

## 2021-08-18 RX ORDER — SODIUM CHLORIDE 9 MG/ML
25 INJECTION, SOLUTION INTRAVENOUS PRN
Status: DISCONTINUED | OUTPATIENT
Start: 2021-08-18 | End: 2021-08-18 | Stop reason: HOSPADM

## 2021-08-18 RX ORDER — GLYCOPYRROLATE 1 MG/5 ML
SYRINGE (ML) INTRAVENOUS PRN
Status: DISCONTINUED | OUTPATIENT
Start: 2021-08-18 | End: 2021-08-18 | Stop reason: SDUPTHER

## 2021-08-18 RX ORDER — HYDROCODONE BITARTRATE AND ACETAMINOPHEN 5; 325 MG/1; MG/1
1 TABLET ORAL EVERY 6 HOURS PRN
Qty: 20 TABLET | Refills: 0 | Status: SHIPPED | OUTPATIENT
Start: 2021-08-18 | End: 2021-08-23

## 2021-08-18 RX ORDER — ONDANSETRON 2 MG/ML
4 INJECTION INTRAMUSCULAR; INTRAVENOUS EVERY 6 HOURS PRN
Status: DISCONTINUED | OUTPATIENT
Start: 2021-08-18 | End: 2021-08-19 | Stop reason: HOSPADM

## 2021-08-18 RX ORDER — SODIUM CHLORIDE 0.9 % (FLUSH) 0.9 %
10 SYRINGE (ML) INJECTION EVERY 12 HOURS SCHEDULED
Status: DISCONTINUED | OUTPATIENT
Start: 2021-08-18 | End: 2021-08-18 | Stop reason: HOSPADM

## 2021-08-18 RX ORDER — HYDROCODONE BITARTRATE AND ACETAMINOPHEN 5; 325 MG/1; MG/1
1 TABLET ORAL PRN
Status: DISCONTINUED | OUTPATIENT
Start: 2021-08-18 | End: 2021-08-18 | Stop reason: HOSPADM

## 2021-08-18 RX ORDER — METHOCARBAMOL 500 MG/1
500 TABLET, FILM COATED ORAL 4 TIMES DAILY
Status: DISCONTINUED | OUTPATIENT
Start: 2021-08-18 | End: 2021-08-19 | Stop reason: HOSPADM

## 2021-08-18 RX ORDER — LIDOCAINE HYDROCHLORIDE 20 MG/ML
INJECTION, SOLUTION INTRAVENOUS PRN
Status: DISCONTINUED | OUTPATIENT
Start: 2021-08-18 | End: 2021-08-18 | Stop reason: SDUPTHER

## 2021-08-18 RX ORDER — OXYCODONE HYDROCHLORIDE 10 MG/1
10 TABLET ORAL EVERY 4 HOURS PRN
Status: DISCONTINUED | OUTPATIENT
Start: 2021-08-18 | End: 2021-08-19 | Stop reason: HOSPADM

## 2021-08-18 RX ORDER — SODIUM CHLORIDE 0.9 % (FLUSH) 0.9 %
10 SYRINGE (ML) INJECTION PRN
Status: DISCONTINUED | OUTPATIENT
Start: 2021-08-18 | End: 2021-08-18 | Stop reason: HOSPADM

## 2021-08-18 RX ORDER — ACETAMINOPHEN 325 MG/1
650 TABLET ORAL EVERY 6 HOURS
Status: DISCONTINUED | OUTPATIENT
Start: 2021-08-18 | End: 2021-08-19 | Stop reason: HOSPADM

## 2021-08-18 RX ORDER — SODIUM CHLORIDE 9 MG/ML
INJECTION, SOLUTION INTRAVENOUS CONTINUOUS
Status: DISCONTINUED | OUTPATIENT
Start: 2021-08-18 | End: 2021-08-18

## 2021-08-18 RX ORDER — METHYLENE BLUE 10 MG/ML
INJECTION INTRAVENOUS PRN
Status: DISCONTINUED | OUTPATIENT
Start: 2021-08-18 | End: 2021-08-18 | Stop reason: ALTCHOICE

## 2021-08-18 RX ORDER — ALBUTEROL SULFATE 90 UG/1
AEROSOL, METERED RESPIRATORY (INHALATION) PRN
Status: DISCONTINUED | OUTPATIENT
Start: 2021-08-18 | End: 2021-08-18 | Stop reason: SDUPTHER

## 2021-08-18 RX ORDER — FLUTICASONE PROPIONATE 50 MCG
2 SPRAY, SUSPENSION (ML) NASAL DAILY
Status: DISCONTINUED | OUTPATIENT
Start: 2021-08-19 | End: 2021-08-19 | Stop reason: HOSPADM

## 2021-08-18 RX ORDER — ONDANSETRON 2 MG/ML
INJECTION INTRAMUSCULAR; INTRAVENOUS PRN
Status: DISCONTINUED | OUTPATIENT
Start: 2021-08-18 | End: 2021-08-18 | Stop reason: SDUPTHER

## 2021-08-18 RX ORDER — NEOSTIGMINE METHYLSULFATE 1 MG/ML
INJECTION, SOLUTION INTRAVENOUS PRN
Status: DISCONTINUED | OUTPATIENT
Start: 2021-08-18 | End: 2021-08-18 | Stop reason: SDUPTHER

## 2021-08-18 RX ORDER — LABETALOL HYDROCHLORIDE 5 MG/ML
INJECTION, SOLUTION INTRAVENOUS PRN
Status: DISCONTINUED | OUTPATIENT
Start: 2021-08-18 | End: 2021-08-18 | Stop reason: SDUPTHER

## 2021-08-18 RX ORDER — MIDAZOLAM HYDROCHLORIDE 2 MG/2ML
2 INJECTION, SOLUTION INTRAMUSCULAR; INTRAVENOUS ONCE
Status: COMPLETED | OUTPATIENT
Start: 2021-08-18 | End: 2021-08-18

## 2021-08-18 RX ORDER — ESCITALOPRAM OXALATE 10 MG/1
10 TABLET ORAL NIGHTLY
Status: DISCONTINUED | OUTPATIENT
Start: 2021-08-18 | End: 2021-08-19 | Stop reason: HOSPADM

## 2021-08-18 RX ORDER — SODIUM CHLORIDE 9 MG/ML
INJECTION, SOLUTION INTRAVENOUS CONTINUOUS PRN
Status: DISCONTINUED | OUTPATIENT
Start: 2021-08-18 | End: 2021-08-18 | Stop reason: SDUPTHER

## 2021-08-18 RX ORDER — FENTANYL CITRATE 50 UG/ML
INJECTION, SOLUTION INTRAMUSCULAR; INTRAVENOUS PRN
Status: DISCONTINUED | OUTPATIENT
Start: 2021-08-18 | End: 2021-08-18 | Stop reason: SDUPTHER

## 2021-08-18 RX ORDER — MORPHINE SULFATE 2 MG/ML
1 INJECTION, SOLUTION INTRAMUSCULAR; INTRAVENOUS EVERY 5 MIN PRN
Status: DISCONTINUED | OUTPATIENT
Start: 2021-08-18 | End: 2021-08-18 | Stop reason: HOSPADM

## 2021-08-18 RX ORDER — ROCURONIUM BROMIDE 10 MG/ML
INJECTION, SOLUTION INTRAVENOUS PRN
Status: DISCONTINUED | OUTPATIENT
Start: 2021-08-18 | End: 2021-08-18 | Stop reason: SDUPTHER

## 2021-08-18 RX ORDER — SODIUM CHLORIDE 0.9 % (FLUSH) 0.9 %
10 SYRINGE (ML) INJECTION PRN
Status: DISCONTINUED | OUTPATIENT
Start: 2021-08-18 | End: 2021-08-19 | Stop reason: HOSPADM

## 2021-08-18 RX ORDER — SODIUM CHLORIDE 0.9 % (FLUSH) 0.9 %
10 SYRINGE (ML) INJECTION EVERY 12 HOURS SCHEDULED
Status: DISCONTINUED | OUTPATIENT
Start: 2021-08-18 | End: 2021-08-19 | Stop reason: HOSPADM

## 2021-08-18 RX ORDER — OXYCODONE HYDROCHLORIDE 5 MG/1
5 TABLET ORAL EVERY 4 HOURS PRN
Status: DISCONTINUED | OUTPATIENT
Start: 2021-08-18 | End: 2021-08-19 | Stop reason: HOSPADM

## 2021-08-18 RX ORDER — DEXAMETHASONE SODIUM PHOSPHATE 10 MG/ML
INJECTION INTRAMUSCULAR; INTRAVENOUS PRN
Status: DISCONTINUED | OUTPATIENT
Start: 2021-08-18 | End: 2021-08-18 | Stop reason: SDUPTHER

## 2021-08-18 RX ORDER — PROMETHAZINE HYDROCHLORIDE 25 MG/ML
6.25 INJECTION, SOLUTION INTRAMUSCULAR; INTRAVENOUS EVERY 10 MIN PRN
Status: DISCONTINUED | OUTPATIENT
Start: 2021-08-18 | End: 2021-08-18 | Stop reason: HOSPADM

## 2021-08-18 RX ORDER — METHOCARBAMOL 500 MG/1
500 TABLET, FILM COATED ORAL 4 TIMES DAILY
Qty: 40 TABLET | Refills: 0 | Status: SHIPPED | OUTPATIENT
Start: 2021-08-18 | End: 2021-08-28

## 2021-08-18 RX ADMIN — PROPOFOL 200 MG: 10 INJECTION, EMULSION INTRAVENOUS at 17:12

## 2021-08-18 RX ADMIN — HYDROMORPHONE HYDROCHLORIDE 0.5 MG: 1 INJECTION, SOLUTION INTRAMUSCULAR; INTRAVENOUS; SUBCUTANEOUS at 21:52

## 2021-08-18 RX ADMIN — ROCURONIUM BROMIDE 15 MG: 10 INJECTION, SOLUTION INTRAVENOUS at 17:39

## 2021-08-18 RX ADMIN — SODIUM CHLORIDE, POTASSIUM CHLORIDE, SODIUM LACTATE AND CALCIUM CHLORIDE: 600; 310; 30; 20 INJECTION, SOLUTION INTRAVENOUS at 17:50

## 2021-08-18 RX ADMIN — ENOXAPARIN SODIUM 40 MG: 40 INJECTION SUBCUTANEOUS at 11:20

## 2021-08-18 RX ADMIN — FENTANYL CITRATE 50 MCG: 50 INJECTION, SOLUTION INTRAMUSCULAR; INTRAVENOUS at 19:39

## 2021-08-18 RX ADMIN — Medication 3 MG: at 20:40

## 2021-08-18 RX ADMIN — ONDANSETRON HYDROCHLORIDE 4 MG: 2 INJECTION, SOLUTION INTRAMUSCULAR; INTRAVENOUS at 20:28

## 2021-08-18 RX ADMIN — ACETAMINOPHEN 650 MG: 325 TABLET ORAL at 23:47

## 2021-08-18 RX ADMIN — ROCURONIUM BROMIDE 10 MG: 10 INJECTION, SOLUTION INTRAVENOUS at 19:29

## 2021-08-18 RX ADMIN — METHOCARBAMOL TABLETS 500 MG: 500 TABLET, COATED ORAL at 23:47

## 2021-08-18 RX ADMIN — MIDAZOLAM 2 MG: 1 INJECTION INTRAMUSCULAR; INTRAVENOUS at 16:51

## 2021-08-18 RX ADMIN — Medication 10 ML: at 23:49

## 2021-08-18 RX ADMIN — FENTANYL CITRATE 50 MCG: 50 INJECTION, SOLUTION INTRAMUSCULAR; INTRAVENOUS at 17:39

## 2021-08-18 RX ADMIN — DEXAMETHASONE SODIUM PHOSPHATE 10 MG: 10 INJECTION INTRAMUSCULAR; INTRAVENOUS at 17:12

## 2021-08-18 RX ADMIN — LABETALOL HYDROCHLORIDE 5 MG: 5 INJECTION INTRAVENOUS at 17:47

## 2021-08-18 RX ADMIN — SODIUM CHLORIDE: 9 INJECTION, SOLUTION INTRAVENOUS at 11:18

## 2021-08-18 RX ADMIN — ROCURONIUM BROMIDE 35 MG: 10 INJECTION, SOLUTION INTRAVENOUS at 17:13

## 2021-08-18 RX ADMIN — Medication 0.6 MG: at 20:40

## 2021-08-18 RX ADMIN — SODIUM CHLORIDE: 9 INJECTION, SOLUTION INTRAVENOUS at 17:08

## 2021-08-18 RX ADMIN — FENTANYL CITRATE 50 MCG: 50 INJECTION, SOLUTION INTRAMUSCULAR; INTRAVENOUS at 18:27

## 2021-08-18 RX ADMIN — ROCURONIUM BROMIDE 10 MG: 10 INJECTION, SOLUTION INTRAVENOUS at 19:40

## 2021-08-18 RX ADMIN — FENTANYL CITRATE 50 MCG: 50 INJECTION, SOLUTION INTRAMUSCULAR; INTRAVENOUS at 20:35

## 2021-08-18 RX ADMIN — LIDOCAINE HYDROCHLORIDE 60 MG: 20 INJECTION, SOLUTION INTRAVENOUS at 17:12

## 2021-08-18 RX ADMIN — ESCITALOPRAM 10 MG: 10 TABLET, FILM COATED ORAL at 23:47

## 2021-08-18 RX ADMIN — LABETALOL HYDROCHLORIDE 5 MG: 5 INJECTION INTRAVENOUS at 19:49

## 2021-08-18 RX ADMIN — TILMANOCEPT 0.54 MILLICURIE: KIT at 12:00

## 2021-08-18 RX ADMIN — FENTANYL CITRATE 50 MCG: 50 INJECTION, SOLUTION INTRAMUSCULAR; INTRAVENOUS at 20:18

## 2021-08-18 RX ADMIN — HYDROMORPHONE HYDROCHLORIDE 0.5 MG: 1 INJECTION, SOLUTION INTRAMUSCULAR; INTRAVENOUS; SUBCUTANEOUS at 21:22

## 2021-08-18 RX ADMIN — ALBUTEROL SULFATE 2 PUFF: 90 AEROSOL, METERED RESPIRATORY (INHALATION) at 19:59

## 2021-08-18 RX ADMIN — FENTANYL CITRATE 100 MCG: 50 INJECTION, SOLUTION INTRAMUSCULAR; INTRAVENOUS at 17:12

## 2021-08-18 RX ADMIN — CEFAZOLIN 3000 MG: 1 INJECTION, POWDER, FOR SOLUTION INTRAMUSCULAR; INTRAVENOUS; PARENTERAL at 17:23

## 2021-08-18 RX ADMIN — MIDAZOLAM HYDROCHLORIDE 2 MG: 2 INJECTION, SOLUTION INTRAMUSCULAR; INTRAVENOUS at 16:51

## 2021-08-18 RX ADMIN — FENTANYL CITRATE 50 MCG: 50 INJECTION, SOLUTION INTRAMUSCULAR; INTRAVENOUS at 18:53

## 2021-08-18 RX ADMIN — FENTANYL CITRATE 50 MCG: 50 INJECTION, SOLUTION INTRAMUSCULAR; INTRAVENOUS at 17:56

## 2021-08-18 ASSESSMENT — PULMONARY FUNCTION TESTS
PIF_VALUE: 32
PIF_VALUE: 32
PIF_VALUE: 38
PIF_VALUE: 31
PIF_VALUE: 32
PIF_VALUE: 11
PIF_VALUE: 36
PIF_VALUE: 31
PIF_VALUE: 33
PIF_VALUE: 31
PIF_VALUE: 32
PIF_VALUE: 0
PIF_VALUE: 20
PIF_VALUE: 32
PIF_VALUE: 33
PIF_VALUE: 32
PIF_VALUE: 32
PIF_VALUE: 33
PIF_VALUE: 31
PIF_VALUE: 32
PIF_VALUE: 31
PIF_VALUE: 2
PIF_VALUE: 20
PIF_VALUE: 5
PIF_VALUE: 32
PIF_VALUE: 36
PIF_VALUE: 33
PIF_VALUE: 32
PIF_VALUE: 34
PIF_VALUE: 32
PIF_VALUE: 28
PIF_VALUE: 33
PIF_VALUE: 1
PIF_VALUE: 27
PIF_VALUE: 31
PIF_VALUE: 31
PIF_VALUE: 32
PIF_VALUE: 31
PIF_VALUE: 25
PIF_VALUE: 32
PIF_VALUE: 35
PIF_VALUE: 36
PIF_VALUE: 32
PIF_VALUE: 31
PIF_VALUE: 32
PIF_VALUE: 31
PIF_VALUE: 10
PIF_VALUE: 34
PIF_VALUE: 31
PIF_VALUE: 32
PIF_VALUE: 36
PIF_VALUE: 32
PIF_VALUE: 36
PIF_VALUE: 31
PIF_VALUE: 31
PIF_VALUE: 36
PIF_VALUE: 21
PIF_VALUE: 32
PIF_VALUE: 32
PIF_VALUE: 31
PIF_VALUE: 36
PIF_VALUE: 32
PIF_VALUE: 31
PIF_VALUE: 34
PIF_VALUE: 32
PIF_VALUE: 4
PIF_VALUE: 25
PIF_VALUE: 20
PIF_VALUE: 2
PIF_VALUE: 32
PIF_VALUE: 35
PIF_VALUE: 32
PIF_VALUE: 31
PIF_VALUE: 4
PIF_VALUE: 32
PIF_VALUE: 36
PIF_VALUE: 32
PIF_VALUE: 32
PIF_VALUE: 47
PIF_VALUE: 32
PIF_VALUE: 33
PIF_VALUE: 31
PIF_VALUE: 32
PIF_VALUE: 22
PIF_VALUE: 32
PIF_VALUE: 32
PIF_VALUE: 20
PIF_VALUE: 37
PIF_VALUE: 34
PIF_VALUE: 25
PIF_VALUE: 32
PIF_VALUE: 32
PIF_VALUE: 28
PIF_VALUE: 32
PIF_VALUE: 24
PIF_VALUE: 32
PIF_VALUE: 40
PIF_VALUE: 31
PIF_VALUE: 32
PIF_VALUE: 1
PIF_VALUE: 2
PIF_VALUE: 36
PIF_VALUE: 32
PIF_VALUE: 34
PIF_VALUE: 35
PIF_VALUE: 36
PIF_VALUE: 32
PIF_VALUE: 4
PIF_VALUE: 31
PIF_VALUE: 31
PIF_VALUE: 27
PIF_VALUE: 35
PIF_VALUE: 32
PIF_VALUE: 32
PIF_VALUE: 38
PIF_VALUE: 32
PIF_VALUE: 32
PIF_VALUE: 33
PIF_VALUE: 34
PIF_VALUE: 0
PIF_VALUE: 32
PIF_VALUE: 33
PIF_VALUE: 32
PIF_VALUE: 33
PIF_VALUE: 33
PIF_VALUE: 32
PIF_VALUE: 32
PIF_VALUE: 33
PIF_VALUE: 1
PIF_VALUE: 22
PIF_VALUE: 27
PIF_VALUE: 32
PIF_VALUE: 21
PIF_VALUE: 4
PIF_VALUE: 32
PIF_VALUE: 25
PIF_VALUE: 27
PIF_VALUE: 22
PIF_VALUE: 32
PIF_VALUE: 32
PIF_VALUE: 34
PIF_VALUE: 25
PIF_VALUE: 32
PIF_VALUE: 31
PIF_VALUE: 36
PIF_VALUE: 34
PIF_VALUE: 32
PIF_VALUE: 32
PIF_VALUE: 6
PIF_VALUE: 32
PIF_VALUE: 38
PIF_VALUE: 36
PIF_VALUE: 31
PIF_VALUE: 32
PIF_VALUE: 34
PIF_VALUE: 31
PIF_VALUE: 31
PIF_VALUE: 1
PIF_VALUE: 1
PIF_VALUE: 31
PIF_VALUE: 39
PIF_VALUE: 34
PIF_VALUE: 34
PIF_VALUE: 31
PIF_VALUE: 31
PIF_VALUE: 28
PIF_VALUE: 31
PIF_VALUE: 36
PIF_VALUE: 32
PIF_VALUE: 25
PIF_VALUE: 32
PIF_VALUE: 35
PIF_VALUE: 20
PIF_VALUE: 32
PIF_VALUE: 10
PIF_VALUE: 4
PIF_VALUE: 31
PIF_VALUE: 32
PIF_VALUE: 33
PIF_VALUE: 32
PIF_VALUE: 33
PIF_VALUE: 32
PIF_VALUE: 31
PIF_VALUE: 32
PIF_VALUE: 31
PIF_VALUE: 31
PIF_VALUE: 46
PIF_VALUE: 32
PIF_VALUE: 34
PIF_VALUE: 4
PIF_VALUE: 33
PIF_VALUE: 32
PIF_VALUE: 36
PIF_VALUE: 31
PIF_VALUE: 32
PIF_VALUE: 34
PIF_VALUE: 31
PIF_VALUE: 31
PIF_VALUE: 32
PIF_VALUE: 31
PIF_VALUE: 35
PIF_VALUE: 32
PIF_VALUE: 32
PIF_VALUE: 25
PIF_VALUE: 34
PIF_VALUE: 32

## 2021-08-18 ASSESSMENT — PAIN SCALES - GENERAL
PAINLEVEL_OUTOF10: 8
PAINLEVEL_OUTOF10: 5
PAINLEVEL_OUTOF10: 0
PAINLEVEL_OUTOF10: 4
PAINLEVEL_OUTOF10: 8

## 2021-08-18 ASSESSMENT — PAIN DESCRIPTION - DESCRIPTORS
DESCRIPTORS: ACHING;DISCOMFORT

## 2021-08-18 ASSESSMENT — PAIN - FUNCTIONAL ASSESSMENT: PAIN_FUNCTIONAL_ASSESSMENT: 0-10

## 2021-08-18 ASSESSMENT — PAIN DESCRIPTION - LOCATION
LOCATION: BREAST

## 2021-08-18 ASSESSMENT — PAIN DESCRIPTION - ORIENTATION
ORIENTATION: LOWER

## 2021-08-18 ASSESSMENT — PAIN DESCRIPTION - PAIN TYPE
TYPE: SURGICAL PAIN

## 2021-08-18 NOTE — ANESTHESIA PRE PROCEDURE
Department of Anesthesiology  Preprocedure Note       Name:  Aide Nieto   Age:  40 y.o.  :  1975                                          MRN:  [id number]         Date:  2021      Surgeon: Alexandrea Matamoros):  Zainab Sánchez MD    Procedure: Procedure(s):  Left breast      Medications prior to admission:   Prior to Admission medications    Medication Sig Start Date End Date Taking?  Authorizing Provider   amoxicillin (AMOXIL) 500 MG capsule Take 1 capsule by mouth 2 times daily for 10 days 21 Yes Katya Ferris DO   fluticasone (FLONASE) 50 MCG/ACT nasal spray 2 sprays by Each Nostril route daily 21  Yes Katya Ferris DO   vitamin D (ERGOCALCIFEROL) 1.25 MG (34914 UT) CAPS capsule Take 1 capsule by mouth once a week 21  Yes JIHAN Rhodes CNP   escitalopram (LEXAPRO) 10 MG tablet TAKE 1 TABLET BY MOUTH DAILY  Patient taking differently: Take 10 mg by mouth nightly  3/27/21  Yes JIHAN Rhodes CNP       Current medications:    Current Facility-Administered Medications   Medication Dose Route Frequency Provider Last Rate Last Admin    0.9 % sodium chloride infusion   Intravenous Continuous Zainab Sánchez  mL/hr at 21 1118 New Bag at 21 1118    0.9 % sodium chloride infusion  25 mL Intravenous PRN Zainab Sánchez MD        ceFAZolin (ANCEF) 3,000 mg in dextrose 5 % 100 mL IVPB  3,000 mg Intravenous On Call to [address], MD        sodium chloride flush 0.9 % injection 10 mL  10 mL Intravenous 2 times per day Zainab Sánchez MD        sodium chloride flush 0.9 % injection 10 mL  10 mL Intravenous PRN Zainab Sánchez MD         Facility-Administered Medications Ordered in Other Encounters   Medication Dose Route Frequency Provider Last Rate Last Admin    technetium Tc 99m tilmanocept (LYMPHOSEEK) injection 9.42 millicurie  086 micro curie Intradermal ONCE PRN Kenyatta Simpson DO   4.14 millicurie at 84/89/53 5593       Allergies: Allergies   Allergen Reactions    Vancomycin Nausea And Vomiting     Red man syndrome, had high fever and upset stomach       Problem List:    Patient Active Problem List   Diagnosis Code    Morbid obesity ([facility] [address] E66.01    Anxiety F41.9    Vitamin D deficiency E55.9    Stress incontinence N39.3    Obesity E66.9    Fatigue R53.83    Hypertriglyceridemia E78.1    Palpitations R00.2    Chronic pain of right knee M25.561, G89.29       Past Medical History:        Diagnosis Date    Abscess of right breast 2018    Anxiety     Breast abscess 2018    Fatigue     GERD (gastroesophageal reflux disease)     Hyperlipidemia     Obesity 2013       Past Surgical History:        Procedure Laterality Date    ANKLE FRACTURE SURGERY  2012    right with hardware, subsequently removed   [facility] Left 2020    BREAST BIOPSY Right 2018    CHOLECYSTECTOMY          CYSTOSCOPY  2014    retrograde pyelogram, ureteroscopy, laser lithotripsy, left stent placement    ENDOSCOPY, COLON, DIAGNOSTIC          AL DRAINAGE OF HEMATOMA/FLUID Right 2018    RIGHT BREAST INCISION AND DRAINAGE POSSIBLE WOUND VAC performed by Werner Reyes MD at [address] [place] [address] Right 2018    INCISION AND DRAINAGE RIGHT BREAST, WITH APPLICATION OF WOUND VAC (PATIENT HSS WOUND VAC WILL BRING IT)  performed by Werner Reyes MD at [facility]    Left leg    US BREAST NEEDLE BIOPSY LEFT  2020    US BREAST NEEDLE BIOPSY LEFT 2020 TRINA SAPP    US BREAST NEEDLE BIOPSY LEFT Left 2021    US BREAST NEEDLE BIOPSY LEFT 2021 TRINA SAPP       Social History:    Social History     Tobacco Use    Smoking status: Former Smoker     Years: 4.00     Quit date: 2008     Years since quittin.9    Smokeless tobacco: Never Used   Substance Use Topics    Alcohol use:  Yes     Alcohol/week: 0.0 standard drinks     Comment: rarely                                Counseling given: Not Answered      Vital Signs (Current):   Vitals:    08/16/21 0915 08/18/21 1100   BP:  (!) 169/76   Pulse:  86   Resp:  20   Temp:  97.7 °F (36.5 °C)   TempSrc:  Temporal   SpO2:  96%   Weight: (!) 360 lb (163.3 kg) (!) 360 lb (163.3 kg)   Height: 5' 3\" (1.6 m) 5' 3\" (1.6 m)                                              BP Readings from Last 3 Encounters:   08/18/21 (!) 169/76   08/09/21 116/68   07/14/21 122/86       NPO Status: Time of last liquid consumption: 2330                        Time of last solid consumption: 2330                        Date of last liquid consumption: 08/17/21                        Date of last solid food consumption: 08/17/21    BMI:   Wt Readings from Last 3 Encounters:   08/18/21 (!) 360 lb (163.3 kg)   08/09/21 (!) 360 lb (163.3 kg)   07/09/21 (!) 360 lb (163.3 kg)     Body mass index is 63.77 kg/m². CBC:   Lab Results   Component Value Date    WBC 13.0 08/18/2021    RBC 5.01 08/18/2021    HGB 13.7 08/18/2021    HCT 42.5 08/18/2021    MCV 84.8 08/18/2021    RDW 14.6 08/18/2021     08/18/2021       CMP:   Lab Results   Component Value Date     07/09/2021    K 3.7 07/09/2021    K 4.3 07/16/2018    CL 99 07/09/2021    CO2 23 07/09/2021    BUN 11 07/09/2021    CREATININE 0.7 07/09/2021    GFRAA >60 07/09/2021    LABGLOM >60 07/09/2021    GLUCOSE 96 07/09/2021    GLUCOSE 80 04/20/2012    PROT 7.6 07/09/2021    CALCIUM 8.9 07/09/2021    BILITOT 0.9 07/09/2021    ALKPHOS 86 07/09/2021    AST 16 07/09/2021    ALT 20 07/09/2021       POC Tests: No results for input(s): POCGLU, POCNA, POCK, POCCL, POCBUN, POCHEMO, POCHCT in the last 72 hours.     Coags:   Lab Results   Component Value Date    PROTIME 13.8 07/15/2018    INR 1.2 07/15/2018    APTT 28.1 05/06/2014       HCG (If Applicable):   Lab Results   Component Value Date    PREGTESTUR negative 07/11/2018        ABGs: No results found for: PHART, PO2ART, DRN6VJF, YEM7TCK, BEART, G9CDJPHZ     Type & Screen (If Applicable):  No results found for: LABABO, LABRH  Us Breast Limited Right  Radiology  Result Date: 6/1/2018  INDICATION: Right Ultrasound  HISTORY: The patient has no personal history of cancer. The patient has the following family history of breast cancer:  mother, at age 54, breast cancer and maternal grandmother, breast cancer, UNSURE OF AGE. ULTRASOUND TECHNIQUE: High-resolution real-time ultrasound scanning was performed. COMPARISON: The present examination has been compared to a prior imaging study performed at [address] [place] [address] on 04/18/2018. ULTRASOUND FINDINGS:   Finding 1: Sonography was performed in the right breast using a radial and anti-radial approach. There is a sonographic appearance consistent with edema and skin thickening seen in the right breast at 9 o'clock, at 10 o'clock and at 11 o'clock. This finding is most consistent with cellulitis. No drainable fluid collection seen. No sonographic evidence of suspicious solid or cystic mass lesions. No focal areas of suspicious atypical echogenicity. IMPRESSION: Sonographic appearance consistent with edema and skin thickening in the right breast is benign. Appropriate antibiotic therapy is advised. Screening mammogram in 3 months is recommended.  ======================================= BI-RADS Category 2:  Benign =======================================       Anesthesia Evaluation  Patient summary reviewed and Nursing notes reviewed no history of anesthetic complications:   Airway: Mallampati: I  TM distance: <3 FB   Neck ROM: full  Mouth opening: > = 3 FB Dental:      Comment: Patient denies loose, missing, chipped teeth    Pulmonary:normal exam                              ROS comment: Probable JUAN based on body habitus and h/o snoring;   Never had sleep study    Prior \"social smoker\"    Cardiovascular:Negative CV ROS  Exercise tolerance: good (>4 METS), Rhythm: regular  Rate: normal                    Neuro/Psych:   (+) depression/anxiety             GI/Hepatic/Renal:   (+) GERD:, morbid obesity          Endo/Other:                      ROS comment: S/p right abscess I&D on 6/20/18 Abdominal:   (+) obese,           Vascular: negative vascular ROS. Other Findings:               Anesthesia Plan      general     ASA 3       Induction: intravenous. BIS  MIPS: Postoperative opioids intended, Prophylactic antiemetics administered and Postoperative trial extubation. Anesthetic plan and risks discussed with patient. Plan discussed with CRNA.                   Damian Stubbs MD   8/18/2021

## 2021-08-18 NOTE — ANESTHESIA POSTPROCEDURE EVALUATION
Department of Anesthesiology  Postprocedure Note    Patient: Marvell Mortimer  MRN: [id number]  YOB: 1975  Date of evaluation: 8/18/2021  Time:  4:34 PM     Procedure Summary     Date: 08/18/21 Room / Location: [facility] OR  / [facility]    Anesthesia Start:  Anesthesia Stop:     Procedure: LEFT BREAST SIMPLE MASTECTOMY WITH SENTINEL LYMPH NODE BIOPSY,POSSSIBLE AXILLARY  LYMPH NODE DISSECTION Yaniv Traore (Left ) Diagnosis: (BREAST CANCER)    Surgeons: Jenna Suero MD Responsible Provider:     Anesthesia Type: general ASA Status: 3          Anesthesia Type: No value filed. Lui Phase I: Lui Score: 10    Lui Phase II:      Last vitals: Reviewed and per EMR flowsheets.        Anesthesia Post Evaluation    Patient location during evaluation: PACU  Patient participation: complete - patient participated  Level of consciousness: awake  Pain score: 3  Airway patency: patent  Nausea & Vomiting: no nausea and no vomiting  Complications: no  Cardiovascular status: blood pressure returned to baseline  Respiratory status: acceptable  Hydration status: euvolemic

## 2021-08-18 NOTE — INTERVAL H&P NOTE
Update History & Physical    The patient's History and Physical of July 9, 2021 was reviewed with the patient and I examined the patient. There was no change. The surgical site was confirmed by the patient and me. Plan: The risks, benefits, expected outcome, and alternative to the recommended procedure have been discussed with the patient. Patient understands and wants to proceed with the procedure.      Electronically signed by Uziel Alvarez MD on 8/18/2021 at 4:32 PM

## 2021-08-18 NOTE — PROGRESS NOTES
Dr. Kermit Sandifer requested [facility] be called to set up drain care. I called & spoke with on call Nurse & he stated to fax order to them & they will call patient tomorrow. Dr. Shelley Brandt saw patient in preop & ordered Versed 2mg IV. Patient medicated per order.

## 2021-08-18 NOTE — PROGRESS NOTES
COVID testing not done. Patient is fully vaccinated  Results of the test: na                                                          The patient verbally confirms that they did adhere to the self-quarantine guidelines. No signs or symptoms expressed or observed.

## 2021-08-19 VITALS
TEMPERATURE: 98.1 F | WEIGHT: 293 LBS | HEART RATE: 101 BPM | BODY MASS INDEX: 51.91 KG/M2 | DIASTOLIC BLOOD PRESSURE: 63 MMHG | OXYGEN SATURATION: 92 % | RESPIRATION RATE: 18 BRPM | SYSTOLIC BLOOD PRESSURE: 131 MMHG | HEIGHT: 63 IN

## 2021-08-19 PROCEDURE — G0378 HOSPITAL OBSERVATION PER HR: HCPCS

## 2021-08-19 PROCEDURE — 99024 POSTOP FOLLOW-UP VISIT: CPT | Performed by: SURGERY

## 2021-08-19 PROCEDURE — 6370000000 HC RX 637 (ALT 250 FOR IP): Performed by: SURGERY

## 2021-08-19 PROCEDURE — 2580000003 HC RX 258: Performed by: SURGERY

## 2021-08-19 PROCEDURE — 94660 CPAP INITIATION&MGMT: CPT

## 2021-08-19 RX ADMIN — METHOCARBAMOL TABLETS 500 MG: 500 TABLET, COATED ORAL at 09:24

## 2021-08-19 RX ADMIN — OXYCODONE HYDROCHLORIDE 10 MG: 10 TABLET ORAL at 15:17

## 2021-08-19 RX ADMIN — ACETAMINOPHEN 650 MG: 325 TABLET ORAL at 04:34

## 2021-08-19 RX ADMIN — OXYCODONE 5 MG: 5 TABLET ORAL at 04:34

## 2021-08-19 RX ADMIN — OXYCODONE 5 MG: 5 TABLET ORAL at 09:27

## 2021-08-19 RX ADMIN — METHOCARBAMOL TABLETS 500 MG: 500 TABLET, COATED ORAL at 15:12

## 2021-08-19 RX ADMIN — Medication 10 ML: at 09:25

## 2021-08-19 RX ADMIN — Medication 10 ML: at 00:00

## 2021-08-19 RX ADMIN — ACETAMINOPHEN 650 MG: 325 TABLET ORAL at 11:35

## 2021-08-19 ASSESSMENT — PAIN SCALES - GENERAL
PAINLEVEL_OUTOF10: 8
PAINLEVEL_OUTOF10: 5
PAINLEVEL_OUTOF10: 5
PAINLEVEL_OUTOF10: 7
PAINLEVEL_OUTOF10: 6
PAINLEVEL_OUTOF10: 4
PAINLEVEL_OUTOF10: 8
PAINLEVEL_OUTOF10: 6

## 2021-08-19 ASSESSMENT — PAIN DESCRIPTION - ONSET: ONSET: ON-GOING

## 2021-08-19 ASSESSMENT — PAIN DESCRIPTION - ORIENTATION: ORIENTATION: LEFT

## 2021-08-19 ASSESSMENT — PAIN DESCRIPTION - PAIN TYPE
TYPE: ACUTE PAIN
TYPE: SURGICAL PAIN

## 2021-08-19 ASSESSMENT — PAIN DESCRIPTION - LOCATION
LOCATION: HEAD
LOCATION: BREAST

## 2021-08-19 ASSESSMENT — PAIN DESCRIPTION - DESCRIPTORS
DESCRIPTORS: ACHING
DESCRIPTORS: ACHING

## 2021-08-19 NOTE — PROGRESS NOTES
[facility]   ATTENDING PHYSICIAN PROGRESS NOTE     I have examined the patient, reviewed the record, and discussed the case with the APN/ Resident. I have reviewed all relevant labs and imaging data. The following summarizes my clinical findings and independent assessment. CC: s/p mastectomy    Patient states pain is reasonably controlled. Awake and alert  Follows commands  Heart: Regular  Lungs: Fairly clear bilaterally  Abdomen: Soft; obese; bowel sounds active; nontender/nondistended  Skin: Warm/dry; dressing to left mastectomy site; drain with serosanguineous drainage    Patient Active Problem List    Diagnosis Date Noted    Malignant neoplasm of upper-outer quadrant of left breast in female, estrogen receptor positive ([facility] [address] 08/18/2021    Acute pain after mastectomy 08/18/2021    Hypertriglyceridemia 05/18/2021    Palpitations 05/18/2021    Chronic pain of right knee 05/18/2021    Fatigue     Vitamin D deficiency 06/01/2018    Stress incontinence 09/19/2017    Anxiety 06/04/2013    Obesity 06/04/2013    Morbid obesity ([facility] [address] 02/08/2012       Status post left mastectomy    Pain control  Local wound care  Monitor drain output  Diet as tolerated  OOB/ambulate  Discharge planning    Robert Paige MD, FACS  8/19/2021  3:31 PM      NOTE: This report was transcribed using voice recognition software. Every effort was made to ensure accuracy; however, inadvertent computerized transcription errors may be present.

## 2021-08-19 NOTE — CARE COORDINATION
Social Work Discharge/Planning:    SW met with AOx4 independent patient to explain role and discuss transition of care. Patient reports being independent and an active . Patient is from home. Patient resides together with her 2 children (ages 9 and 6) in a split level home that has 3 steps to enter and a basement that has 8 steps to enter with 1 handrail. Bed and bath are located on the upper level of the home that has 5 steps to enter with 1 handrail. Patient is independent with all ADL's, Patient has no DME. Patient does not wear oxygen at home. Patient reports no RAMAN/SNF hx. Patient has hx with [facility] and would like to use them again at time of discharge. 0830 SW made referral with liamaryana Briceno with [facility]. Patient will need HHC orders prior to discharge. Patient's PCP is Dr. Chris Neil. Patient reports she also follows with Surgeon Dr. Abdiaziz Oakes. Patient's pharmacy is [facility] located in [place]. Patient reports plan is to return home with [facility] once medically stable. Patient reports her friend Rocio Cr is able to transport her home day of discharge. [facility] is unable to see patient until Saturday. Bedside RN will provide patient with a bedside teaching and send her home with an abundance of supplies to return home with until [facility] is able to see patient on Saturday the 21st.     SW/CM to follow for any needs that may arise upon discharge. The Plan for Transition of Care is related to the following treatment goals: upon medically stable    The Patient and/or patient representative Jr Treviño was provided with a choice of provider and agrees   with the discharge plan. [x] Yes [] No    Freedom of choice list was provided with basic dialogue that supports the patient's individualized plan of care/goals, treatment preferences and shares the quality data associated with the providers.  [x] Yes [] No      Edgar Acevedo, [facility]  [phone]

## 2021-08-19 NOTE — OP NOTE
[facility]  OPERATIVE REPORT    Elmer Gabriel  1975      DATE OF PROCEDURE: 8/18/2021     SURGEON: Arnaldo Harrison MD, MSc, FACS     ASSISTANT: Maggie Das MD, PGY-IV; LYEDA Ecsalante-4; CLEO Flowers3     PREOPERATIVE DIAGNOSIS: Left breast cancer, stage IA, ER/AZ+ HER-2 negative, grade 2. Morbid obesity with BMI 63 and macromastia     POSTOPERATIVE DIAGNOSIS: Same    OPERATION: Left simple mastectomy, sentinel lymph node biopsy, injection of methylene blue dye; modifier 22 due to morbid obesity and macromastia     ANESTHESIA: General     ESTIMATED BLOOD LOSS: 915 cc     COMPLICATIONS: None    SPECIMEN: Left breast, additional inferior medial margin, additional inferior lateral margin, sentinel lymph node #1 (#[id number])    DRAINS: 15 Western Lexi Anuj drain    HISTORY:  This is a 40 y. o.female who I have known for several years who had prior right breast abscesses. She has a significant family history of breast cancer and a very high Tyer Tutor Key score. She has been followed in the high risk breast clinic for couple years. She had a MRI of her breast recently and showed a new lesion that was diagnosed as breast cancer. Due to her body habitus and morbid obesity was decided that she needed to have a mastectomy because she was not can be able to have radiation. She understood risk benefits complication yessy's procedure was to proceed. DESCRIPTION OF PROCEDURE: The patient was identified and the procedure was confirmed. Ancef 3 g was given, bilateral SCDs were in place, lower bear hugger applied. Patient was prepped and draped standard fashion. We injected full-strength methylene blue dye 5 cc in total in the retroareolar space. We massaged the breast for 5 minutes. We mapped out our incision and ellipse fashion around the breast.  Using PEAK PlasmaBlade we made our incision. We then used the PEAK PlasmaBlade to perform our dissection.   We took our dissection to the clavicle superiorly, the sternum medially, the axilla laterally, and down to the rectus inferiorly including the inframammary fold. We actually did not get the inframammary fold on her first pass with the mastectomy we had to take additional margins to obtain the inframammary fold. We marked these as additional inferior medial margin and the specimen had been split and so there was additional inferior lateral margin but these 2 specimens were in continuity. We took the breast off of the pectoralis major muscle including the pectoralis major fascia. Once the breast was removed it was marked on the back table with 6 colors of pain using the Vector marking kit. The breast was weighed and it was 6.3 pounds. We used the true node device to determine which lymph nodes to take we found a cluster of lymph nodes; felt firm and the level 1 area of the axilla we remove these with the harmonic scalpel. Final counts were 728. We looked in the axilla again we did not have any further counts greater than 70. We did not find any additional blue nodes. There was no other nodes that felt firm or enlarged. We irrigated the wound copiously with normal saline. We achieved hemostasis. We placed a 15 Western Lexi Anuj drain and secured it to the skin with a 3-0 nylon. We placed 10 g of Stacy into her mastectomy site. And we closed the wound with 3-0 and 4-0 Vicryl in the usual fashion. We applied Dermabond and half-inch Steri-Strips. We placed 3 coverlets. Needle, sponge, and instrument counts were reported as correct x2. The patient tolerated the procedure and was transferred to the recovery area in satisfactory condition. Family was updated at the end of the case.     Agent(s) utilized for sentinel lymph node identification: Blue dye and Radioactive tracer  Agent(s) utilized for sentinel lymph node identification after neoadjuvant chemotherapy: Not Applicable  All colored nodes or non-colored nodes present at the end of a dye-filled lymphatic channel were removed: yes  All significantly radioactive nodes were removed if radionuclide was used: yes  All palpably suspicious nodes were removed, if present: Yes  If clips were placed preoperatively in pathologically-involved nodes, those nodes were identified and removed: Russell Pratt MD, MSc, FACS  8/18/2021  8:48 PM

## 2021-08-19 NOTE — ANESTHESIA POSTPROCEDURE EVALUATION
Department of Anesthesiology  Postprocedure Note    Patient: Poncho Dan  MRN: [id number]  YOB: 1975  Date of evaluation: 8/18/2021  Time:  9:07 PM     Procedure Summary     Date: 08/18/21 Room / Location: [facility] OR  / [facility]    Anesthesia Start: 1700 Anesthesia Stop: 2104    Procedure: LEFT BREAST SIMPLE MASTECTOMY WITH SENTINEL LYMPH NODE BIOPSY (Left Breast) Diagnosis: (BREAST CANCER)    Surgeons: Mary Conner MD Responsible Provider: Vicki Cee MD    Anesthesia Type: general ASA Status: 3          Anesthesia Type: general    Lui Phase I: Lui Score: 7    Lui Phase II:      Last vitals: Reviewed and per EMR flowsheets.        Anesthesia Post Evaluation    Patient location during evaluation: PACU  Patient participation: complete - patient participated  Level of consciousness: awake  Pain score: 0  Airway patency: patent  Nausea & Vomiting: no nausea  Complications: no  Cardiovascular status: hemodynamically stable  Respiratory status: acceptable  Hydration status: stable

## 2021-08-19 NOTE — PROGRESS NOTES
Date: 8/18/2021    Time: 9:03 PM    Patient Placed On BIPAP/CPAP/ Non-Invasive Ventilation? Yes    If no must comment. Facial area red/color change? No           If YES are Blister/Lesion present? No   If yes must notify nursing staff  BIPAP/CPAP skin barrier?  Yes     Skin barrier type:mepilex    Comments:    bipap applied post op in [address], [place]

## 2021-08-19 NOTE — PLAN OF CARE
Problem: Pain:  Description: Pain management should include both nonpharmacologic and pharmacologic interventions. Goal: Pain level will decrease  Description: Pain level will decrease  8/19/2021 1116 by Radha Puente RN  Outcome: Ongoing     Problem: Pain:  Description: Pain management should include both nonpharmacologic and pharmacologic interventions. Goal: Control of acute pain  Description: Control of acute pain  8/19/2021 1116 by Radha Puente RN  Outcome: Ongoing     Problem: Pain:  Description: Pain management should include both nonpharmacologic and pharmacologic interventions.   Goal: Control of chronic pain  Description: Control of chronic pain  Outcome: Ongoing

## 2021-08-19 NOTE — PLAN OF CARE
Problem: Pain:  Description: Pain management should include both nonpharmacologic and pharmacologic interventions. Goal: Pain level will decrease  Description: Pain level will decrease  8/19/2021 1548 by Andra Waters RN  Outcome: Completed     Problem: Pain:  Description: Pain management should include both nonpharmacologic and pharmacologic interventions. Goal: Control of acute pain  Description: Control of acute pain  8/19/2021 1548 by Andra Waters RN  Outcome: Completed     Problem: Pain:  Description: Pain management should include both nonpharmacologic and pharmacologic interventions.   Goal: Control of chronic pain  Description: Control of chronic pain  8/19/2021 1548 by Andra Waters RN  Outcome: Completed

## 2021-08-19 NOTE — PROGRESS NOTES
Patient on Bipap in PACU postop. Anesthetics still wearing off. O2 94% on Bipap 16/6 + 60%    Discussed with patient admitting for observation overnight for oxygen monitoring and pain control. Outpatient pharmacies are closed at this time of night and she would not be able to fill rx for narcotics if were to discharge home at this time.     Electronically signed by Alberto Dasilva DO on 8/18/2021 at 9:23 PM

## 2021-08-20 NOTE — DISCHARGE SUMMARY
Physician Discharge Summary     Patient ID:  Karol Avery  [id number]  44 y.o.  1975    Admit date: 8/18/2021    Discharge date and time: 8/19/2021  4:00 PM     Admitting Physician: Nicole Romo MD     Admission Diagnoses: Acute pain after mastectomy [G89.18, Z90.10]    Discharge Diagnoses: Active Problems:    Malignant neoplasm of upper-outer quadrant of left breast in female, estrogen receptor positive ([address]    Acute pain after mastectomy  Resolved Problems:    * No resolved hospital problems. *      Admission Condition: good    Discharged Condition: stable    Indication for Admission: Invasive, moderately differentiated   mammary carcinoma (grade 2)     Hospital Course/Procedures/Operation/treatments: The mammogram was performed at [facility] on 7/1/2021. The patient has not noted a change in BSE since presentation.  Patient denies nipple discharge. Patient denies a personal history of breast cancer.  Breast cancer risk factors include family hx on mother's side, mom with breast CA, family hx of colon CA and age and gender.  She presented to the hospital for Left simple mastectomy, sentinel lymph node biopsy. 8/19 pain controlled, plan to discharge today        Consults:   IP CONSULT TO HOME CARE NEEDS  IP CONSULT TO RESPIRATORY CARE    Significant Diagnostic Studies:   NM INJ LYMPHOSCINTIGRAM    Result Date: 8/18/2021  EXAMINATION: LYMPHOSCINTIGRAPHY (IMAGES) 8/18/2021 12:13 pm TECHNIQUE: 0.540 mCi Lymphoseek was injected in the 12 o'clock periareolar region of the left breast. Patient was then taken to the OR for probe guided lymph node localization. HISTORY: ORDERING SYSTEM PROVIDED HISTORY: Malignant neoplasm of left female breast, unspecified estrogen receptor status, unspecified site of breast [facility]) TECHNOLOGIST PROVIDED HISTORY: Reason for exam:->left breast cancer What reading provider will be dictating this exam?->CRC     No images were acquired. Lymph nodes localized with probe in the OR. Discharge Exam:  Patient states pain is reasonably controlled.     Awake and alert  Follows commands  Heart: Regular  Lungs: Fairly clear bilaterally  Abdomen: Soft; obese; bowel sounds active; nontender/nondistended  Skin: Warm/dry; dressing to left mastectomy site; drain with serosanguineous drainage     Disposition: home    In process/preliminary results:  Outstanding Order Results     Date and Time Order Name Status Description    8/18/2021 12:00 AM Surgical Pathology In process           Patient Instructions:   Discharge Medication List as of 8/19/2021  3:06 PM           Details   HYDROcodone-acetaminophen (NORCO) 5-325 MG per tablet Take 1 tablet by mouth every 6 hours as needed for Pain for up to 5 days. Intended supply: 5 days. Take lowest dose possible to manage pain, Disp-20 tablet, R-0Print      methocarbamol (ROBAXIN) 500 MG tablet Take 1 tablet by mouth 4 times daily for 10 days, Disp-40 tablet, R-0Normal              Details   amoxicillin (AMOXIL) 500 MG capsule Take 1 capsule by mouth 2 times daily for 10 days, Disp-20 capsule, R-0Normal      fluticasone (FLONASE) 50 MCG/ACT nasal spray 2 sprays by Each Nostril route daily, Disp-3 Bottle, R-1Normal      vitamin D (ERGOCALCIFEROL) 1.25 MG (80265 UT) CAPS capsule Take 1 capsule by mouth once a week, Disp-12 capsule, R-1Normal      escitalopram (LEXAPRO) 10 MG tablet TAKE 1 TABLET BY MOUTH DAILY, Disp-90 tablet, R-1Normal           SURGERY DISCHARGE INSTRUCTIONS    You may be drowsy or lightheaded after receiving sedation or anesthesia. A responsible person should be with you for the next 24 hours. Please follow the instructions checked below:    · FOLLOW UP:  Follow up in the office. Dr. Izabela Pringle will call to check on you in the next day or so and will also call when your pathology report is back. · DIET: Advance your diet as tolerated. Start with light diet and progress to your normal diet as you feel like eating.  If you experience nausea or repeated episodes of vomiting which persist beyond 12-24 hours, notify your doctor. · ACTIVITY: Rest today. Increase activity gradually. No driving for 1 day. No driving while on prescription pain medication. · SHOWER/BATHING: Okay to shower in 48 hours after procedure. · WOUND CARE: You have a clean dressing applied. You may remove this dressing in 24 - 36 hours. Please leave steri-strips on, they will fall off on their own. You do not need a new dressing but may apply one if your feel that your incisions are oozing. Keep incisions clean and dry. Always ensure you and your care giver clean hands before and after caring for the wound. You may place ice on incisions to decrease the pain and bruising. Wear tight, supportive bra at all times, remove only to shower and then replace bra. · MEDICATIONS: Take as prescribed. You may take over the counter ibuprofen or tylenol for pain as directed, limit total amount of acetaminophen (tylenol) to 3 grams per 24-hour period. Okay to resume anticoagulant medication after 24hrs. You may experience constipation while taking pain medication, you may take over the counter stool softeners (Docusate/Colace or Senokot-S) as directed. · DRAINS: Leave drains in until seen in office for follow-up. Place clean, dry dressing around drain site, change daily or as needed. Empty drain bulbs as needed. Record daily output and bring log in with you to your follow-up appointment. Keep drain in place until output in less than 50cc daily or until 10 days post-op, call Myah Nunes at that time.       SPECIAL INSTRUCTIONS:   Call physician if they or any other problems occur:  - Call the office if you have a fever over >101F, or if your incision becomes red, tender, or drains more than a small amount of clear fluid  - Fever over 101°    - Redness, swelling, hardness or warmth at the operative site  - Unrelieved nausea    - Foul smelling or cloudy drainage at the operative site   - Unrelieved pain    - Blood soaked dressing.  (Some oozing may be normal)      Follow up:   Ronny Pickett MD  [facility] [phone]      For wound re-check       Signed:  JIHAN Neal NP  8/20/2021  3:06 PM

## 2021-08-21 ENCOUNTER — TELEPHONE (OUTPATIENT)
Dept: SURGERY | Age: 46
End: 2021-08-21

## 2021-08-21 DIAGNOSIS — R11.2 NAUSEA AND VOMITING, INTRACTABILITY OF VOMITING NOT SPECIFIED, UNSPECIFIED VOMITING TYPE: Primary | ICD-10-CM

## 2021-08-21 RX ORDER — ONDANSETRON 4 MG/1
4 TABLET, FILM COATED ORAL 3 TIMES DAILY PRN
Qty: 30 TABLET | Refills: 0 | Status: SHIPPED
Start: 2021-08-21 | End: 2022-01-31

## 2021-08-21 NOTE — TELEPHONE ENCOUNTER
Orders Placed This Encounter   Medications    ondansetron (ZOFRAN) 4 MG tablet     Sig: Take 1 tablet by mouth 3 times daily as needed for Nausea or Vomiting     Dispense:  30 tablet     Refill:  0

## 2021-08-23 ENCOUNTER — TELEPHONE (OUTPATIENT)
Dept: FAMILY MEDICINE CLINIC | Age: 46
End: 2021-08-23

## 2021-08-24 ENCOUNTER — TELEPHONE (OUTPATIENT)
Dept: BREAST CENTER | Age: 46
End: 2021-08-24

## 2021-08-24 NOTE — TELEPHONE ENCOUNTER
Home health nurse Gus Ching) called in reference to patient's drain output. Patient has been emptying her drain 3-4 times a day. Serosanguinous fluid noted by nurse. Nurse states the she emptied the drain today and noted 80ccs. The drain filled back up quickly with another 80cc, and then after emptying the drain again she noted an additional 30cc. Johanna Busch states she is doing great overall. Incision is intact with steri strips, no leakage noted. No signs or symptoms of infection. I informed them that I would provide Dr. Bhumi Andino with this update above and call them back to touch base on any other recommendations.

## 2021-08-24 NOTE — TELEPHONE ENCOUNTER
[id number] Julissa duenas worries, there is a lot of room for fluid. Will wait the full ten days if needed.

## 2021-08-25 NOTE — TELEPHONE ENCOUNTER
Touched base with patient and home health nurse (Ricco Castano) in reference to the drain output. They are aware to continue monitoring. Answered patient questions. She will call us Friday to give another update on her output.

## 2021-08-26 ENCOUNTER — TELEPHONE (OUTPATIENT)
Dept: SURGERY | Age: 46
End: 2021-08-26

## 2021-08-26 NOTE — TELEPHONE ENCOUNTER
MA received phone call from patient requesting return call from Dr Jarvis Colbert to review pathology from recent mastectomy on 8/18/2021. Patient apologized for not waiting for call and looking at [address] for results. Patient can be reached at [phone]. MA routing message to Dr Jarvis Colbert for advisement. MA routing message to Norberto Cheadle, [address] [place] [address] Zoë informatively.     Electronically signed by Niall Benton on 8/26/21 at 9:13 AM EDT

## 2021-08-27 ENCOUNTER — TELEPHONE (OUTPATIENT)
Dept: BREAST CENTER | Age: 46
End: 2021-08-27

## 2021-08-27 NOTE — TELEPHONE ENCOUNTER
Gladys Suarez underwent left mastectomy with left sentinel lymph node biopsy on  August 18, 2021, with Werner Reyes MD; w/a closed-suction drain placed at the mastectomy site. This was placed to bulb suction. She is here this morning to have her drain removed. Last output was 70-80 in the last 24 hours. Instructed to come in by the physician to be removed now that it has been almost 10 days since her surgery. Left  breast incision well approximated. Steri strips intact. Left drain sutures clipped and drain removed. Site cleansed and 2x2 DSD applied. She has a post mastectomy garment/sportsbra that she will wear with soft form for slight pressure to mastectomy site. She was instructed as to signs/symptoms of seroma formation. Instructed to call Dr Lisette Herrera office if her mastectomy site begins to enlarge or she feels like fluid is accumulating. May continue to apply heat as necessary. Verbalizes understanding. Scheduled to see Dr. Edilberto Engle on September 10, 2021 for postop appointment. I answered all of her questions to her satisfaction.

## 2021-09-01 ENCOUNTER — TELEPHONE (OUTPATIENT)
Dept: BREAST CENTER | Age: 46
End: 2021-09-01

## 2021-09-01 ENCOUNTER — HOSPITAL ENCOUNTER (OUTPATIENT)
Dept: GENERAL RADIOLOGY | Age: 46
Discharge: HOME | End: 2021-09-03
Payer: MEDICAID

## 2021-09-01 DIAGNOSIS — N64.89 SEROMA OF BREAST: Primary | ICD-10-CM

## 2021-09-01 DIAGNOSIS — N64.89 SEROMA OF BREAST: ICD-10-CM

## 2021-09-01 PROCEDURE — 2500000003 HC RX 250 WO HCPCS

## 2021-09-01 PROCEDURE — 19000 PUNCTURE ASPIR CYST BREAST: CPT

## 2021-09-01 NOTE — TELEPHONE ENCOUNTER
Patient called with concerns about fluid in her breast. She has been using a heating pad but states she is very sore and she knows there is fluid build up. Spoke with [address] [place] [address],[address] who can fit her in today at noon for a seroma aspiration.

## 2021-09-02 ENCOUNTER — TELEPHONE (OUTPATIENT)
Dept: BREAST CENTER | Age: 46
End: 2021-09-02

## 2021-09-02 DIAGNOSIS — C50.812 MALIGNANT NEOPLASM OF OVERLAPPING SITES OF LEFT BREAST IN FEMALE, ESTROGEN RECEPTOR POSITIVE (HCC): Primary | ICD-10-CM

## 2021-09-02 DIAGNOSIS — Z17.0 MALIGNANT NEOPLASM OF OVERLAPPING SITES OF LEFT BREAST IN FEMALE, ESTROGEN RECEPTOR POSITIVE (HCC): Primary | ICD-10-CM

## 2021-09-02 NOTE — TELEPHONE ENCOUNTER
----- Message from Darrian See MD sent at 9/2/2021 12:29 PM EDT -----  Regarding: tests  We had tumor board yesterday and I presented Lauren Jaime again. I have called her. They recommended CT scans and bone scan since she has micromets to left axilla. I have referred her to oncology and radiation oncology. Lauren Jaime is aware of all of this.

## 2021-09-03 ENCOUNTER — TELEPHONE (OUTPATIENT)
Dept: BREAST CENTER | Age: 46
End: 2021-09-03

## 2021-09-03 NOTE — TELEPHONE ENCOUNTER
Authorization for [employer] Oncotype Dx test is currently under review through [facility]. It may take up to 15 business days. They are not requesting clinical information at this time.  (8/27/21-11/25/21)    Case number: [id number]

## 2021-09-07 ENCOUNTER — HOSPITAL ENCOUNTER (OUTPATIENT)
Dept: GENERAL RADIOLOGY | Age: 46
Discharge: HOME | End: 2021-09-09
Payer: MEDICAID

## 2021-09-07 DIAGNOSIS — N64.89 RECURRENT SEROMA OF BREAST: Primary | ICD-10-CM

## 2021-09-07 DIAGNOSIS — N64.89 RECURRENT SEROMA OF BREAST: ICD-10-CM

## 2021-09-07 DIAGNOSIS — E55.9 VITAMIN D DEFICIENCY: ICD-10-CM

## 2021-09-07 PROCEDURE — 2500000003 HC RX 250 WO HCPCS

## 2021-09-07 PROCEDURE — 76942 ECHO GUIDE FOR BIOPSY: CPT

## 2021-09-07 RX ORDER — CHOLECALCIFEROL (VITAMIN D3) 125 MCG
CAPSULE ORAL
Qty: 90 TABLET | Refills: 1 | Status: SHIPPED
Start: 2021-09-07 | End: 2021-10-18

## 2021-09-07 NOTE — TELEPHONE ENCOUNTER
Last Appointment:  5/18/2021  Future Appointments   Date Time Provider Tiana Batista   9/10/2021 10:00 AM Hermelindo Harrell MD [facility]   9/14/2021 11:00 AM [facility] NM INJ RM [facility] NUC MED [facility]   9/14/2021 12:45 PM SJ CT RM 2 GE 16 BRIGHTSPEED [facility] CT [facility]   9/14/2021  2:00 PM [facility] NM ROOM 2 [facility] NUC MED [facility]   9/22/2021  9:00 AM Wyatt Zuluaga MD MED ONC [facility]   9/22/2021  9:00 AM [facility] MED ONC FAST TRACK 1 [facility] Med Onc Calvin Patiño

## 2021-09-09 ENCOUNTER — TELEPHONE (OUTPATIENT)
Dept: BREAST CENTER | Age: 46
End: 2021-09-09

## 2021-09-09 NOTE — TELEPHONE ENCOUNTER
[employer] Oncotype DX results 15. These results will be scanned into the chart and forwarded to participating providers to assist in further care treatment.

## 2021-09-10 ENCOUNTER — OFFICE VISIT (OUTPATIENT)
Dept: BREAST CENTER | Age: 46
End: 2021-09-10
Payer: MEDICAID

## 2021-09-10 VITALS — HEIGHT: 63 IN | WEIGHT: 293 LBS | BODY MASS INDEX: 51.91 KG/M2

## 2021-09-10 DIAGNOSIS — C50.412 MALIGNANT NEOPLASM OF UPPER-OUTER QUADRANT OF LEFT BREAST IN FEMALE, ESTROGEN RECEPTOR POSITIVE (HCC): Primary | ICD-10-CM

## 2021-09-10 DIAGNOSIS — F41.9 ANXIETY: ICD-10-CM

## 2021-09-10 DIAGNOSIS — Z17.0 MALIGNANT NEOPLASM OF UPPER-OUTER QUADRANT OF LEFT BREAST IN FEMALE, ESTROGEN RECEPTOR POSITIVE (HCC): Primary | ICD-10-CM

## 2021-09-10 PROCEDURE — 99024 POSTOP FOLLOW-UP VISIT: CPT | Performed by: SURGERY

## 2021-09-10 RX ORDER — ESCITALOPRAM OXALATE 10 MG/1
10 TABLET ORAL DAILY
Qty: 90 TABLET | Refills: 1 | Status: SHIPPED
Start: 2021-09-10 | End: 2022-03-01

## 2021-09-10 NOTE — TELEPHONE ENCOUNTER
Last Appointment:  5/18/2021  Future Appointments   Date Time Provider Tiana Batista   9/14/2021 11:00 AM [facility] NM INJ RM [facility] NUC MED [facility]   9/14/2021 12:45 PM [facility] CT RM 2 GE 16 BRIGHTSPEED [facility] CT [facility]   9/14/2021  2:00 PM [facility] NM ROOM 2 [facility] NUC MED [facility]   9/22/2021  9:00 AM Nguyen Caraballo MD MED ONC [facility]   9/22/2021  9:00 AM [facility] MED ONC FAST TRACK 1 [facility] Med Onc . [facility]   9/22/2021 10:30 AM Darrian See MD [facility]   10/4/2021 11:00 AM Katya Ferris DO [facility]

## 2021-09-10 NOTE — PROGRESS NOTES
[facility]  PROGRESS NOTE  ATTENDING NOTE    POSTOP APPOINTMENT    Chief Complaint   Patient presents with    Post-Op Check     P/O left mastectomy DOS 08/18/2021    Other     using compression - states she's feeling fine now     Other     denies nausea, vomiting, fever, chills          S:  40 y. o.female s/p left mastectomy with sentinel lymph node biopsy. She has had 2 seroma evacuation since her drain has come out. First 1 had over 400 cc a second 1 had just over 300 cc. She states that she calls when she feels like her axilla is filling up with fluid and that is where she gets the drain from. She does have some seroma over her left anterior chest wall however she says has been unchanged since surgery. She denies issues with range of motion she denies issues with paresthesias. Ht 5' 3\" (1.6 m)   Wt (!) 365 lb (165.6 kg)   BMI 64.66 kg/m²   Physical Exam  Constitutional:       Appearance: Normal appearance. She is obese. HENT:      Head: Normocephalic and atraumatic. Nose: Nose normal.      Mouth/Throat:      Mouth: Mucous membranes are moist.      Pharynx: Oropharynx is clear. Eyes:      Extraocular Movements: Extraocular movements intact. Pupils: Pupils are equal, round, and reactive to light. Cardiovascular:      Rate and Rhythm: Normal rate. Pulses: Normal pulses. Pulmonary:      Effort: Pulmonary effort is normal.   Chest:       Musculoskeletal:         General: No tenderness or signs of injury. Cervical back: Normal range of motion and neck supple. Skin:     General: Skin is warm and dry. Neurological:      General: No focal deficit present. Mental Status: She is alert and oriented to person, place, and time. Psychiatric:         Mood and Affect: Mood normal.         Behavior: Behavior normal.         Thought Content:  Thought content normal.         Judgment: Judgment normal.           PATHOLOGY:    Diagnosis:   A.  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 B.  [place] lymph nodes #1, biopsy:    - Three of six (3/6) lymph nodes positive for metastatic carcinoma   (micrometastasis), see comment. C.  Left breast, new inferior/medial margin, excision:    - Unremarkable fibroadipose tissue and skeletal muscle negative for   malignancy. D.  Left breast, additional inferior/lateral margin, excision:    - Unremarkable fibroadipose tissue, negative for malignancy. Comment:   Sections of the mastectomy specimen revealed two isolated,   similar sized tumor masses.  Both are composed of infiltrative neoplastic   cells showing single cell or single cell line arrangement. Immunostaining for E-cadherin was performed on sections of two selected   tissue blocks (A 7 and A 12).  Some of the invasive neoplastic cells (A   7) show strong membrane positivity for E-cadherin, consistent with ductal   differentiation while others (A 12) show absence or aberrant expression   of E-cadherin, indicative of lobular differentiation.  Therefore, this is   an invasive carcinoma with mixed ductal and lobular features. The presence of lobular carcinoma in situ (LCIS) and atypical lobular   hyperplasia [facility]) is confirmed by immunostaining for p63 and E-cadherin   on sections of tissue block A 9 showing the presence of a p63 positive   myoepithelial cells layer at the periphery and absence of E-cadherin   expression of the epithelial cells inside of the myoepithelial layer.      Total six lymph nodes are identified from the submitted tissue in   specimen B and each node is serially sectioned.  Histologically, there   are rare small foci suspicious for metastatic carcinoma.  Immunostaining   for pankeratin on sections of selected nodes (B2, B4, B5, B12 and B13)   was then performed.  Positively stained foci of micrometastasis (0.2-2   mm) are identified on sections of B2, B4/B5 (the same lymph node) and   B13.  Intradepartmental consultation is obtained. CANCER CASE SUMMARY   Procedure: Total mastectomy   Specimen Laterality: Left   TUMOR   Tumor Site: 2:00 (between upper-outer and lower-outer quadrant)   Histologic Type:  Invasive carcinoma with mixed ductal and lobular   features   Histologic Grade: [place] Histologic Score        Glandular/tubular differentiation: Score 3        Nuclear pleomorphism: Score 2        Right articular rate: Score 1        Overall grade: Grade 2 (scores of 6)   Tumor Size: 21 x 15 x 10 mm   Tumor Focality: Multiple foci of invasive carcinoma        Number of foci: 2        Sizes of individual foci in millimeters: 20 x 15 x 7 mm        Ductal Carcinoma In Situ (DCIS): Not identified        Lobular Carcinoma In Situ (LCIS): Present   Lymphovascular invasion: Not identified   Dermal lymphovascular invasion: Not identified   Microcalcifications: Not identified   Treatment effect: No known presurgical therapy   MARGINS   All margins negative for invasive carcinoma    Distance from invasive carcinoma to closest margin: 6.0 cm from tumor   #2; 6.5 cm from tumor #1    Closest margin to invasive carcinoma: Superior/anterior (for tumor #2);   posterior (for tumor #1)   REGIONAL LYMPH NODES    Tumor present in regional lymph nodes    Number of lymph nodes with macrometastases (>2 mm): 0    Number of lymph nodes with micrometastasis (0.2-2 mm): 3    Number of lymph nodes with isolated tumor cells 0    Size of largest shelia metastatic deposit: 2 mm    Extranodal extension: Not identified   Total

## 2021-09-14 ENCOUNTER — HOSPITAL ENCOUNTER (OUTPATIENT)
Dept: NUCLEAR MEDICINE | Age: 46
Discharge: HOME | End: 2021-09-14
Payer: MEDICAID

## 2021-09-14 ENCOUNTER — HOSPITAL ENCOUNTER (OUTPATIENT)
Dept: CT IMAGING | Age: 46
Discharge: HOME | End: 2021-09-14
Payer: MEDICAID

## 2021-09-14 DIAGNOSIS — C50.812 MALIGNANT NEOPLASM OF OVERLAPPING SITES OF LEFT BREAST IN FEMALE, ESTROGEN RECEPTOR POSITIVE (HCC): ICD-10-CM

## 2021-09-14 DIAGNOSIS — Z17.0 MALIGNANT NEOPLASM OF OVERLAPPING SITES OF LEFT BREAST IN FEMALE, ESTROGEN RECEPTOR POSITIVE (HCC): ICD-10-CM

## 2021-09-14 PROCEDURE — 78306 BONE IMAGING WHOLE BODY: CPT

## 2021-09-14 PROCEDURE — 74177 CT ABD & PELVIS W/CONTRAST: CPT

## 2021-09-14 PROCEDURE — 3430000000 HC RX DIAGNOSTIC RADIOPHARMACEUTICAL: Performed by: RADIOLOGY

## 2021-09-14 PROCEDURE — 6360000004 HC RX CONTRAST MEDICATION: Performed by: RADIOLOGY

## 2021-09-14 PROCEDURE — 71260 CT THORAX DX C+: CPT

## 2021-09-14 PROCEDURE — A9503 TC99M MEDRONATE: HCPCS | Performed by: RADIOLOGY

## 2021-09-14 RX ORDER — TC 99M MEDRONATE 20 MG/10ML
25 INJECTION, POWDER, LYOPHILIZED, FOR SOLUTION INTRAVENOUS
Status: COMPLETED | OUTPATIENT
Start: 2021-09-14 | End: 2021-09-14

## 2021-09-14 RX ADMIN — TC 99M MEDRONATE 25 MILLICURIE: 20 INJECTION, POWDER, LYOPHILIZED, FOR SOLUTION INTRAVENOUS at 10:21

## 2021-09-14 RX ADMIN — IOHEXOL 50 ML: 240 INJECTION, SOLUTION INTRATHECAL; INTRAVASCULAR; INTRAVENOUS; ORAL at 12:02

## 2021-09-14 RX ADMIN — IOPAMIDOL 75 ML: 755 INJECTION, SOLUTION INTRAVENOUS at 12:02

## 2021-09-16 ENCOUNTER — TELEPHONE (OUTPATIENT)
Dept: BREAST CENTER | Age: 46
End: 2021-09-16

## 2021-09-16 DIAGNOSIS — N64.89 RECURRENT SEROMA OF BREAST: Primary | ICD-10-CM

## 2021-09-16 NOTE — TELEPHONE ENCOUNTER
Patient called in reference to her recurring left breast seroma. She is added on for tomorrow at 930am for another seroma aspiration. She has a follow up appointment with Dr. Bryon Paige next Wednesday.

## 2021-09-17 ENCOUNTER — HOSPITAL ENCOUNTER (OUTPATIENT)
Dept: GENERAL RADIOLOGY | Age: 46
Discharge: HOME | End: 2021-09-19
Payer: MEDICAID

## 2021-09-17 DIAGNOSIS — N64.89 RECURRENT SEROMA OF BREAST: ICD-10-CM

## 2021-09-17 PROCEDURE — 2500000003 HC RX 250 WO HCPCS

## 2021-09-17 PROCEDURE — 76942 ECHO GUIDE FOR BIOPSY: CPT

## 2021-09-17 PROCEDURE — 19000 PUNCTURE ASPIR CYST BREAST: CPT

## 2021-09-20 NOTE — PROGRESS NOTES
Department of [facility] Oncology  Attending Consult Note    Reason for Visit: Consultation on a patient with Left Breast Cancer    Referring Physician: Carolyn Medina MD     PCP:  JIHAN Goodrich - CNP    History of Present Illness:  40year old female with Left Breast Cancer    Breast cancer risk factors include family hx on mother's side, mom with breast CA, family hx of colon CA and age and gender    Bilateral Diagnostic Mammogram/Left Breast U/S on 07/01/2021  Left sided ill defined hypoechoic region at the 2 o'clock position measuring 2 cm. On 07/01/2021 Left breast, 2:00 core needle biopsy:   Invasive, moderately differentiated mammary carcinoma (grade 2)     Comment: Microscopic examination shows an invasive carcinoma with linear growth pattern dispersed throughout fibrous stroma.  The tumor has a [place] histologic score of 3 (tubule formation) +2 (nuclear pleomorphism) +1 (mitotic count) = 6.  The tumor cells are immunoreactive with pankeratin and E-cadherin which favors ductal origin.  The p63 highlights myoepithelial cells in benign ducts.  Intradepartmental consultation is obtained. Breast Cancer Marker Studies:   Estrogen Receptors (ER): 60%   Progesterone Receptors (CT): 60%   Her-2/markie: Negative/1+     CXR 07/09/2021 noted no pneumonia or pleural effusion    Left axillary US 07/14/2021 noted no LN    Left simple mastectomy, sentinel lymph node biopsy, injection of methylene blue dye 08/18/2021  A.  Left breast, total mastectomy:   - Invasive carcinoma with mixed ductal and lobular features, bicentric, grade 2;   - Lobular carcinoma in situ and atypical lobular hyperplasia;   - Rare small ducts showing atypical ductal hyperplasia;   - Fibrocystic changes with usual ductal hyperplasia without atypia, adenosis, columnar cell change, fibroadenomatoid nodule, ductal ectasia, microcysts and stromal fibrosis;   - Changes of previous biopsy sites (2).      B.  [place] lymph nodes #1, biopsy:   - Three of six (3/6) lymph nodes positive for metastatic carcinoma (micrometastasis), see comment. C.  Left breast, new inferior/medial margin, excision:   - Unremarkable fibroadipose tissue and skeletal muscle negative for malignancy. D.  Left breast, additional inferior/lateral margin, excision:   - Unremarkable fibroadipose tissue, negative for malignancy. Comment: Sections of the mastectomy specimen revealed two isolated, similar sized tumor masses. Both are composed of infiltrative neoplastic cells showing single cell or single cell line arrangement. Immunostaining for E-cadherin was performed on sections of two selected tissue blocks (A 7 and A 12).  Some of the invasive neoplastic cells (A 7) show strong membrane positivity for E-cadherin, consistent with ductal differentiation while others (A 12) show absence or aberrant expression   of E-cadherin, indicative of lobular differentiation.  Therefore, this is an invasive carcinoma with mixed ductal and lobular features. The presence of lobular carcinoma in situ (LCIS) and atypical lobular hyperplasia [facility]) is confirmed by immunostaining for p63 and E-cadherin on sections of tissue block A 9 showing the presence of a p63 positive myoepithelial cells layer at the periphery and absence of E-cadherin expression of the epithelial cells inside of the myoepithelial layer. Total six lymph nodes are identified from the submitted tissue in specimen B and each node is serially sectioned.  Histologically, there are rare small foci suspicious for metastatic carcinoma. Immunostaining for pankeratin on sections of selected nodes (B2, B4, B5, B12 and B13)   was then performed.  Positively stained foci of micrometastasis (0.2-2 mm) are identified on sections of B2, B4/B5 (the same lymph node) and B13.  Intradepartmental consultation is obtained. CANCER CASE SUMMARY   Procedure:  Total mastectomy   Specimen Laterality: Left   TUMOR   Tumor Site: 2:00 (between upper-outer and lower-outer quadrant)   Histologic Type: Invasive carcinoma with mixed ductal and lobular   features   Histologic Grade: [place] Histologic Score        Glandular/tubular differentiation: Score 3        Nuclear pleomorphism: Score 2        Right articular rate: Score 1        Overall grade: Grade 2 (scores of 6)   Tumor Size: 21 x 15 x 10 mm   Tumor Focality: Multiple foci of invasive carcinoma        Number of foci: 2        Sizes of individual foci in millimeters: 20 x 15 x 7 mm        Ductal Carcinoma In Situ (DCIS): Not identified        Lobular Carcinoma In Situ (LCIS): Present   Lymphovascular invasion: Not identified   Dermal lymphovascular invasion: Not identified   Microcalcifications: Not identified   Treatment effect: No known presurgical therapy   MARGINS   All margins negative for invasive carcinoma    Distance from invasive carcinoma to closest margin: 6.0 cm from tumor #2; 6.5 cm from tumor #1    Closest margin to invasive carcinoma: Superior/anterior (for tumor #2); posterior (for tumor #1)   REGIONAL LYMPH NODES    Tumor present in regional lymph nodes    Number of lymph nodes with macrometastases (>2 mm): 0    Number of lymph nodes with micrometastasis (0.2-2 mm): 3    Number of lymph nodes with isolated tumor cells 0    Size of largest shelia metastatic deposit: 2 mm    Extranodal extension: Not identified   Total number of lymph nodes examined: 6   Number of sentinel nodes examined: 6   DISTANT METASTASIS: Not applicable   PATHOLOGIC STAGE (pTNM, AJCC 8th Edition)   TNM descriptors: m (multiple foci of invasive carcinoma)   mpT2 pN1mi     Oncotype Recurrence score:15    Left sided IDC/ILC T2N1mi M0 ER/UT positive HER2 negative. CT chest/abdomen/pelvis 09/14/2021 Postsurgical changes in the left breast with the fluid collection and inflammation as noted likely postoperative hematoma/seroma. No evidence of metastatic disease.   Non-specific 8 mm hypodense lesion at the head of the pancreas; referred to HBP team  NM Bone Scan 09/14/2021 without metastatic disease. Review of Systems;  CONSTITUTIONAL: No fever, chills. Fair appetite and energy level. ENMT: Eyes: No diplopia; Nose: No epistaxis. Mouth: No sore throat. RESPIRATORY: No hemoptysis, shortness of breath, cough. CARDIOVASCULAR: No chest pain, palpitations. GASTROINTESTINAL: No nausea/vomiting, abdominal pain. GENITOURINARY: No dysuria, urinary frequency, hematuria. NEURO: No syncope, presyncope, headache.   Remainder:  ROS NEGATIVE    Past Medical History:      Diagnosis Date    Abscess of right breast 6/19/2018    Anxiety     Breast abscess 7/11/2018    Fatigue     GERD (gastroesophageal reflux disease)     Hyperlipidemia     Obesity 6/4/2013     Past Surgical History:      Procedure Laterality Date    ANKLE FRACTURE SURGERY  4/20/2012    right with hardware, subsequently removed   Gunzing 9 BREAST BIOPSY Left 2020    BREAST BIOPSY Right 2018    CHOLECYSTECTOMY      2003    CYSTOSCOPY  7/25/2014    retrograde pyelogram, ureteroscopy, laser lithotripsy, left stent placement    ENDOSCOPY, COLON, DIAGNOSTIC      1998    MASTECTOMY Left 8/18/2021    LEFT BREAST SIMPLE MASTECTOMY WITH SENTINEL LYMPH NODE BIOPSY performed by Zi Callaway MD at [address] [place] [address] OF HEMATOMA/FLUID Right 6/20/2018    RIGHT BREAST INCISION AND DRAINAGE POSSIBLE WOUND VAC performed by Zi Callaway MD at [address] [place] [address] Right 7/11/2018    INCISION AND DRAINAGE RIGHT BREAST, WITH APPLICATION OF WOUND VAC (PATIENT HSS WOUND VAC WILL BRING IT)  performed by Zi Callaway MD at [facility]    Left leg    US BREAST CYST ASPIRATION LEFT Left 9/1/2021    US BREAST CYST ASPIRATION LEFT 9/1/2021 [facility]    US BREAST CYST ASPIRATION LEFT Left 9/7/2021    US BREAST CYST ASPIRATION LEFT [facility]    US BREAST CYST ASPIRATION LEFT Left 2021    US BREAST CYST ASPIRATION LEFT 2021 [facility]    US BREAST NEEDLE BIOPSY LEFT  2020    US BREAST NEEDLE BIOPSY LEFT 2020 [facility]    US BREAST NEEDLE BIOPSY LEFT Left 2021    US BREAST NEEDLE BIOPSY LEFT 2021 [facility]     Family History:  Family History   Problem Relation Age of Onset    Diabetes Maternal Grandmother     High Blood Pressure Maternal Grandmother     High Cholesterol Maternal Grandmother     Breast Cancer Maternal Grandmother 79    High Cholesterol Mother     Breast Cancer Mother 54    Colon Cancer Mother     Stomach Cancer Mother     Cancer Mother         stomach, colon, intestine    Cancer Maternal Grandfather 60        throat     Medications:  Reviewed and reconciled. Social History:  Social History     Socioeconomic History    Marital status: Single     Spouse name: Not on file    Number of children: Not on file    Years of education: Not on file    Highest education level: Not on file   Occupational History    Not on file   Tobacco Use    Smoking status: Former Smoker     Years: 4.00     Quit date: 2008     Years since quittin.0    Smokeless tobacco: Never Used   Vaping Use    Vaping Use: Never used   Substance and Sexual Activity    Alcohol use: Yes     Alcohol/week: 0.0 standard drinks     Comment: rarely    Drug use: No    Sexual activity: Not on file   Other Topics Concern    Not on file   Social History Narrative    Not on file     Social Determinants of Health     Financial Resource Strain: Low Risk     Difficulty of Paying Living Expenses: Not hard at all   Food Insecurity: No Food Insecurity    Worried About [address] in the Last Year: Never true    [address] in the Last Year: Never true   Transportation Needs:     Lack of Transportation (Medical):      Lack of Transportation (Non-Medical):    Physical Activity:     Days of Exercise per Week:     Minutes of Exercise per Session:    Stress:     Feeling of Stress :    Social Connections:     Frequency of Communication with Friends and Family:     Frequency of Social Gatherings with Friends and Family:     Attends Hindu Services:     Active Member of Clubs or Organizations:     Attends Club or Organization Meetings:     Marital Status:    Intimate Partner Violence:     Fear of Current or Ex-Partner:     Emotionally Abused:     Physically Abused:     Sexually Abused: Allergies: Allergies   Allergen Reactions    Vancomycin Nausea And Vomiting     Red man syndrome, had high fever and upset stomach     Physical Exam:  BP (!) 107/51   Pulse 86   Temp 97.7 °F (36.5 °C)   Resp 18   Ht 5' 3\" (1.6 m)   Wt (!) 369 lb (167.4 kg)   SpO2 96%   BMI 65.37 kg/m²   GENERAL: Alert, oriented x 3, not in acute distress. HEENT: PERRLA; EOMI. Oropharynx clear. NECK: Supple. Without lymphadenopathy. LUNGS: Good air entry bilaterally. No wheezing, crackles or rhonchi. CARDIOVASCULAR: Regular rate. No murmurs, rubs or gallops. ABDOMEN: Soft. Non-tender, non-distended. Positive bowel sounds. EXTREMITIES: Without clubbing or cyanosis or edema. NEUROLOGIC: No focal deficits. Impression/Plan:  41 y/o female with Left IDC/ILC breast cancer, T2N1mi(sn)M0 ER+LA+HER2-, grade 2, Stage IB  Genetic Testing: Negative    Left simple mastectomy, sentinel lymph node biopsy, injection of methylene blue dye 08/18/2021  CANCER CASE SUMMARY   Procedure: Total mastectomy   Specimen Laterality: Left   TUMOR   Tumor Site: 2:00 (between upper-outer and lower-outer quadrant)   Histologic Type:  Invasive carcinoma with mixed ductal and lobular features   Histologic Grade: [place] Histologic Score        Glandular/tubular differentiation: Score 3        Nuclear pleomorphism: Score 2        Right articular rate: Score 1        Overall grade: Grade 2 (scores of 6)   Tumor Size: 21 x 15 x 10 mm   Tumor Focality: Multiple foci of invasive carcinoma        Number of foci: 2        Sizes of individual foci in millimeters: 20 x 15 x 7 mm        Ductal Carcinoma In Situ (DCIS): Not identified        Lobular Carcinoma In Situ (LCIS): Present   Lymphovascular invasion: Not identified   Dermal lymphovascular invasion: Not identified   Microcalcifications: Not identified   Treatment effect: No known presurgical therapy   MARGINS   All margins negative for invasive carcinoma   Distance from invasive carcinoma to closest margin: 6.0 cm from tumor #2; 6.5 cm from tumor #1   Closest margin to invasive carcinoma: Superior/anterior (for tumor #2); posterior (for tumor #1)   REGIONAL LYMPH NODES    Tumor present in regional lymph nodes    Number of lymph nodes with macrometastases (>2 mm): 0    Number of lymph nodes with micrometastasis (0.2-2 mm): 3    Number of lymph nodes with isolated tumor cells 0    Size of largest shelia metastatic deposit: 2 mm    Extranodal extension: Not identified   Total number of lymph nodes examined: 6   Number of sentinel nodes examined: 6   DISTANT METASTASIS: Not applicable   PATHOLOGIC STAGE (pTNM, AJCC 8th Edition)   TNM descriptors: m (multiple foci of invasive carcinoma)   mpT2 pN1mi     Post-op seroma followed by Breast surgery   Rad Onc consulted by Breast surgery     Pathology NCCN guidelines reviewed extensively with patient  [employer]x suggested that in women ? 50 years and with an intermediate RS (11-25), chemotherapy plus endocrine therapy was associated with a lower rate of distant recurrence relative to endocrine therapy alone, particularly for those with high-intermediate scores (21 to 25) or clinical risk features. However, this was based only on exploratory subset analyses, and moreover, some of the benefit hypothetically may have been due to ovarian suppression in premenopausal women. Ovarian suppression was not assessed as a treatment strategy in this trial, however.  In light of limitations in the data, either chemotherapy and endocrine therapy, or endocrine therapy only (with ovarian suppression in premenopausal women) are acceptable options. [address] 7.6 09/22/2021  Estradiol 45.9 09/22/2021   LMP 08/05/2021; She is leaning more to proceed with endocrine therapy only with ovarian suppression; She is seeing GYN Dr. Roberto Carlos Patel Monday for evaluation of RAFFI/BSO  Tamoxifen will be started on 09/23/2021 in the interim. Side effects reviewed. Low dose Aspirin  RTC 4 weeks.      Thank you for allowing us to participate in the care of Mrs. Praveena Cortés, JIHAN - CNP      The patient was seen and examined, I agree with Nanda Pop CNP H&P, assessment and plan as outlined.   09/22/2021  Jose Adler MD

## 2021-09-22 ENCOUNTER — OFFICE VISIT (OUTPATIENT)
Dept: ONCOLOGY | Age: 46
End: 2021-09-22
Payer: MEDICAID

## 2021-09-22 ENCOUNTER — OFFICE VISIT (OUTPATIENT)
Dept: BREAST CENTER | Age: 46
End: 2021-09-22
Payer: MEDICAID

## 2021-09-22 ENCOUNTER — HOSPITAL ENCOUNTER (OUTPATIENT)
Dept: INFUSION THERAPY | Age: 46
Discharge: HOME | End: 2021-09-22
Payer: MEDICAID

## 2021-09-22 ENCOUNTER — TELEPHONE (OUTPATIENT)
Dept: CASE MANAGEMENT | Age: 46
End: 2021-09-22

## 2021-09-22 VITALS
TEMPERATURE: 97.7 F | WEIGHT: 293 LBS | DIASTOLIC BLOOD PRESSURE: 51 MMHG | HEIGHT: 63 IN | SYSTOLIC BLOOD PRESSURE: 107 MMHG | RESPIRATION RATE: 18 BRPM | BODY MASS INDEX: 51.91 KG/M2 | HEART RATE: 86 BPM | OXYGEN SATURATION: 96 %

## 2021-09-22 VITALS
TEMPERATURE: 97.5 F | SYSTOLIC BLOOD PRESSURE: 134 MMHG | OXYGEN SATURATION: 96 % | HEART RATE: 81 BPM | HEIGHT: 63 IN | RESPIRATION RATE: 18 BRPM | BODY MASS INDEX: 65.37 KG/M2 | DIASTOLIC BLOOD PRESSURE: 80 MMHG

## 2021-09-22 DIAGNOSIS — N64.89 SEROMA OF BREAST: ICD-10-CM

## 2021-09-22 DIAGNOSIS — Z85.3 PERSONAL HISTORY OF BREAST CANCER: ICD-10-CM

## 2021-09-22 DIAGNOSIS — Z85.3 PERSONAL HISTORY OF BREAST CANCER: Primary | ICD-10-CM

## 2021-09-22 DIAGNOSIS — C50.412 MALIGNANT NEOPLASM OF UPPER-OUTER QUADRANT OF LEFT BREAST IN FEMALE, ESTROGEN RECEPTOR POSITIVE (HCC): Primary | ICD-10-CM

## 2021-09-22 DIAGNOSIS — Z17.0 MALIGNANT NEOPLASM OF UPPER-OUTER QUADRANT OF LEFT BREAST IN FEMALE, ESTROGEN RECEPTOR POSITIVE (HCC): Primary | ICD-10-CM

## 2021-09-22 LAB
ESTRADIOL LEVEL: 45.9 PG/ML
FOLLICLE STIMULATING HORMONE: 7.6 MIU/ML

## 2021-09-22 PROCEDURE — 99214 OFFICE O/P EST MOD 30 MIN: CPT

## 2021-09-22 PROCEDURE — 99024 POSTOP FOLLOW-UP VISIT: CPT | Performed by: SURGERY

## 2021-09-22 PROCEDURE — 36415 COLL VENOUS BLD VENIPUNCTURE: CPT

## 2021-09-22 PROCEDURE — G8427 DOCREV CUR MEDS BY ELIG CLIN: HCPCS | Performed by: INTERNAL MEDICINE

## 2021-09-22 PROCEDURE — G8417 CALC BMI ABV UP PARAM F/U: HCPCS | Performed by: INTERNAL MEDICINE

## 2021-09-22 PROCEDURE — 99205 OFFICE O/P NEW HI 60 MIN: CPT | Performed by: INTERNAL MEDICINE

## 2021-09-22 RX ORDER — TAMOXIFEN CITRATE 20 MG/1
20 TABLET ORAL DAILY
Qty: 30 TABLET | Refills: 0 | Status: SHIPPED
Start: 2021-09-23 | End: 2021-10-19

## 2021-09-22 NOTE — PROGRESS NOTES
David Tex  1975 40 y.o. Referring Physician: Dr Javi Leach    PCP: Saurav Grove, JIHAN - CNP    Vitals:    21 0915   BP: (!) 107/51   Pulse: 86   Resp: 18   Temp: 97.7 °F (36.5 °C)   SpO2: 96%        Wt Readings from Last 3 Encounters:   21 (!) 369 lb (167.4 kg)   09/10/21 (!) 365 lb (165.6 kg)   21 (!) 360 lb (163.3 kg)        Body mass index is 65.37 kg/m². Chief Complaint:   Chief Complaint   Patient presents with   [facility] Patient         Cancer Staging  Malignant neoplasm of upper-outer quadrant of left breast in female, estrogen receptor positive ([facility] [address]  Staging form: Breast, AJCC 8th Edition  - Clinical stage from 2021: Stage IA (cT1c, cN0, cM0, G2, ER+, MT+, HER2-) - Signed by Erasmo Matthew MD on 2021  - Pathologic stage from 2021: Stage IB (pT2, pN1mi(sn), cM0, G2, ER+, MT+, HER2-) - Signed by Erasmo Matthew MD on 2021      Prior Radiation Therapy? NO    Concurrent Chemo/radiation? NO    Prior Chemotherapy? NO    Prior Hormonal Therapy? NO    Head and Neck Cancer? No, patient does NOT have HN cancer.       LMP: 2021    Age at first Menses: 16    : 2    Para: 2          Current Outpatient Medications:     escitalopram (LEXAPRO) 10 MG tablet, Take 1 tablet by mouth daily, Disp: 90 tablet, Rfl: 1    Cholecalciferol (VITAMIN D3) 50 MCG ( UT) TABS, TAKE 1 TABLET BY MOUTH DAILY, Disp: 90 tablet, Rfl: 1    ondansetron (ZOFRAN) 4 MG tablet, Take 1 tablet by mouth 3 times daily as needed for Nausea or Vomiting, Disp: 30 tablet, Rfl: 0    vitamin D (ERGOCALCIFEROL) 1.25 MG (98323 UT) CAPS capsule, Take 1 capsule by mouth once a week, Disp: 12 capsule, Rfl: 1    fluticasone (FLONASE) 50 MCG/ACT nasal spray, 2 sprays by Each Nostril route daily (Patient not taking: Reported on 2021), Disp: 3 Bottle, Rfl: 1       Past Medical History:   Diagnosis Date    Abscess of right breast 2018    Anxiety     Breast abscess 2018 Maternal Grandfather 60        throat       Social History     Socioeconomic History    Marital status: Single     Spouse name: Not on file    Number of children: Not on file    Years of education: Not on file    Highest education level: Not on file   Occupational History    Not on file   Tobacco Use    Smoking status: Former Smoker     Years: 4.00     Quit date: 2008     Years since quittin.0    Smokeless tobacco: Never Used   Vaping Use    Vaping Use: Never used   Substance and Sexual Activity    Alcohol use: Yes     Alcohol/week: 0.0 standard drinks     Comment: rarely    Drug use: No    Sexual activity: Not on file   Other Topics Concern    Not on file   Social History Narrative    Not on file     Social Determinants of Health     Financial Resource Strain: Low Risk     Difficulty of Paying Living Expenses: Not hard at all   Food Insecurity: No Food Insecurity    Worried About [address] [employer] in the Last Year: Never true    [address] [employer] in the Last Year: Never true   Transportation Needs:     Lack of Transportation (Medical):      Lack of Transportation (Non-Medical):    Physical Activity:     Days of Exercise per Week:     Minutes of Exercise per Session:    Stress:     Feeling of Stress :    Social Connections:     Frequency of Communication with Friends and Family:     Frequency of Social Gatherings with Friends and Family:     Attends Mandaeism Services:     Active Member of Clubs or Organizations:     Attends Club or Organization Meetings:     Marital Status:    Intimate Partner Violence:     Fear of Current or Ex-Partner:     Emotionally Abused:     Physically Abused:     Sexually Abused:            Occupation: unemployed  Retired:  NO          REVIEW OF SYSTEMS: <<For Level 5, 10 or more systems>>     Pacemaker/Defibulator/ICD:  No    Mediport: No            FALLS RISK SCREENING ASSESSMENT    Instructions:  Assess the patient and [employer] [place] [place] [employer] [place] the appropriate indicators that are present for fall risk identification. Total the numbers circled and assign a fall risk score from Table 2.  Reassess patient at a minimum every 12 weeks or with status change. Assessment   Date  9/22/2021     1. Mental Ability: confusion/cognitively impaired No - 0       2. Elimination Issues: incontinence, frequency No - 0       3. Ambulatory: use of assistive devices (walker, cane, off-loading devices), attached to equipment (IV pole, oxygen) No - 0     4. Sensory Limitations: dizziness, vertigo, impaired vision No - 0       5. Age Less than 65 years - 0       6. Medication: diuretics, strong analgesics, hypnotics, sedatives, antihypertensive agents   No - 0   7. Falls:  recent history of falls within the last 3 months (not to include slipping or tripping)   No - 0   TOTAL 0    If score of 4 or greater was education given? No       TABLE 2   Risk Score Risk Level Plan of Care   0-3 Little or  No Risk 1. Provide assistance as indicated for ambulation activities  2. Reorient confused/cognitively impaired patient  3. Call-light/bell within patient's reach  4. Chair/bed in low position, stretcher/bed with siderails up except when performing patient care activities  5. Educate patient/family/caregiver on falls prevention  6.  Reassess in 12 weeks or with any noted change in patient condition which places them at a risk for a fall   4-6 Moderate Risk 1. Provide assistance as indicated for ambulation activities  2. Reorient confused/cognitively impaired patient  3. Call-light/bell within patient's reach  4. Chair/bed in low position, stretcher/bed with siderails up except when performing patient care activities  5. Educate patient/family/caregiver on falls prevention  6. Falls risk precaution (Yellow sticker Level II) placed on patient chart   7 or   Higher High Risk 1. Place patient in easily observable treatment room  2.   Patient attended at all times by family member or staff  3. Provide assistance as indicated for ambulation activities  4. Reorient confused/cognitively impaired patient  5. Call-light/bell within patient's reach  6. Chair/bed in low position, stretcher/bed with siderails up except when performing patient care activities  7. Educate patient/family/caregiver on falls prevention  8. Falls risk precaution (Yellow sticker Level III) placed on patient chart           MALNUTRITION RISK SCREENING ASSESSMENT    Instructions:  Assess the patient and enter the appropriate indicators that are present for nutrition risk identification. Total the numbers entered and assign a risk score. Follow the appropriate action for total score listed below. Assessment   Date  9/22/2021     1. Have you lost weight without trying? 0- No     2. Have you been eating poorly because of a decreased appetite? 0- No   3. Do you have a diagnosis of head and neck cancer?       0- No                                                                                    TOTAL 0        Score of 0-1: No action  Score 2 or greater:  · For Non-Diabetic Patient: Recommend adding Ensure Enlive 2 x daily and provide patient with Ensure wellness bag with coupons  · For Diabetic Patient: Recommend adding Glucerna Shake 2 x daily and provide patient with Glucerna Wellness bag with coupons  · Route to the dietitian via [employer]    · Are you having  difficulty performing daily routine tasks  due to fatigue or weakness (ie: bathing/showering, dressing, housework, meal prep, work, child Anell Sat): No     · Do you have any arm flexibility/ROM restrictions, swelling or pain that limit activity: No     · Any changes in memory, attention/focus that impact daily activities: No     · Do you avoid participation in leisure/social activity due weakness, fatigue or pain: No     ARE ANY OF THE ABOVE ARE ANSWERED YES: No          PT ASSESSMENT FOR REFERRAL    · Have you had any recent falls in past 2 months: No     · Do you have difficulty  going up/down stairs: No     · Are you having difficulty walking: No     · Do you often hold onto furniture/environmental supports or feel off balance when you are walking: No     · Do you need to take rest breaks when you are walking: No     · Any pain on scale of 1-10 that limits your mobility: No 0/10    ARE ANY OF THE ABOVE ARE ANSWERED YES: No           PREHAB REFERRALS FOR NEOADJUVANT BREAST CANCER PATIENTS    Is this patient a breast cancer patient requiring neoadjuvant chemotherapy: No, this patient does NOT require neoadjuvant chemotherapy. LYMPHEDEMA SCREENING ASSESSMENT FOR PATIENTS WITH BREAST CANCER    The patient reports the following signs/symptoms of lymphedema: None    Please ask the provider to assess patient for lymphedema for any reported signs or symptoms so a referral to Lymphedema Therapy can be considered. PREHAB AUDIOLOGY REFERRAL    - Is patient planned to receive Cisplatin? No. This patient is not planned to start Cisplatin. - Is patient complaining of new onset hearing loss? No. Patient is not complaining of new onset hearing loss.         Celia Waggoner RN

## 2021-09-22 NOTE — TELEPHONE ENCOUNTER
Met with patient and friend during her  initial consultation with Dr. Ciara Brandt  for her  recent breast cancer diagnosis. Introduced myself and explained my role with patients receiving treatment at our center. Patient was friendly and receptive. Instructed on next steps including lab work today and patient is to follow up Monday with Dr. Deepak Esparza per Dr. Janae Mackey recommendations and follow up care. Provided patient with transportation resource list and literature on Patient Resource booklet Breast Cancer. Patient also received handout on Tamoxifen. Reviewed resources available to her  such as Social Work, Dietician, and Financial Navigator. Patient would like to speak to [facility] at this time. Patient to start Tamoxifen and is worried about weight gain. Referral will be sent. Provided with my contact information and instructed patient to call me with questions or concerns. Verbalizes understanding. Patient appreciative of visit. Will continue to follow.  Cody Gonzalez RN, [facility] Nurse Navigator

## 2021-09-23 PROBLEM — N64.89 SEROMA OF BREAST: Status: ACTIVE | Noted: 2021-09-23

## 2021-09-23 NOTE — PROGRESS NOTES
[facility]  PROGRESS NOTE  ATTENDING NOTE    Chief Complaint   Patient presents with    Post-Op Check     post op - left mastectomy     S: 81-year-old female who is undergone a left mastectomy sentinel lymph node biopsy. She has had recurrent seromas. She has had her breast drained 3 times and shows up today for a postop check but also to have her breast drained again. She states that she gets nauseated when the fluid is removed. She does feel uncomfortable in the fluids there she can feel it sloshing around. She was seen by oncology today. Oncology did review the CAT scans with her and bone scan they have referred her to Dr. Aliya Gaudalupe To follow-up for the pancreatic mass which is small. They did draw some lab work to check her menopausal status to see if she would need chemo or not. /80 (Site: Right Upper Arm)   Pulse 81   Temp 97.5 °F (36.4 °C)   Resp 18   Ht 5' 3\" (1.6 m)   SpO2 96%   BMI 65.37 kg/m²   Gen:  NAD  Left breast--wound c/d/i, seroma present    PROCEDURE NOTE:  Verbal consent obtained. Area cleaned with ChloraPrep. We made a small incision with 11 blade scalpel and accessed the seroma cavity with this seroma drain. Once the drain was in place the stylette was removed and it was hooked up to a large syringe and 200 cc of clear fluid was evacuated. I secured the drain then in place with 3-0 Prolene x3. And attached to the tubing and bulb suction. Dressing was applied. Patient was highly advised to watch for infection. I will see her in a week to remove drain.     ASSESSMENT/PLAN:  Stage Ib left breast cancer estrogen progesterone positive HER-2 negative grade 2  --Monitor for infection  --Home health care to help with seroma drain  --May need to leave seroma drain in longer than her previous drain was then for  --Okay to shower  --Follow-up with oncology    Sherri Pyle MD, MSc, FACS  9/23/2021  6:39 PM    RTC October 1

## 2021-09-24 ENCOUNTER — OFFICE VISIT (OUTPATIENT)
Dept: HEMATOLOGY | Age: 46
End: 2021-09-24
Payer: MEDICAID

## 2021-09-24 ENCOUNTER — HOSPITAL ENCOUNTER (OUTPATIENT)
Dept: RADIATION ONCOLOGY | Age: 46
Discharge: HOME | End: 2021-09-24
Payer: MEDICAID

## 2021-09-24 VITALS
DIASTOLIC BLOOD PRESSURE: 74 MMHG | BODY MASS INDEX: 51.91 KG/M2 | HEIGHT: 63 IN | WEIGHT: 293 LBS | SYSTOLIC BLOOD PRESSURE: 132 MMHG | HEART RATE: 87 BPM | OXYGEN SATURATION: 97 % | TEMPERATURE: 97.1 F

## 2021-09-24 VITALS
SYSTOLIC BLOOD PRESSURE: 132 MMHG | HEART RATE: 87 BPM | TEMPERATURE: 97.1 F | BODY MASS INDEX: 65.31 KG/M2 | RESPIRATION RATE: 18 BRPM | DIASTOLIC BLOOD PRESSURE: 74 MMHG | WEIGHT: 293 LBS | OXYGEN SATURATION: 97 %

## 2021-09-24 DIAGNOSIS — C50.912 MALIGNANT NEOPLASM OF LEFT FEMALE BREAST, UNSPECIFIED ESTROGEN RECEPTOR STATUS, UNSPECIFIED SITE OF BREAST (HCC): Primary | ICD-10-CM

## 2021-09-24 DIAGNOSIS — K86.9 PANCREATIC LESION: ICD-10-CM

## 2021-09-24 PROCEDURE — 99205 OFFICE O/P NEW HI 60 MIN: CPT

## 2021-09-24 PROCEDURE — 99245 OFF/OP CONSLTJ NEW/EST HI 55: CPT | Performed by: RADIOLOGY

## 2021-09-24 PROCEDURE — G8417 CALC BMI ABV UP PARAM F/U: HCPCS | Performed by: TRANSPLANT SURGERY

## 2021-09-24 PROCEDURE — 99214 OFFICE O/P EST MOD 30 MIN: CPT | Performed by: TRANSPLANT SURGERY

## 2021-09-24 PROCEDURE — 1036F TOBACCO NON-USER: CPT | Performed by: TRANSPLANT SURGERY

## 2021-09-24 PROCEDURE — G8427 DOCREV CUR MEDS BY ELIG CLIN: HCPCS | Performed by: TRANSPLANT SURGERY

## 2021-09-24 PROCEDURE — 99203 OFFICE O/P NEW LOW 30 MIN: CPT | Performed by: TRANSPLANT SURGERY

## 2021-09-24 ASSESSMENT — ENCOUNTER SYMPTOMS
EYE DISCHARGE: 0
NAUSEA: 0
DIARRHEA: 0
BACK PAIN: 0
SHORTNESS OF BREATH: 0
PHOTOPHOBIA: 0
CONSTIPATION: 0
ABDOMINAL PAIN: 0
BLOOD IN STOOL: 0
EYE PAIN: 0
VOMITING: 0

## 2021-09-24 NOTE — PROGRESS NOTES
Zakiya Brain  1975 40 y.o. Referring Physician: dr. Nicholas Mcdonald    PCP: Jovani Herzog, APRN - CNP     Vitals:    21 1008   BP: 132/74   Pulse: 87   Resp: 18   Temp: 97.1 °F (36.2 °C)   SpO2: 97%        Wt Readings from Last 3 Encounters:   21 (!) 368 lb 11.2 oz (167.2 kg)   21 (!) 369 lb (167.4 kg)   09/10/21 (!) 365 lb (165.6 kg)        Body mass index is 65.31 kg/m². Chief Complaint: No chief complaint on file. Cancer Staging  Malignant neoplasm of upper-outer quadrant of left breast in female, estrogen receptor positive ([facility] [address]  Staging form: Breast, AJCC 8th Edition  - Clinical stage from 2021: Stage IA (cT1c, cN0, cM0, G2, ER+, ID+, HER2-) - Signed by Werner Reyes MD on 2021  - Pathologic stage from 2021: Stage IB (pT2, pN1mi(sn), cM0, G2, ER+, ID+, HER2-) - Signed by Werner Reyes MD on 2021      Prior Radiation Therapy? NO    Concurrent Chemo/radiation? NO    Prior Chemotherapy? NO    Prior Hormonal Therapy? NO    Head and Neck Cancer? No, patient does NOT have HN cancer. LMP: 2021    Age at first Menses: 16    : 3    Para: 2        Current Outpatient Medications   Medication Sig Dispense Refill    tamoxifen (NOLVADEX) 20 MG tablet Take 1 tablet by mouth daily 30 tablet 0    escitalopram (LEXAPRO) 10 MG tablet Take 1 tablet by mouth daily 90 tablet 1    Cholecalciferol (VITAMIN D3) 50 MCG ( UT) TABS TAKE 1 TABLET BY MOUTH DAILY 90 tablet 1    ondansetron (ZOFRAN) 4 MG tablet Take 1 tablet by mouth 3 times daily as needed for Nausea or Vomiting 30 tablet 0    fluticasone (FLONASE) 50 MCG/ACT nasal spray 2 sprays by Each Nostril route daily 3 Bottle 1    vitamin D (ERGOCALCIFEROL) 1.25 MG (76435 UT) CAPS capsule Take 1 capsule by mouth once a week 12 capsule 1     No current facility-administered medications for this encounter.        Past Medical History:   Diagnosis Date    Abscess of right breast 2018  Anxiety     Breast abscess 7/11/2018    Fatigue     GERD (gastroesophageal reflux disease)     Hyperlipidemia     Obesity 6/4/2013       Past Surgical History:   Procedure Laterality Date    ANKLE FRACTURE SURGERY  4/20/2012    right with hardware, subsequently removed   Gunzing 9 BREAST BIOPSY Left 2020    BREAST BIOPSY Right 2018    CHOLECYSTECTOMY      2003    CYSTOSCOPY  7/25/2014    retrograde pyelogram, ureteroscopy, laser lithotripsy, left stent placement    ENDOSCOPY, COLON, DIAGNOSTIC      1998    MASTECTOMY Left 8/18/2021    LEFT BREAST SIMPLE MASTECTOMY WITH SENTINEL LYMPH NODE BIOPSY performed by Tessy Miller MD at [address] [place] [address] OF HEMATOMA/FLUID Right 6/20/2018    RIGHT BREAST INCISION AND DRAINAGE POSSIBLE WOUND VAC performed by Tessy Miller MD at [address] [place] [address] Right 7/11/2018    INCISION AND DRAINAGE RIGHT BREAST, WITH APPLICATION OF WOUND VAC (PATIENT HSS WOUND VAC WILL BRING IT)  performed by [facility]y Miller MD at [facility]    Left leg    US BREAST CYST ASPIRATION LEFT Left 9/1/2021    US BREAST CYST ASPIRATION LEFT 9/1/2021 [facility]    US BREAST CYST ASPIRATION LEFT Left 9/7/2021    US BREAST CYST ASPIRATION LEFT [facility]    US BREAST CYST ASPIRATION LEFT Left 9/17/2021    US BREAST CYST ASPIRATION LEFT 9/17/2021 [facility]    US BREAST NEEDLE BIOPSY LEFT  9/17/2020    US BREAST NEEDLE BIOPSY LEFT 9/17/2020 [facility]    US BREAST NEEDLE BIOPSY LEFT Left 7/1/2021    US BREAST NEEDLE BIOPSY LEFT 7/1/2021 [facility]       Family History   Problem Relation Age of Onset    Diabetes Maternal Grandmother     High Blood Pressure Maternal Grandmother     High Cholesterol Maternal Grandmother     Breast Cancer Maternal Grandmother 79    High Cholesterol Mother     Breast Cancer Mother 54    Colon Cancer Mother     Stomach Cancer Mother     Cancer Mother stomach, colon, intestine    Cancer Maternal Grandfather 60        throat       Social History     Socioeconomic History    Marital status: Single     Spouse name: Not on file    Number of children: Not on file    Years of education: Not on file    Highest education level: Not on file   Occupational History    Not on file   Tobacco Use    Smoking status: Former Smoker     Years: 4.00     Quit date: 2008     Years since quittin.0    Smokeless tobacco: Never Used   Vaping Use    Vaping Use: Never used   Substance and Sexual Activity    Alcohol use: Yes     Alcohol/week: 0.0 standard drinks     Comment: rarely    Drug use: No    Sexual activity: Not on file   Other Topics Concern    Not on file   Social History Narrative    Not on file     Social Determinants of Health     Financial Resource Strain: Low Risk     Difficulty of Paying Living Expenses: Not hard at all   Food Insecurity: No Food Insecurity    Worried About [address] [employer] in the Last Year: Never true    [facility] tipple.me in the Last Year: Never true   Transportation Needs:     Lack of Transportation (Medical):  Lack of Transportation (Non-Medical):    Physical Activity:     Days of Exercise per Week:     Minutes of Exercise per Session:    Stress:     Feeling of Stress :    Social Connections:     Frequency of Communication with Friends and Family:     Frequency of Social Gatherings with Friends and Family:     Attends Ten[employer] Services:     Active Member of Clubs or Organizations:     Attends Club or Organization Meetings:     Marital Status:    Intimate Partner Violence:     Fear of Current or Ex-Partner:     Emotionally Abused:     Physically Abused:     Sexually Abused:            Occupation: unempleyed  Retired:  NO        REVIEW OF SYSTEMS: <<For Level 5, 10 or more systems>> approximately   >20mins spent with patient about radiation therapy to breast utilizing handouts and slides.  She had a routine mammogram on 7/1/2021 that presented a left sided ill defined hypoechoic region at the 2 oclock position measuring 2cm. Biopsy on 7/1/2021 presented invasive, moderately differentiated mammary carcinoma, grade 2, gael score 6, ER/DE+, Her2-. Pt had on 8/18/2021 a simple mastectomy SLN biopsy that presented invasive CA with mixed ductal and lobular features, stage 1B. All margins negative. ONC score 15. Genetic testing NEG. [id number](sn)M0. On 9/14/2021 CT of abd/pelvis presented 8mm hydrodense lesion in the head of the pancrease. She is being followed by Dr. Kurt Meléndez for a seroma at the o'clock position. Following with Dr. Preethi Solis for medical oncology. She is planning to start with Tamoxifen and has a consult with Dr. Stone Stark for a possible RAFFI/BSO and Dr. Romana Dancer for the lesion on the pancrease. All questions were answered from a nursing perspective and she expressed understanding of care. Pacemaker/Defibulator/ICD:  No    Mediport: No        FALLS RISK SCREENING ASSESSMENT    Instructions:  Assess the patient and enter the appropriate indicators that are present for fall risk identification. Total the numbers entered and assign a fall risk score from Table 2.  Reassess patient at a minimum every 12 weeks or with status change. Assessment   Date  9/24/2021     1. Mental Ability: confusion/cognitively impaired No - 0       2. Elimination Issues: incontinence, frequency No - 0       3. Ambulatory: use of assistive devices (walker, cane, off-loading devices), attached to equipment (IV pole, oxygen) No - 0     4. Sensory Limitations: dizziness, vertigo, impaired vision No - 0       5. Age Less than 65 years - 0       6. Medication: diuretics, strong analgesics, hypnotics, sedatives, antihypertensive agents   No - 0   7. Falls:  recent history of falls within the last 3 months (not to include slipping or tripping)   No - 0   TOTAL 0    If score of 4 or greater was education given?  Yes       TABLE 2 Risk Score Risk Level Plan of Care   0-3 Little or  No Risk 1. Provide assistance as indicated for ambulation activities  2. Reorient confused/cognitively impaired patient  3. Call-light/bell within patient's reach  4. Chair/bed in low position, stretcher/bed with siderails up except when performing patient care activities  5. Educate patient/family/caregiver on falls prevention  6.  Reassess in 12 weeks or with any noted change in patient condition which places them at a risk for a fall   4-6 Moderate Risk 1. Provide assistance as indicated for ambulation activities  2. Reorient confused/cognitively impaired patient  3. Call-light/bell within patient's reach  4. Chair/bed in low position, stretcher/bed with siderails up except when performing patient care activities  5. Educate patient/family/caregiver on falls prevention  6. Falls risk precaution (Yellow sticker Level II) placed on patient chart   7 or   Higher High Risk 1. Place patient in easily observable treatment room  2. Patient attended at all times by family member or staff  3. Provide assistance as indicated for ambulation activities  4. Reorient confused/cognitively impaired patient  5. Call-light/bell within patient's reach  6. Chair/bed in low position, stretcher/bed with siderails up except when performing patient care activities  7. Educate patient/family/caregiver on falls prevention  8. Falls risk precaution (Yellow sticker Level III) placed on patient chart           MALNUTRITION RISK SCREENING ASSESSMENT    Instructions:  Assess the patient and enter the appropriate indicators that are present for nutrition risk identification. Total the numbers entered and assign a risk score. Follow the appropriate action for total score listed below. Assessment   Date  9/24/2021     1. Have you lost weight without trying? 0- No     2. Have you been eating poorly because of a decreased appetite? 0- No   3.  Do you have a diagnosis of head and neck cancer? 0- No                                                                                    TOTAL 0          Score of 0-1: No action  Score 2 or greater:  · For Non-Diabetic Patient: Recommend adding Ensure Complete 2 x daily and provide patient with Ensure wellness bag with coupons  · For Diabetic Patient: Recommend adding Glucerna Shake 2 x daily and provide patient with Glucerna Wellness bag with coupons  · Route to the dietitian via [address]    · Are you having difficulty performing daily routine tasks due to fatigue or weakness (ie: bathing/showering, dressing, housework, meal prep, work, , etc): No     · Do you have any arm flexibility/ROM restrictions, swelling or pain that limit activity: No     · Any changes in memory, attention/focus that impact daily activities: No     · Do you avoid participation in leisure/social activity due to weakness, fatigue or pain: No     ARE ANY OF THE ABOVE ARE ANSWERED YES: No          PT ASSESSMENT FOR REFERRAL    · Have you had any recent falls in the past 2 months: No     · Do you have difficulty going up/down stairs: No     · Are you having difficulty walking: No     · Do you often hold onto furniture/environmental supports or feel off balance when you are walking: No     · Do you need to take rest breaks when you are walking: No     · Any pain on a scale of 1-10 that limits your mobility: No 0/10    ARE ANY OF THE ABOVE ARE ANSWERED YES: No           LYMPHEDEMA SCREENING ASSESSMENT FOR PATIENTS WITH BREAST CANCER    The patient reports the following signs/symptoms of lymphedema: None    Please ask the provider to assess patient for lymphedema for any reported signs or symptoms so a referral to Lymphedema Therapy can be considered. PREHAB AUDIOLOGY REFERRAL    - Is patient planned to receive Cisplatin? No. This patient is not planned to start Cisplatin.     - Is patient planned to

## 2021-09-24 NOTE — PATIENT INSTRUCTIONS
RYAN Harrell. Maxnie Huynh MD MS [facility]:  [phone]   FAX: [phone] [address] [facility]:  [phone]   FAX:    [phone]  [address] [place] [address] Road:  [phone]   FAX:  [phone]  Email: [email or web address]

## 2021-09-24 NOTE — PROGRESS NOTES
Hepatobiliary and Pancreatic Surgery Attending History and Physical    Patient's Name/Date of Birth: Tabitha Nelson /1975 (40 y.o.)    Date: September 24, 2021     CC: 8mm pancreatic head lesion    HPI:  Patient is a very pleasant 40year old unfortunate female whom recently underwent a left mastectomy for breast cancer where 3 positive lymph nodes were found. She underwent a staging workup where a 8mm hypodense mass was seen in her pancreas. She denies any weightloss or abdominal pain.     Grandfather: throat cancer, mother with colon cancer    Past Medical History:   Diagnosis Date    Abscess of right breast 6/19/2018    Anxiety     Breast abscess 7/11/2018    Fatigue     GERD (gastroesophageal reflux disease)     Hyperlipidemia     Obesity 6/4/2013       Past Surgical History:   Procedure Laterality Date    ANKLE FRACTURE SURGERY  4/20/2012    right with hardware, subsequently removed   Gunzing 9 BREAST BIOPSY Left 2020    BREAST BIOPSY Right 2018    CHOLECYSTECTOMY      2003    CYSTOSCOPY  7/25/2014    retrograde pyelogram, ureteroscopy, laser lithotripsy, left stent placement    ENDOSCOPY, COLON, DIAGNOSTIC      1998    MASTECTOMY Left 8/18/2021    LEFT BREAST SIMPLE MASTECTOMY WITH SENTINEL LYMPH NODE BIOPSY performed by Nitza Jacobs MD at [address] [place] [address] OF HEMATOMA/FLUID Right 6/20/2018    RIGHT BREAST INCISION AND DRAINAGE POSSIBLE WOUND VAC performed by Nitza Jacobs MD at [address] [place] [address] Right 7/11/2018    INCISION AND DRAINAGE RIGHT BREAST, WITH APPLICATION OF WOUND VAC (PATIENT HSS WOUND VAC WILL BRING IT)  performed by Nitza Jacobs MD at [facility]    Left leg    US BREAST CYST ASPIRATION LEFT Left 9/1/2021    US BREAST CYST ASPIRATION LEFT 9/1/2021 [facility]    US BREAST CYST ASPIRATION LEFT Left 9/7/2021    US BREAST CYST ASPIRATION LEFT [facility]    US BREAST CYST tobacco: Never Used   Vaping Use    Vaping Use: Never used   Substance and Sexual Activity    Alcohol use: Yes     Alcohol/week: 0.0 standard drinks     Comment: rarely    Drug use: No    Sexual activity: Not on file   Other Topics Concern    Not on file   Social History Narrative    Not on file     Social Determinants of Health     Financial Resource Strain: Low Risk     Difficulty of Paying Living Expenses: Not hard at all   Food Insecurity: No Food Insecurity    Worried About [address] in the Last Year: Never true    [facility] [employer] in the Last Year: Never true   Transportation Needs:     Lack of Transportation (Medical):  Lack of Transportation (Non-Medical):    Physical Activity:     Days of Exercise per Week:     Minutes of Exercise per Session:    Stress:     Feeling of Stress :    Social Connections:     Frequency of Communication with Friends and Family:     Frequency of Social Gatherings with Friends and Family:     Attends Nondenominational Services:     Active Member of Clubs or Organizations:     Attends Club or Organization Meetings:     Marital Status:    Intimate Partner Violence:     Fear of Current or Ex-Partner:     Emotionally Abused:     Physically Abused:     Sexually Abused:        ROS:   Review of Systems   Constitutional: Negative for chills, diaphoresis and fever. HENT: Negative for congestion, ear discharge, ear pain, hearing loss, nosebleeds and tinnitus. Eyes: Negative for photophobia, pain and discharge. Respiratory: Negative for shortness of breath. Cardiovascular: Negative for palpitations and leg swelling. Gastrointestinal: Negative for abdominal pain, blood in stool, constipation, diarrhea, nausea and vomiting. Endocrine: Negative for polydipsia. Genitourinary: Negative for frequency, hematuria and urgency. Musculoskeletal: Negative for back pain and neck pain. Skin: Negative for rash.    Allergic/Immunologic: Negative for environmental allergies. Neurological: Negative for tremors and seizures. Psychiatric/Behavioral: Negative for hallucinations and suicidal ideas. The patient is not nervous/anxious. Physical Exam:  /74   Pulse 87   Temp 97.1 °F (36.2 °C)   Ht 5' 3\" (1.6 m)   Wt (!) 368 lb (166.9 kg)   SpO2 97%   BMI 65.19 kg/m²       PSYCH: mood and affect normal, alert and oriented x 3: No apparent distress, comfortable  EYES: Sclera white, pupils equal round and reactive to light  ENMT:  Hearing normal, trachea midline, ears externally intact  LYMPH: no obvious lympadenopathy in neck. RESP: Respiratory effort was normal with no retractions or use of accessory muscles. CV:  No pedal edema  GI/ Abdomen: Soft, nondistended, nontender, no guarding, no peritoneal signs  MSK: no clubbing/ no cyanosis/ gaitnormal       Assessment/Plan:  8mm pancreatic head cyst vs. Fat, morbid obesity BMI 65  - I reviewed their images prior to our office visit and we also reviewed them together today. - we discussed that she does not have any worrisome features. It also appears present in January 2020.  - repeat CT scan in 6 months  - dedicated pancreatic protocol CT. 39 Minutes of which greater than 50% was spent counseling or coordinating her care. Thank you for the consultation allowing me to take part in Ms. Suarez' care.      Electronically signed by Lizz Yi MD on 9/24/2021 at 12:08 PM

## 2021-09-24 NOTE — PROGRESS NOTES
Radiation Oncology      [facility]. Torsten Anson 51      Referring Physician: Dr. Kesha Reyna      Primary Care Moises Osman, APRN - CNP   Primary Oncologist: Dr. Opal Fermin      Diagnosis: mpT2 pN1(mi) cM0 left breast cancer    -ER+   -NH+   -HER2-   *ever VU pending      Service:  Radiation Oncology consultation performed on 9/24/21        HPI:        Woody Dorantes is a pleasant 40year old with left breast cancer. She had a routine mammogram on 7/1/2021 that presented a left sided ill defined hypoechoic region at the 2 oclock position measuring 2cm. Biopsy on 7/1/2021 presented invasive, moderately differentiated mammary carcinoma, grade 2, gael score 6, ER/NH+, Her2-. Pt had on 8/18/2021 a simple mastectomy SLN biopsy that presented invasive CA with mixed ductal and lobular features, stage 1B. All margins negative. ONC score 15. Genetic testing NEG. XR2oW3tr(sn)M0. On 9/14/2021 CT of abd/pelvis presented 8mm hypodense lesion in the head of the pancrease. She is being followed by Dr. Jeremias London for a seroma at the o'clock position. Following with Dr. Meena Black for medical oncology. She is planning to start with Tamoxifen and has a consult with Dr. Charisse Burton for a possible RAFFI/BSO and Dr. Nichole Gallardo for the lesion on the pancrease. The patient presents today to discuss fractionated external beam radiation therapy as a component of multidisciplinary, adjuvant management. We reviewed the available medical records including the complete medical history of this pt today prior to consultation. Epic -CE and available scanned documents per the Epic Media tab were reviewed PRN. A complete ROS was also performed today and is noted below. During consultation today I personally discussed the pts workup to date; including but not limited to applicable imaging studies, Pathology reports, and interventions.   The NCCN guidelines, as pertaining to the above diagnosis were also recapped for the pt today in brief. Today, Catrachito Zavaleta  notes Sx that include fatigue, soreness over seroma. KPS 80-90.      -----    Per [address]:      Impression/Plan:  39 y/o female with Left IDC/ILC breast cancer, T2N1mi(sn)M0 ER+NC+HER2-, grade 2, Stage IB  Genetic Testing: Negative     Left simple mastectomy, sentinel lymph node biopsy, injection of methylene blue dye 08/18/2021  CANCER CASE SUMMARY   Procedure: Total mastectomy   Specimen Laterality: Left   TUMOR   Tumor Site: 2:00 (between upper-outer and lower-outer quadrant)   Histologic Type:  Invasive carcinoma with mixed ductal and lobular features   Histologic Grade: Stacia Histologic Score        Glandular/tubular differentiation: Score 3        Nuclear pleomorphism: Score 2        Right articular rate: Score 1        Overall grade: Grade 2 (scores of 6)   Tumor Size: 21 x 15 x 10 mm   Tumor Focality: Multiple foci of invasive carcinoma        Number of foci: 2        Sizes of individual foci in millimeters: 20 x 15 x 7 mm        Ductal Carcinoma In Situ (DCIS): Not identified        Lobular Carcinoma In Situ (LCIS): Present   Lymphovascular invasion: Not identified   Dermal lymphovascular invasion: Not identified   Microcalcifications: Not identified   Treatment effect: No known presurgical therapy   MARGINS   All margins negative for invasive carcinoma   Distance from invasive carcinoma to closest margin: 6.0 cm from tumor #2; 6.5 cm from tumor #1   Closest margin to invasive carcinoma: Superior/anterior (for tumor #2); posterior (for tumor #1)   REGIONAL LYMPH NODES    Tumor present in regional lymph nodes    Number of lymph nodes with macrometastases (>2 mm): 0    Number of lymph nodes with micrometastasis (0.2-2 mm): 3    Number of lymph nodes with isolated tumor cells 0    Size of largest shelia metastatic deposit: 2 mm    Extranodal extension: Not identified   Total number of lymph nodes examined: 6   Number of sentinel nodes examined: 6   DISTANT METASTASIS: Not applicable   PATHOLOGIC STAGE (pTNM, AJCC 8th Edition)   TNM descriptors: m (multiple foci of invasive carcinoma)   mpT2 pN1mi      Post-op seroma followed by Breast surgery   Rad Onc consulted by Breast surgery      Pathology NCCN guidelines reviewed extensively with patient  TAILORx suggested that in women ? 50 years and with an intermediate RS (11-25), chemotherapy plus endocrine therapy was associated with a lower rate of distant recurrence relative to endocrine therapy alone, particularly for those with high-intermediate scores (21 to 25) or clinical risk features. However, this was based only on exploratory subset analyses, and moreover, some of the benefit hypothetically may have been due to ovarian suppression in premenopausal women. Ovarian suppression was not assessed as a treatment strategy in this trial, however. In light of limitations in the data, either chemotherapy and endocrine therapy, or endocrine therapy only (with ovarian suppression in premenopausal women) are acceptable options. [address] 7.6 09/22/2021  Estradiol 45.9 09/22/2021   LMP 08/05/2021; She is leaning more to proceed with endocrine therapy only with ovarian suppression; She is seeing GYN Dr. Asaf Begum Monday for evaluation of RAFFI/BSO  Tamoxifen will be started on 09/23/2021 in the interim. Side effects reviewed.  Low dose Aspirin  RTC 4 weeks.        -----    Pathology reviewed:      Diagnosis:   A.  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 B.  [place] lymph nodes #1, biopsy:    - Three of six (3/6) lymph nodes positive for metastatic carcinoma   (micrometastasis), see comment. C.  Left breast, new inferior/medial margin, excision:    - Unremarkable fibroadipose tissue and skeletal muscle negative for   malignancy. D.  Left breast, additional inferior/lateral margin, excision:    - Unremarkable fibroadipose tissue, negative for malignancy. Comment:   Sections of the mastectomy specimen revealed two isolated,   similar sized tumor masses.  Both are composed of infiltrative neoplastic   cells showing single cell or single cell line arrangement. Immunostaining for E-cadherin was performed on sections of two selected   tissue blocks (A 7 and A 12).  Some of the invasive neoplastic cells (A   7) show strong membrane positivity for E-cadherin, consistent with ductal   differentiation while others (A 12) show absence or aberrant expression   of E-cadherin, indicative of lobular differentiation.  Therefore, this is   an invasive carcinoma with mixed ductal and lobular features. The presence of lobular carcinoma in situ (LCIS) and atypical lobular   hyperplasia [facility]) is confirmed by immunostaining for p63 and E-cadherin   on sections of tissue block A 9 showing the presence of a p63 positive   myoepithelial cells layer at the periphery and absence of E-cadherin   expression of the epithelial cells inside of the myoepithelial layer. Total six lymph nodes are identified from the submitted tissue in   specimen B and each node is serially sectioned.  Histologically, there   are rare small foci suspicious for metastatic carcinoma.  Immunostaining   for pankeratin on sections of selected nodes (B2, B4, B5, B12 and B13)   was then performed.  Positively stained foci of micrometastasis (0.2-2   mm) are identified on sections of B2, B4/B5 (the same lymph node) and   B13.  Intradepartmental consultation is obtained.      CANCER CASE SUMMARY   Procedure: Total mastectomy   Specimen Laterality: Left   TUMOR   Tumor Site: 2:00 (between upper-outer and lower-outer quadrant)   Histologic Type:  Invasive carcinoma with mixed ductal and lobular   features   Histologic Grade: [place] Histologic Score        Glandular/tubular differentiation: Score 3        Nuclear pleomorphism: Score 2        Right articular rate: Score 1        Overall grade: Grade 2 (scores of 6)   Tumor Size: 21 x 15 x 10 mm   Tumor Focality: Multiple foci of invasive carcinoma        Number of foci: 2        Sizes of individual foci in millimeters: 20 x 15 x 7 mm        Ductal Carcinoma In Situ (DCIS): Not identified        Lobular Carcinoma In Situ (LCIS): Present   Lymphovascular invasion: Not identified   Dermal lymphovascular invasion: Not identified   Microcalcifications: Not identified   Treatment effect: No known presurgical therapy   MARGINS   All margins negative for invasive carcinoma    Distance from invasive carcinoma to closest margin: 6.0 cm from tumor   #2; 6.5 cm from tumor #1    Closest margin to invasive carcinoma: Superior/anterior (for tumor #2);   posterior (for tumor #1)   REGIONAL LYMPH NODES    Tumor present in regional lymph nodes    Number of lymph nodes with macrometastases (>2 mm): 0    Number of lymph nodes with micrometastasis (0.2-2 mm): 3    Number of lymph nodes with isolated tumor cells 0    Size of largest shelia metastatic deposit: 2 mm    Extranodal extension: Not identified   Total number of lymph nodes examined: 6   Number of sentinel nodes examined: 6   DISTANT METASTASIS: Not applicable   PATHOLOGIC STAGE (pTNM, AJCC 8th Edition)   TNM descriptors: m (multiple foci of invasive carcinoma)    pT category: mpT2 (tumor >20 mm but </=50 mm in greatest dimension    pN category: pN1mi   Ancillary Studies: ER(+)/CT(+)/HER-2(-) per report: [facility]-       BIOP:  Diagnosis:   Left breast, 2:00 core needle biopsy: Invasive, moderately differentiated mammary carcinoma (grade 2)     Comment:     Microscopic examination shows an invasive carcinoma with   linear growth pattern dispersed throughout fibrous stroma.  The tumor has   a Stacia histologic score of 3 (tubule formation) +2 (nuclear   pleomorphism) +1 (mitotic count) = 6.  The tumor cells are immunoreactive   with pankeratin and E-cadherin which favors ductal origin.  The p63   highlights myoepithelial cells in benign ducts.  Intradepartmental   consultation is obtained.      Breast Cancer Marker Studies:     Estrogen Receptors (ER):   -Positive (>10%of cells demonstrate nuclear positivity):   Percentage of cells positive: 60%   Intensity: Strong     Progesterone Receptors (AK):   -Positive:   Percentage of cells positive: 60%   Intensity: Strong     -----      Past Medical History:   Diagnosis Date    Abscess of right breast 6/19/2018    Anxiety     Breast abscess 7/11/2018    Fatigue     GERD (gastroesophageal reflux disease)     Hyperlipidemia     Obesity 6/4/2013       Past Surgical History:   Procedure Laterality Date    ANKLE FRACTURE SURGERY  4/20/2012    right with hardware, subsequently removed   [facility] Left 2020    BREAST BIOPSY Right 2018    CHOLECYSTECTOMY      2003    CYSTOSCOPY  7/25/2014    retrograde pyelogram, ureteroscopy, laser lithotripsy, left stent placement    ENDOSCOPY, COLON, DIAGNOSTIC      1998    MASTECTOMY Left 8/18/2021    LEFT BREAST SIMPLE MASTECTOMY WITH SENTINEL LYMPH NODE BIOPSY performed by Bernett Mortimer, MD at [address] [place] [address] OF HEMATOMA/FLUID Right 6/20/2018    RIGHT BREAST INCISION AND DRAINAGE POSSIBLE WOUND VAC performed by Bernett Mortimer, MD at [address] [place] [address] Right 7/11/2018    INCISION AND DRAINAGE RIGHT BREAST, WITH APPLICATION OF WOUND VAC (PATIENT HSS WOUND VAC WILL BRING IT)  performed by Bernett Mortimer, MD at [facility]    Left leg  US BREAST CYST ASPIRATION LEFT Left 9/1/2021    US BREAST CYST ASPIRATION LEFT 9/1/2021 [facility]    US BREAST CYST ASPIRATION LEFT Left 9/7/2021    US BREAST CYST ASPIRATION LEFT [facility]    US BREAST CYST ASPIRATION LEFT Left 9/17/2021    US BREAST CYST ASPIRATION LEFT 9/17/2021 [facility]    US BREAST NEEDLE BIOPSY LEFT  9/17/2020    US BREAST NEEDLE BIOPSY LEFT 9/17/2020 [facility]    US BREAST NEEDLE BIOPSY LEFT Left 7/1/2021    US BREAST NEEDLE BIOPSY LEFT 7/1/2021 [facility]       Family History   Problem Relation Age of Onset    Diabetes Maternal Grandmother     High Blood Pressure Maternal Grandmother     High Cholesterol Maternal Grandmother     Breast Cancer Maternal Grandmother 79    High Cholesterol Mother     Breast Cancer Mother 54    Colon Cancer Mother     Stomach Cancer Mother     Cancer Mother         stomach, colon, intestine    Cancer Maternal Grandfather 60        throat       Current Outpatient Medications   Medication Sig Dispense Refill    tamoxifen (NOLVADEX) 20 MG tablet Take 1 tablet by mouth daily 30 tablet 0    escitalopram (LEXAPRO) 10 MG tablet Take 1 tablet by mouth daily 90 tablet 1    Cholecalciferol (VITAMIN D3) 50 MCG (2000 UT) TABS TAKE 1 TABLET BY MOUTH DAILY 90 tablet 1    ondansetron (ZOFRAN) 4 MG tablet Take 1 tablet by mouth 3 times daily as needed for Nausea or Vomiting 30 tablet 0    fluticasone (FLONASE) 50 MCG/ACT nasal spray 2 sprays by Each Nostril route daily 3 Bottle 1    vitamin D (ERGOCALCIFEROL) 1.25 MG (08258 UT) CAPS capsule Take 1 capsule by mouth once a week 12 capsule 1     No current facility-administered medications for this encounter.        Allergies   Allergen Reactions    Vancomycin Nausea And Vomiting     Red man syndrome, had high fever and upset stomach       Social History     Socioeconomic History    Marital status: Single     Spouse name: None    Number of children: 2    Years of education: None    Highest education level: None   Occupational History    None   Tobacco Use    Smoking status: Former Smoker     Years: 4.00     Quit date: 2008     Years since quittin.0    Smokeless tobacco: Never Used   Vaping Use    Vaping Use: Never used   Substance and Sexual Activity    Alcohol use: Yes     Alcohol/week: 0.0 standard drinks     Comment: rarely    Drug use: No    Sexual activity: None   Other Topics Concern    None   Social History Narrative    None     Social Determinants of Health     Financial Resource Strain: Low Risk     Difficulty of Paying Living Expenses: Not hard at all   Food Insecurity: No Food Insecurity    Worried About Running Out of Food in the Last Year: Never true    Yamini of Food in the Last Year: Never true   Transportation Needs:     Lack of Transportation (Medical):      Lack of Transportation (Non-Medical):    Physical Activity:     Days of Exercise per Week:     Minutes of Exercise per Session:    Stress:     Feeling of Stress :    Social Connections:     Frequency of Communication with Friends and Family:     Frequency of Social Gatherings with Friends and Family:     Attends Congregation Services:     Active Member of Clubs or Organizations:     Attends Club or Organization Meetings:     Marital Status:    Intimate Partner Violence:     Fear of Current or Ex-Partner:     Emotionally Abused:     Physically Abused:     Sexually Abused:            Review of Systems - History obtained from chart review and the patient  General ROS: positive for  - fatigue  Psychological ROS: negative  Ophthalmic ROS: negative  ENT ROS: negative  Allergy and Immunology ROS: negative  Hematological and Lymphatic ROS: negative  Endocrine ROS: negative  Breast ROS: negative for breast lumps  Respiratory ROS: no cough, shortness of breath, or wheezing  Cardiovascular ROS: no chest pain or dyspnea on exertion  Gastrointestinal ROS: no abdominal pain, change in bowel habits, or black or bloody stools  Genito-Urinary ROS: no dysuria, trouble voiding, or hematuria  Musculoskeletal ROS: negative  Neurological ROS: no TIA or stroke symptoms  Dermatological ROS: negative        Physical Exam  HENT:      Head: Normocephalic and atraumatic. Right Ear: External ear normal.      Left Ear: External ear normal.      Nose: Nose normal.      Mouth/Throat:      Mouth: Mucous membranes are moist.   Eyes:      Extraocular Movements: Extraocular movements intact. Pupils: Pupils are equal, round, and reactive to light. Cardiovascular:      Rate and Rhythm: Normal rate and regular rhythm. Pulses: Normal pulses. Heart sounds: Normal heart sounds. Pulmonary:      Effort: Pulmonary effort is normal.      Breath sounds: Normal breath sounds. Abdominal:      General: Abdomen is flat. Palpations: Abdomen is soft. Musculoskeletal:         General: Normal range of motion. Cervical back: Normal range of motion. Skin:     General: Skin is warm and dry. Neurological:      General: No focal deficit present. Mental Status: She is alert and oriented to person, place, and time. Psychiatric:         Mood and Affect: Mood normal.         Behavior: Behavior normal.         Thought Content: Thought content normal.         Judgment: Judgment normal.             Imaging reviewed:      CT chest 9/14/21:  Impression   Postsurgical changes in the left breast with the fluid collection and   inflammation as noted likely postoperative hematoma/seroma.  An abscess   collection is less likely.  Mammographic surveillance is recommended.  No   other worrisome findings are identified in the chest abdomen pelvis.       8 mm hypodense lesion in the head of the pancreas, a nonspecific finding and   is unchanged.  Continued surveillance recommended.            Radiation Safety and Treatment Support:  -previous Radiation history: No  -history of connective tissue disease: No  -history of autoimmune disease: No  -pregnant: not applicable  -fertility conservation and /or contraception discussed: no  -nutrition consult prior to [facility]: Yes  -PEG: No  -Dental evaluation prior to treatment:No  -Social Work requested: Yes  -Oncology Nurse Navigator requested: Yes  -pre + post treatment PT / Rehab / PM+R evaluation considered: Yes  -ICD: No   -ICD brand: 0  -[facility] patient navigator: Lan Mosley  -Nurse Practitioners for Radiation Oncology:    ---Froy Chi, MSN, RN, FNP-C   ---Magnolia Martinez, MSN, RN, FNP-BC        Assessment and Plan: Abdirizak Rousseau is a pleasant and cooperative 40year old with a recent diagnosis of AJCC stage group IIB anatomic (3/6 alla). Given the multifocal primary [Breast J. Mar-Apr 2015;21(2):121-6], the 50% shelia involvement (although not macroscopic) and the pts young age, we recommend adjuvant post mastectomy fractionated external beam radiation therapy. There is ample data to suggest that for patient's with 4 or more + LNs and T3 tumors, with or without chemotherapy (in most modern series chemotherapy has been given), that all comers have a local and regional recurrence rate in the range of 15-36% (Aaron A et al. [facility] Oncol. 2004 Nov 1;22(21):4240-54): however this is a  heterogenous group of patients and other factors beside N2 shelia status and T3 size can be used to form the basis for adjuvant fractionated external beam radiation therapy recommendations. A now historic and landmark analysis of the [facility] B trial which include all comers with + nodes or large tumors- demonstrated improved survival with PMRT (in the setting of adjuvant chemotherapy prior), similar in the 82 C trial wherein Tamoxifen was included adjuvantly (Meredith M et al. Annye Parody. 1999 May 15;811(8384):9593-) - different selection criteria.  A more recent combined analysis demonstrates a LRR benefit for all node + patients and a survival benefit which persisted at the 15 year rachel for all comers with + nodes, more pronounced in those with 4+. Furthermore, this analysis re-demonstrated the known importance of PMRT for T3 tumors or those with skin or fascial involvement. Even when john-adjuvant chemotherapy is given, high risk pts benefit from PMRT Girtha Gosselin [facility] et al. Marco Manrique Clin Oncol. 2004 Dec 1;22(23):9754-9). NANO, + or close margins, high grade disease, young age, and LVSI, are other factors to consider Jenn Delcid, Int J Radiat Oncol Biol Phys. 1998 Jun 1;41(3):599-605); and these factors may also play into the decision to treat the regional nodes Laura Dinh EA, Int J Radiat Oncol Biol Phys. 2005 Dec 1;63(5):1508-13), particularly important when there are 4 + nodes, >20% LN+, and NANO. For left sided breast cancer and select right sided cases, utilization of the Active Breathing Coordinator and / or surface guided fractionated external beam radiation therapy (with Vision-RT / 4D) will be considered to reduce radiation dose to the heart (and lung in certain situations) [Dg et al. PRO. 2015 /// Yana alex al. Darlene Mcardle. 2011 /// Rosa et al. Abi Whitney. 2012  /// Geraldo Median. 2017]. A second opinion was offered today and declined. The risks, benefits, alternatives, process and logistics of external beam radiation were reviewed (specific risks include but are not limited to lymphedema, shoulder joint pain / arthritis, skin changes, pneumonitis, 2nd malignancy, cardiac disease, cosmetic changes - each were discussed today). We answered all of the patient's questions to the best of our ability. Johanna Busch verbalized understanding and seemed satisfied. Radiation planning will commence within 7 days; the next step in management being the simulation scan, with external beam radiation to commence in a timely fashion thereafter. It was a pleasure meeting Johanna Busch today and we appreciate the referral and opportunity to be involved in Her care.   We had an extensive discussion today regarding the course to date (including a focused review of theapplicable radiographic and laboratory information), multidisciplinary approach to cancer care, and indications for external beam radiation therapy as a component therein. A literature review and multidisciplinary discussion was performed after seeing this patient due to the complexity of the medical decision making in this case. I personally spent greater than 85 minutes on this case and with this patient. I performed the complete history and physical as above at today's visit, at least 45 minutes was in direct discussion and  regarding disease management.          -CW RT + low dose RLNI  -pt desires consideration for recon - no boost  -cardiac sparing ANUSHA Hu. Mary Regan MD [place] [address] Oncology  Cell: [phone]    [address]:  Makeda Bonds [address]: [phone]  [facility]:  [phone]   FAX:    [phone]  [facility]:  [phone]   FAX:  [phone]        NOTE: This report was transcribed using voice recognition software. Every effort was made to ensure accuracy; however, inadvertent computerized transcription errors may be present.

## 2021-09-28 ENCOUNTER — TELEPHONE (OUTPATIENT)
Dept: ONCOLOGY | Age: 46
End: 2021-09-28

## 2021-10-03 RX ORDER — DOXYCYCLINE HYCLATE 100 MG
100 TABLET ORAL 2 TIMES DAILY
Qty: 14 TABLET | Refills: 0 | Status: SHIPPED | OUTPATIENT
Start: 2021-10-03 | End: 2021-10-10

## 2021-10-04 ENCOUNTER — OFFICE VISIT (OUTPATIENT)
Dept: SURGERY | Age: 46
End: 2021-10-04
Payer: MEDICAID

## 2021-10-04 ENCOUNTER — APPOINTMENT (OUTPATIENT)
Dept: RADIATION ONCOLOGY | Age: 46
End: 2021-10-04
Attending: RADIOLOGY
Payer: MEDICAID

## 2021-10-04 ENCOUNTER — TELEPHONE (OUTPATIENT)
Dept: BREAST CENTER | Age: 46
End: 2021-10-04

## 2021-10-04 VITALS
SYSTOLIC BLOOD PRESSURE: 150 MMHG | HEART RATE: 98 BPM | OXYGEN SATURATION: 95 % | TEMPERATURE: 97 F | DIASTOLIC BLOOD PRESSURE: 78 MMHG | RESPIRATION RATE: 16 BRPM

## 2021-10-04 DIAGNOSIS — Z17.0 MALIGNANT NEOPLASM OF UPPER-OUTER QUADRANT OF LEFT BREAST IN FEMALE, ESTROGEN RECEPTOR POSITIVE (HCC): Primary | ICD-10-CM

## 2021-10-04 DIAGNOSIS — T81.49XA SURGICAL SITE INFECTION: ICD-10-CM

## 2021-10-04 DIAGNOSIS — C50.412 MALIGNANT NEOPLASM OF UPPER-OUTER QUADRANT OF LEFT BREAST IN FEMALE, ESTROGEN RECEPTOR POSITIVE (HCC): Primary | ICD-10-CM

## 2021-10-04 DIAGNOSIS — N64.89 SEROMA OF BREAST: ICD-10-CM

## 2021-10-04 PROCEDURE — 99024 POSTOP FOLLOW-UP VISIT: CPT | Performed by: SURGERY

## 2021-10-04 NOTE — TELEPHONE ENCOUNTER
Patient contacted and coming to [facility] to see Ella Gallegos 10/4/2021 per  request.       Electronically signed by Nadya Joya MA on 10/4/21 at 8:31 AM EDT

## 2021-10-04 NOTE — TELEPHONE ENCOUNTER
Patient called this morning stating the drain site is likely infected. She said she had a fever Friday but initially thought she caught something from her son who was sick. She said her breast was okay on Saturday, then didn't feel well yesterday. The area is red, hot and there is seepage near the stiches. She describes the drainage as \"dark beer\" in color. She called the on-call physician last night who recommended to come in as a direct admit. She was unable to make arrangements to come in due to being the only one with her kids. She was placed on Doxycycline and has had two doses. Will notify Dr. Almeida Resides for next steps.

## 2021-10-05 ENCOUNTER — TELEPHONE (OUTPATIENT)
Dept: BREAST CENTER | Age: 46
End: 2021-10-05

## 2021-10-05 NOTE — PROGRESS NOTES
[facility] SURGICAL ASSOCIATES/[facility]  PROGRESS NOTE  ATTENDING NOTE    Chief Complaint   Patient presents with    Post-Op Check     wound check, pt called on call and spoke to Tony Taylor.  Fever     Pt had fever Friday and saturday.  Breast Pain     random stabbing pains off/on. S: 70-year-old female who undergone a left mastectomy with sentinel lymph node biopsy on August 18, 2021. She has been seen in the office multiple times and undergone several aspirations of a seroma. I saw her on 8 or 23rd 2021 and placed a seroma drain. It has been in for about 13 days she missed her appointment on Friday because her her child had hand-foot-and-mouth disease. However over the weekend she had a fever of 102 and she felt warm over her left chest wall and thought her drain had turned dark. She called our physician line and my partner put her on doxycycline for a week. She is doing much better today she feels much better she wants her drain removed. BP (!) 150/78 (Site: Right Upper Arm, Position: Sitting, Cuff Size: Medium Adult)   Pulse 98   Temp 97 °F (36.1 °C) (Temporal)   Resp 16   SpO2 95%   Gen:  NAD  Left chest: Sternal drain with serous fluid. She states it drains about 50 cc/day. I explained I can remove it but she may accumulate more fluid. I advised her to get a hold me directly if she accumulated this fluid. I remove the stitches remove the drain and placed a dry dressing. The erythema has definitely improved and there is no need to take her to the OR for washout and drainage.     ASSESSMENT/PLAN:  Left breast cancer status postmastectomy sentinel lymph node biopsy now with seroma and surgical site infection  --Continue with doxycycline  --Drain removed  --Follow-up in clinic in 1 week    Veronika Virk MD, MSc, FACS  10/4/2021  9:37 PM

## 2021-10-05 NOTE — TELEPHONE ENCOUNTER
Have her come in again on Thursday to [facility] between 1-2pm.  I'm not terribly concerned, but best to see her before the weekend.

## 2021-10-05 NOTE — TELEPHONE ENCOUNTER
MA received a call from patient in regards to her incision. Patient [facility] AT [facility] nurse is there and was accessing the wound and noticed a small black spot that wont wipe away. Its raised a little patient denies drainage. Pt temp was 98.9F. Patient calling to get advisement if normal or a concern. Attached is picture patient sent to office. The center of the lump is black and about the size of a sesame seed per the nurse. MA advised picture and information would be forward to  for advisement.     Electronically signed by Jennifer Ayoub MA on 10/5/21 at 3:33 PM EDT

## 2021-10-05 NOTE — TELEPHONE ENCOUNTER
MA contacted patient and scheduled appt on 10/7/2021 1pm in [facility] with .        Electronically signed by Melquiades Simental MA on 10/5/21 at 4:01 PM EDT

## 2021-10-07 ENCOUNTER — OFFICE VISIT (OUTPATIENT)
Dept: BREAST CENTER | Age: 46
End: 2021-10-07
Payer: MEDICAID

## 2021-10-07 ENCOUNTER — TELEPHONE (OUTPATIENT)
Dept: SURGERY | Age: 46
End: 2021-10-07

## 2021-10-07 VITALS
SYSTOLIC BLOOD PRESSURE: 148 MMHG | TEMPERATURE: 98.2 F | HEART RATE: 80 BPM | RESPIRATION RATE: 18 BRPM | DIASTOLIC BLOOD PRESSURE: 78 MMHG | OXYGEN SATURATION: 97 %

## 2021-10-07 DIAGNOSIS — C50.412 MALIGNANT NEOPLASM OF UPPER-OUTER QUADRANT OF LEFT BREAST IN FEMALE, ESTROGEN RECEPTOR POSITIVE (HCC): Primary | ICD-10-CM

## 2021-10-07 DIAGNOSIS — T81.49XA SURGICAL SITE INFECTION: ICD-10-CM

## 2021-10-07 DIAGNOSIS — Z17.0 MALIGNANT NEOPLASM OF UPPER-OUTER QUADRANT OF LEFT BREAST IN FEMALE, ESTROGEN RECEPTOR POSITIVE (HCC): Primary | ICD-10-CM

## 2021-10-07 DIAGNOSIS — N64.89 SEROMA OF BREAST: ICD-10-CM

## 2021-10-07 PROCEDURE — 99024 POSTOP FOLLOW-UP VISIT: CPT | Performed by: SURGERY

## 2021-10-07 NOTE — TELEPHONE ENCOUNTER
----- Message from Mikhail Smiley MD sent at 10/7/2021  1:22 PM EDT -----  Regarding: patient appoitment  Hi,  Please add Randee Navarrete on for Monday for wound check in [facility] clinic for 9am.  Thanks

## 2021-10-07 NOTE — TELEPHONE ENCOUNTER
Patient scheduled for wound check on Monday, 01/11/2021 @ 9:00am with Dr Lexii Macdonald in [facility].      Electronically signed by Kit Cranker on 10/7/21 at 1:44 PM EDT

## 2021-10-11 ENCOUNTER — TELEPHONE (OUTPATIENT)
Dept: SURGERY | Age: 46
End: 2021-10-11

## 2021-10-11 ENCOUNTER — OFFICE VISIT (OUTPATIENT)
Dept: SURGERY | Age: 46
End: 2021-10-11
Payer: MEDICAID

## 2021-10-11 VITALS
DIASTOLIC BLOOD PRESSURE: 76 MMHG | SYSTOLIC BLOOD PRESSURE: 144 MMHG | BODY MASS INDEX: 51.91 KG/M2 | HEIGHT: 63 IN | WEIGHT: 293 LBS | TEMPERATURE: 96.8 F | HEART RATE: 90 BPM | OXYGEN SATURATION: 97 %

## 2021-10-11 DIAGNOSIS — Z17.0 MALIGNANT NEOPLASM OF UPPER-OUTER QUADRANT OF LEFT BREAST IN FEMALE, ESTROGEN RECEPTOR POSITIVE (HCC): ICD-10-CM

## 2021-10-11 DIAGNOSIS — C50.412 MALIGNANT NEOPLASM OF UPPER-OUTER QUADRANT OF LEFT BREAST IN FEMALE, ESTROGEN RECEPTOR POSITIVE (HCC): ICD-10-CM

## 2021-10-11 DIAGNOSIS — N64.89 SEROMA OF BREAST: Primary | ICD-10-CM

## 2021-10-11 PROCEDURE — 99212 OFFICE O/P EST SF 10 MIN: CPT | Performed by: SURGERY

## 2021-10-11 PROCEDURE — 99024 POSTOP FOLLOW-UP VISIT: CPT | Performed by: SURGERY

## 2021-10-11 NOTE — PROGRESS NOTES
[facility] SURGICAL ASSOCIATES/[facility]  PROGRESS NOTE  ATTENDING NOTE    Chief Complaint   Patient presents with    Wound Check     states its filling with fluid - states its tender - denies drainage     Nausea     states she's nauseous several times a day    Other     denies vomiting, fever, chills, diarrhea     Other     finishing doxycycline today     S:  45y/o F s/p left mastectomy with SLNBx. She came for f/u today as I am monitoring her seroma. She states she is starting to feel uncomfortable again. She will finish her antibiotics today. She is feeling well. BP (!) 144/76 (Site: Right Upper Arm, Position: Sitting, Cuff Size: Large Adult)   Pulse 90   Temp 96.8 °F (36 °C) (Infrared)   Ht 5' 3\" (1.6 m)   Wt (!) 368 lb (166.9 kg)   SpO2 97%   BMI 65.19 kg/m²   Gen:  NAD  Left mastectomy site:  C/d/i; palpable seroma    PROCEDURE NOTE:  Area for aspiration cleaned with alcohol wipe. 18G needle used to aspirate 750cc of serous fluid. Cultures taken, but no signs of infection. Wound covered with Band-aide. She tolerated well, but did feel a little nauseated after. ASSESSMENT/PLAN:  Left breast cancer--s/p mastectomy with SLNBx  --RTC Friday  --seroma will improve when starting radiation. --if no improvement, may need sclerotherapy--but will wait until after completion of radiation. --continue to monitor for infection.       Young Francisco MD, MSc, FACS  10/11/2021  4:31 PM

## 2021-10-14 LAB
GRAM STAIN RESULT: NORMAL
WOUND/ABSCESS: NORMAL

## 2021-10-15 ENCOUNTER — OFFICE VISIT (OUTPATIENT)
Dept: BREAST CENTER | Age: 46
End: 2021-10-15
Payer: MEDICAID

## 2021-10-15 ENCOUNTER — HOSPITAL ENCOUNTER (OUTPATIENT)
Dept: RADIATION ONCOLOGY | Age: 46
Discharge: HOME | End: 2021-10-15
Attending: RADIOLOGY
Payer: MEDICAID

## 2021-10-15 VITALS
WEIGHT: 293 LBS | OXYGEN SATURATION: 94 % | RESPIRATION RATE: 14 BRPM | BODY MASS INDEX: 51.91 KG/M2 | HEIGHT: 63 IN | HEART RATE: 84 BPM | DIASTOLIC BLOOD PRESSURE: 97 MMHG | TEMPERATURE: 97.5 F | SYSTOLIC BLOOD PRESSURE: 158 MMHG

## 2021-10-15 DIAGNOSIS — Z17.0 MALIGNANT NEOPLASM OF UPPER-OUTER QUADRANT OF LEFT BREAST IN FEMALE, ESTROGEN RECEPTOR POSITIVE (HCC): Primary | ICD-10-CM

## 2021-10-15 DIAGNOSIS — C50.412 MALIGNANT NEOPLASM OF UPPER-OUTER QUADRANT OF LEFT BREAST IN FEMALE, ESTROGEN RECEPTOR POSITIVE (HCC): Primary | ICD-10-CM

## 2021-10-15 DIAGNOSIS — N64.89 SEROMA OF BREAST: ICD-10-CM

## 2021-10-15 PROCEDURE — 77334 RADIATION TREATMENT AID(S): CPT | Performed by: RADIOLOGY

## 2021-10-15 PROCEDURE — 99024 POSTOP FOLLOW-UP VISIT: CPT | Performed by: SURGERY

## 2021-10-15 NOTE — PROGRESS NOTES
[facility]ICAL [facility]OC[facility]  PROGRESS NOTE  ATTENDING NOTE    Chief Complaint   Patient presents with    Post-Op Check     wound check, Patient states area seems full, breast area feels tight. S:  47y/o F presents for f/u of left mastectomy wound. There is still slight erythema to the superior aspect near the axilla. I drained 750cc of serous fluid on Monday. The culture was negative. She is having a lot of side effects from the tamoxifen. She is having a lot of mood swings. She is having hot flashes. She is having a lot of abdominal cramping as well. She states she is that she does not feel well today but she thinks is due to the tamoxifen. She denies fevers or chills. She states she feels like she is filling up a little bit. BP (!) 158/97 (Site: Right Upper Arm, Position: Sitting, Cuff Size: Medium Adult)   Pulse 84   Temp 97.5 °F (36.4 °C) (Temporal)   Resp 14   Ht 5' 3\" (1.6 m)   Wt (!) 368 lb (166.9 kg)   SpO2 94%   BMI 65.19 kg/m²   Gen:  NAD  Left breast: Slight erythema to the superior aspect near the axilla. No warmth to touch small fluid wave with seroma present.     ASSESSMENT/PLAN:  Stage Ib left breast cancer, grade 2, ER/MO positive, HER-2 negative  --Continue taking tamoxifen and follow-up with oncology next month  --Monitor for signs of infection including fevers chills increasing redness to the wound or drainage  --Follow-up in general surgery clinic on Monday to reassess    We will keep a close eye on this for right now to monitor for any signs of infection the wound otherwise appears well healed    Marta Miller MD, MSc, FACS  10/15/2021  11:58 AM

## 2021-10-16 LAB — ANAEROBIC CULTURE: NORMAL

## 2021-10-18 ENCOUNTER — OFFICE VISIT (OUTPATIENT)
Dept: SURGERY | Age: 46
End: 2021-10-18
Payer: MEDICAID

## 2021-10-18 DIAGNOSIS — Z17.0 MALIGNANT NEOPLASM OF UPPER-OUTER QUADRANT OF LEFT BREAST IN FEMALE, ESTROGEN RECEPTOR POSITIVE (HCC): Primary | ICD-10-CM

## 2021-10-18 DIAGNOSIS — C50.412 MALIGNANT NEOPLASM OF UPPER-OUTER QUADRANT OF LEFT BREAST IN FEMALE, ESTROGEN RECEPTOR POSITIVE (HCC): Primary | ICD-10-CM

## 2021-10-18 DIAGNOSIS — N64.89 SEROMA OF BREAST: ICD-10-CM

## 2021-10-18 PROCEDURE — 99024 POSTOP FOLLOW-UP VISIT: CPT | Performed by: SURGERY

## 2021-10-18 NOTE — PROGRESS NOTES
[place] [facility]ICAL [facility]ATES/[facility]  PROGRESS NOTE  ATTENDING NOTE    Chief Complaint   Patient presents with    Wound Check     no issues     S: 52-year-old female status post left mastectomy sentinel lymph node biopsy. She presents for wound check. She states it does not seem to be any different since Friday. She did not feel good over the weekend but she started feel better today. She states it is not warm to touch. She denies any fevers or chills. She does not feel like it needs drained today. Gen:  NAD  Left mastectomy: Wound is clean dry and intact. There is is seroma present however is becoming more firm in nature than fluidlike. There is no erythema today. I advised the patient that we should just watch this wound for now    ASSESSMENT/PLAN:  Stage Ib left breast cancer estrogen positive progesterone positive HER-2 negative, grade 2 status post left mastectomy sentinel lymph node biopsy  --Follow-up with OB/GYN today for discussion of oophorectomy  --Monitor wound call if there is any drainage fevers chills or redness to the wound. --Advised her to call Thursday morning and if there is any changes or she needs to be seen to call and we can see her in the breast clinic that day as I will be out of town Friday and Saturday.     If there is no further issues she could be seen again in 1 month    Marta Miller MD, MSc, FACS  10/18/2021  12:08 PM

## 2021-10-19 ENCOUNTER — HOSPITAL ENCOUNTER (OUTPATIENT)
Dept: RADIATION ONCOLOGY | Age: 46
Discharge: HOME | End: 2021-10-19
Attending: RADIOLOGY
Payer: MEDICAID

## 2021-10-19 PROCEDURE — 77332 RADIATION TREATMENT AID(S): CPT | Performed by: RADIOLOGY

## 2021-10-19 RX ORDER — TAMOXIFEN CITRATE 20 MG/1
20 TABLET ORAL DAILY
Qty: 30 TABLET | Refills: 0 | Status: SHIPPED
Start: 2021-10-19 | End: 2021-11-24

## 2021-10-19 NOTE — TELEPHONE ENCOUNTER
Last Appointment:  9/22/2021  Future Appointments   Date Time Provider Tiana Lynne   10/20/2021 10:30 AM [facility] MED ONC FAST TRACK 1 [facility] Med Onc [facility]   10/20/2021 10:45 AM Matt Junior MD MED ONC [facility]   10/26/2021 12:30 PM Katya Dominique DO [facility]   11/4/2021 10:00 AM Karan Gilliam MD [facility] PAT None   11/11/2021  9:00 AM Karan Gilliam MD [facility] GEN [place] None   11/29/2021 10:30 AM Karan Gilliam MD [facility] Buffy Moore

## 2021-10-20 ENCOUNTER — OFFICE VISIT (OUTPATIENT)
Dept: ONCOLOGY | Age: 46
End: 2021-10-20
Payer: MEDICAID

## 2021-10-20 ENCOUNTER — HOSPITAL ENCOUNTER (OUTPATIENT)
Dept: INFUSION THERAPY | Age: 46
Discharge: HOME | End: 2021-10-20
Payer: MEDICAID

## 2021-10-20 VITALS
RESPIRATION RATE: 18 BRPM | SYSTOLIC BLOOD PRESSURE: 135 MMHG | BODY MASS INDEX: 51.91 KG/M2 | DIASTOLIC BLOOD PRESSURE: 63 MMHG | HEIGHT: 63 IN | HEART RATE: 99 BPM | OXYGEN SATURATION: 96 % | TEMPERATURE: 97.3 F | WEIGHT: 293 LBS

## 2021-10-20 DIAGNOSIS — Z85.3 PERSONAL HISTORY OF BREAST CANCER: Primary | ICD-10-CM

## 2021-10-20 PROCEDURE — 1036F TOBACCO NON-USER: CPT | Performed by: INTERNAL MEDICINE

## 2021-10-20 PROCEDURE — G8417 CALC BMI ABV UP PARAM F/U: HCPCS | Performed by: INTERNAL MEDICINE

## 2021-10-20 PROCEDURE — 99214 OFFICE O/P EST MOD 30 MIN: CPT | Performed by: INTERNAL MEDICINE

## 2021-10-20 PROCEDURE — G8427 DOCREV CUR MEDS BY ELIG CLIN: HCPCS | Performed by: INTERNAL MEDICINE

## 2021-10-20 PROCEDURE — G8484 FLU IMMUNIZE NO ADMIN: HCPCS | Performed by: INTERNAL MEDICINE

## 2021-10-20 PROCEDURE — 99212 OFFICE O/P EST SF 10 MIN: CPT

## 2021-10-20 NOTE — PROGRESS NOTES
Department of [facility] Oncology  Attending Clinic Note    Reason for Visit: Follow-up on a patient with Left Breast Cancer    PCP:  JIHAN Rojas - CNP    History of Present Illness:  40year old female with Left Breast Cancer    Breast cancer risk factors include family hx on mother's side, mom with breast CA, family hx of colon CA and age and gender    Bilateral Diagnostic Mammogram/Left Breast U/S on 07/01/2021  Left sided ill defined hypoechoic region at the 2 o'clock position measuring 2 cm. On 07/01/2021 Left breast, 2:00 core needle biopsy:   Invasive, moderately differentiated mammary carcinoma (grade 2)     Comment: Microscopic examination shows an invasive carcinoma with linear growth pattern dispersed throughout fibrous stroma.  The tumor has a [place] histologic score of 3 (tubule formation) +2 (nuclear pleomorphism) +1 (mitotic count) = 6.  The tumor cells are immunoreactive with pankeratin and E-cadherin which favors ductal origin.  The p63 highlights myoepithelial cells in benign ducts.  Intradepartmental consultation is obtained. Breast Cancer Marker Studies:   Estrogen Receptors (ER): 60%   Progesterone Receptors (NV): 60%   Her-2/markie: Negative/1+     CXR 07/09/2021 noted no pneumonia or pleural effusion    Left axillary US 07/14/2021 noted no LN    Left simple mastectomy, sentinel lymph node biopsy, injection of methylene blue dye 08/18/2021  A.  Left breast, total mastectomy:   - Invasive carcinoma with mixed ductal and lobular features, bicentric, grade 2;   - Lobular carcinoma in situ and atypical lobular hyperplasia;   - Rare small ducts showing atypical ductal hyperplasia;   - Fibrocystic changes with usual ductal hyperplasia without atypia, adenosis, columnar cell change, fibroadenomatoid nodule, ductal ectasia, microcysts and stromal fibrosis;   - Changes of previous biopsy sites (2).      B.  [place] lymph nodes #1, biopsy:   - Three of six (3/6) lymph nodes positive for metastatic carcinoma (micrometastasis), see comment. C.  Left breast, new inferior/medial margin, excision:   - Unremarkable fibroadipose tissue and skeletal muscle negative for malignancy. D.  Left breast, additional inferior/lateral margin, excision:   - Unremarkable fibroadipose tissue, negative for malignancy. Comment: Sections of the mastectomy specimen revealed two isolated, similar sized tumor masses. Both are composed of infiltrative neoplastic cells showing single cell or single cell line arrangement. Immunostaining for E-cadherin was performed on sections of two selected tissue blocks (A 7 and A 12).  Some of the invasive neoplastic cells (A 7) show strong membrane positivity for E-cadherin, consistent with ductal differentiation while others (A 12) show absence or aberrant expression   of E-cadherin, indicative of lobular differentiation.  Therefore, this is an invasive carcinoma with mixed ductal and lobular features. The presence of lobular carcinoma in situ (LCIS) and atypical lobular hyperplasia [facility]) is confirmed by immunostaining for p63 and E-cadherin on sections of tissue block A 9 showing the presence of a p63 positive myoepithelial cells layer at the periphery and absence of E-cadherin expression of the epithelial cells inside of the myoepithelial layer. Total six lymph nodes are identified from the submitted tissue in specimen B and each node is serially sectioned.  Histologically, there are rare small foci suspicious for metastatic carcinoma. Immunostaining for pankeratin on sections of selected nodes (B2, B4, B5, B12 and B13)   was then performed.  Positively stained foci of micrometastasis (0.2-2 mm) are identified on sections of B2, B4/B5 (the same lymph node) and B13.  Intradepartmental consultation is obtained. CANCER CASE SUMMARY   Procedure:  Total mastectomy   Specimen Laterality: Left   TUMOR   Tumor Site: 2:00 (between upper-outer and lower-outer quadrant) Bone Scan 09/14/2021 without metastatic disease. Rad Onc on board    Pathology NCCN guidelines reviewed extensively with patient  TAILORx suggested that in women ? 50 years and with an intermediate RS (11-25), chemotherapy plus endocrine therapy was associated with a lower rate of distant recurrence relative to endocrine therapy alone, particularly for those with high-intermediate scores (21 to 25) or clinical risk features. However, this was based only on exploratory subset analyses, and moreover, some of the benefit hypothetically may have been due to ovarian suppression in premenopausal women. Ovarian suppression was not assessed as a treatment strategy in this trial, however. In light of limitations in the data, either chemotherapy and endocrine therapy, or endocrine therapy only (with ovarian suppression in premenopausal women) are acceptable options. [address] 7.6 09/22/2021 Estradiol 45.9 09/22/2021    She was leaning more to proceed with endocrine therapy only with ovarian suppression; Evaluated by Mara Sias Dr. Jesse Cooks for BSO; scheduled 11/11/2021  Tamoxifen was started on 09/23/2021. Mood changes hot flashes arthralgias fatigue    Review of Systems;  CONSTITUTIONAL: Fatigue mood changes. Hot flashes  ENMT: Eyes: No diplopia; Nose: No epistaxis. Mouth: No sore throat. RESPIRATORY: No hemoptysis, shortness of breath, cough. CARDIOVASCULAR: No chest pain, palpitations. GASTROINTESTINAL: No nausea/vomiting, abdominal pain. GENITOURINARY: No dysuria, urinary frequency, hematuria. MSK: Arthralgias  NEURO: No syncope, presyncope, headache. Remainder:  ROS NEGATIVE    Past Medical History:      Diagnosis Date    Abscess of right breast 6/19/2018    Anxiety     Breast abscess 7/11/2018    Fatigue     GERD (gastroesophageal reflux disease)     Hyperlipidemia     Obesity 6/4/2013     Medications:  Reviewed and reconciled. Allergies:   Allergies   Allergen Reactions    Vancomycin Nausea And Vomiting Red man syndrome, had high fever and upset stomach     Physical Exam:  /63   Pulse 99   Temp 97.3 °F (36.3 °C)   Resp 18   Ht 5' 3\" (1.6 m)   Wt (!) 368 lb (166.9 kg)   SpO2 96%   BMI 65.19 kg/m²   GENERAL: Alert, oriented x 3, not in acute distress. EXTREMITIES: Without clubbing or cyanosis or edema. Impression/Plan:  41 y/o female with Left IDC/ILC breast cancer, T2N1mi(sn)M0 ER+RI+HER2-, grade 2, Stage IB  Genetic Testing: Negative    Left simple mastectomy, sentinel lymph node biopsy, injection of methylene blue dye 08/18/2021  CANCER CASE SUMMARY   Procedure: Total mastectomy   Specimen Laterality: Left   TUMOR   Tumor Site: 2:00 (between upper-outer and lower-outer quadrant)   Histologic Type:  Invasive carcinoma with mixed ductal and lobular features   Histologic Grade: [place] Histologic Score        Glandular/tubular differentiation: Score 3        Nuclear pleomorphism: Score 2        Right articular rate: Score 1        Overall grade: Grade 2 (scores of 6)   Tumor Size: 21 x 15 x 10 mm   Tumor Focality: Multiple foci of invasive carcinoma        Number of foci: 2        Sizes of individual foci in millimeters: 20 x 15 x 7 mm        Ductal Carcinoma In Situ (DCIS): Not identified        Lobular Carcinoma In Situ (LCIS): Present   Lymphovascular invasion: Not identified   Dermal lymphovascular invasion: Not identified   Microcalcifications: Not identified   Treatment effect: No known presurgical therapy   MARGINS   All margins negative for invasive carcinoma   Distance from invasive carcinoma to closest margin: 6.0 cm from tumor #2; 6.5 cm from tumor #1   Closest margin to invasive carcinoma: Superior/anterior (for tumor #2); posterior (for tumor #1)   REGIONAL LYMPH NODES    Tumor present in regional lymph nodes    Number of lymph nodes with macrometastases (>2 mm): 0    Number of lymph nodes with micrometastasis (0.2-2 mm): 3    Number of lymph nodes with isolated tumor cells 0   HUA [place] of largest shelia metastatic deposit: 2 mm    Extranodal extension: Not identified   Total number of lymph nodes examined: 6   Number of sentinel nodes examined: 6   DISTANT METASTASIS: Not applicable   PATHOLOGIC STAGE (pTNM, AJCC 8th Edition)   TNM descriptors: m (multiple foci of invasive carcinoma)   mpT2 pN1mi     Rad Onc on board    Pathology NCCN guidelines reviewed extensively with patient  TAILORx suggested that in women ? 50 years and with an intermediate RS (11-25), chemotherapy plus endocrine therapy was associated with a lower rate of distant recurrence relative to endocrine therapy alone, particularly for those with high-intermediate scores (21 to 25) or clinical risk features. However, this was based only on exploratory subset analyses, and moreover, some of the benefit hypothetically may have been due to ovarian suppression in premenopausal women. Ovarian suppression was not assessed as a treatment strategy in this trial, however. In light of limitations in the data, either chemotherapy and endocrine therapy, or endocrine therapy only (with ovarian suppression in premenopausal women) are acceptable options. [address] 7.6 09/22/2021 Estradiol 45.9 09/22/2021    She was leaning more to proceed with endocrine therapy only with ovarian suppression; Evaluated by Cristy Miller for BSO; scheduled 11/11/2021  Tamoxifen was started on 09/23/2021.  Mood changes hot flashes arthralgias fatigue    RTC 2 weeks post BSO (will discuss Aromatase Inhibitor)    10/20/2021  Saida Butterfield MD

## 2021-10-21 ENCOUNTER — TELEPHONE (OUTPATIENT)
Dept: BREAST CENTER | Age: 46
End: 2021-10-21

## 2021-10-21 DIAGNOSIS — N64.89 SEROMA OF BREAST: Primary | ICD-10-CM

## 2021-10-21 RX ORDER — SULFAMETHOXAZOLE AND TRIMETHOPRIM 800; 160 MG/1; MG/1
2 TABLET ORAL 2 TIMES DAILY
Qty: 28 TABLET | Refills: 0 | Status: SHIPPED | OUTPATIENT
Start: 2021-10-21 | End: 2021-10-28

## 2021-10-21 NOTE — TELEPHONE ENCOUNTER
I called Bev Falcon and went over her symptoms. She is feeling better today, but it is still warm to touch. I escribed Bactrim. I talked to Alok Bennett about trying to get her an [address] with aspiration tomorrow. Ok to NOT leave a drain. Bev Falcon was unable to come in today.

## 2021-10-21 NOTE — TELEPHONE ENCOUNTER
MA received call from patient stating her breast area is full. Patient states yesterday she had nausea and chills and just didn't feel good. Patient denies fever or vomiting. Patient states so far today no symptoms but still doesn't feel good. Patient sent in pictures for reference. MA routing message to  for advisement.      Electronically signed by Megan Munguia MA on 10/21/21 at 8:49 AM EDT

## 2021-10-21 NOTE — TELEPHONE ENCOUNTER
Called patient to let her know she can come into the [facility] at 1100 on 10/22/201 for left  breast ultrasound with aspiration.

## 2021-10-22 ENCOUNTER — TELEPHONE (OUTPATIENT)
Dept: RADIATION ONCOLOGY | Age: 46
End: 2021-10-22

## 2021-10-22 ENCOUNTER — HOSPITAL ENCOUNTER (OUTPATIENT)
Dept: GENERAL RADIOLOGY | Age: 46
Discharge: HOME | End: 2021-10-24
Payer: MEDICAID

## 2021-10-22 DIAGNOSIS — N64.89 SEROMA OF BREAST: ICD-10-CM

## 2021-10-22 PROCEDURE — 2500000003 HC RX 250 WO HCPCS

## 2021-10-22 PROCEDURE — C1729 CATH, DRAINAGE: HCPCS

## 2021-10-22 NOTE — TELEPHONE ENCOUNTER
Patient called rad onc office and spoke to the rad onc nurse regarding her left breast that she is going to get drained today. Patient was informed she will have to get re-simmed per Dr. Florence Wong. Patient verbalized understanding.

## 2021-10-26 ENCOUNTER — OFFICE VISIT (OUTPATIENT)
Dept: FAMILY MEDICINE CLINIC | Age: 46
End: 2021-10-26
Payer: MEDICAID

## 2021-10-26 VITALS
RESPIRATION RATE: 16 BRPM | HEIGHT: 63 IN | SYSTOLIC BLOOD PRESSURE: 130 MMHG | TEMPERATURE: 97.8 F | BODY MASS INDEX: 51.91 KG/M2 | OXYGEN SATURATION: 97 % | DIASTOLIC BLOOD PRESSURE: 74 MMHG | WEIGHT: 293 LBS | HEART RATE: 89 BPM

## 2021-10-26 DIAGNOSIS — Z17.0 MALIGNANT NEOPLASM OF UPPER-OUTER QUADRANT OF LEFT BREAST IN FEMALE, ESTROGEN RECEPTOR POSITIVE (HCC): ICD-10-CM

## 2021-10-26 DIAGNOSIS — C50.412 MALIGNANT NEOPLASM OF UPPER-OUTER QUADRANT OF LEFT BREAST IN FEMALE, ESTROGEN RECEPTOR POSITIVE (HCC): ICD-10-CM

## 2021-10-26 DIAGNOSIS — F41.9 ANXIETY: Primary | ICD-10-CM

## 2021-10-26 DIAGNOSIS — E55.9 VITAMIN D DEFICIENCY: ICD-10-CM

## 2021-10-26 PROCEDURE — 1036F TOBACCO NON-USER: CPT | Performed by: STUDENT IN AN ORGANIZED HEALTH CARE EDUCATION/TRAINING PROGRAM

## 2021-10-26 PROCEDURE — G8427 DOCREV CUR MEDS BY ELIG CLIN: HCPCS | Performed by: STUDENT IN AN ORGANIZED HEALTH CARE EDUCATION/TRAINING PROGRAM

## 2021-10-26 PROCEDURE — G8484 FLU IMMUNIZE NO ADMIN: HCPCS | Performed by: STUDENT IN AN ORGANIZED HEALTH CARE EDUCATION/TRAINING PROGRAM

## 2021-10-26 PROCEDURE — 99213 OFFICE O/P EST LOW 20 MIN: CPT | Performed by: STUDENT IN AN ORGANIZED HEALTH CARE EDUCATION/TRAINING PROGRAM

## 2021-10-26 PROCEDURE — G8417 CALC BMI ABV UP PARAM F/U: HCPCS | Performed by: STUDENT IN AN ORGANIZED HEALTH CARE EDUCATION/TRAINING PROGRAM

## 2021-10-26 ASSESSMENT — ENCOUNTER SYMPTOMS
SINUS PRESSURE: 0
RHINORRHEA: 0
COUGH: 0
SINUS PAIN: 0
VOMITING: 0
EYE PAIN: 0
SORE THROAT: 0
BACK PAIN: 0
EYE REDNESS: 0
ABDOMINAL PAIN: 0
SHORTNESS OF BREATH: 0
DIARRHEA: 0
CONSTIPATION: 0
NAUSEA: 0

## 2021-10-26 NOTE — PROGRESS NOTES
10/26/2021    Lauryn Gomez (:  1975) is a 40 y.o. female, here for evaluation of the following medical concerns:    HPI  Chief Complaint   Patient presents with    New Patient     had Mastectomy 2 months ago, on Tamoxifen, having Hysterectomy next month due to breast cancer wanting to bring on Menopause, starting Chemotherapy next week     Patient 81-year-old female here today to establish care with myself. She is to see [employer]. She has a past medical history of breast cancer that is ER positive currently taking tamoxifen. She is starting chemo soon. She is going to be having a hysterectomy as well. She also already had a mastectomy and having another one in the future. She is on Lexapro for the depression anxiety that is going on with everything. There is a strong family history of breast cancer including her mother and her grandmother. Also a strong family history of other cancers as well. Both her sons have type 1 diabetes. Patient states that she had left ear pain and felt that there was something in the ear but I do not appreciate anything on examination. I did discuss use of the Flonase. She states she has some at home and will start to use it. She would like her vaccinations but she would like to speak with her oncologist prior to getting the flu shot or the Covid booster. She did have a Pap smear few months ago and will get us the records so that we can update them in her system. She does want a colonoscopy in the future but wants to get everything done with the breast cancer first before going through another thing such as a colonoscopy. Review of Systems   Constitutional: Negative for chills, fatigue and fever. HENT: Positive for ear pain. Negative for congestion, postnasal drip, rhinorrhea, sinus pressure, sinus pain and sore throat. Eyes: Negative for pain and redness. Respiratory: Negative for cough and shortness of breath.     Cardiovascular: Negative for chest pain. Gastrointestinal: Negative for abdominal pain, constipation, diarrhea, nausea and vomiting. Genitourinary: Negative for difficulty urinating and dysuria. Musculoskeletal: Negative for back pain, myalgias and neck pain. Skin: Negative for rash. Neurological: Negative for dizziness, light-headedness and numbness. Psychiatric/Behavioral: Positive for dysphoric mood. The patient is nervous/anxious. Prior to Visit Medications    Medication Sig Taking?  Authorizing Provider   sulfamethoxazole-trimethoprim (BACTRIM DS;SEPTRA DS) 800-160 MG per tablet Take 2 tablets by mouth 2 times daily for 7 days Yes Toma Tamayo MD   tamoxifen (NOLVADEX) 20 MG tablet TAKE 1 TABLET BY MOUTH DAILY Yes Vernell Whyte MD   ASPIRIN 81 PO Take by mouth Yes Historical Provider, MD   escitalopram (LEXAPRO) 10 MG tablet Take 1 tablet by mouth daily Yes Katya Ferris DO   ondansetron (ZOFRAN) 4 MG tablet Take 1 tablet by mouth 3 times daily as needed for Nausea or Vomiting Yes Betty Kovacs MD   fluticasone (FLONASE) 50 MCG/ACT nasal spray 2 sprays by Each Nostril route daily Yes Katya Ferris DO   vitamin D (ERGOCALCIFEROL) 1.25 MG (50379 UT) CAPS capsule Take 1 capsule by mouth once a week Yes JIHAN Quiñones - CNP        Allergies   Allergen Reactions    Vancomycin Nausea And Vomiting     Red man syndrome, had high fever and upset stomach       Past Medical History:   Diagnosis Date    Abscess of right breast 6/19/2018    Anxiety     Breast abscess 7/11/2018    Fatigue     GERD (gastroesophageal reflux disease)     Hyperlipidemia     Obesity 6/4/2013       Past Surgical History:   Procedure Laterality Date    ANKLE FRACTURE SURGERY  4/20/2012    right with hardware, subsequently removed    APPENDECTOMY      APPENDECTOMY  1984    BREAST BIOPSY Left 2020    BREAST BIOPSY Right 2018    CHOLECYSTECTOMY      2003    CYSTOSCOPY  7/25/2014    retrograde pyelogram, ureteroscopy, laser lithotripsy, left stent placement    ENDOSCOPY, COLON, DIAGNOSTIC          MASTECTOMY Left 2021    LEFT BREAST SIMPLE MASTECTOMY WITH SENTINEL LYMPH NODE BIOPSY performed by Bernice Melendez MD at [address] [facility] HEMATOMA/FLUID Right 2018    RIGHT BREAST INCISION AND DRAINAGE POSSIBLE WOUND VAC performed by Bernice Melendez MD at [address] [facility] Right 2018    INCISION AND DRAINAGE RIGHT BREAST, WITH APPLICATION OF WOUND VAC (PATIENT HSS WOUND VAC WILL BRING IT)  performed by [facility]endez MD at [facility]    Left leg    US BREAST CYST ASPIRATION LEFT Left 2021    US BREAST CYST ASPIRATION LEFT 2021 [facility]    US BREAST CYST ASPIRATION LEFT Left 2021    US BREAST CYST ASPIRATION LEFT [facility]    US BREAST CYST ASPIRATION LEFT Left 2021    US BREAST CYST ASPIRATION LEFT 2021 [facility]    US BREAST CYST ASPIRATION LEFT Left 10/22/2021    US BREAST CYST ASPIRATION LEFT 10/22/2021 [facility]    US BREAST NEEDLE BIOPSY LEFT  2020    US BREAST NEEDLE BIOPSY LEFT 2020 [facility]    US BREAST NEEDLE BIOPSY LEFT Left 2021    US BREAST NEEDLE BIOPSY LEFT 2021 [facility]       Social History     Socioeconomic History    Marital status: Single     Spouse name: Not on file    Number of children: 2    Years of education: Not on file    Highest education level: Not on file   Occupational History    Not on file   Tobacco Use    Smoking status: Former Smoker     Years: 4.00     Quit date: 2008     Years since quittin.1    Smokeless tobacco: Never Used   Vaping Use    Vaping Use: Never used   Substance and Sexual Activity    Alcohol use:  Yes     Alcohol/week: 0.0 standard drinks     Comment: rarely    Drug use: No    Sexual activity: Not on file   Other Topics Concern    Not on file   Social History Narrative    Not on file     Social Determinants of Health Financial Resource Strain: Low Risk     Difficulty of Paying Living Expenses: Not hard at all   Food Insecurity: No Food Insecurity    Worried About Running Out of Food in the Last Year: Never true    Yamini of Food in the Last Year: Never true   Transportation Needs:     Lack of Transportation (Medical):  Lack of Transportation (Non-Medical):    Physical Activity:     Days of Exercise per Week:     Minutes of Exercise per Session:    Stress:     Feeling of Stress :    Social Connections:     Frequency of Communication with Friends and Family:     Frequency of Social Gatherings with Friends and Family:     Attends Nondenominational Services:     Active Member of Clubs or Organizations:     Attends Club or Organization Meetings:     Marital Status:    Intimate Partner Violence:     Fear of Current or Ex-Partner:     Emotionally Abused:     Physically Abused:     Sexually Abused:         Family History   Problem Relation Age of Onset    Diabetes Maternal Grandmother     High Blood Pressure Maternal Grandmother     High Cholesterol Maternal Grandmother     Breast Cancer Maternal Grandmother 79    High Cholesterol Mother     Breast Cancer Mother 54    Colon Cancer Mother     Stomach Cancer Mother     Cancer Mother         stomach, colon, intestine    Cancer Maternal Grandfather 60        throat    Ovarian Cancer Other        Vitals:    10/26/21 1226   BP: 130/74   Pulse: 89   Resp: 16   Temp: 97.8 °F (36.6 °C)   TempSrc: Infrared   SpO2: 97%   Weight: (!) 364 lb 6.4 oz (165.3 kg)   Height: 5' 3\" (1.6 m)     Estimated body mass index is 64.55 kg/m² as calculated from the following:    Height as of this encounter: 5' 3\" (1.6 m). Weight as of this encounter: 364 lb 6.4 oz (165.3 kg).     Most Recent Labs  CBC  Lab Results   Component Value Date    WBC 13.0 08/18/2021    WBC 14.5 07/09/2021    WBC 11.9 05/13/2021    RBC 5.01 08/18/2021    RBC 5.24 07/09/2021    RBC 5.04 05/13/2021    HGB 13.7 08/18/2021    HGB 14.4 07/09/2021    HGB 14.1 05/13/2021    HCT 42.5 08/18/2021    HCT 45.9 07/09/2021    HCT 45.7 05/13/2021    MCV 84.8 08/18/2021    MCV 87.6 07/09/2021    MCV 90.7 05/13/2021     08/18/2021     07/09/2021     05/13/2021      CMP  Lab Results   Component Value Date     07/09/2021     05/13/2021     01/15/2020    K 3.7 07/09/2021    K 4.4 05/13/2021    K 4.3 01/15/2020    K 4.3 07/16/2018    K 3.9 07/15/2018    K 4.4 07/14/2018    CL 99 07/09/2021     05/13/2021     01/15/2020    CO2 23 07/09/2021    CO2 26 05/13/2021    CO2 22 01/15/2020    ANIONGAP 14 07/09/2021    ANIONGAP 13 05/13/2021    ANIONGAP 13 01/15/2020    GLUCOSE 96 07/09/2021    GLUCOSE 95 05/13/2021    GLUCOSE 104 01/15/2020    GLUCOSE 80 04/20/2012    GLUCOSE 80 03/08/2011    BUN 11 07/09/2021    BUN 11 05/13/2021    BUN 20 01/15/2020    CREATININE 0.7 07/09/2021    CREATININE 0.7 05/13/2021    CREATININE 0.6 01/15/2020    LABGLOM >60 07/09/2021    LABGLOM >60 05/13/2021    LABGLOM >60 01/15/2020    GFRAA >60 07/09/2021    GFRAA >60 05/13/2021    GFRAA >60 01/15/2020    CALCIUM 8.9 07/09/2021    CALCIUM 8.9 05/13/2021    CALCIUM 8.9 01/15/2020    PROT 7.6 07/09/2021    PROT 7.8 05/13/2021    PROT 7.4 01/15/2020    LABALBU 4.0 07/09/2021    LABALBU 3.9 05/13/2021    LABALBU 3.7 01/15/2020    LABALBU 3.6 04/20/2012    LABALBU 4.1 03/08/2011    BILITOT 0.9 07/09/2021    BILITOT 0.6 05/13/2021    BILITOT 0.4 01/15/2020    ALKPHOS 86 07/09/2021    ALKPHOS 83 05/13/2021    ALKPHOS 75 01/15/2020    AST 16 07/09/2021    AST 20 05/13/2021    AST 11 01/15/2020    ALT 20 07/09/2021    ALT 19 05/13/2021    ALT 21 01/15/2020     A1C  Lab Results   Component Value Date    LABA1C 5.8 05/13/2021    LABA1C 5.6 08/08/2019    LABA1C 5.6 04/16/2014     TSH  Lab Results   Component Value Date    TSH 3.240 05/13/2021    TSH 3.800 08/08/2019    TSH 2.470 06/01/2018     FREET4  Lab Results   Component Value Date    S3NQSZG 15.2 02/08/2012     LIPID  Lab Results   Component Value Date    CHOL 172 05/13/2021    CHOL 170 08/08/2019    CHOL 148 06/01/2018    HDL 43 05/13/2021    HDL 50 08/08/2019    HDL 51 06/01/2018    LDLCALC 80 05/13/2021    LDLCALC 68 08/08/2019    LDLCALC 79 06/01/2018    TRIG 243 05/13/2021    TRIG 262 08/08/2019    TRIG 90 06/01/2018     VITAMIN D  Lab Results   Component Value Date    VITD25 18 05/13/2021    VITD25 17 08/08/2019    VITD25 17 10/02/2018       SALOME   Lab Results   Component Value Date    SALOME NEGATIVE 10/15/2019     RHEUMATOID FACTOR  Lab Results   Component Value Date    RF <10 10/15/2019     UA  Lab Results   Component Value Date    COLORU Yellow 01/15/2020    COLORU Yellow 05/05/2018    COLORU Yellow 07/14/2014    CLARITYU Clear 01/15/2020    CLARITYU Clear 05/05/2018    CLARITYU CLOUDY 07/14/2014    GLUCOSEU Negative 01/15/2020    GLUCOSEU neg 01/14/2020    GLUCOSEU Negative 05/05/2018    GLUCOSEU Negative 07/14/2014    BILIRUBINUR Negative 01/15/2020    BILIRUBINUR neg 01/14/2020    BILIRUBINUR Negative 05/05/2018    BILIRUBINUR Negative 07/14/2014    KETUA Negative 01/15/2020    KETUA neg 01/14/2020    KETUA Negative 05/05/2018    KETUA Negative 07/14/2014    SPECGRAV 1.015 01/15/2020    SPECGRAV 1.025 01/14/2020    SPECGRAV >=1.030 05/05/2018    SPECGRAV >=1.030 07/14/2014    BLOODU TRACE-INTACT 01/15/2020    BLOODU neg 01/14/2020    BLOODU LARGE 05/05/2018    BLOODU LARGE 07/14/2014    PHUR 6.0 01/15/2020    PHUR 5.5 01/14/2020    PHUR 6.0 05/05/2018    PHUR 6.0 07/14/2014    PROTEINU Negative 01/15/2020    PROTEINU trace 01/14/2020    PROTEINU Negative 05/05/2018    PROTEINU TRACE 07/14/2014    UROBILINOGEN 0.2 01/15/2020    UROBILINOGEN 0.2 05/05/2018    UROBILINOGEN 0.2 07/14/2014    NITRU Negative 01/15/2020    NITRU Negative 05/05/2018    NITRU Negative 07/14/2014    LEUKOCYTESUR Negative 01/15/2020    LEUKOCYTESUR neg 01/14/2020    LEUKOCYTESUR Negative 05/05/2018 LEUKOCYTESUR TRACE 07/14/2014       Physical Exam  Vitals and nursing note reviewed. Constitutional:       Appearance: Normal appearance. She is obese. HENT:      Head: Normocephalic and atraumatic. Right Ear: Tympanic membrane, ear canal and external ear normal.      Left Ear: Tympanic membrane, ear canal and external ear normal.      Nose: Nose normal.      Mouth/Throat:      Mouth: Mucous membranes are moist.      Pharynx: Oropharynx is clear. Eyes:      Extraocular Movements: Extraocular movements intact. Conjunctiva/sclera: Conjunctivae normal.      Pupils: Pupils are equal, round, and reactive to light. Cardiovascular:      Rate and Rhythm: Normal rate and regular rhythm. Pulses: Normal pulses. Heart sounds: Normal heart sounds. Pulmonary:      Effort: Pulmonary effort is normal.      Breath sounds: Normal breath sounds. Abdominal:      General: Bowel sounds are normal.      Palpations: Abdomen is soft. Musculoskeletal:         General: Normal range of motion. Cervical back: Normal range of motion and neck supple. Skin:     General: Skin is warm and dry. Capillary Refill: Capillary refill takes less than 2 seconds. Neurological:      General: No focal deficit present. Mental Status: She is alert and oriented to person, place, and time. Psychiatric:         Mood and Affect: Mood is anxious. Affect is tearful. Behavior: Behavior normal.         Thought Content: Thought content normal.         ASSESSMENT/PLAN:  Jose Martinez was seen today for new patient.     Diagnoses and all orders for this visit:    Anxiety    Vitamin D deficiency    Malignant neoplasm of upper-outer quadrant of left breast in female, estrogen receptor positive ([facility] [address]    BMI 60.0-69.9, adult [facility])       Patient to continue following with Dr. Teddy Rahman and her oncologist.    Educational materials and/or home exercises printed for patient's review and were included in patient instructions on his/her After Visit Summary and given to patient at the end of visit. Counseled regarding above diagnosis, including possible risks and complications,  especially if left uncontrolled. Counseled regarding the possible side effects, risks, benefits and alternatives to treatment; patient and/or guardian verbalizes understanding, agrees, feels comfortable with and wishes to proceed with above treatment plan. Advised patient to call with any new medication issues, and read all Rx info from pharmacy to assure aware of all possible risks and side effects of medication before taking. Reviewed age and gender appropriate health screening exams and vaccinations. Advised patient regarding importance of keeping up with recommended health maintenance and to schedule as soon as possible if overdue, as this is important in assessing for undiagnosed pathology, especially cancer, as well as protecting against potentially harmful/life threatening disease. Patient and/or guardian verbalizes understanding and agrees with above counseling, assessment and plan. All questions answered. Return in about 3 months (around 1/26/2022). An  electronic signature was used to authenticate this note.     --Huber Agudelo,  on 10/26/2021 at 1:04 PM

## 2021-10-28 ENCOUNTER — HOSPITAL ENCOUNTER (OUTPATIENT)
Dept: RADIATION ONCOLOGY | Age: 46
Discharge: HOME | End: 2021-10-28
Attending: RADIOLOGY
Payer: MEDICAID

## 2021-10-28 PROCEDURE — 77263 THER RADIOLOGY TX PLNG CPLX: CPT | Performed by: RADIOLOGY

## 2021-11-05 ENCOUNTER — HOSPITAL ENCOUNTER (OUTPATIENT)
Dept: RADIATION ONCOLOGY | Age: 46
Discharge: HOME | End: 2021-11-05
Attending: RADIOLOGY
Payer: MEDICAID

## 2021-11-05 PROCEDURE — 77301 RADIOTHERAPY DOSE PLAN IMRT: CPT | Performed by: RADIOLOGY

## 2021-11-05 PROCEDURE — 77338 DESIGN MLC DEVICE FOR IMRT: CPT | Performed by: RADIOLOGY

## 2021-11-05 PROCEDURE — 77293 RESPIRATOR MOTION MGMT SIMUL: CPT | Performed by: RADIOLOGY

## 2021-11-05 PROCEDURE — 77300 RADIATION THERAPY DOSE PLAN: CPT | Performed by: RADIOLOGY

## 2021-11-09 ENCOUNTER — APPOINTMENT (OUTPATIENT)
Dept: RADIATION ONCOLOGY | Age: 46
End: 2021-11-09
Attending: RADIOLOGY
Payer: MEDICAID

## 2021-11-10 ENCOUNTER — APPOINTMENT (OUTPATIENT)
Dept: RADIATION ONCOLOGY | Age: 46
End: 2021-11-10
Attending: RADIOLOGY
Payer: MEDICAID

## 2021-11-11 ENCOUNTER — APPOINTMENT (OUTPATIENT)
Dept: RADIATION ONCOLOGY | Age: 46
End: 2021-11-11
Attending: RADIOLOGY
Payer: MEDICAID

## 2021-11-12 ENCOUNTER — APPOINTMENT (OUTPATIENT)
Dept: RADIATION ONCOLOGY | Age: 46
End: 2021-11-12
Attending: RADIOLOGY
Payer: MEDICAID

## 2021-11-15 ENCOUNTER — APPOINTMENT (OUTPATIENT)
Dept: RADIATION ONCOLOGY | Age: 46
End: 2021-11-15
Attending: RADIOLOGY
Payer: MEDICAID

## 2021-11-16 ENCOUNTER — APPOINTMENT (OUTPATIENT)
Dept: RADIATION ONCOLOGY | Age: 46
End: 2021-11-16
Attending: RADIOLOGY
Payer: MEDICAID

## 2021-11-16 ENCOUNTER — HOSPITAL ENCOUNTER (OUTPATIENT)
Dept: RADIATION ONCOLOGY | Age: 46
Discharge: HOME | End: 2021-11-16
Attending: RADIOLOGY
Payer: MEDICAID

## 2021-11-16 PROCEDURE — 77014 PR CT GUIDANCE PLACEMENT RAD THERAPY FIELDS: CPT | Performed by: RADIOLOGY

## 2021-11-16 PROCEDURE — 77385 HC NTSTY MODUL RAD TX DLVR SMPL: CPT | Performed by: RADIOLOGY

## 2021-11-17 ENCOUNTER — TELEPHONE (OUTPATIENT)
Dept: ONCOLOGY | Age: 46
End: 2021-11-17

## 2021-11-17 ENCOUNTER — HOSPITAL ENCOUNTER (OUTPATIENT)
Dept: RADIATION ONCOLOGY | Age: 46
Discharge: HOME | End: 2021-11-17
Attending: RADIOLOGY
Payer: MEDICAID

## 2021-11-17 ENCOUNTER — APPOINTMENT (OUTPATIENT)
Dept: RADIATION ONCOLOGY | Age: 46
End: 2021-11-17
Attending: RADIOLOGY
Payer: MEDICAID

## 2021-11-17 VITALS
BODY MASS INDEX: 64.83 KG/M2 | WEIGHT: 293 LBS | HEART RATE: 85 BPM | OXYGEN SATURATION: 95 % | SYSTOLIC BLOOD PRESSURE: 110 MMHG | RESPIRATION RATE: 18 BRPM | DIASTOLIC BLOOD PRESSURE: 74 MMHG | TEMPERATURE: 97.5 F

## 2021-11-17 DIAGNOSIS — C50.919 MALIGNANT NEOPLASM OF FEMALE BREAST, UNSPECIFIED ESTROGEN RECEPTOR STATUS, UNSPECIFIED LATERALITY, UNSPECIFIED SITE OF BREAST (HCC): Primary | ICD-10-CM

## 2021-11-17 PROCEDURE — 77385 HC NTSTY MODUL RAD TX DLVR SMPL: CPT | Performed by: RADIOLOGY

## 2021-11-17 PROCEDURE — 99999 PR OFFICE/OUTPT VISIT,PROCEDURE ONLY: CPT | Performed by: RADIOLOGY

## 2021-11-17 ASSESSMENT — PAIN DESCRIPTION - LOCATION: LOCATION: ABDOMEN

## 2021-11-17 ASSESSMENT — PAIN SCALES - GENERAL: PAINLEVEL_OUTOF10: 7

## 2021-11-17 NOTE — PATIENT INSTRUCTIONS
Continue daily fractionated radiation therapy as scheduled. Please see weekly OTV note and intial consultation letter in [facility] for clinical details. Wendy Michelle. Micheal Comer MD MS [facility] [place]:  [phone]   FAX: [phone]  [address] [place] [address]:  [phone]   FAX:    [phone]  [facility]:  [phone]   FAX:  [phone]  Email: [email or web address]

## 2021-11-17 NOTE — PROGRESS NOTES
Jenna Bachelor  11/17/2021  Wt Readings from Last 3 Encounters:   11/17/21 (!) 366 lb (166 kg)   11/11/21 (!) 365 lb (165.6 kg)   11/04/21 (!) 365 lb (165.6 kg)     Body mass index is 64.83 kg/m². Treatment Area:Vmat left Chest wall    Patient was seen today for weekly visit. Comfort Alteration  KPS:90%  Fatigue: None    Nutritional Alteration  Anorexia: No   Nausea: No   Vomiting: No     Skin Alteration   Sensation:good and using lotion    Radiation Dermatitis:  na    Mucous Membrane Alteration  Drainage: No  Lymphedema: No    Emotional  Coping: effective    Sexuality Alteration  na    Injury, potential bleeding or infection: na        Lab Results   Component Value Date    WBC 15.1 (H) 11/11/2021     11/11/2021         /74   Pulse 85   Temp 97.5 °F (36.4 °C) (Skin)   Resp 18   Wt (!) 366 lb (166 kg)   LMP 10/24/2021   SpO2 95%   BMI 64.83 kg/m²   BP within normal range? yes           Assessment/Plan: 2/28fx  [id number] and tolerating, anxiety with breathing and encouraged imagery.     Cherise Everett, RN
and PRN. Current and planned dose reviewed. Goals of treatment and potential side effects were reviewed with the patient PRN. Treatment imaging has been personally reviewed for accuracy and precision. Questions answered to apparent satisfaction. Treatments will continue as planned. Zuly Porras.  Demarco Clements MD MS DABR  Radiation Oncologist        [facility] ([address] [place] [address]): [phone] /// FAX: [phone]  [facility]): [phone] /// FAX: [phone]  [facility]): [phone] /// FAX: [phone]

## 2021-11-17 NOTE — TELEPHONE ENCOUNTER
Met with pt in conjunction with OTV re: positive distress screen. Pt is 71-year-old female being treated for breast CA. Pt's mood appeared euthymic with full affect, she appeared A&Ox4, and she was willing/able to participate in session. She appeared appropriately dressed/groomed and was able to ambulate/transfer without assistance. Pt discussed recent cancer dx and ongoing tx. Stated that she had hysterectomy previous week and continues to have some pain. Noted that her mother is also being treated for breast cancer but reported that [place]. Pt discussed ongoing stressors including caring for her two sons who have diabetes while she is undergoing cancer tx. Stated that she does have a strong social support system. Provided information on role of oncology SW including counseling services. Pt to notify this provider if she wishes to pursue counseling services. No additional needs identified at this time.      JULIANA Caba, JEROME-S  Oncology Social Worker

## 2021-11-18 ENCOUNTER — APPOINTMENT (OUTPATIENT)
Dept: RADIATION ONCOLOGY | Age: 46
End: 2021-11-18
Attending: RADIOLOGY
Payer: MEDICAID

## 2021-11-18 ENCOUNTER — HOSPITAL ENCOUNTER (OUTPATIENT)
Dept: RADIATION ONCOLOGY | Age: 46
Discharge: HOME | End: 2021-11-18
Attending: RADIOLOGY
Payer: MEDICAID

## 2021-11-18 PROCEDURE — 77385 HC NTSTY MODUL RAD TX DLVR SMPL: CPT | Performed by: RADIOLOGY

## 2021-11-19 ENCOUNTER — HOSPITAL ENCOUNTER (OUTPATIENT)
Dept: RADIATION ONCOLOGY | Age: 46
Discharge: HOME | End: 2021-11-19
Attending: RADIOLOGY
Payer: MEDICAID

## 2021-11-19 ENCOUNTER — APPOINTMENT (OUTPATIENT)
Dept: RADIATION ONCOLOGY | Age: 46
End: 2021-11-19
Attending: RADIOLOGY
Payer: MEDICAID

## 2021-11-19 PROCEDURE — 77385 HC NTSTY MODUL RAD TX DLVR SMPL: CPT | Performed by: RADIOLOGY

## 2021-11-21 ENCOUNTER — APPOINTMENT (OUTPATIENT)
Dept: RADIATION ONCOLOGY | Age: 46
End: 2021-11-21
Attending: RADIOLOGY
Payer: MEDICAID

## 2021-11-21 ENCOUNTER — HOSPITAL ENCOUNTER (OUTPATIENT)
Dept: RADIATION ONCOLOGY | Age: 46
Discharge: HOME | End: 2021-11-21
Attending: RADIOLOGY
Payer: MEDICAID

## 2021-11-21 PROCEDURE — 77336 RADIATION PHYSICS CONSULT: CPT | Performed by: RADIOLOGY

## 2021-11-21 PROCEDURE — 77385 HC NTSTY MODUL RAD TX DLVR SMPL: CPT | Performed by: RADIOLOGY

## 2021-11-21 PROCEDURE — 77427 RADIATION TX MANAGEMENT X5: CPT | Performed by: RADIOLOGY

## 2021-11-22 ENCOUNTER — APPOINTMENT (OUTPATIENT)
Dept: RADIATION ONCOLOGY | Age: 46
End: 2021-11-22
Attending: RADIOLOGY
Payer: MEDICAID

## 2021-11-22 ENCOUNTER — HOSPITAL ENCOUNTER (OUTPATIENT)
Dept: RADIATION ONCOLOGY | Age: 46
Discharge: HOME | End: 2021-11-22
Attending: RADIOLOGY
Payer: MEDICAID

## 2021-11-22 PROCEDURE — 77014 PR CT GUIDANCE PLACEMENT RAD THERAPY FIELDS: CPT | Performed by: RADIOLOGY

## 2021-11-22 PROCEDURE — 77385 HC NTSTY MODUL RAD TX DLVR SMPL: CPT | Performed by: RADIOLOGY

## 2021-11-23 ENCOUNTER — APPOINTMENT (OUTPATIENT)
Dept: RADIATION ONCOLOGY | Age: 46
End: 2021-11-23
Attending: RADIOLOGY
Payer: MEDICAID

## 2021-11-23 ENCOUNTER — HOSPITAL ENCOUNTER (OUTPATIENT)
Dept: RADIATION ONCOLOGY | Age: 46
Discharge: HOME | End: 2021-11-23
Attending: RADIOLOGY
Payer: MEDICAID

## 2021-11-23 PROCEDURE — 77385 HC NTSTY MODUL RAD TX DLVR SMPL: CPT | Performed by: RADIOLOGY

## 2021-11-24 ENCOUNTER — HOSPITAL ENCOUNTER (OUTPATIENT)
Dept: INFUSION THERAPY | Age: 46
Discharge: HOME | End: 2021-11-24

## 2021-11-24 ENCOUNTER — OFFICE VISIT (OUTPATIENT)
Dept: ONCOLOGY | Age: 46
End: 2021-11-24
Payer: MEDICAID

## 2021-11-24 ENCOUNTER — HOSPITAL ENCOUNTER (OUTPATIENT)
Dept: RADIATION ONCOLOGY | Age: 46
Discharge: HOME | End: 2021-11-24
Attending: RADIOLOGY
Payer: MEDICAID

## 2021-11-24 ENCOUNTER — APPOINTMENT (OUTPATIENT)
Dept: RADIATION ONCOLOGY | Age: 46
End: 2021-11-24
Attending: RADIOLOGY
Payer: MEDICAID

## 2021-11-24 ENCOUNTER — HOSPITAL ENCOUNTER (OUTPATIENT)
Dept: RADIATION ONCOLOGY | Age: 46
End: 2021-11-24
Attending: RADIOLOGY
Payer: MEDICAID

## 2021-11-24 VITALS
BODY MASS INDEX: 51.91 KG/M2 | SYSTOLIC BLOOD PRESSURE: 168 MMHG | HEART RATE: 88 BPM | TEMPERATURE: 97.7 F | DIASTOLIC BLOOD PRESSURE: 81 MMHG | OXYGEN SATURATION: 95 % | WEIGHT: 293 LBS | HEIGHT: 63 IN

## 2021-11-24 DIAGNOSIS — Z79.811 USE OF AROMATASE INHIBITORS: Primary | ICD-10-CM

## 2021-11-24 DIAGNOSIS — C50.919 MALIGNANT NEOPLASM OF FEMALE BREAST, UNSPECIFIED ESTROGEN RECEPTOR STATUS, UNSPECIFIED LATERALITY, UNSPECIFIED SITE OF BREAST (HCC): Primary | ICD-10-CM

## 2021-11-24 PROCEDURE — G8428 CUR MEDS NOT DOCUMENT: HCPCS | Performed by: INTERNAL MEDICINE

## 2021-11-24 PROCEDURE — 99214 OFFICE O/P EST MOD 30 MIN: CPT | Performed by: INTERNAL MEDICINE

## 2021-11-24 PROCEDURE — G8484 FLU IMMUNIZE NO ADMIN: HCPCS | Performed by: INTERNAL MEDICINE

## 2021-11-24 PROCEDURE — 99213 OFFICE O/P EST LOW 20 MIN: CPT

## 2021-11-24 PROCEDURE — G8417 CALC BMI ABV UP PARAM F/U: HCPCS | Performed by: INTERNAL MEDICINE

## 2021-11-24 PROCEDURE — 99999 PR OFFICE/OUTPT VISIT,PROCEDURE ONLY: CPT | Performed by: RADIOLOGY

## 2021-11-24 PROCEDURE — 1036F TOBACCO NON-USER: CPT | Performed by: INTERNAL MEDICINE

## 2021-11-24 RX ORDER — ANASTROZOLE 1 MG/1
1 TABLET ORAL DAILY
Qty: 30 TABLET | Refills: 3 | Status: SHIPPED
Start: 2021-11-24 | End: 2022-03-28

## 2021-11-24 NOTE — PATIENT INSTRUCTIONS
Continue daily fractionated radiation therapy as scheduled. Please see weekly OTV note and intial consultation letter in [facility] for clinical details. Robin Sjogren. Mira Branch MD MS [facility]:  [phone]   FAX: [phone]  [facility]:  [phone]   FAX:    [phone]  [address] [place] [address] Road:  [phone]   FAX:  [phone]  Email: [email or web address]

## 2021-11-24 NOTE — PROGRESS NOTES
Department of [facility] Oncology  Attending Clinic Note    Reason for Visit: Follow-up on a patient with Left Breast Cancer    PCP:  Tony Begum DO    History of Present Illness:  40year old female with Left Breast Cancer    Breast cancer risk factors include family hx on mother's side, mom with breast CA, family hx of colon CA and age and gender    Bilateral Diagnostic Mammogram/Left Breast U/S on 07/01/2021  Left sided ill defined hypoechoic region at the 2 o'clock position measuring 2 cm. On 07/01/2021 Left breast, 2:00 core needle biopsy:   Invasive, moderately differentiated mammary carcinoma (grade 2)     Comment: Microscopic examination shows an invasive carcinoma with linear growth pattern dispersed throughout fibrous stroma.  The tumor has a [place] histologic score of 3 (tubule formation) +2 (nuclear pleomorphism) +1 (mitotic count) = 6.  The tumor cells are immunoreactive with pankeratin and E-cadherin which favors ductal origin.  The p63 highlights myoepithelial cells in benign ducts.  Intradepartmental consultation is obtained. Breast Cancer Marker Studies:   Estrogen Receptors (ER): 60%   Progesterone Receptors (NJ): 60%   Her-2/markie: Negative/1+     CXR 07/09/2021 noted no pneumonia or pleural effusion    Left axillary US 07/14/2021 noted no LN    Left simple mastectomy, sentinel lymph node biopsy, injection of methylene blue dye 08/18/2021  A.  Left breast, total mastectomy:   - Invasive carcinoma with mixed ductal and lobular features, bicentric, grade 2;   - Lobular carcinoma in situ and atypical lobular hyperplasia;   - Rare small ducts showing atypical ductal hyperplasia;   - Fibrocystic changes with usual ductal hyperplasia without atypia, adenosis, columnar cell change, fibroadenomatoid nodule, ductal ectasia, microcysts and stromal fibrosis;   - Changes of previous biopsy sites (2).      B.  Hester lymph nodes #1, biopsy:   - Three of six (3/6) lymph nodes positive for metastatic carcinoma (micrometastasis), see comment. C.  Left breast, new inferior/medial margin, excision:   - Unremarkable fibroadipose tissue and skeletal muscle negative for malignancy. D.  Left breast, additional inferior/lateral margin, excision:   - Unremarkable fibroadipose tissue, negative for malignancy. Comment: Sections of the mastectomy specimen revealed two isolated, similar sized tumor masses. Both are composed of infiltrative neoplastic cells showing single cell or single cell line arrangement. Immunostaining for E-cadherin was performed on sections of two selected tissue blocks (A 7 and A 12).  Some of the invasive neoplastic cells (A 7) show strong membrane positivity for E-cadherin, consistent with ductal differentiation while others (A 12) show absence or aberrant expression   of E-cadherin, indicative of lobular differentiation.  Therefore, this is an invasive carcinoma with mixed ductal and lobular features. The presence of lobular carcinoma in situ (LCIS) and atypical lobular hyperplasia [facility]) is confirmed by immunostaining for p63 and E-cadherin on sections of tissue block A 9 showing the presence of a p63 positive myoepithelial cells layer at the periphery and absence of E-cadherin expression of the epithelial cells inside of the myoepithelial layer. Total six lymph nodes are identified from the submitted tissue in specimen B and each node is serially sectioned.  Histologically, there are rare small foci suspicious for metastatic carcinoma. Immunostaining for pankeratin on sections of selected nodes (B2, B4, B5, B12 and B13)   was then performed.  Positively stained foci of micrometastasis (0.2-2 mm) are identified on sections of B2, B4/B5 (the same lymph node) and B13.  Intradepartmental consultation is obtained. CANCER CASE SUMMARY   Procedure:  Total mastectomy   Specimen Laterality: Left   TUMOR   Tumor Site: 2:00 (between upper-outer and lower-outer quadrant) Histologic Type: Invasive carcinoma with mixed ductal and lobular   features   Histologic Grade: [place] Histologic Score        Glandular/tubular differentiation: Score 3        Nuclear pleomorphism: Score 2        Right articular rate: Score 1        Overall grade: Grade 2 (scores of 6)   Tumor Size: 21 x 15 x 10 mm   Tumor Focality: Multiple foci of invasive carcinoma        Number of foci: 2        Sizes of individual foci in millimeters: 20 x 15 x 7 mm        Ductal Carcinoma In Situ (DCIS): Not identified        Lobular Carcinoma In Situ (LCIS): Present   Lymphovascular invasion: Not identified   Dermal lymphovascular invasion: Not identified   Microcalcifications: Not identified   Treatment effect: No known presurgical therapy   MARGINS   All margins negative for invasive carcinoma    Distance from invasive carcinoma to closest margin: 6.0 cm from tumor #2; 6.5 cm from tumor #1    Closest margin to invasive carcinoma: Superior/anterior (for tumor #2); posterior (for tumor #1)   REGIONAL LYMPH NODES    Tumor present in regional lymph nodes    Number of lymph nodes with macrometastases (>2 mm): 0    Number of lymph nodes with micrometastasis (0.2-2 mm): 3    Number of lymph nodes with isolated tumor cells 0    Size of largest shelia metastatic deposit: 2 mm    Extranodal extension: Not identified   Total number of lymph nodes examined: 6   Number of sentinel nodes examined: 6   DISTANT METASTASIS: Not applicable   PATHOLOGIC STAGE (pTNM, AJCC 8th Edition)   TNM descriptors: m (multiple foci of invasive carcinoma)   mpT2 pN1mi     Oncotype Recurrence score:15    Left sided IDC/ILC T2N1mi M0 ER/IL positive HER2 negative. CT chest/abdomen/pelvis 09/14/2021 Postsurgical changes in the left breast with the fluid collection and inflammation as noted likely postoperative hematoma/seroma. No evidence of metastatic disease. Non-specific 8 mm hypodense lesion at the head of the pancreas; HBP team following.   NM Bone Scan 09/14/2021 without metastatic disease. Rad Onc on board    Pathology NCCN guidelines reviewed extensively with patient  TAILORx suggested that in women ? 50 years and with an intermediate RS (11-25), chemotherapy plus endocrine therapy was associated with a lower rate of distant recurrence relative to endocrine therapy alone, particularly for those with high-intermediate scores (21 to 25) or clinical risk features. However, this was based only on exploratory subset analyses, and moreover, some of the benefit hypothetically may have been due to ovarian suppression in premenopausal women. Ovarian suppression was not assessed as a treatment strategy in this trial, however. In light of limitations in the data, either chemotherapy and endocrine therapy, or endocrine therapy only (with ovarian suppression in premenopausal women) are acceptable options. [address] 7.6 09/22/2021 Estradiol 45.9 09/22/2021    She was leaning more to proceed with endocrine therapy only with ovarian suppression; Evaluated by Lu Sims for BSO; scheduled 11/11/2021  Tamoxifen was started on 09/23/2021. Evaluated by Lu Sims for BSO; Endometrial biopsy BSO done on 11/11/2021  A. ENDOMETRIUM, ENDOMETRIAL BIOPSY:   - DYSSYNCHRONOUS SECRETORY ENDOMETRIUM. - BENIGN ENDOCERVICAL POLYP AND BENIGN SQUAMOUS MUCOSA. B. OVARY AND FALLOPIAN TUBES, RIGHT AND LEFT, BILATERAL SALPINGECTOMY:   - OVARIES IDENTIFIED, BILATERALLY; HEMORRHAGIC CORPUS LUTEAL CYSTS. - COMPLETE CROSS-SECTION OF FALLOPIAN TUBES IDENTIFIED, BILATERALLY    Review of Systems;  CONSTITUTIONAL: Fatigue   ENMT: Eyes: No diplopia; Nose: No epistaxis. Mouth: No sore throat. RESPIRATORY: No hemoptysis, shortness of breath, cough. CARDIOVASCULAR: No chest pain, palpitations. GASTROINTESTINAL: No nausea/vomiting, abdominal pain. GENITOURINARY: No dysuria, urinary frequency, hematuria.    MSK: Arthralgias  NEURO: No syncope, presyncope, headache. Remainder:  ROS NEGATIVE    Past Medical History:      Diagnosis Date    Abscess of right breast 6/19/2018    Anxiety     Arthritis     Breast abscess 7/11/2018    Cancer (HCC)     Fatigue     GERD (gastroesophageal reflux disease)     Hyperlipidemia     Obesity 6/4/2013     Medications:  Reviewed and reconciled. Allergies: Allergies   Allergen Reactions    Vancomycin Nausea And Vomiting     Red man syndrome, had high fever and upset stomach     Physical Exam:  BP (!) 168/81   Pulse 88   Temp 97.7 °F (36.5 °C)   Ht 5' 3\" (1.6 m)   Wt (!) 366 lb 9.6 oz (166.3 kg)   SpO2 95%   BMI 64.94 kg/m²   GENERAL: Alert, oriented x 3, not in acute distress. EXTREMITIES: Without clubbing or cyanosis or edema. Impression/Plan:  41 y/o female with Left IDC/ILC breast cancer, T2N1mi(sn)M0 ER+IL+HER2-, grade 2, Stage IB  Genetic Testing: Negative    Left simple mastectomy, sentinel lymph node biopsy, injection of methylene blue dye 08/18/2021  CANCER CASE SUMMARY   Procedure: Total mastectomy   Specimen Laterality: Left   TUMOR   Tumor Site: 2:00 (between upper-outer and lower-outer quadrant)   Histologic Type:  Invasive carcinoma with mixed ductal and lobular features   Histologic Grade: [place] Histologic Score        Glandular/tubular differentiation: Score 3        Nuclear pleomorphism: Score 2        Right articular rate: Score 1        Overall grade: Grade 2 (scores of 6)   Tumor Size: 21 x 15 x 10 mm   Tumor Focality: Multiple foci of invasive carcinoma        Number of foci: 2        Sizes of individual foci in millimeters: 20 x 15 x 7 mm        Ductal Carcinoma In Situ (DCIS): Not identified        Lobular Carcinoma In Situ (LCIS): Present   Lymphovascular invasion: Not identified   Dermal lymphovascular invasion: Not identified   Microcalcifications: Not identified   Treatment effect: No known presurgical therapy   MARGINS   All margins negative for invasive carcinoma   Distance from invasive carcinoma to closest margin: 6.0 cm from tumor #2; 6.5 cm from tumor #1   Closest margin to invasive carcinoma: Superior/anterior (for tumor #2); posterior (for tumor #1)   REGIONAL LYMPH NODES    Tumor present in regional lymph nodes    Number of lymph nodes with macrometastases (>2 mm): 0    Number of lymph nodes with micrometastasis (0.2-2 mm): 3    Number of lymph nodes with isolated tumor cells 0    Size of largest shelia metastatic deposit: 2 mm    Extranodal extension: Not identified   Total number of lymph nodes examined: 6   Number of sentinel nodes examined: 6   DISTANT METASTASIS: Not applicable   PATHOLOGIC STAGE (pTNM, AJCC 8th Edition)   TNM descriptors: m (multiple foci of invasive carcinoma)   mpT2 pN1mi     Rad Onc on board    Pathology NCCN guidelines reviewed extensively with patient  TAILORx suggested that in women ? 50 years and with an intermediate RS (11-25), chemotherapy plus endocrine therapy was associated with a lower rate of distant recurrence relative to endocrine therapy alone, particularly for those with high-intermediate scores (21 to 25) or clinical risk features. However, this was based only on exploratory subset analyses, and moreover, some of the benefit hypothetically may have been due to ovarian suppression in premenopausal women. Ovarian suppression was not assessed as a treatment strategy in this trial, however. In light of limitations in the data, either chemotherapy and endocrine therapy, or endocrine therapy only (with ovarian suppression in premenopausal women) are acceptable options. [address] [facility] 7.6 09/22/2021 Estradiol 45.9 09/22/2021    She was leaning more to proceed with endocrine therapy only with ovarian suppression;   Tamoxifen was started on 09/23/2021. Evaluated by Bradley Woods for BSO; Endometrial biopsy BSO done on 11/11/2021  A. ENDOMETRIUM, ENDOMETRIAL BIOPSY:   - DYSSYNCHRONOUS SECRETORY ENDOMETRIUM.    - BENIGN ENDOCERVICAL POLYP AND BENIGN SQUAMOUS MUCOSA. B. OVARY AND FALLOPIAN TUBES, RIGHT AND LEFT, BILATERAL SALPINGECTOMY:   - OVARIES IDENTIFIED, BILATERALLY; HEMORRHAGIC CORPUS LUTEAL CYSTS. - COMPLETE CROSS-SECTION OF FALLOPIAN TUBES IDENTIFIED, BILATERALLY    Discussed Arimidex 1 mg po daily. Side effects reviewed. She agreed to proceed. Ca.VitD while on Arimidex. DEXA scan ordered. RTC 1 month for Arimidex toxicity check.     11/24/2021  Rafia Elkins MD

## 2021-11-24 NOTE — PROGRESS NOTES
DEPARTMENT OF RADIATION ONCOLOGY ON TREATMENT VISIT         11/24/2021      NAME:  Ivonne Cam    YOB: 1975    Diagnosis: breast cancer    SUBJECTIVE:   Ivonne Cam has now received fractionated external beam radiation therapy - ongoing. Past medical, surgical, social and family histories reviewed and updated as indicated. Pain: controlled    ALLERGIES:  Vancomycin         Current Outpatient Medications   Medication Sig Dispense Refill    anastrozole (ARIMIDEX) 1 MG tablet Take 1 tablet by mouth daily 30 tablet 3    ibuprofen (ADVIL;MOTRIN) 600 MG tablet Take 1 tablet by mouth 3 times daily as needed for Pain 30 tablet 1    docusate sodium (COLACE) 100 MG capsule Take 1 capsule by mouth daily as needed for Constipation 30 capsule 0    escitalopram (LEXAPRO) 10 MG tablet Take 1 tablet by mouth daily (Patient taking differently: Take 10 mg by mouth nightly ) 90 tablet 1    ondansetron (ZOFRAN) 4 MG tablet Take 1 tablet by mouth 3 times daily as needed for Nausea or Vomiting 30 tablet 0    fluticasone (FLONASE) 50 MCG/ACT nasal spray 2 sprays by Each Nostril route daily 3 Bottle 1    vitamin D (ERGOCALCIFEROL) 1.25 MG (31642 UT) CAPS capsule Take 1 capsule by mouth once a week (Patient taking differently: Take 50,000 Units by mouth once a week wednesday) 12 capsule 1     No current facility-administered medications for this encounter. OBJECTIVE:  Alert and fully ambulatory. Pleasant and conversant. Physical Examination: General appearance - alert, well appearing, and in no distress. Wt Readings from Last 3 Encounters:   11/24/21 (!) 366 lb 9.6 oz (166.3 kg)   11/17/21 (!) 366 lb (166 kg)   11/11/21 (!) 365 lb (165.6 kg)         ASSESSMENT/PLAN:     Patient is tolerating treatments well with expected toxicities. RBA were reviewed prior to first fraction and PRN. Current and planned dose reviewed.  Goals of treatment and potential side effects were reviewed with the patient PRN. Treatment imaging has been personally reviewed for accuracy and precision. Questions answered to apparent satisfaction. Treatments will continue as planned. Wendy Michelle.  Micheal Comer MD MS EDMARR  Radiation Oncologist        [facility] ([address] [place] [address]): [phone] /// FAX: [phone]  [facility]): [phone] /// FAX: [phone]  [address] [place] [address]): [phone] /// FAX: [phone]

## 2021-11-29 ENCOUNTER — APPOINTMENT (OUTPATIENT)
Dept: RADIATION ONCOLOGY | Age: 46
End: 2021-11-29
Attending: RADIOLOGY
Payer: MEDICAID

## 2021-11-29 ENCOUNTER — HOSPITAL ENCOUNTER (OUTPATIENT)
Dept: RADIATION ONCOLOGY | Age: 46
Discharge: HOME | End: 2021-11-29
Attending: RADIOLOGY
Payer: MEDICAID

## 2021-11-29 PROCEDURE — 77385 HC NTSTY MODUL RAD TX DLVR SMPL: CPT | Performed by: RADIOLOGY

## 2021-11-30 ENCOUNTER — HOSPITAL ENCOUNTER (OUTPATIENT)
Dept: RADIATION ONCOLOGY | Age: 46
Discharge: HOME | End: 2021-11-30
Attending: RADIOLOGY
Payer: MEDICAID

## 2021-11-30 ENCOUNTER — APPOINTMENT (OUTPATIENT)
Dept: RADIATION ONCOLOGY | Age: 46
End: 2021-11-30
Attending: RADIOLOGY
Payer: MEDICAID

## 2021-11-30 PROCEDURE — 77385 HC NTSTY MODUL RAD TX DLVR SMPL: CPT | Performed by: RADIOLOGY

## 2021-12-01 ENCOUNTER — HOSPITAL ENCOUNTER (OUTPATIENT)
Dept: RADIATION ONCOLOGY | Age: 46
Discharge: HOME | End: 2021-12-01
Attending: RADIOLOGY
Payer: MEDICAID

## 2021-12-01 ENCOUNTER — APPOINTMENT (OUTPATIENT)
Dept: RADIATION ONCOLOGY | Age: 46
End: 2021-12-01
Attending: RADIOLOGY
Payer: MEDICAID

## 2021-12-01 VITALS
WEIGHT: 293 LBS | RESPIRATION RATE: 18 BRPM | BODY MASS INDEX: 65.03 KG/M2 | OXYGEN SATURATION: 95 % | TEMPERATURE: 97.3 F | DIASTOLIC BLOOD PRESSURE: 54 MMHG | SYSTOLIC BLOOD PRESSURE: 128 MMHG | HEART RATE: 95 BPM

## 2021-12-01 DIAGNOSIS — C50.919 MALIGNANT NEOPLASM OF FEMALE BREAST, UNSPECIFIED ESTROGEN RECEPTOR STATUS, UNSPECIFIED LATERALITY, UNSPECIFIED SITE OF BREAST (HCC): Primary | ICD-10-CM

## 2021-12-01 PROCEDURE — 77427 RADIATION TX MANAGEMENT X5: CPT | Performed by: RADIOLOGY

## 2021-12-01 PROCEDURE — 77336 RADIATION PHYSICS CONSULT: CPT | Performed by: RADIOLOGY

## 2021-12-01 PROCEDURE — 99999 PR OFFICE/OUTPT VISIT,PROCEDURE ONLY: CPT | Performed by: RADIOLOGY

## 2021-12-01 PROCEDURE — 77385 HC NTSTY MODUL RAD TX DLVR SMPL: CPT | Performed by: RADIOLOGY

## 2021-12-01 NOTE — PROGRESS NOTES
Cassy Kanwal  12/1/2021  Wt Readings from Last 3 Encounters:   12/01/21 (!) 367 lb 1.6 oz (166.5 kg)   11/24/21 (!) 366 lb 9.6 oz (166.3 kg)   11/17/21 (!) 366 lb (166 kg)     Body mass index is 65.03 kg/m². Treatment Area:CTV NOREEN+ RLNS    Patient was seen today for weekly visit. Comfort Alteration  KPS:90%  Fatigue: Mild    Nutritional Alteration  Anorexia: No   Nausea: No   Vomiting: No     Skin Alteration   Sensation:pink and using aqua phor spray    Radiation Dermatitis:  na    Mucous Membrane Alteration  Drainage: No  Lymphedema: No    Emotional  Coping: effective    Sexuality Alteration  na    Injury, potential bleeding or infection: na        Lab Results   Component Value Date    WBC 15.1 (H) 11/11/2021     11/11/2021         BP (!) 128/54   Pulse 95   Temp 97.3 °F (36.3 °C) (Skin)   Resp 18   Wt (!) 367 lb 1.6 oz (166.5 kg)   SpO2 95%   BMI 65.03 kg/m²   BP within normal range?  yes          Assessment/Plan: 10/28fx  1800/5040cGY and tolerating    Nitesh Zarate RN

## 2021-12-02 ENCOUNTER — APPOINTMENT (OUTPATIENT)
Dept: RADIATION ONCOLOGY | Age: 46
End: 2021-12-02
Attending: RADIOLOGY
Payer: MEDICAID

## 2021-12-03 ENCOUNTER — APPOINTMENT (OUTPATIENT)
Dept: RADIATION ONCOLOGY | Age: 46
End: 2021-12-03
Attending: RADIOLOGY
Payer: MEDICAID

## 2021-12-03 DIAGNOSIS — R53.83 FATIGUE, UNSPECIFIED TYPE: Primary | ICD-10-CM

## 2021-12-06 ENCOUNTER — APPOINTMENT (OUTPATIENT)
Dept: RADIATION ONCOLOGY | Age: 46
End: 2021-12-06
Attending: RADIOLOGY
Payer: MEDICAID

## 2021-12-06 ENCOUNTER — HOSPITAL ENCOUNTER (OUTPATIENT)
Dept: RADIATION ONCOLOGY | Age: 46
Discharge: HOME | End: 2021-12-06
Attending: RADIOLOGY
Payer: MEDICAID

## 2021-12-06 PROCEDURE — 77385 HC NTSTY MODUL RAD TX DLVR SMPL: CPT | Performed by: RADIOLOGY

## 2021-12-06 PROCEDURE — 77014 PR CT GUIDANCE PLACEMENT RAD THERAPY FIELDS: CPT | Performed by: RADIOLOGY

## 2021-12-06 NOTE — PROGRESS NOTES
DEPARTMENT OF RADIATION ONCOLOGY ON TREATMENT VISIT         12/1/21      NAME:  Coral Srpague    YOB: 1975    Diagnosis: breast cancer    SUBJECTIVE:   Coral Sprague has now received fractionated external beam radiation therapy - ongoing. Past medical, surgical, social and family histories reviewed and updated as indicated. Pain: controlled    ALLERGIES:  Vancomycin         Current Outpatient Medications   Medication Sig Dispense Refill    anastrozole (ARIMIDEX) 1 MG tablet Take 1 tablet by mouth daily 30 tablet 3    ibuprofen (ADVIL;MOTRIN) 600 MG tablet Take 1 tablet by mouth 3 times daily as needed for Pain 30 tablet 1    docusate sodium (COLACE) 100 MG capsule Take 1 capsule by mouth daily as needed for Constipation 30 capsule 0    escitalopram (LEXAPRO) 10 MG tablet Take 1 tablet by mouth daily (Patient taking differently: Take 10 mg by mouth nightly ) 90 tablet 1    ondansetron (ZOFRAN) 4 MG tablet Take 1 tablet by mouth 3 times daily as needed for Nausea or Vomiting 30 tablet 0    fluticasone (FLONASE) 50 MCG/ACT nasal spray 2 sprays by Each Nostril route daily 3 Bottle 1    vitamin D (ERGOCALCIFEROL) 1.25 MG (21801 UT) CAPS capsule Take 1 capsule by mouth once a week (Patient taking differently: Take 50,000 Units by mouth once a week wednesday) 12 capsule 1     No current facility-administered medications for this encounter. OBJECTIVE:  Alert and fully ambulatory. Pleasant and conversant. Physical Examination: General appearance - alert, well appearing, and in no distress. Wt Readings from Last 3 Encounters:   12/02/21 (!) 370 lb (167.8 kg)   12/01/21 (!) 367 lb 1.6 oz (166.5 kg)   11/24/21 (!) 366 lb 9.6 oz (166.3 kg)         ASSESSMENT/PLAN:     Patient is tolerating treatments well with expected toxicities. RBA were reviewed prior to first fraction and PRN. Current and planned dose reviewed.  Goals of treatment and potential side effects were reviewed with the patient PRN. Treatment imaging has been personally reviewed for accuracy and precision. Questions answered to apparent satisfaction. Treatments will continue as planned. Bam Perry.  Lina Herrera MD MS EDMARR  Radiation Oncologist        [facility] ([address] [place] [address]): [phone] /// FAX: [phone]  [facility]): [phone] /// FAX: [phone]  [facility]): [phone] /// FAX: [phone]

## 2021-12-07 ENCOUNTER — HOSPITAL ENCOUNTER (OUTPATIENT)
Dept: RADIATION ONCOLOGY | Age: 46
Discharge: HOME | End: 2021-12-07
Attending: RADIOLOGY
Payer: MEDICAID

## 2021-12-07 ENCOUNTER — APPOINTMENT (OUTPATIENT)
Dept: RADIATION ONCOLOGY | Age: 46
End: 2021-12-07
Attending: RADIOLOGY
Payer: MEDICAID

## 2021-12-07 PROCEDURE — 77385 HC NTSTY MODUL RAD TX DLVR SMPL: CPT | Performed by: RADIOLOGY

## 2021-12-08 ENCOUNTER — HOSPITAL ENCOUNTER (OUTPATIENT)
Dept: RADIATION ONCOLOGY | Age: 46
Discharge: HOME | End: 2021-12-08
Attending: RADIOLOGY
Payer: MEDICAID

## 2021-12-08 ENCOUNTER — APPOINTMENT (OUTPATIENT)
Dept: RADIATION ONCOLOGY | Age: 46
End: 2021-12-08
Attending: RADIOLOGY
Payer: MEDICAID

## 2021-12-08 VITALS
OXYGEN SATURATION: 97 % | RESPIRATION RATE: 18 BRPM | BODY MASS INDEX: 64.83 KG/M2 | HEART RATE: 96 BPM | WEIGHT: 293 LBS | DIASTOLIC BLOOD PRESSURE: 74 MMHG | SYSTOLIC BLOOD PRESSURE: 118 MMHG | TEMPERATURE: 97.1 F

## 2021-12-08 DIAGNOSIS — C50.919 MALIGNANT NEOPLASM OF FEMALE BREAST, UNSPECIFIED ESTROGEN RECEPTOR STATUS, UNSPECIFIED LATERALITY, UNSPECIFIED SITE OF BREAST (HCC): Primary | ICD-10-CM

## 2021-12-08 PROCEDURE — 99999 PR OFFICE/OUTPT VISIT,PROCEDURE ONLY: CPT | Performed by: RADIOLOGY

## 2021-12-08 PROCEDURE — 77385 HC NTSTY MODUL RAD TX DLVR SMPL: CPT | Performed by: RADIOLOGY

## 2021-12-08 NOTE — PROGRESS NOTES
Zeb Dover  12/8/2021  Wt Readings from Last 3 Encounters:   12/08/21 (!) 366 lb (166 kg)   12/02/21 (!) 370 lb (167.8 kg)   12/01/21 (!) 367 lb 1.6 oz (166.5 kg)     Body mass index is 64.83 kg/m². Treatment Area:VMAT left CW    Patient was seen today for weekly visit. Comfort Alteration  KPS:80%  Fatigue: Moderate    Nutritional Alteration  Anorexia: no  Nausea: No   Vomiting: No     Skin Alteration   Sensation:good and using lotion    Radiation Dermatitis:  na    Mucous Membrane Alteration  Drainage: No  Lymphedema: No    Emotional  Coping: effective    Sexuality Alteration  na    Injury, potential bleeding or infection: na        Lab Results   Component Value Date    WBC 15.1 (H) 11/11/2021     11/11/2021         /74   Pulse 96   Temp 97.1 °F (36.2 °C) (Skin)   Resp 18   Wt (!) 366 lb (166 kg)   SpO2 97%   BMI 64.83 kg/m²   BP within normal range?  yes          Assessment/Plan: 13/28fx  2340/5040cGY and tolerating well    Adam Heller RN

## 2021-12-09 ENCOUNTER — HOSPITAL ENCOUNTER (OUTPATIENT)
Dept: RADIATION ONCOLOGY | Age: 46
Discharge: HOME | End: 2021-12-09
Attending: RADIOLOGY
Payer: MEDICAID

## 2021-12-09 ENCOUNTER — APPOINTMENT (OUTPATIENT)
Dept: RADIATION ONCOLOGY | Age: 46
End: 2021-12-09
Attending: RADIOLOGY
Payer: MEDICAID

## 2021-12-09 PROCEDURE — 77385 HC NTSTY MODUL RAD TX DLVR SMPL: CPT | Performed by: RADIOLOGY

## 2021-12-09 NOTE — PROGRESS NOTES
DEPARTMENT OF RADIATION ONCOLOGY ON TREATMENT VISIT         12/9/2021      NAME:  Lluvia Moran    YOB: 1975    Diagnosis: breast cancer    SUBJECTIVE:   Lluvia Moran has now received fractionated external beam radiation therapy - ongoing. Past medical, surgical, social and family histories reviewed and updated as indicated. Pain: controlled    ALLERGIES:  Vancomycin         Current Outpatient Medications   Medication Sig Dispense Refill    anastrozole (ARIMIDEX) 1 MG tablet Take 1 tablet by mouth daily 30 tablet 3    ibuprofen (ADVIL;MOTRIN) 600 MG tablet Take 1 tablet by mouth 3 times daily as needed for Pain 30 tablet 1    docusate sodium (COLACE) 100 MG capsule Take 1 capsule by mouth daily as needed for Constipation 30 capsule 0    escitalopram (LEXAPRO) 10 MG tablet Take 1 tablet by mouth daily (Patient taking differently: Take 10 mg by mouth nightly ) 90 tablet 1    ondansetron (ZOFRAN) 4 MG tablet Take 1 tablet by mouth 3 times daily as needed for Nausea or Vomiting 30 tablet 0    fluticasone (FLONASE) 50 MCG/ACT nasal spray 2 sprays by Each Nostril route daily 3 Bottle 1    vitamin D (ERGOCALCIFEROL) 1.25 MG (00028 UT) CAPS capsule Take 1 capsule by mouth once a week (Patient taking differently: Take 50,000 Units by mouth once a week wednesday) 12 capsule 1     No current facility-administered medications for this encounter. OBJECTIVE:  Alert and fully ambulatory. Pleasant and conversant. Physical Examination: General appearance - alert, well appearing, and in no distress. Wt Readings from Last 3 Encounters:   12/08/21 (!) 366 lb (166 kg)   12/02/21 (!) 370 lb (167.8 kg)   12/01/21 (!) 367 lb 1.6 oz (166.5 kg)         ASSESSMENT/PLAN:     Patient is tolerating treatments well with expected toxicities. RBA were reviewed prior to first fraction and PRN. Current and planned dose reviewed.  Goals of treatment and potential side effects were reviewed with the patient PRN. Treatment imaging has been personally reviewed for accuracy and precision. Questions answered to apparent satisfaction. Treatments will continue as planned. Donna Zamudio.  Tiffanie Barclay MD MS EDMARR  Radiation Oncologist        [facility] ([address] [place] [address]): [phone] /// FAX: [phone]  [facility]): [phone] /// FAX: [phone]  [address] [place] [address]): [phone] /// FAX: [phone]

## 2021-12-09 NOTE — PATIENT INSTRUCTIONS
Continue daily fractionated radiation therapy as scheduled. Please see weekly OTV note and intial consultation letter in [facility] for clinical details. Alexey Scanlon. Grisel Natarajan MD MS [place]:  [phone]   FAX: [phone]  [facility]:  [phone]   FAX:    [phone]  [address] [place] [address] Road:  [phone]   FAX:  [phone]  Email: [email or web address]

## 2021-12-10 ENCOUNTER — APPOINTMENT (OUTPATIENT)
Dept: RADIATION ONCOLOGY | Age: 46
End: 2021-12-10
Attending: RADIOLOGY
Payer: MEDICAID

## 2021-12-10 ENCOUNTER — HOSPITAL ENCOUNTER (OUTPATIENT)
Dept: RADIATION ONCOLOGY | Age: 46
Discharge: HOME | End: 2021-12-10
Attending: RADIOLOGY
Payer: MEDICAID

## 2021-12-10 PROCEDURE — 77385 HC NTSTY MODUL RAD TX DLVR SMPL: CPT | Performed by: RADIOLOGY

## 2021-12-10 PROCEDURE — 77336 RADIATION PHYSICS CONSULT: CPT | Performed by: RADIOLOGY

## 2021-12-10 PROCEDURE — 77427 RADIATION TX MANAGEMENT X5: CPT | Performed by: RADIOLOGY

## 2021-12-13 ENCOUNTER — HOSPITAL ENCOUNTER (OUTPATIENT)
Dept: RADIATION ONCOLOGY | Age: 46
Discharge: HOME | End: 2021-12-13
Attending: RADIOLOGY
Payer: MEDICAID

## 2021-12-13 ENCOUNTER — OFFICE VISIT (OUTPATIENT)
Dept: FAMILY MEDICINE CLINIC | Age: 46
End: 2021-12-13
Payer: MEDICAID

## 2021-12-13 ENCOUNTER — APPOINTMENT (OUTPATIENT)
Dept: RADIATION ONCOLOGY | Age: 46
End: 2021-12-13
Attending: RADIOLOGY
Payer: MEDICAID

## 2021-12-13 VITALS
BODY MASS INDEX: 51.91 KG/M2 | HEIGHT: 63 IN | OXYGEN SATURATION: 97 % | TEMPERATURE: 97.7 F | WEIGHT: 293 LBS | SYSTOLIC BLOOD PRESSURE: 122 MMHG | DIASTOLIC BLOOD PRESSURE: 70 MMHG | HEART RATE: 87 BPM | RESPIRATION RATE: 16 BRPM

## 2021-12-13 DIAGNOSIS — R07.9 CHEST PAIN, UNSPECIFIED TYPE: Primary | ICD-10-CM

## 2021-12-13 PROCEDURE — G8484 FLU IMMUNIZE NO ADMIN: HCPCS | Performed by: STUDENT IN AN ORGANIZED HEALTH CARE EDUCATION/TRAINING PROGRAM

## 2021-12-13 PROCEDURE — G8427 DOCREV CUR MEDS BY ELIG CLIN: HCPCS | Performed by: STUDENT IN AN ORGANIZED HEALTH CARE EDUCATION/TRAINING PROGRAM

## 2021-12-13 PROCEDURE — 77385 HC NTSTY MODUL RAD TX DLVR SMPL: CPT | Performed by: RADIOLOGY

## 2021-12-13 PROCEDURE — 93000 ELECTROCARDIOGRAM COMPLETE: CPT | Performed by: STUDENT IN AN ORGANIZED HEALTH CARE EDUCATION/TRAINING PROGRAM

## 2021-12-13 PROCEDURE — G8417 CALC BMI ABV UP PARAM F/U: HCPCS | Performed by: STUDENT IN AN ORGANIZED HEALTH CARE EDUCATION/TRAINING PROGRAM

## 2021-12-13 PROCEDURE — 77014 PR CT GUIDANCE PLACEMENT RAD THERAPY FIELDS: CPT | Performed by: RADIOLOGY

## 2021-12-13 PROCEDURE — 99213 OFFICE O/P EST LOW 20 MIN: CPT | Performed by: STUDENT IN AN ORGANIZED HEALTH CARE EDUCATION/TRAINING PROGRAM

## 2021-12-13 PROCEDURE — 1036F TOBACCO NON-USER: CPT | Performed by: STUDENT IN AN ORGANIZED HEALTH CARE EDUCATION/TRAINING PROGRAM

## 2021-12-13 ASSESSMENT — ENCOUNTER SYMPTOMS
RHINORRHEA: 0
EYE REDNESS: 0
NAUSEA: 0
SHORTNESS OF BREATH: 0
SINUS PAIN: 0
SINUS PRESSURE: 0
VOMITING: 0
COUGH: 0
DIARRHEA: 0
ABDOMINAL PAIN: 0
SORE THROAT: 0
EYE PAIN: 0
BACK PAIN: 1
CONSTIPATION: 0

## 2021-12-13 NOTE — PROGRESS NOTES
2021    Della Siegel (:  1975) is a 40 y.o. female, here for evaluation of the following medical concerns:    HPI  Chief Complaint   Patient presents with    Fatigue     Under Radiation for lt breast CA (13th treatment as of now) / not sure if related      Fatigue  Patient complains of fatigue. Symptoms began several months ago. Symptoms of her fatigue have been anxiousness, diffuse soft tissue aches and pains, fatigue with paradoxical insomnia, feelings of depression, general malaise and headaches. Patient describes the following psychological symptoms: stress due to health condition. Patient denies change in hair texture, cold intolerance, constipation, excessive menstrual bleeding, exercise intolerance, fever, GI blood loss, significant change in weight, symptoms of arthritis and witnessed or suspected sleep apnea. Symptoms have gradually worsened. Symptom severity: moderate, symptoms bothersome, but easily able to carry out all usual work/school/family activities. Previous visits for this problem: none. She has been going through radiation for 3 weeks but the fatigue has been present since she was diagnosed with breast cancer. Review of Systems   Constitutional: Positive for fatigue. Negative for chills and fever. HENT: Negative for congestion, ear pain, postnasal drip, rhinorrhea, sinus pressure, sinus pain and sore throat. Eyes: Negative for pain and redness. Respiratory: Negative for cough and shortness of breath. Cardiovascular: Positive for chest pain (due to mastectomy? ). Gastrointestinal: Negative for abdominal pain, constipation, diarrhea, nausea and vomiting. Genitourinary: Negative for difficulty urinating and dysuria. Musculoskeletal: Positive for back pain and neck pain. Negative for myalgias. Skin: Negative for rash. Neurological: Positive for dizziness and light-headedness. Negative for numbness.    Psychiatric/Behavioral: The patient is not nervous/anxious. Prior to Visit Medications    Medication Sig Taking?  Authorizing Provider   anastrozole (ARIMIDEX) 1 MG tablet Take 1 tablet by mouth daily Yes Greg Lane MD   ibuprofen (ADVIL;MOTRIN) 600 MG tablet Take 1 tablet by mouth 3 times daily as needed for Pain Yes [employer] MD   escitalopram (LEXAPRO) 10 MG tablet Take 1 tablet by mouth daily  Patient taking differently: Take 10 mg by mouth nightly  Yes Katya Ferris DO   ondansetron (ZOFRAN) 4 MG tablet Take 1 tablet by mouth 3 times daily as needed for Nausea or Vomiting Yes Jacobo Funez MD   vitamin D (ERGOCALCIFEROL) 1.25 MG (15732 UT) CAPS capsule Take 1 capsule by mouth once a week  Patient taking differently: Take 50,000 Units by mouth once a week wednesday Yes JIHNA Augustine CNP   docusate sodium (COLACE) 100 MG capsule Take 1 capsule by mouth daily as needed for Constipation  Patient not taking: Reported on 12/13/2021  [employer] MD   fluticasone [facility]) 50 MCG/ACT nasal spray 2 sprays by Each Nostril route daily  Patient not taking: Reported on 12/13/2021  Katya Ferris DO        Allergies   Allergen Reactions    Vancomycin Nausea And Vomiting     Red man syndrome, had high fever and upset stomach       Past Medical History:   Diagnosis Date    Abscess of right breast 6/19/2018    Anxiety     Arthritis     Breast abscess 7/11/2018    Cancer ([facility] [address]     Fatigue     GERD (gastroesophageal reflux disease)     Hyperlipidemia     Obesity 6/4/2013       Past Surgical History:   Procedure Laterality Date    ANKLE FRACTURE SURGERY  4/20/2012    right with hardware, subsequently removed    APPENDECTOMY      APPENDECTOMY  1984    BREAST BIOPSY Left 2020    BREAST BIOPSY Right 2018    CHOLECYSTECTOMY      2003    CYSTOSCOPY  7/25/2014    retrograde pyelogram, ureteroscopy, laser lithotripsy, left stent placement    ENDOSCOPY, COLON, DIAGNOSTIC      1998    FRACTURE SURGERY      HYSTERECTOMY, TOTAL ABDOMINAL      Novrember     MASTECTOMY Left 2021    LEFT BREAST SIMPLE MASTECTOMY WITH SENTINEL LYMPH NODE BIOPSY performed by Lubna Musa MD at [address] HEMATOMA/FLUID Right 2018    RIGHT BREAST INCISION AND DRAINAGE POSSIBLE WOUND VAC performed by Lubna Musa MD at [facility] Right 2018    INCISION AND DRAINAGE RIGHT BREAST, WITH APPLICATION OF WOUND VAC (PATIENT HSS WOUND VAC WILL BRING IT)  performed by Lubna Musa MD at [facility] 22 SALPINGO-OOPHORECTOMY  2021    Lap BSO, EMBx    SKIN GRAFT  1991    Left leg    US BREAST CYST ASPIRATION LEFT Left 2021    US BREAST CYST ASPIRATION LEFT 2021 [facility]    US BREAST CYST ASPIRATION LEFT Left 2021    US BREAST CYST ASPIRATION LEFT [facility]    US BREAST CYST ASPIRATION LEFT Left 2021    US BREAST CYST ASPIRATION LEFT 2021 [facility]    US BREAST CYST ASPIRATION LEFT Left 10/22/2021    US BREAST CYST ASPIRATION LEFT 10/22/2021 [facility]    US BREAST NEEDLE BIOPSY LEFT  2020    US BREAST NEEDLE BIOPSY LEFT 2020 [facility]    US BREAST NEEDLE BIOPSY LEFT Left 2021    US BREAST NEEDLE BIOPSY LEFT 2021 [facility]       Social History     Socioeconomic History    Marital status: Single     Spouse name: Not on file    Number of children: 2    Years of education: Not on file    Highest education level: Not on file   Occupational History    Not on file   Tobacco Use    Smoking status: Former Smoker     Packs/day: 0.25     Years: 4.00     Pack years: 1.00     Types: Cigarettes     Quit date: 2008     Years since quittin.2    Smokeless tobacco: Never Used   Vaping Use    Vaping Use: Never used   Substance and Sexual Activity    Alcohol use:  Yes     Alcohol/week: 0.0 standard drinks     Comment: rarely    Drug use: No    Sexual activity: Not on file   Other Topics Concern    Not on file   Social History Narrative    Not on file     Social Determinants of Health     Financial Resource Strain: Low Risk     Difficulty of Paying Living Expenses: Not hard at all   Food Insecurity: No Food Insecurity    Worried About Running Out of Food in the Last Year: Never true    [address] in the Last Year: Never true   Transportation Needs:     Lack of Transportation (Medical): Not on file    Lack of Transportation (Non-Medical):  Not on file   Physical Activity:     Days of Exercise per Week: Not on file    Minutes of Exercise per Session: Not on file   Stress:     Feeling of Stress : Not on file   Social Connections:     Frequency of Communication with Friends and Family: Not on file    Frequency of Social Gatherings with Friends and Family: Not on file    Attends Taoism Services: Not on file    Active Member of [address] [place] [address] [employer] or Organizations: Not on file    Attends Club or Organization Meetings: Not on file    Marital Status: Not on file   Intimate Partner Violence:     Fear of Current or Ex-Partner: Not on file    Emotionally Abused: Not on file    Physically Abused: Not on file    Sexually Abused: Not on file   Housing Stability:     Unable to Pay for Housing in the Last Year: Not on file    Number of [place] in the Last Year: Not on file    Unstable Housing in the Last Year: Not on file        Family History   Problem Relation Age of Onset    Diabetes Maternal Grandmother     High Blood Pressure Maternal Grandmother     High Cholesterol Maternal Grandmother     Breast Cancer Maternal Grandmother 79    High Cholesterol Mother     Breast Cancer Mother 54    Colon Cancer Mother     Stomach Cancer Mother     Cancer Mother         stomach, colon, intestine    Cancer Maternal Grandfather 60        throat    Ovarian Cancer Other        Vitals:    12/13/21 1139   BP: 122/70   Pulse: 87   Resp: 16   Temp: 97.7 °F (36.5 °C)   TempSrc: Temporal   SpO2: 97%   Weight: (!) 367 lb (166.5 kg) Height: 5' 3\" (1.6 m)     Estimated body mass index is 65.01 kg/m² as calculated from the following:    Height as of this encounter: 5' 3\" (1.6 m). Weight as of this encounter: 367 lb (166.5 kg).     Most Recent Labs  CBC  Lab Results   Component Value Date    WBC 15.1 11/11/2021    WBC 13.0 08/18/2021    WBC 14.5 07/09/2021    WBC 11.9 05/13/2021    RBC 4.78 11/11/2021    RBC 5.01 08/18/2021    RBC 5.24 07/09/2021    RBC 5.04 05/13/2021    HGB 12.9 11/11/2021    HGB 13.7 08/18/2021    HGB 14.4 07/09/2021    HCT 40.4 11/11/2021    HCT 42.5 08/18/2021    HCT 45.9 07/09/2021    MCV 84.5 11/11/2021    MCV 84.8 08/18/2021    MCV 87.6 07/09/2021     11/11/2021     08/18/2021     07/09/2021      CMP  Lab Results   Component Value Date     07/09/2021     05/13/2021     01/15/2020    K 3.7 07/09/2021    K 4.4 05/13/2021    K 4.3 01/15/2020    K 4.3 07/16/2018    K 3.9 07/15/2018    K 4.4 07/14/2018    CL 99 07/09/2021     05/13/2021     01/15/2020    CO2 23 07/09/2021    CO2 26 05/13/2021    CO2 22 01/15/2020    ANIONGAP 14 07/09/2021    ANIONGAP 13 05/13/2021    ANIONGAP 13 01/15/2020    GLUCOSE 96 07/09/2021    GLUCOSE 95 05/13/2021    GLUCOSE 104 01/15/2020    GLUCOSE 80 04/20/2012    GLUCOSE 80 03/08/2011    BUN 11 07/09/2021    BUN 11 05/13/2021    BUN 20 01/15/2020    CREATININE 0.7 07/09/2021    CREATININE 0.7 05/13/2021    CREATININE 0.6 01/15/2020    LABGLOM >60 07/09/2021    LABGLOM >60 05/13/2021    LABGLOM >60 01/15/2020    GFRAA >60 07/09/2021    GFRAA >60 05/13/2021    GFRAA >60 01/15/2020    CALCIUM 8.9 07/09/2021    CALCIUM 8.9 05/13/2021    CALCIUM 8.9 01/15/2020    PROT 7.6 07/09/2021    PROT 7.8 05/13/2021    PROT 7.4 01/15/2020    LABALBU 4.0 07/09/2021    LABALBU 3.9 05/13/2021    LABALBU 3.7 01/15/2020    LABALBU 3.6 04/20/2012    LABALBU 4.1 03/08/2011    BILITOT 0.9 07/09/2021    BILITOT 0.6 05/13/2021    BILITOT 0.4 01/15/2020    ALKPHOS 86 07/09/2021    ALKPHOS 83 05/13/2021    ALKPHOS 75 01/15/2020    AST 16 07/09/2021    AST 20 05/13/2021    AST 11 01/15/2020    ALT 20 07/09/2021    ALT 19 05/13/2021    ALT 21 01/15/2020     A1C  Lab Results   Component Value Date    LABA1C 5.8 05/13/2021    LABA1C 5.6 08/08/2019    LABA1C 5.6 04/16/2014     TSH  Lab Results   Component Value Date    TSH 3.240 05/13/2021    TSH 3.800 08/08/2019    TSH 2.470 06/01/2018     FREET4  Lab Results   Component Value Date    Q3WSCPX 15.2 02/08/2012     LIPID  Lab Results   Component Value Date    CHOL 172 05/13/2021    CHOL 170 08/08/2019    CHOL 148 06/01/2018    HDL 43 05/13/2021    HDL 50 08/08/2019    HDL 51 06/01/2018    LDLCALC 80 05/13/2021    LDLCALC 68 08/08/2019    LDLCALC 79 06/01/2018    TRIG 243 05/13/2021    TRIG 262 08/08/2019    TRIG 90 06/01/2018     VITAMIN D  Lab Results   Component Value Date    VITD25 18 05/13/2021    VITD25 17 08/08/2019    VITD25 17 10/02/2018     SALOME   Lab Results   Component Value Date    SALMOE NEGATIVE 10/15/2019     RHEUMATOID FACTOR  Lab Results   Component Value Date    RF <10 10/15/2019     UA  Lab Results   Component Value Date    COLORU Yellow 01/15/2020    COLORU Yellow 05/05/2018    COLORU Yellow 07/14/2014    CLARITYU Clear 01/15/2020    CLARITYU Clear 05/05/2018    CLARITYU CLOUDY 07/14/2014    GLUCOSEU Negative 01/15/2020    GLUCOSEU neg 01/14/2020    GLUCOSEU Negative 05/05/2018    GLUCOSEU Negative 07/14/2014    BILIRUBINUR Negative 01/15/2020    BILIRUBINUR neg 01/14/2020    BILIRUBINUR Negative 05/05/2018    BILIRUBINUR Negative 07/14/2014    KETUA Negative 01/15/2020    KETUA neg 01/14/2020    KETUA Negative 05/05/2018    KETUA Negative 07/14/2014    SPECGRAV 1.015 01/15/2020    SPECGRAV 1.025 01/14/2020    SPECGRAV >=1.030 05/05/2018    SPECGRAV >=1.030 07/14/2014    BLOODU TRACE-INTACT 01/15/2020    BLOODU neg 01/14/2020    BLOODU LARGE 05/05/2018    BLOODU LARGE 07/14/2014    PHUR 6.0 01/15/2020    PHUR 5.5 was seen today for fatigue. Diagnoses and all orders for this visit:    Chest pain, unspecified type  -     EKG 12 Lead; Future  -     EKG 12 Lead       Blood work was already ordered for the patient in the past.  She will get it done today for her fatigue. We will call her with results. Educational materials and/or home exercises printed for patient's review and were included in patient instructions on his/her After Visit Summary and given to patient at the end of visit. Counseled regarding above diagnosis, including possible risks and complications,  especially if left uncontrolled. Counseled regarding the possible side effects, risks, benefits and alternatives to treatment; patient and/or guardian verbalizes understanding, agrees, feels comfortable with and wishes to proceed with above treatment plan. Advised patient to call with any new medication issues, and read all Rx info from pharmacy to assure aware of all possible risks and side effects of medication before taking. Reviewed age and gender appropriate health screening exams and vaccinations. Advised patient regarding importance of keeping up with recommended health maintenance and to schedule as soon as possible if overdue, as this is important in assessing for undiagnosed pathology, especially cancer, as well as protecting against potentially harmful/life threatening disease. Patient and/or guardian verbalizes understanding and agrees with above counseling, assessment and plan. All questions answered. Return in about 3 months (around 3/13/2022). An  electronic signature was used to authenticate this note.     --Darvin Bernstein, DO on 12/13/2021 at 4:59 PM

## 2021-12-14 ENCOUNTER — APPOINTMENT (OUTPATIENT)
Dept: RADIATION ONCOLOGY | Age: 46
End: 2021-12-14
Attending: RADIOLOGY
Payer: MEDICAID

## 2021-12-15 ENCOUNTER — TELEPHONE (OUTPATIENT)
Dept: ONCOLOGY | Age: 46
End: 2021-12-15

## 2021-12-15 ENCOUNTER — HOSPITAL ENCOUNTER (OUTPATIENT)
Dept: RADIATION ONCOLOGY | Age: 46
Discharge: HOME | End: 2021-12-15
Attending: RADIOLOGY
Payer: MEDICAID

## 2021-12-15 ENCOUNTER — APPOINTMENT (OUTPATIENT)
Dept: RADIATION ONCOLOGY | Age: 46
End: 2021-12-15
Attending: RADIOLOGY
Payer: MEDICAID

## 2021-12-15 VITALS
WEIGHT: 293 LBS | TEMPERATURE: 97.1 F | SYSTOLIC BLOOD PRESSURE: 138 MMHG | BODY MASS INDEX: 64.83 KG/M2 | RESPIRATION RATE: 18 BRPM | OXYGEN SATURATION: 96 % | DIASTOLIC BLOOD PRESSURE: 76 MMHG | HEART RATE: 90 BPM

## 2021-12-15 DIAGNOSIS — C50.919 MALIGNANT NEOPLASM OF FEMALE BREAST, UNSPECIFIED ESTROGEN RECEPTOR STATUS, UNSPECIFIED LATERALITY, UNSPECIFIED SITE OF BREAST (HCC): Primary | ICD-10-CM

## 2021-12-15 PROCEDURE — 99999 PR OFFICE/OUTPT VISIT,PROCEDURE ONLY: CPT | Performed by: RADIOLOGY

## 2021-12-15 PROCEDURE — 77385 HC NTSTY MODUL RAD TX DLVR SMPL: CPT | Performed by: RADIOLOGY

## 2021-12-15 NOTE — PROGRESS NOTES
Corbin Chess  12/15/2021  Wt Readings from Last 3 Encounters:   12/15/21 (!) 366 lb (166 kg)   12/13/21 (!) 367 lb (166.5 kg)   12/08/21 (!) 366 lb (166 kg)     Body mass index is 64.83 kg/m². Treatment Area:Vmat left CW    Patient was seen today for weekly visit. Comfort Alteration  KPS:90%  Fatigue: Moderate    Nutritional Alteration  Anorexia: No   Nausea: No   Vomiting: No     Skin Alteration   Sensation:good and using aquaphor    Radiation Dermatitis:  na    Mucous Membrane Alteration  Drainage: No  Lymphedema: No    Emotional  Coping: effective    Sexuality Alteration  na    Injury, potential bleeding or infection: na        Lab Results   Component Value Date    WBC 15.1 (H) 11/11/2021     11/11/2021         /76   Pulse 90   Temp 97.1 °F (36.2 °C)   Resp 18   Wt (!) 366 lb (166 kg)   LMP 10/24/2021   SpO2 96%   BMI 64.83 kg/m²   BP within normal range?  yes           Assessment/Plan: 17/28fx  3060/5040cGY and tolerating    Surinder Stevenson RN

## 2021-12-16 ENCOUNTER — INITIAL CONSULT (OUTPATIENT)
Dept: ONCOLOGY | Age: 46
End: 2021-12-16
Payer: MEDICAID

## 2021-12-16 ENCOUNTER — HOSPITAL ENCOUNTER (OUTPATIENT)
Dept: RADIATION ONCOLOGY | Age: 46
Discharge: HOME | End: 2021-12-16
Attending: RADIOLOGY
Payer: MEDICAID

## 2021-12-16 ENCOUNTER — APPOINTMENT (OUTPATIENT)
Dept: RADIATION ONCOLOGY | Age: 46
End: 2021-12-16
Attending: RADIOLOGY
Payer: MEDICAID

## 2021-12-16 DIAGNOSIS — F41.0 GENERALIZED ANXIETY DISORDER WITH PANIC ATTACKS: Primary | ICD-10-CM

## 2021-12-16 DIAGNOSIS — F41.1 GENERALIZED ANXIETY DISORDER WITH PANIC ATTACKS: Primary | ICD-10-CM

## 2021-12-16 PROCEDURE — 77385 HC NTSTY MODUL RAD TX DLVR SMPL: CPT | Performed by: RADIOLOGY

## 2021-12-16 PROCEDURE — 90837 PSYTX W PT 60 MINUTES: CPT | Performed by: SOCIAL WORKER

## 2021-12-16 NOTE — PROGRESS NOTES
[address] [place],MSW,LISW-S     Visit Date: 12/16/2021   Time of appointment:  9:55-10:49  Time spent with Patient: 54 minutes. This is patient's first appointment. Reason for Consult:  No chief complaint on file. Referring Provider/PCP:    No ref. provider found  Katya Ferris DO      Pt provided informed consent for the behavioral health program. Discussed with patient model of service to include the limits of confidentiality (i.e. abuse reporting, suicide intervention, etc.) and short-term intervention focused approach. PRESENTING PROBLEM AND HISTORY  Dora Ordaz is a 40 y.o. female who presents for new evaluation and treatment of  anxiety. She has the following symptoms: increased sleep, fatigue/lack of energy, excessive guilt, mood swings, psychomotor agitation, feeling nervous, anxious, or on edge, racing worry thoughts and excessive anxiety and worry about specific stressors. Onset of symptoms was approximately 3 years ago. Symptoms have been controlled since that time with a worsening of symptoms approximately one month ago. She denies current suicidal and homicidal ideation. Current treatment includes Lexapro which she has been taking since 2018. She complains of the following medication side effects: flattened affect. .    MENTAL STATUS EXAM  Mood was within normal limits with tearful affect. Suicidal ideation was denied. Homicidal ideation was denied. Hygiene was good . Dress was appropriate. Behavior was Within Normal Limits with No observation or self-report of difficulties ambulating. Attitude was Friendly. Eye-contact was good. Speech: rate - WNL, rhythm -  WNL, volume - WNL  Verbalizations were goal directed. Thought processes were intact and goal-oriented without evidence of delusions, hallucinations, obsessions, or susan; without  cognitive distortions. Associations were characterized by intact cognitive processes.   Pt was oriented to person, place, time, and general circumstances;  recent:  good. Insight and judgment were estimated to be good, AEB, a good  understanding of cyclical maladaptive patterns, and the ability to use insight to inform behavior change. CURRENT MEDICATIONS    Current Outpatient Medications:     anastrozole (ARIMIDEX) 1 MG tablet, Take 1 tablet by mouth daily, Disp: 30 tablet, Rfl: 3    ibuprofen (ADVIL;MOTRIN) 600 MG tablet, Take 1 tablet by mouth 3 times daily as needed for Pain, Disp: 30 tablet, Rfl: 1    docusate sodium (COLACE) 100 MG capsule, Take 1 capsule by mouth daily as needed for Constipation (Patient not taking: Reported on 12/13/2021), Disp: 30 capsule, Rfl: 0    escitalopram (LEXAPRO) 10 MG tablet, Take 1 tablet by mouth daily (Patient taking differently: Take 10 mg by mouth nightly ), Disp: 90 tablet, Rfl: 1    ondansetron (ZOFRAN) 4 MG tablet, Take 1 tablet by mouth 3 times daily as needed for Nausea or Vomiting, Disp: 30 tablet, Rfl: 0    fluticasone (FLONASE) 50 MCG/ACT nasal spray, 2 sprays by Each Nostril route daily (Patient not taking: Reported on 12/13/2021), Disp: 3 Bottle, Rfl: 1    vitamin D (ERGOCALCIFEROL) 1.25 MG (20612 UT) CAPS capsule, Take 1 capsule by mouth once a week (Patient taking differently: Take 50,000 Units by mouth once a week wednesday), Disp: 12 capsule, Rfl: 1     FAMILY MEDICAL/MH HISTORY   Her family history includes Breast Cancer (age of onset: 54) in her mother; Breast Cancer (age of onset: 79) in her maternal grandmother; Cancer in her mother; Cancer (age of onset: 61) in her maternal grandfather; Colon Cancer in her mother; Diabetes in her maternal grandmother; High Blood Pressure in her maternal grandmother; High Cholesterol in her maternal grandmother and mother; Ovarian Cancer in an other family member; Stomach Cancer in her mother. PATIENT MENTAL HEALTH HISTORY  Pt discussed history of anxiety and panic attacks.   Noted that she was prescribed Lexapro in 2018 following severe non-cancerous breast problems that occurred at that time. PSYCHOSOCIAL HISTORY  Current living situation: Pt resides with her two minor sons. Work/Education: Pt reported that she is not working at this time. Support system: Pt indicated that she has a strong support system. Noted that she has a group of friends and also has a good relationship with he ex. Stated that she has a good relationship with her mother who lives out of state and has Stage IV breast CA. Pt indicated that she was very close with her aunt who passed away unexpectedly several weeks ago. Orthodox/Spirituality: Not discussed      DRUG AND ALCOHOL CURRENT USE/HISTORY  TOBACCO:  She reports that she quit smoking about 13 years ago. Her smoking use included cigarettes. She has a 1.00 pack-year smoking history. She has never used smokeless tobacco.  ALCOHOL:  She reports current alcohol use. OTHER SUBSTANCES: She reports no history of drug use. ASSESSMENT  Dianelys Morales presented to the appointment today for evaluation and treatment of symptoms of anxiety. She is currently deemed no risk to herself or others and meets criteria for Generalized Anxiety Disorder. She has expressed desire to remain on current medication regiment at this time. She will also benefit from brief and solution-focused consultation to address cognitive and behavioral interventions for anxiety symptoms. Bernarda Mehta was in agreement with recommendations.       PHQ Scores 5/18/2021 1/14/2020 2/19/2019 4/11/2018   PHQ2 Score 0 0 0 0   PHQ9 Score 0 0 0 0     Interpretation of Total Score Depression Severity: 1-4 = Minimal depression, 5-9 = Mild depression, 10-14 = Moderate depression, 15-19 = Moderately severe depression, 20-27 = Severe depression    How often pt has had thoughts of death or hurting self (if PHQ positive for depression):       JASON 7 SCORE 5/10/2018   JASON-7 Total Score 17     Interpretation of JASON-7 score: 5-9 = mild anxiety, 10-14 = moderate anxiety, 15+ = severe anxiety. Recommend referral to behavioral health for scores 10 or greater. DIAGNOSIS  There are no diagnoses linked to this encounter. INTERVENTION  Discussed and set plan for behavioral activation, Provided education, Discussed and problem-solved barriers in adhering to behavioral change plan, Established rapport, Supportive techniques and Emphasized self-care as important for managing overall health      PLAN  Met with pt for initial [address] session. Pt is 61-year-old female currently being treated for breast CA. She noted that previous date she began experiencing heightened anxiety but noted overall increase in anxiety since CA dx. Explored that recent triggers include complications with her surgical incision site, her and her children being ill, loss of her aunt, and worsening of mother's CA prognosis. She stated that she is concerned that she is \"not responding like she should\" to stressors including CA dx, loss of aunt, and son's recent episode of DKA. Pt indicated that she began experiencing anxiety in 2018 surrounding cancer scare and extensive breast surgeries. Noted at that time she was started on Lexapro and believes that it is helpful in managing overall anxiety symptoms as well as panic attacks. She indicated that she has occasional episodes of tremors, chest tightness, and racing thoughts. Pt indicated that she would like to initiate counseling services in order to have support and encouragement as she continues tx. Discussed role of [address] and how it may be beneficial to her. Noted that pt expressed many situations in which she had increased anxiety due to believing that she should be acting a certain way in response to an event. Explored how setting these expectations for herself may be leading to increased anxiety and pt indicated that she will monitor this between session. Pt to return to clinic in 3 weeks due to holidays.   Pt in agreement with plan.

## 2021-12-16 NOTE — PATIENT INSTRUCTIONS
Continue daily fractionated radiation therapy as scheduled. Please see weekly OTV note and intial consultation letter in [facility] for clinical details. Anitha Floyd. Christa Vidales MD MS Donnie Michael:  [phone]   FAX: [phone]  [address] [place] [address]:  [phone]   FAX:    [phone]  [facility]:  [phone]   FAX:  [phone]  Email: [email or web address]

## 2021-12-16 NOTE — PROGRESS NOTES
DEPARTMENT OF RADIATION ONCOLOGY ON TREATMENT VISIT         12/16/2021      NAME:  Tawanna Colorado    YOB: 1975    Diagnosis: breast cancer    SUBJECTIVE:   Tawanna Colorado has now received fractionated external beam radiation therapy - ongoing. Past medical, surgical, social and family histories reviewed and updated as indicated. Pain: controlled    ALLERGIES:  Vancomycin         Current Outpatient Medications   Medication Sig Dispense Refill    anastrozole (ARIMIDEX) 1 MG tablet Take 1 tablet by mouth daily 30 tablet 3    ibuprofen (ADVIL;MOTRIN) 600 MG tablet Take 1 tablet by mouth 3 times daily as needed for Pain 30 tablet 1    docusate sodium (COLACE) 100 MG capsule Take 1 capsule by mouth daily as needed for Constipation (Patient not taking: Reported on 12/13/2021) 30 capsule 0    escitalopram (LEXAPRO) 10 MG tablet Take 1 tablet by mouth daily (Patient taking differently: Take 10 mg by mouth nightly ) 90 tablet 1    ondansetron (ZOFRAN) 4 MG tablet Take 1 tablet by mouth 3 times daily as needed for Nausea or Vomiting 30 tablet 0    fluticasone (FLONASE) 50 MCG/ACT nasal spray 2 sprays by Each Nostril route daily (Patient not taking: Reported on 12/13/2021) 3 Bottle 1    vitamin D (ERGOCALCIFEROL) 1.25 MG (84092 UT) CAPS capsule Take 1 capsule by mouth once a week (Patient taking differently: Take 50,000 Units by mouth once a week wednesday) 12 capsule 1     No current facility-administered medications for this encounter. OBJECTIVE:  Alert and fully ambulatory. Pleasant and conversant.    -fatigue    Physical Examination: General appearance - alert, well appearing, and in no distress. Wt Readings from Last 3 Encounters:   12/15/21 (!) 366 lb (166 kg)   12/13/21 (!) 367 lb (166.5 kg)   12/08/21 (!) 366 lb (166 kg)         ASSESSMENT/PLAN:     Patient is tolerating treatments well with expected toxicities.  RBA were reviewed prior to first fraction and

## 2021-12-17 ENCOUNTER — APPOINTMENT (OUTPATIENT)
Dept: RADIATION ONCOLOGY | Age: 46
End: 2021-12-17
Attending: RADIOLOGY
Payer: MEDICAID

## 2021-12-17 ENCOUNTER — HOSPITAL ENCOUNTER (OUTPATIENT)
Dept: RADIATION ONCOLOGY | Age: 46
Discharge: HOME | End: 2021-12-17
Attending: RADIOLOGY
Payer: MEDICAID

## 2021-12-17 PROCEDURE — 77385 HC NTSTY MODUL RAD TX DLVR SMPL: CPT | Performed by: RADIOLOGY

## 2021-12-17 NOTE — PROGRESS NOTES
Dr Genesis Rodriguez notified that patient has intolerance to vancomycin PAST SURGICAL HISTORY:  History of neck surgery and port placement for chemotherapy and subsequent removal

## 2021-12-20 ENCOUNTER — APPOINTMENT (OUTPATIENT)
Dept: RADIATION ONCOLOGY | Age: 46
End: 2021-12-20
Attending: RADIOLOGY
Payer: MEDICAID

## 2021-12-21 ENCOUNTER — HOSPITAL ENCOUNTER (OUTPATIENT)
Dept: RADIATION ONCOLOGY | Age: 46
Discharge: HOME | End: 2021-12-21
Attending: RADIOLOGY
Payer: MEDICAID

## 2021-12-21 PROCEDURE — 77336 RADIATION PHYSICS CONSULT: CPT | Performed by: RADIOLOGY

## 2021-12-21 PROCEDURE — 77385 HC NTSTY MODUL RAD TX DLVR SMPL: CPT | Performed by: RADIOLOGY

## 2021-12-21 PROCEDURE — 77427 RADIATION TX MANAGEMENT X5: CPT | Performed by: RADIOLOGY

## 2021-12-22 ENCOUNTER — HOSPITAL ENCOUNTER (OUTPATIENT)
Dept: RADIATION ONCOLOGY | Age: 46
Discharge: HOME | End: 2021-12-22
Attending: RADIOLOGY
Payer: MEDICAID

## 2021-12-22 VITALS
DIASTOLIC BLOOD PRESSURE: 82 MMHG | OXYGEN SATURATION: 96 % | HEART RATE: 92 BPM | RESPIRATION RATE: 18 BRPM | SYSTOLIC BLOOD PRESSURE: 132 MMHG | TEMPERATURE: 96 F

## 2021-12-22 DIAGNOSIS — C50.919 MALIGNANT NEOPLASM OF FEMALE BREAST, UNSPECIFIED ESTROGEN RECEPTOR STATUS, UNSPECIFIED LATERALITY, UNSPECIFIED SITE OF BREAST (HCC): Primary | ICD-10-CM

## 2021-12-22 PROCEDURE — 77385 HC NTSTY MODUL RAD TX DLVR SMPL: CPT | Performed by: RADIOLOGY

## 2021-12-22 PROCEDURE — 99999 PR OFFICE/OUTPT VISIT,PROCEDURE ONLY: CPT | Performed by: RADIOLOGY

## 2021-12-22 PROCEDURE — 77014 PR CT GUIDANCE PLACEMENT RAD THERAPY FIELDS: CPT | Performed by: RADIOLOGY

## 2021-12-22 NOTE — PROGRESS NOTES
DEPARTMENT OF RADIATION ONCOLOGY ON TREATMENT VISIT         12/22/2021      NAME:  Peter Holm    YOB: 1975    Diagnosis: breast cancer    SUBJECTIVE:   Peter Holm has now received fractionated external beam radiation therapy - ongoing. Past medical, surgical, social and family histories reviewed and updated as indicated. Pain: controlled    ALLERGIES:  Vancomycin         Current Outpatient Medications   Medication Sig Dispense Refill    anastrozole (ARIMIDEX) 1 MG tablet Take 1 tablet by mouth daily 30 tablet 3    ibuprofen (ADVIL;MOTRIN) 600 MG tablet Take 1 tablet by mouth 3 times daily as needed for Pain 30 tablet 1    docusate sodium (COLACE) 100 MG capsule Take 1 capsule by mouth daily as needed for Constipation (Patient not taking: Reported on 12/13/2021) 30 capsule 0    escitalopram (LEXAPRO) 10 MG tablet Take 1 tablet by mouth daily (Patient taking differently: Take 10 mg by mouth nightly ) 90 tablet 1    ondansetron (ZOFRAN) 4 MG tablet Take 1 tablet by mouth 3 times daily as needed for Nausea or Vomiting 30 tablet 0    fluticasone (FLONASE) 50 MCG/ACT nasal spray 2 sprays by Each Nostril route daily (Patient not taking: Reported on 12/13/2021) 3 Bottle 1    vitamin D (ERGOCALCIFEROL) 1.25 MG (30904 UT) CAPS capsule Take 1 capsule by mouth once a week (Patient taking differently: Take 50,000 Units by mouth once a week wednesday) 12 capsule 1     No current facility-administered medications for this encounter. OBJECTIVE:  Alert and fully ambulatory. Pleasant and conversant. Physical Examination: General appearance - alert, well appearing, and in no distress. Wt Readings from Last 3 Encounters:   12/15/21 (!) 366 lb (166 kg)   12/13/21 (!) 367 lb (166.5 kg)   12/08/21 (!) 366 lb (166 kg)         ASSESSMENT/PLAN:     Patient is tolerating treatments well with expected toxicities. RBA were reviewed prior to first fraction and PRN.     Current and planned dose reviewed. Goals of treatment and potential side effects were reviewed with the patient PRN. Treatment imaging has been personally reviewed for accuracy and precision. Questions answered to apparent satisfaction. Treatments will continue as planned. Robin Sjogren.  Mira Branch MD MS EYAD  Radiation Oncologist        [facility]): [phone] /// FAX: [phone]  [facility]): [phone] /// FAX: [phone]  [facility] [phone] /// FAX: [phone]

## 2021-12-22 NOTE — PROGRESS NOTES
Mercedez Galvez  12/22/2021  Wt Readings from Last 3 Encounters:   12/15/21 (!) 366 lb (166 kg)   12/13/21 (!) 367 lb (166.5 kg)   12/08/21 (!) 366 lb (166 kg)     There is no height or weight on file to calculate BMI. Treatment Area:CTV Left CW + RLNs    Patient was seen today for weekly visit. Comfort Alteration  KPS:90%  Fatigue: Moderate    Nutritional Alteration  Anorexia: No   Nausea: No   Vomiting: No     Skin Alteration   Sensation:pink     Radiation Dermatitis:  Pink, moisturizing at least 3 times daily    Mucous Membrane Alteration  Drainage: No  Lymphedema: No    Emotional  Coping: effective    Sexuality Alteration  na    Injury, potential bleeding or infection: na        Lab Results   Component Value Date    WBC 15.1 (H) 11/11/2021     11/11/2021         /82   Pulse 92   Temp 96 °F (35.6 °C) (Temporal)   Resp 18   LMP 10/24/2021   SpO2 96%   BP within normal range?  yes       Assessment/Plan:  Completed 21/28 fractions; 3780/5040 cGy, no other complaints, Dr Hoy Goltz updated    Sue Boss, RN

## 2021-12-23 ENCOUNTER — HOSPITAL ENCOUNTER (OUTPATIENT)
Dept: RADIATION ONCOLOGY | Age: 46
Discharge: HOME | End: 2021-12-23
Attending: RADIOLOGY
Payer: MEDICAID

## 2021-12-23 PROCEDURE — 77385 HC NTSTY MODUL RAD TX DLVR SMPL: CPT | Performed by: RADIOLOGY

## 2021-12-27 ENCOUNTER — APPOINTMENT (OUTPATIENT)
Dept: RADIATION ONCOLOGY | Age: 46
End: 2021-12-27
Attending: RADIOLOGY
Payer: MEDICAID

## 2021-12-28 ENCOUNTER — APPOINTMENT (OUTPATIENT)
Dept: RADIATION ONCOLOGY | Age: 46
End: 2021-12-28
Attending: RADIOLOGY
Payer: MEDICAID

## 2021-12-29 ENCOUNTER — APPOINTMENT (OUTPATIENT)
Dept: RADIATION ONCOLOGY | Age: 46
End: 2021-12-29
Attending: RADIOLOGY
Payer: MEDICAID

## 2021-12-30 ENCOUNTER — TELEPHONE (OUTPATIENT)
Dept: ONCOLOGY | Age: 46
End: 2021-12-30

## 2021-12-30 ENCOUNTER — APPOINTMENT (OUTPATIENT)
Dept: RADIATION ONCOLOGY | Age: 46
End: 2021-12-30
Attending: RADIOLOGY
Payer: MEDICAID

## 2021-12-30 NOTE — TELEPHONE ENCOUNTER
Contacted pt for check in. Pt reported that she and her children have been quarantined due to [place] exposure. Stated that they are all healthy and that she plans to resume radiation tx on 1/3/2022. Reviewed upcoming appointments. No additional needs identified at this time. Reviewed role of oncology SW and encouraged pt to notify this provider if additional needs arise.     Alma Lopes, MSW, LISW-S  Oncology Social Worker

## 2022-01-03 ENCOUNTER — APPOINTMENT (OUTPATIENT)
Dept: RADIATION ONCOLOGY | Age: 47
End: 2022-01-03
Attending: RADIOLOGY
Payer: MEDICAID

## 2022-01-04 ENCOUNTER — HOSPITAL ENCOUNTER (OUTPATIENT)
Dept: RADIATION ONCOLOGY | Age: 47
Discharge: HOME | End: 2022-01-04
Attending: RADIOLOGY
Payer: MEDICAID

## 2022-01-04 PROCEDURE — 77385 HC NTSTY MODUL RAD TX DLVR SMPL: CPT | Performed by: RADIOLOGY

## 2022-01-05 ENCOUNTER — HOSPITAL ENCOUNTER (OUTPATIENT)
Dept: RADIATION ONCOLOGY | Age: 47
Discharge: HOME | End: 2022-01-05
Attending: RADIOLOGY
Payer: MEDICAID

## 2022-01-05 ENCOUNTER — HOSPITAL ENCOUNTER (OUTPATIENT)
Dept: INFUSION THERAPY | Age: 47
Discharge: HOME | End: 2022-01-05

## 2022-01-05 ENCOUNTER — OFFICE VISIT (OUTPATIENT)
Dept: ONCOLOGY | Age: 47
End: 2022-01-05
Payer: MEDICAID

## 2022-01-05 VITALS
TEMPERATURE: 97.7 F | RESPIRATION RATE: 16 BRPM | WEIGHT: 293 LBS | DIASTOLIC BLOOD PRESSURE: 79 MMHG | OXYGEN SATURATION: 95 % | HEIGHT: 63 IN | HEART RATE: 104 BPM | BODY MASS INDEX: 51.91 KG/M2 | SYSTOLIC BLOOD PRESSURE: 143 MMHG

## 2022-01-05 VITALS
DIASTOLIC BLOOD PRESSURE: 88 MMHG | WEIGHT: 293 LBS | TEMPERATURE: 97.8 F | RESPIRATION RATE: 18 BRPM | HEART RATE: 74 BPM | SYSTOLIC BLOOD PRESSURE: 140 MMHG | OXYGEN SATURATION: 96 % | BODY MASS INDEX: 66.06 KG/M2

## 2022-01-05 DIAGNOSIS — C50.919 MALIGNANT NEOPLASM OF FEMALE BREAST, UNSPECIFIED ESTROGEN RECEPTOR STATUS, UNSPECIFIED LATERALITY, UNSPECIFIED SITE OF BREAST (HCC): ICD-10-CM

## 2022-01-05 DIAGNOSIS — Z85.3 PERSONAL HISTORY OF BREAST CANCER: Primary | ICD-10-CM

## 2022-01-05 PROCEDURE — 99214 OFFICE O/P EST MOD 30 MIN: CPT | Performed by: INTERNAL MEDICINE

## 2022-01-05 PROCEDURE — G8484 FLU IMMUNIZE NO ADMIN: HCPCS | Performed by: INTERNAL MEDICINE

## 2022-01-05 PROCEDURE — G8427 DOCREV CUR MEDS BY ELIG CLIN: HCPCS | Performed by: INTERNAL MEDICINE

## 2022-01-05 PROCEDURE — 77385 HC NTSTY MODUL RAD TX DLVR SMPL: CPT | Performed by: RADIOLOGY

## 2022-01-05 PROCEDURE — G8417 CALC BMI ABV UP PARAM F/U: HCPCS | Performed by: INTERNAL MEDICINE

## 2022-01-05 PROCEDURE — 99212 OFFICE O/P EST SF 10 MIN: CPT

## 2022-01-05 PROCEDURE — 1036F TOBACCO NON-USER: CPT | Performed by: INTERNAL MEDICINE

## 2022-01-05 NOTE — PROGRESS NOTES
Brigida Elizabeth  1/5/2022  Wt Readings from Last 3 Encounters:   01/05/22 (!) 372 lb 14.4 oz (169.1 kg)   12/15/21 (!) 366 lb (166 kg)   12/13/21 (!) 367 lb (166.5 kg)     Body mass index is 66.06 kg/m². Treatment Area:CTV LCW + RLNs    Patient was seen today for weekly visit. Comfort Alteration  KPS:80%  Fatigue: Mild    Nutritional Alteration  Anorexia: No   Nausea: No   Vomiting: No     Skin Alteration   Sensation:no    Radiation Dermatitis:  no    Mucous Membrane Alteration  Drainage: No  Lymphedema: No    Emotional  Coping: somewhat effective    Sexuality Alteration  n/a    Injury, potential bleeding or infection: no        Lab Results   Component Value Date    WBC 15.1 (H) 11/11/2021     11/11/2021         BP (!) 140/88   Pulse 74   Temp 97.8 °F (36.6 °C) (Temporal)   Resp 18   Wt (!) 372 lb 14.4 oz (169.1 kg)   LMP 10/24/2021   SpO2 96%   BMI 66.06 kg/m²   BP within normal range? yes     Assessment/Plan: Pt completed 24/28fx and 4297/5040cGy. Pt is toelrating tx well thus far.      Krista Summers RN

## 2022-01-05 NOTE — PROGRESS NOTES
metastatic carcinoma (micrometastasis), see comment. C.  Left breast, new inferior/medial margin, excision:   - Unremarkable fibroadipose tissue and skeletal muscle negative for malignancy. D.  Left breast, additional inferior/lateral margin, excision:   - Unremarkable fibroadipose tissue, negative for malignancy. Comment: Sections of the mastectomy specimen revealed two isolated, similar sized tumor masses. Both are composed of infiltrative neoplastic cells showing single cell or single cell line arrangement. Immunostaining for E-cadherin was performed on sections of two selected tissue blocks (A 7 and A 12).  Some of the invasive neoplastic cells (A 7) show strong membrane positivity for E-cadherin, consistent with ductal differentiation while others (A 12) show absence or aberrant expression   of E-cadherin, indicative of lobular differentiation.  Therefore, this is an invasive carcinoma with mixed ductal and lobular features. The presence of lobular carcinoma in situ (LCIS) and atypical lobular hyperplasia [facility]) is confirmed by immunostaining for p63 and E-cadherin on sections of tissue block A 9 showing the presence of a p63 positive myoepithelial cells layer at the periphery and absence of E-cadherin expression of the epithelial cells inside of the myoepithelial layer. Total six lymph nodes are identified from the submitted tissue in specimen B and each node is serially sectioned.  Histologically, there are rare small foci suspicious for metastatic carcinoma. Immunostaining for pankeratin on sections of selected nodes (B2, B4, B5, B12 and B13)   was then performed.  Positively stained foci of micrometastasis (0.2-2 mm) are identified on sections of B2, B4/B5 (the same lymph node) and B13.  Intradepartmental consultation is obtained. CANCER CASE SUMMARY   Procedure:  Total mastectomy   Specimen Laterality: Left   TUMOR   Tumor Site: 2:00 (between upper-outer and lower-outer quadrant) Histologic Type: Invasive carcinoma with mixed ductal and lobular   features   Histologic Grade: [place] Histologic Score        Glandular/tubular differentiation: Score 3        Nuclear pleomorphism: Score 2        Right articular rate: Score 1        Overall grade: Grade 2 (scores of 6)   Tumor Size: 21 x 15 x 10 mm   Tumor Focality: Multiple foci of invasive carcinoma        Number of foci: 2        Sizes of individual foci in millimeters: 20 x 15 x 7 mm        Ductal Carcinoma In Situ (DCIS): Not identified        Lobular Carcinoma In Situ (LCIS): Present   Lymphovascular invasion: Not identified   Dermal lymphovascular invasion: Not identified   Microcalcifications: Not identified   Treatment effect: No known presurgical therapy   MARGINS   All margins negative for invasive carcinoma    Distance from invasive carcinoma to closest margin: 6.0 cm from tumor #2; 6.5 cm from tumor #1    Closest margin to invasive carcinoma: Superior/anterior (for tumor #2); posterior (for tumor #1)   REGIONAL LYMPH NODES    Tumor present in regional lymph nodes    Number of lymph nodes with macrometastases (>2 mm): 0    Number of lymph nodes with micrometastasis (0.2-2 mm): 3    Number of lymph nodes with isolated tumor cells 0    Size of largest shelia metastatic deposit: 2 mm    Extranodal extension: Not identified   Total number of lymph nodes examined: 6   Number of sentinel nodes examined: 6   DISTANT METASTASIS: Not applicable   PATHOLOGIC STAGE (pTNM, AJCC 8th Edition)   TNM descriptors: m (multiple foci of invasive carcinoma)   mpT2 pN1mi     Oncotype Recurrence score:15    Left sided IDC/ILC T2N1mi M0 ER/MD positive HER2 negative. CT chest/abdomen/pelvis 09/14/2021 Postsurgical changes in the left breast with the fluid collection and inflammation as noted likely postoperative hematoma/seroma. No evidence of metastatic disease. Non-specific 8 mm hypodense lesion at the head of the pancreas; HBP team following.   NM Bone Scan 09/14/2021 without metastatic disease. Rad Onc on board    Pathology NCCN guidelines reviewed extensively with patient  TAILORx suggested that in women ? 50 years and with an intermediate RS (11-25), chemotherapy plus endocrine therapy was associated with a lower rate of distant recurrence relative to endocrine therapy alone, particularly for those with high-intermediate scores (21 to 25) or clinical risk features. However, this was based only on exploratory subset analyses, and moreover, some of the benefit hypothetically may have been due to ovarian suppression in premenopausal women. Ovarian suppression was not assessed as a treatment strategy in this trial, however. In light of limitations in the data, either chemotherapy and endocrine therapy, or endocrine therapy only (with ovarian suppression in premenopausal women) are acceptable options. [address] 7.6 09/22/2021 Estradiol 45.9 09/22/2021    She was leaning more to proceed with endocrine therapy only with ovarian suppression  Tamoxifen was started on 09/23/2021. Evaluated by Nargis Munroe for BSO; Endometrial biopsy BSO done on 11/11/2021  A. ENDOMETRIUM, ENDOMETRIAL BIOPSY:   - DYSSYNCHRONOUS SECRETORY ENDOMETRIUM. - BENIGN ENDOCERVICAL POLYP AND BENIGN SQUAMOUS MUCOSA. B. OVARY AND FALLOPIAN TUBES, RIGHT AND LEFT, BILATERAL SALPINGECTOMY:   - OVARIES IDENTIFIED, BILATERALLY; HEMORRHAGIC CORPUS LUTEAL CYSTS. - COMPLETE CROSS-SECTION OF FALLOPIAN TUBES IDENTIFIED, BILATERALLY    RT will be completed on 01/11/2022  Discussed Arimidex 1 mg po daily. DEXA scan scheduled on 01/07/2022  Arimidex 1 mg po daily was started on 11/24/2021 with good tolerance so far. Mild hot flashes manageable. Review of Systems;  CONSTITUTIONAL: Mild hot flashes manageable  ENMT: Eyes: No diplopia; Nose: No epistaxis. Mouth: No sore throat. RESPIRATORY: No hemoptysis, shortness of breath, cough.    CARDIOVASCULAR: No chest pain, palpitations. GASTROINTESTINAL: No nausea/vomiting, abdominal pain. GENITOURINARY: No dysuria, urinary frequency, hematuria. NEURO: No syncope, presyncope, headache. Remainder:  ROS NEGATIVE    Past Medical History:      Diagnosis Date    Abscess of right breast 6/19/2018    Anxiety     Arthritis     Breast abscess 7/11/2018    Cancer (HCC)     Fatigue     GERD (gastroesophageal reflux disease)     Hyperlipidemia     Obesity 6/4/2013     Medications:  Reviewed and reconciled. Allergies: Allergies   Allergen Reactions    Vancomycin Nausea And Vomiting     Red man syndrome, had high fever and upset stomach     Physical Exam:  BP (!) 143/79 (Site: Right Upper Arm, Position: Sitting, Cuff Size: Medium Adult)   Pulse 104   Temp 97.7 °F (36.5 °C) (Infrared)   Resp 16   Ht 5' 3\" (1.6 m)   Wt (!) 371 lb (168.3 kg)   LMP 10/24/2021   SpO2 95%   BMI 65.72 kg/m²   GENERAL: Alert, oriented x 3, not in acute distress. EXTREMITIES: Without clubbing or cyanosis or edema. Impression/Plan:  39 y/o female with Left IDC/ILC breast cancer, T2N1mi(sn)M0 ER+GA+HER2-, grade 2, Stage IB  Genetic Testing: Negative    Left simple mastectomy, sentinel lymph node biopsy, injection of methylene blue dye 08/18/2021  CANCER CASE SUMMARY   Procedure: Total mastectomy   Specimen Laterality: Left   TUMOR   Tumor Site: 2:00 (between upper-outer and lower-outer quadrant)   Histologic Type:  Invasive carcinoma with mixed ductal and lobular features   Histologic Grade: [place] Histologic Score        Glandular/tubular differentiation: Score 3        Nuclear pleomorphism: Score 2        Right articular rate: Score 1        Overall grade: Grade 2 (scores of 6)   Tumor Size: 21 x 15 x 10 mm   Tumor Focality: Multiple foci of invasive carcinoma        Number of foci: 2        Sizes of individual foci in millimeters: 20 x 15 x 7 mm        Ductal Carcinoma In Situ (DCIS): Not identified        Lobular Carcinoma In Situ (LCIS): Present   Lymphovascular invasion: Not identified   Dermal lymphovascular invasion: Not identified   Microcalcifications: Not identified   Treatment effect: No known presurgical therapy   MARGINS   All margins negative for invasive carcinoma   Distance from invasive carcinoma to closest margin: 6.0 cm from tumor #2; 6.5 cm from tumor #1   Closest margin to invasive carcinoma: Superior/anterior (for tumor #2); posterior (for tumor #1)   REGIONAL LYMPH NODES    Tumor present in regional lymph nodes    Number of lymph nodes with macrometastases (>2 mm): 0    Number of lymph nodes with micrometastasis (0.2-2 mm): 3    Number of lymph nodes with isolated tumor cells 0    Size of largest shelia metastatic deposit: 2 mm    Extranodal extension: Not identified   Total number of lymph nodes examined: 6   Number of sentinel nodes examined: 6   DISTANT METASTASIS: Not applicable   PATHOLOGIC STAGE (pTNM, AJCC 8th Edition)   TNM descriptors: m (multiple foci of invasive carcinoma)   mpT2 pN1mi     Rad Onc on board    Pathology NCCN guidelines reviewed extensively with patient  TAILORx suggested that in women ? 50 years and with an intermediate RS (11-25), chemotherapy plus endocrine therapy was associated with a lower rate of distant recurrence relative to endocrine therapy alone, particularly for those with high-intermediate scores (21 to 25) or clinical risk features. However, this was based only on exploratory subset analyses, and moreover, some of the benefit hypothetically may have been due to ovarian suppression in premenopausal women. Ovarian suppression was not assessed as a treatment strategy in this trial, however. In light of limitations in the data, either chemotherapy and endocrine therapy, or endocrine therapy only (with ovarian suppression in premenopausal women) are acceptable options.   [address] 7.6 09/22/2021 Estradiol 45.9 09/22/2021    She was leaning more to proceed with endocrine therapy only with ovarian suppression;   Tamoxifen was started on 09/23/2021. Evaluated by Tawnya Acevedo for BSO; Endometrial biopsy BSO done on 11/11/2021  A. ENDOMETRIUM, ENDOMETRIAL BIOPSY:   - DYSSYNCHRONOUS SECRETORY ENDOMETRIUM. - BENIGN ENDOCERVICAL POLYP AND BENIGN SQUAMOUS MUCOSA. B. OVARY AND FALLOPIAN TUBES, RIGHT AND LEFT, BILATERAL SALPINGECTOMY:   - OVARIES IDENTIFIED, BILATERALLY; HEMORRHAGIC CORPUS LUTEAL CYSTS. - COMPLETE CROSS-SECTION OF FALLOPIAN TUBES IDENTIFIED, BILATERALLY    RT will be completed on 01/11/2022  Discussed Arimidex 1 mg po daily. DEXA scan scheduled on 01/07/2022  Arimidex 1 mg po daily was started on 11/24/2021 with good tolerance so far. Mild hot flashes manageable. Continue Arimidex Ca,VitD    RTC 4 Months with labs.     01/05/2022  Leonid Greenberg MD

## 2022-01-06 ENCOUNTER — OFFICE VISIT (OUTPATIENT)
Dept: ONCOLOGY | Age: 47
End: 2022-01-06
Payer: MEDICAID

## 2022-01-06 ENCOUNTER — HOSPITAL ENCOUNTER (OUTPATIENT)
Dept: RADIATION ONCOLOGY | Age: 47
Discharge: HOME | End: 2022-01-06
Attending: RADIOLOGY
Payer: MEDICAID

## 2022-01-06 DIAGNOSIS — F41.1 GENERALIZED ANXIETY DISORDER WITH PANIC ATTACKS: Primary | ICD-10-CM

## 2022-01-06 DIAGNOSIS — F41.0 GENERALIZED ANXIETY DISORDER WITH PANIC ATTACKS: Primary | ICD-10-CM

## 2022-01-06 PROCEDURE — 90837 PSYTX W PT 60 MINUTES: CPT | Performed by: SOCIAL WORKER

## 2022-01-06 PROCEDURE — 77336 RADIATION PHYSICS CONSULT: CPT | Performed by: RADIOLOGY

## 2022-01-06 PROCEDURE — 77385 HC NTSTY MODUL RAD TX DLVR SMPL: CPT | Performed by: RADIOLOGY

## 2022-01-06 NOTE — PROGRESS NOTES
[address] [place],MSW,LISW-S     Visit Date: 1/6/2022   Time of appointment:  2722-8323  Time spent with Patient: 64 minutes. This is patient's second appointment. Reason for Consult:  No chief complaint on file. Referring Provider/PCP:    No ref. provider found  Katya Ferris DO      Pt provided informed consent for the behavioral health program. Discussed with patient model of service to include the limits of confidentiality (i.e. abuse reporting, suicide intervention, etc.) and short-term intervention focused approach. PRESENTING PROBLEM AND HISTORY  Sydnie Diego is a 40 y.o. female who presents for follow up of anxiety. She has the following symptoms: increased sleep, fatigue/lack of energy, excessive guilt, mood swings, psychomotor agitation, feeling nervous, anxious, or on edge, racing worry thoughts and excessive anxiety and worry about specific stressors. Onset of symptoms was approximately 3 years ago. Symptoms have been controlled since that time with a worsening of symptoms approximately one month ago. She denies current suicidal and homicidal ideation. Current treatment includes Lexapro which she has been taking since 2018. She complains of the following medication side effects: flattened affect. .    MENTAL STATUS EXAM  Mood was within normal limits with full affect. Suicidal ideation was denied. Homicidal ideation was denied. Hygiene was good . Dress was appropriate. Behavior was Within Normal Limits with No observation or self-report of difficulties ambulating. Attitude was Friendly. Eye-contact was good. Speech: rate - WNL, rhythm -  WNL, volume - WNL  Verbalizations were goal directed. Thought processes were intact and goal-oriented without evidence of delusions, hallucinations, obsessions, or susan; without  cognitive distortions. Associations were characterized by intact cognitive processes.   Pt was oriented to person, place, time, and general circumstances;  recent:  good. Insight and judgment were estimated to be good, AEB, a good  understanding of cyclical maladaptive patterns, and the ability to use insight to inform behavior change. CURRENT MEDICATIONS    Current Outpatient Medications:     anastrozole (ARIMIDEX) 1 MG tablet, Take 1 tablet by mouth daily, Disp: 30 tablet, Rfl: 3    ibuprofen (ADVIL;MOTRIN) 600 MG tablet, Take 1 tablet by mouth 3 times daily as needed for Pain, Disp: 30 tablet, Rfl: 1    docusate sodium (COLACE) 100 MG capsule, Take 1 capsule by mouth daily as needed for Constipation (Patient not taking: Reported on 12/13/2021), Disp: 30 capsule, Rfl: 0    escitalopram (LEXAPRO) 10 MG tablet, Take 1 tablet by mouth daily (Patient taking differently: Take 10 mg by mouth nightly ), Disp: 90 tablet, Rfl: 1    ondansetron (ZOFRAN) 4 MG tablet, Take 1 tablet by mouth 3 times daily as needed for Nausea or Vomiting, Disp: 30 tablet, Rfl: 0    fluticasone (FLONASE) 50 MCG/ACT nasal spray, 2 sprays by Each Nostril route daily (Patient not taking: Reported on 12/13/2021), Disp: 3 Bottle, Rfl: 1    vitamin D (ERGOCALCIFEROL) 1.25 MG (06362 UT) CAPS capsule, Take 1 capsule by mouth once a week (Patient taking differently: Take 50,000 Units by mouth once a week wednesday), Disp: 12 capsule, Rfl: 1     FAMILY MEDICAL/MH HISTORY   Her family history includes Breast Cancer (age of onset: 54) in her mother; Breast Cancer (age of onset: 79) in her maternal grandmother; Cancer in her mother; Cancer (age of onset: 61) in her maternal grandfather; Colon Cancer in her mother; Diabetes in her maternal grandmother; High Blood Pressure in her maternal grandmother; High Cholesterol in her maternal grandmother and mother; Ovarian Cancer in an other family member; Stomach Cancer in her mother. PATIENT MENTAL HEALTH HISTORY  Pt discussed history of anxiety and panic attacks.   Noted that she was prescribed Lexapro in 2018 following severe non-cancerous breast problems that occurred at that time. PSYCHOSOCIAL HISTORY  Current living situation: Pt resides with her two minor sons.     Work/Education: Pt reported that she is not working at this time.     Support system: Pt indicated that she has a strong support system. Noted that she has a group of friends and also has a good relationship with he ex. Stated that she has a good relationship with her mother who lives out of state and has Stage IV breast CA. Pt indicated that she was very close with her aunt who passed away unexpectedly several weeks ago.     Worship/Spirituality: Not discussed      DRUG AND ALCOHOL CURRENT USE/HISTORY  TOBACCO:  She reports that she quit smoking about 13 years ago. Her smoking use included cigarettes. She has a 1.00 pack-year smoking history. She has never used smokeless tobacco.  ALCOHOL:  She reports current alcohol use. OTHER SUBSTANCES: She reports no history of drug use. ASSESSMENT  Sandra Delgado presented to the appointment today for evaluation and treatment of symptoms of anxiety. She is currently deemed no risk to herself or others and meets criteria for Generalized Anxiety Disorder. She has expressed desire to remain on current medication regiment at this time. She will also benefit from brief and solution-focused consultation to address cognitive and behavioral interventions for anxiety symptoms.  Babs Bolaños was in agreement with recommendations.     PHQ Scores 5/18/2021 1/14/2020 2/19/2019 4/11/2018   PHQ2 Score 0 0 0 0   PHQ9 Score 0 0 0 0     Interpretation of Total Score Depression Severity: 1-4 = Minimal depression, 5-9 = Mild depression, 10-14 = Moderate depression, 15-19 = Moderately severe depression, 20-27 = Severe depression    How often pt has had thoughts of death or hurting self (if PHQ positive for depression):       JASON 7 SCORE 5/10/2018   JASON-7 Total Score 17     Interpretation of JASON-7 score: 5-9 = mild anxiety, 10-14 = moderate anxiety, 15+ = severe anxiety. Recommend referral to behavioral health for scores 10 or greater. DIAGNOSIS  Generalized Anxiety Disorder    INTERVENTION  Discussed prevalence of  anxiety for general population, Discussed self-care (sleep, nutrition, rewarding activities, social support, exercise), Motivational Interviewing to determine importance and readiness for change, Discussed potential barriers to change, Established rapport and Supportive techniques      PLAN  Met with pt for second [facility] session. Pt is 56-year-old female currently being treated for breast CA. Pt discussed recent quarantine which impacted holiday plans. Stated that she and her sons did not contract COVID-19 but discussed missing tx in order to isolate. Discussed concerns about sons returning to school due to [place] and school not requiring masks. Noted that she plans to schedule vaccines for sons as well as booster for herself to attempt to alleviate some stress. Reviewed discussion from previous session. Pt expressed ongoing problems balancing expectations of herself. Explored events surrounding CA dx and how this compared to her previous breast surgery. Paralleled this to how her reaction to son's health conditions have changed over time. Provided support and discussed value of being aware and accepting of her feelings. Pt indicated that she has difficulty accepting things that she cannot control. Pt reported that she would like to focus on \"letting things go. \"      Pt agreeable to 2 week follow up.     JULIANA Sevilla, ANGEL  Oncology Social Worker

## 2022-01-07 ENCOUNTER — HOSPITAL ENCOUNTER (OUTPATIENT)
Dept: RADIATION ONCOLOGY | Age: 47
Discharge: HOME | End: 2022-01-07
Attending: RADIOLOGY
Payer: MEDICAID

## 2022-01-07 PROCEDURE — 77385 HC NTSTY MODUL RAD TX DLVR SMPL: CPT | Performed by: RADIOLOGY

## 2022-01-07 PROCEDURE — 77014 PR CT GUIDANCE PLACEMENT RAD THERAPY FIELDS: CPT | Performed by: RADIOLOGY

## 2022-01-10 ENCOUNTER — HOSPITAL ENCOUNTER (OUTPATIENT)
Dept: RADIATION ONCOLOGY | Age: 47
Discharge: HOME | End: 2022-01-10
Attending: RADIOLOGY
Payer: MEDICAID

## 2022-01-10 PROCEDURE — 77385 HC NTSTY MODUL RAD TX DLVR SMPL: CPT | Performed by: RADIOLOGY

## 2022-01-11 ENCOUNTER — HOSPITAL ENCOUNTER (OUTPATIENT)
Dept: RADIATION ONCOLOGY | Age: 47
Discharge: HOME | End: 2022-01-11
Attending: RADIOLOGY
Payer: MEDICAID

## 2022-01-11 PROCEDURE — 77385 HC NTSTY MODUL RAD TX DLVR SMPL: CPT | Performed by: RADIOLOGY

## 2022-01-11 NOTE — PROGRESS NOTES
Nallely Derrell  1/11/2022  7:40 AM          Current Outpatient Medications   Medication Sig Dispense Refill    anastrozole (ARIMIDEX) 1 MG tablet Take 1 tablet by mouth daily 30 tablet 3    ibuprofen (ADVIL;MOTRIN) 600 MG tablet Take 1 tablet by mouth 3 times daily as needed for Pain 30 tablet 1    docusate sodium (COLACE) 100 MG capsule Take 1 capsule by mouth daily as needed for Constipation (Patient not taking: Reported on 12/13/2021) 30 capsule 0    escitalopram (LEXAPRO) 10 MG tablet Take 1 tablet by mouth daily (Patient taking differently: Take 10 mg by mouth nightly ) 90 tablet 1    ondansetron (ZOFRAN) 4 MG tablet Take 1 tablet by mouth 3 times daily as needed for Nausea or Vomiting 30 tablet 0    fluticasone (FLONASE) 50 MCG/ACT nasal spray 2 sprays by Each Nostril route daily (Patient not taking: Reported on 12/13/2021) 3 Bottle 1    vitamin D (ERGOCALCIFEROL) 1.25 MG (91556 UT) CAPS capsule Take 1 capsule by mouth once a week (Patient taking differently: Take 50,000 Units by mouth once a week wednesday) 12 capsule 1     No current facility-administered medications for this encounter. This is an up-to-date medication list.    Please take this list to your next care provider, and discard any previous medication lists.

## 2022-01-20 ENCOUNTER — HOSPITAL ENCOUNTER (OUTPATIENT)
Dept: GENERAL RADIOLOGY | Age: 47
Discharge: HOME | End: 2022-01-22
Payer: MEDICAID

## 2022-01-20 DIAGNOSIS — Z79.811 USE OF AROMATASE INHIBITORS: ICD-10-CM

## 2022-01-20 PROCEDURE — 77080 DXA BONE DENSITY AXIAL: CPT

## 2022-01-25 ENCOUNTER — TELEPHONE (OUTPATIENT)
Dept: ONCOLOGY | Age: 47
End: 2022-01-25

## 2022-01-25 NOTE — TELEPHONE ENCOUNTER
Contacted pt re: scheduled counseling appointment. Pt indicated that she got vaccine booster previous date and is not feeling well. Pt to contact office for rescheduling.     Deny Grant MSW, JEROME-S  Oncology Social Worker

## 2022-01-28 ENCOUNTER — TELEPHONE (OUTPATIENT)
Dept: BREAST CENTER | Age: 47
End: 2022-01-28

## 2022-01-28 ENCOUNTER — OFFICE VISIT (OUTPATIENT)
Dept: BREAST CENTER | Age: 47
End: 2022-01-28
Payer: MEDICAID

## 2022-01-28 VITALS
HEIGHT: 63 IN | RESPIRATION RATE: 16 BRPM | TEMPERATURE: 97.3 F | OXYGEN SATURATION: 97 % | HEART RATE: 97 BPM | DIASTOLIC BLOOD PRESSURE: 85 MMHG | SYSTOLIC BLOOD PRESSURE: 162 MMHG | WEIGHT: 293 LBS | BODY MASS INDEX: 51.91 KG/M2

## 2022-01-28 DIAGNOSIS — E66.01 CLASS 3 SEVERE OBESITY DUE TO EXCESS CALORIES WITHOUT SERIOUS COMORBIDITY WITH BODY MASS INDEX (BMI) OF 60.0 TO 69.9 IN ADULT (HCC): ICD-10-CM

## 2022-01-28 DIAGNOSIS — Z91.89 AT HIGH RISK FOR BREAST CANCER: Primary | ICD-10-CM

## 2022-01-28 DIAGNOSIS — R92.2 DENSE BREAST: ICD-10-CM

## 2022-01-28 PROCEDURE — 99213 OFFICE O/P EST LOW 20 MIN: CPT | Performed by: SURGERY

## 2022-01-28 PROCEDURE — G8427 DOCREV CUR MEDS BY ELIG CLIN: HCPCS | Performed by: SURGERY

## 2022-01-28 PROCEDURE — 1036F TOBACCO NON-USER: CPT | Performed by: SURGERY

## 2022-01-28 PROCEDURE — G8417 CALC BMI ABV UP PARAM F/U: HCPCS | Performed by: SURGERY

## 2022-01-28 PROCEDURE — G8484 FLU IMMUNIZE NO ADMIN: HCPCS | Performed by: SURGERY

## 2022-01-28 ASSESSMENT — ENCOUNTER SYMPTOMS
VOMITING: 0
SHORTNESS OF BREATH: 0
NAUSEA: 0
ALLERGIC/IMMUNOLOGIC NEGATIVE: 1
BLOOD IN STOOL: 0
DIARRHEA: 0
CONSTIPATION: 0
RESPIRATORY NEGATIVE: 1
EYES NEGATIVE: 1
COUGH: 0
ANAL BLEEDING: 0
ABDOMINAL PAIN: 0
ABDOMINAL DISTENTION: 0
BACK PAIN: 0
GASTROINTESTINAL NEGATIVE: 1

## 2022-01-28 NOTE — TELEPHONE ENCOUNTER
CLAY Watts contacted [facility] via online for prior authorization of outpatient imaing. A prior authorization needed for MBI (71647) at [address] [place] [address] in [facility]. MA submitted authorization via online and it was sent to review. Case number [id number].      Electronically signed by Davon Jones MA on 1/28/22 at 2:08 PM EST

## 2022-01-28 NOTE — PROGRESS NOTES
[facility]  PROGRESS NOTE  ATTENDING NOTE    Chief Complaint   Patient presents with    Wound Check     left breast wound check     S:  45y/o F presents for f/u of left breast cancer. She continues to gain weight. She is very inactive as she stating she is constantly fatigued. She is very concerned about her mom who issues all started with breast cancer now she is got metastatic but she has been dealing with this for at least 4 years. She was able to tolerate radiation. The seroma in her left breast has decreased. She is wanting to have a right mastectomy. Review of Systems   Constitutional: Positive for unexpected weight change (gaining weight). Negative for activity change and appetite change. HENT: Negative. Eyes: Negative. Respiratory: Negative. Negative for cough and shortness of breath. Cardiovascular: Positive for chest pain (over mastectomy site). Negative for leg swelling. Gastrointestinal: Negative. Negative for abdominal distention, abdominal pain, anal bleeding, blood in stool, constipation, diarrhea, nausea and vomiting. Endocrine: Negative. Genitourinary: Negative. Musculoskeletal: Negative. Negative for arthralgias, back pain, gait problem, joint swelling and myalgias. Skin: Negative. Allergic/Immunologic: Negative. Neurological: Negative. Negative for dizziness, weakness and headaches. Hematological: Negative. Psychiatric/Behavioral: Negative. Negative for confusion, decreased concentration and sleep disturbance. BP (!) 162/85 (Site: Right Upper Arm, Position: Sitting, Cuff Size: Large Adult)   Pulse 97   Temp 97.3 °F (36.3 °C) (Infrared)   Resp 16   Ht 5' 3\" (1.6 m)   Wt (!) 371 lb (168.3 kg)   LMP 10/24/2021   SpO2 97%   BMI 65.72 kg/m²   Physical Exam  Constitutional:       Appearance: Normal appearance. She is obese. HENT:      Head: Normocephalic and atraumatic.       Nose: Nose normal.      Mouth/Throat: Mouth: Mucous membranes are moist.      Pharynx: Oropharynx is clear. Eyes:      Extraocular Movements: Extraocular movements intact. Pupils: Pupils are equal, round, and reactive to light. Cardiovascular:      Rate and Rhythm: Normal rate and regular rhythm. Pulses: Normal pulses. Heart sounds: Normal heart sounds. Pulmonary:      Effort: Pulmonary effort is normal.      Breath sounds: Normal breath sounds. Chest:   Breasts:      Right: No swelling, bleeding, inverted nipple, mass, nipple discharge, skin change, tenderness, axillary adenopathy or supraclavicular adenopathy. Left: Absent. No axillary adenopathy or supraclavicular adenopathy. Abdominal:      General: There is no distension. Palpations: Abdomen is soft. Tenderness: There is no abdominal tenderness. Musculoskeletal:         General: No tenderness or signs of injury. Cervical back: Normal range of motion and neck supple. Lymphadenopathy:      Cervical: No cervical adenopathy. Right cervical: No superficial cervical adenopathy. Left cervical: No superficial cervical adenopathy. Upper Body:      Right upper body: No supraclavicular or axillary adenopathy. Left upper body: No supraclavicular or axillary adenopathy. Skin:     General: Skin is warm and dry. Neurological:      General: No focal deficit present. Mental Status: She is alert and oriented to person, place, and time. Psychiatric:         Mood and Affect: Mood normal.         Behavior: Behavior normal.         Thought Content:  Thought content normal.         Judgment: Judgment normal.       ASSESSMENT/PLAN:  Stage IB left breast cancer--ER/NY positive HER-2 negative, grade 2status post left mastectomy with sentinel lymph node biopsy, radiation  --Continue monthly self breast exams and call if any changes  --Plan for MBI of right breast issues at high risk and difficult to examine and has dense breast  --Plan for mammogram right breast 6 months  --Patient wishes for prophylactic mastectomy however she has gained a significant amount of weight. I explained to her she has much issues with the left mastectomy with seroma formations that it would be best for her to focus on losing some weight and do this in a more delayed fashion. Age is a risk factor for colon cancer given she is now 46--discussed colonoscopy plan for prep and scheduling of colonoscopy at next visit    Morbid obesity with a BMI 65  --Referral to bariatric surgery weight loss  --Patient has gone to weight loss center before for medical management and was not successful with that.     RTC 6 months    Opal Cordova MD, MSc, FACS  1/31/2022  7:48 AM

## 2022-01-31 NOTE — TELEPHONE ENCOUNTER
MA received fax from [facility] that clinicals were required to review authorization. MA faxed all clinicals to fax provided and received confirmation that was received. MA scanned all clinicals in patients chart.        Electronically signed by Jenny Eli MA on 1/31/22 at 8:57 AM EST

## 2022-02-03 NOTE — TELEPHONE ENCOUNTER
MA received fax from [facility] in regards to auth submitted for MBI. They have denied it. MA scanned in denial and routed to  for further assistance.         can call [phone] if would like to speak to someone in regards to the denial.         Electronically signed by Colin Roberts MA on 2/3/22 at 9:07 AM EST

## 2022-02-07 NOTE — TELEPHONE ENCOUNTER
doing [id number] on 2/9/2022.       Electronically signed by Yrn Roblero MA on 2/7/22 at 12:55 PM EST

## 2022-02-11 NOTE — TELEPHONE ENCOUNTER
Peer to Peer moved to Monday 02/14/2022.       Electronically signed by Jennifer Miller MA on 2/11/22 at 12:54 PM EST

## 2022-02-14 NOTE — TELEPHONE ENCOUNTER
----- Message from Arnoldo Lau MD sent at 2/14/2022  2:17 PM EST -----  Regarding: MBI approval  MBI had been approved for the right breast  [id number] good until March 31, 2022

## 2022-02-15 NOTE — TELEPHONE ENCOUNTER
RN gave [facility] schedulers the [facility] [employer] and they will call and contact patient to schedule appointment.        Electronically signed by Surekha Benites MA on 2/15/22 at 7:27 AM EST

## 2022-02-16 ENCOUNTER — TELEPHONE (OUTPATIENT)
Dept: FAMILY MEDICINE CLINIC | Age: 47
End: 2022-02-16

## 2022-02-16 NOTE — TELEPHONE ENCOUNTER
Pt called complaining of Ear pain, sinus pressure, cough, headache, drainage x 3 days and took a home COVID last night that was negative. Would like something called into [facility] in [place].

## 2022-02-18 ENCOUNTER — OFFICE VISIT (OUTPATIENT)
Dept: FAMILY MEDICINE CLINIC | Age: 47
End: 2022-02-18
Payer: MEDICAID

## 2022-02-18 VITALS
WEIGHT: 293 LBS | BODY MASS INDEX: 51.91 KG/M2 | HEIGHT: 63 IN | OXYGEN SATURATION: 98 % | TEMPERATURE: 97 F | SYSTOLIC BLOOD PRESSURE: 132 MMHG | HEART RATE: 96 BPM | RESPIRATION RATE: 20 BRPM | DIASTOLIC BLOOD PRESSURE: 84 MMHG

## 2022-02-18 DIAGNOSIS — J01.90 ACUTE BACTERIAL SINUSITIS: ICD-10-CM

## 2022-02-18 DIAGNOSIS — J30.9 ALLERGIC RHINITIS, UNSPECIFIED SEASONALITY, UNSPECIFIED TRIGGER: ICD-10-CM

## 2022-02-18 DIAGNOSIS — B37.9 YEAST INFECTION: ICD-10-CM

## 2022-02-18 DIAGNOSIS — B96.89 ACUTE BACTERIAL SINUSITIS: ICD-10-CM

## 2022-02-18 DIAGNOSIS — H66.002 ACUTE SUPPURATIVE OTITIS MEDIA OF LEFT EAR WITHOUT SPONTANEOUS RUPTURE OF TYMPANIC MEMBRANE, RECURRENCE NOT SPECIFIED: Primary | ICD-10-CM

## 2022-02-18 DIAGNOSIS — Z11.59 SCREENING FOR VIRAL DISEASE: ICD-10-CM

## 2022-02-18 LAB
Lab: NORMAL
PERFORMING INSTRUMENT: NORMAL
QC PASS/FAIL: NORMAL
SARS-COV-2, POC: NORMAL

## 2022-02-18 PROCEDURE — 99214 OFFICE O/P EST MOD 30 MIN: CPT | Performed by: SURGERY

## 2022-02-18 PROCEDURE — 87426 SARSCOV CORONAVIRUS AG IA: CPT | Performed by: SURGERY

## 2022-02-18 RX ORDER — OXYMETAZOLINE HYDROCHLORIDE 0.05 G/100ML
2 SPRAY NASAL 2 TIMES DAILY
Qty: 1 EACH | Refills: 3 | Status: SHIPPED | OUTPATIENT
Start: 2022-02-18 | End: 2022-02-21

## 2022-02-18 RX ORDER — FLUCONAZOLE 150 MG/1
150 TABLET ORAL ONCE
Qty: 1 TABLET | Refills: 0 | Status: SHIPPED | OUTPATIENT
Start: 2022-02-18 | End: 2022-02-18

## 2022-02-18 RX ORDER — GUAIFENESIN 600 MG/1
600 TABLET, EXTENDED RELEASE ORAL 2 TIMES DAILY
Qty: 28 TABLET | Refills: 0 | Status: SHIPPED | OUTPATIENT
Start: 2022-02-18 | End: 2022-03-04

## 2022-02-18 RX ORDER — AMOXICILLIN 875 MG/1
875 TABLET, COATED ORAL 2 TIMES DAILY
Qty: 20 TABLET | Refills: 0 | Status: SHIPPED | OUTPATIENT
Start: 2022-02-18 | End: 2022-02-28

## 2022-02-18 NOTE — PROGRESS NOTES
Tarun Ramírez (:  1975) is a 40 y.o. female,Established patient, here for evaluation of the following chief complaint(s):  Otalgia (b/l earache x 3 days / covid vaccinated x 3 / took 2 home covid tests which were negative ), Cough (sometimes productive ), and Sinusitis         ASSESSMENT/PLAN:  1. Acute suppurative otitis media of left ear without spontaneous rupture of tympanic membrane, recurrence not specified  -     amoxicillin (AMOXIL) 875 MG tablet; Take 1 tablet by mouth 2 times daily for 10 days, Disp-20 tablet, R-0Normal  2. Screening for viral disease  -     POCT COVID-19, Antigen  3. Yeast infection  -     fluconazole (DIFLUCAN) 150 MG tablet; Take 1 tablet by mouth once for 1 dose, Disp-1 tablet, R-0Normal  4. Acute bacterial sinusitis  5. Allergic rhinitis, unspecified seasonality, unspecified trigger  -     oxymetazoline (12 HOUR NASAL SPRAY) 0.05 % nasal spray; 2 sprays by Nasal route 2 times daily for 3 days, Disp-1 each, R-3Normal  -     guaiFENesin (MUCINEX) 600 MG extended release tablet; Take 1 tablet by mouth 2 times daily for 14 days, Disp-28 tablet, R-0Normal      Return if symptoms worsen or fail to improve. Subjective   SUBJECTIVE/OBJECTIVE:  HPI:    URI:  4 days (2 children at home with similar symptoms) - post nasal drip, rhinorrhea, cough, left otalgia worse than right. Popping crackling pressure fullness in both ears. Denies fever, chills, diaphoresis, dizziness, syncope, anosmia, dysgeusia, chest discomfort, chest pain, palpitations, shortness of breath, [place], nausea, emesis, change or bowel and bladder function    Covid is negative. ROS:    Denies 10pt ROS other than noted in HPI. Objective       PHYSICAL EXAM:    /84   Pulse 96   Temp 97 °F (36.1 °C) (Temporal)   Resp 20   Ht 5' 3\" (1.6 m)   Wt (!) 371 lb (168.3 kg)   LMP 10/24/2021   SpO2 98%   BMI 65.72 kg/m²     AVSS    GA: Well-groomed, appears well, no acute distress.     HEENT: Atraumatic normocephalic. Extraocular muscles are grossly intact. Pupils are equal round reactive to light. Conjunctiva pink and moist.  Hearing is grossly intact. Left ear pars tensa and flaccida are bulging, there is a thick purulent material present behind the tympanic membrane the light reflex is displaced and there is hyperemia present. Right TM osseous landmarks are visible, no air-fluid level, good light reflex. Nares patent without external drainage. Rightward septal deviation anteriorly leftward posteriorly. Inferior turbinates are pink to pale thin clear posterior rhinorrhea draining buccal mucosa pink and moist.  Thin clear posterior rhinorrhea draining. NECK: Trachea is midline, supple, nontender, no lymphadenopathy. CARDIO: Regular rate and rhythm without murmur rub or gallop. Cap refill 2+. Radial pulses 2+ bilaterally. RESPIRATORY: Clear to auscultation bilaterally without wheezes rales or rhonchi. Normal inspiratory and expiratory effort. Normoxic on room air    ABD: Obese, normoactive bowel sounds. Soft, nontender, no organomegaly. MSK: Structurally appropriate for age. No gross deficit. NEURO: Alert, no gross deficit. PSYCH:  Mood is normal and congruent with affect. No signs of psychomotor retardation or agitation. Thought content seems normal, speech is fluent and non-pressured. SKIN: Generally warm pink and dry. An electronic signature was used to authenticate this note.     --Estefania Delgado DO

## 2022-02-23 ENCOUNTER — TELEPHONE (OUTPATIENT)
Dept: HEMATOLOGY | Age: 47
End: 2022-02-23

## 2022-02-23 NOTE — TELEPHONE ENCOUNTER
Spoke to Violette Tejada about her scheduled CT scan. Date and time did not work for patient. I gave her the number to radiology scheduling so she could r/s.  Pt agreed to return call to office once completed to that she may schedule f/u with Dr. Mandy Herrera to review results

## 2022-02-28 DIAGNOSIS — F41.9 ANXIETY: ICD-10-CM

## 2022-02-28 RX ORDER — ESCITALOPRAM OXALATE 10 MG/1
10 TABLET ORAL DAILY
Qty: 90 TABLET | Refills: 1 | Status: CANCELLED | OUTPATIENT
Start: 2022-02-28

## 2022-03-01 RX ORDER — ESCITALOPRAM OXALATE 10 MG/1
10 TABLET ORAL NIGHTLY
Qty: 90 TABLET | Refills: 0 | Status: SHIPPED
Start: 2022-03-01 | End: 2022-08-23

## 2022-03-01 NOTE — TELEPHONE ENCOUNTER
Last Appointment:  12/13/2021  Future Appointments   Date Time Provider Tiana Johnsoni   3/15/2022 10:00 AM [facility] CT SCAN 2 [facility] CT [facility] Radiolo   4/8/2022 10:45 AM Aravind James MD Surg Weight [facility]   5/4/2022  9:45 AM [facility] MED ONC FAST TRACK 2 [facility] Med Onc [address]   5/4/2022 10:00 AM Radha Viveros MD MED ONC [facility]

## 2022-03-10 ENCOUNTER — HOSPITAL ENCOUNTER (OUTPATIENT)
Dept: NUCLEAR MEDICINE | Age: 47
Discharge: HOME | End: 2022-03-12
Payer: MEDICAID

## 2022-03-10 ENCOUNTER — TELEPHONE (OUTPATIENT)
Dept: SURGERY | Age: 47
End: 2022-03-10

## 2022-03-10 DIAGNOSIS — I89.0 LYMPHEDEMA: Primary | ICD-10-CM

## 2022-03-10 DIAGNOSIS — R92.2 DENSE BREAST: ICD-10-CM

## 2022-03-10 DIAGNOSIS — Z91.89 AT HIGH RISK FOR BREAST CANCER: ICD-10-CM

## 2022-03-10 PROCEDURE — 3430000000 HC RX DIAGNOSTIC RADIOPHARMACEUTICAL: Performed by: RADIOLOGY

## 2022-03-10 PROCEDURE — 78800 RP LOCLZJ TUM 1 AREA 1 D IMG: CPT

## 2022-03-10 PROCEDURE — A9500 TC99M SESTAMIBI: HCPCS | Performed by: RADIOLOGY

## 2022-03-10 RX ADMIN — Medication 15 MILLICURIE: at 10:08

## 2022-03-10 NOTE — TELEPHONE ENCOUNTER
I called Sydniehina Diego and went over her MBI results. They were negative. She has a follow-up appointment with me in a few weeks. She is complaining of some burning sensation in her mastectomy site and she feels like she has some fluid. I referred her to occupational therapy for lymphedema treatment. I also advised she can get some Voltaren gel for the burning sensation she likes. She may just be getting some sensation back in that area. I explained to her that although I think screening her with MBI yearly is a good thing since she cannot do an MRI because she is high risk her insurance company may not pay for it in the future because they told me that when I did the peer to peer this last time. We will continue to monitor her her closely and get yearly mammograms as well.

## 2022-03-15 ENCOUNTER — HOSPITAL ENCOUNTER (OUTPATIENT)
Dept: CT IMAGING | Age: 47
Discharge: HOME | End: 2022-03-17
Payer: MEDICAID

## 2022-03-15 ENCOUNTER — NURSE ONLY (OUTPATIENT)
Dept: FAMILY MEDICINE CLINIC | Age: 47
End: 2022-03-15
Payer: MEDICAID

## 2022-03-15 DIAGNOSIS — K86.9 PANCREATIC LESION: ICD-10-CM

## 2022-03-15 DIAGNOSIS — R53.83 FATIGUE, UNSPECIFIED TYPE: ICD-10-CM

## 2022-03-15 LAB
BASOPHILS ABSOLUTE: 0.05 E9/L (ref 0–0.2)
BASOPHILS RELATIVE PERCENT: 0.5 % (ref 0–2)
EOSINOPHILS ABSOLUTE: 0.19 E9/L (ref 0.05–0.5)
EOSINOPHILS RELATIVE PERCENT: 1.8 % (ref 0–6)
FOLATE: >20 NG/ML (ref 4.8–24.2)
HCT VFR BLD CALC: 44.5 % (ref 34–48)
HEMOGLOBIN: 13.7 G/DL (ref 11.5–15.5)
IMMATURE GRANULOCYTES #: 0.03 E9/L
IMMATURE GRANULOCYTES %: 0.3 % (ref 0–5)
LYMPHOCYTES ABSOLUTE: 1.17 E9/L (ref 1.5–4)
LYMPHOCYTES RELATIVE PERCENT: 11.2 % (ref 20–42)
MCH RBC QN AUTO: 28.8 PG (ref 26–35)
MCHC RBC AUTO-ENTMCNC: 30.8 % (ref 32–34.5)
MCV RBC AUTO: 93.7 FL (ref 80–99.9)
MONOCYTES ABSOLUTE: 0.49 E9/L (ref 0.1–0.95)
MONOCYTES RELATIVE PERCENT: 4.7 % (ref 2–12)
NEUTROPHILS ABSOLUTE: 8.51 E9/L (ref 1.8–7.3)
NEUTROPHILS RELATIVE PERCENT: 81.5 % (ref 43–80)
PDW BLD-RTO: 14.2 FL (ref 11.5–15)
PLATELET # BLD: 336 E9/L (ref 130–450)
PMV BLD AUTO: 10.4 FL (ref 7–12)
RBC # BLD: 4.75 E12/L (ref 3.5–5.5)
TSH SERPL DL<=0.05 MIU/L-ACNC: 2.96 UIU/ML (ref 0.27–4.2)
VITAMIN B-12: 375 PG/ML (ref 211–946)
VITAMIN D 25-HYDROXY: 18 NG/ML (ref 30–100)
WBC # BLD: 10.4 E9/L (ref 4.5–11.5)

## 2022-03-15 PROCEDURE — 74160 CT ABDOMEN W/CONTRAST: CPT

## 2022-03-15 PROCEDURE — 2580000003 HC RX 258: Performed by: RADIOLOGY

## 2022-03-15 PROCEDURE — 36415 COLL VENOUS BLD VENIPUNCTURE: CPT | Performed by: STUDENT IN AN ORGANIZED HEALTH CARE EDUCATION/TRAINING PROGRAM

## 2022-03-15 PROCEDURE — 6360000004 HC RX CONTRAST MEDICATION: Performed by: RADIOLOGY

## 2022-03-15 PROCEDURE — 99999 PR OFFICE/OUTPT VISIT,PROCEDURE ONLY: CPT | Performed by: STUDENT IN AN ORGANIZED HEALTH CARE EDUCATION/TRAINING PROGRAM

## 2022-03-15 RX ORDER — SODIUM CHLORIDE 0.9 % (FLUSH) 0.9 %
10 SYRINGE (ML) INJECTION
Status: COMPLETED | OUTPATIENT
Start: 2022-03-15 | End: 2022-03-15

## 2022-03-15 RX ADMIN — IOPAMIDOL 90 ML: 755 INJECTION, SOLUTION INTRAVENOUS at 09:15

## 2022-03-15 RX ADMIN — Medication 10 ML: at 09:16

## 2022-03-15 NOTE — TELEPHONE ENCOUNTER
to [place] Dat      3/3/22 9:59 AM  Good Morning Susan Stone,   I see you have rescheduled your CT scan to the 15th of March. I was wondering if we can go ahead and also schedule a follow up appointment with Dr. Simone Carrasquillo to go over those results? Please feel feel to respond through [employer] or call office at [phone].      Thank you  Reji Lloyd

## 2022-03-17 DIAGNOSIS — E53.8 VITAMIN B12 DEFICIENCY: Primary | ICD-10-CM

## 2022-03-17 RX ORDER — CYANOCOBALAMIN 1000 UG/ML
1000 INJECTION INTRAMUSCULAR; SUBCUTANEOUS ONCE
Qty: 1 ML | Refills: 3 | Status: SHIPPED
Start: 2022-03-17 | End: 2022-07-05

## 2022-03-25 ENCOUNTER — OFFICE VISIT (OUTPATIENT)
Dept: HEMATOLOGY | Age: 47
End: 2022-03-25
Payer: MEDICAID

## 2022-03-25 ENCOUNTER — TELEPHONE (OUTPATIENT)
Dept: BREAST CENTER | Age: 47
End: 2022-03-25

## 2022-03-25 VITALS
TEMPERATURE: 97.2 F | HEART RATE: 93 BPM | HEIGHT: 63 IN | DIASTOLIC BLOOD PRESSURE: 96 MMHG | OXYGEN SATURATION: 93 % | SYSTOLIC BLOOD PRESSURE: 153 MMHG | WEIGHT: 293 LBS | BODY MASS INDEX: 51.91 KG/M2 | RESPIRATION RATE: 18 BRPM

## 2022-03-25 DIAGNOSIS — K86.2 PANCREATIC CYST: ICD-10-CM

## 2022-03-25 DIAGNOSIS — K75.81 NASH (NONALCOHOLIC STEATOHEPATITIS): Primary | ICD-10-CM

## 2022-03-25 PROCEDURE — 99212 OFFICE O/P EST SF 10 MIN: CPT | Performed by: TRANSPLANT SURGERY

## 2022-03-25 PROCEDURE — G8427 DOCREV CUR MEDS BY ELIG CLIN: HCPCS | Performed by: TRANSPLANT SURGERY

## 2022-03-25 PROCEDURE — G8417 CALC BMI ABV UP PARAM F/U: HCPCS | Performed by: TRANSPLANT SURGERY

## 2022-03-25 PROCEDURE — G8484 FLU IMMUNIZE NO ADMIN: HCPCS | Performed by: TRANSPLANT SURGERY

## 2022-03-25 PROCEDURE — 99213 OFFICE O/P EST LOW 20 MIN: CPT | Performed by: TRANSPLANT SURGERY

## 2022-03-25 PROCEDURE — 1036F TOBACCO NON-USER: CPT | Performed by: TRANSPLANT SURGERY

## 2022-03-25 NOTE — PROGRESS NOTES
Hepatobiliary and Pancreatic Surgery Attending History and Physical    Patient's Name/Date of Birth: Nallely Dove /1975 (40 y.o.)    Date: March 25, 2022     CC: 8mm pancreatic head lesion    HPI:  Patient is a very pleasant 40year old unfortunate female whom recently underwent a left mastectomy for breast cancer where 3 positive lymph nodes were found. She underwent a staging workup where a 8mm hypodense mass was seen in her pancreas. She denies any weightloss or abdominal pain. She has had follow up imaging of her pancreas. Physical Exam:  Pulse 93   Temp 97.2 °F (36.2 °C) (Temporal)   Resp 18   Ht 5' 3\" (1.6 m)   Wt (!) 374 lb (169.6 kg)   LMP 10/24/2021   SpO2 93%   BMI 66.25 kg/m²       PSYCH: mood and affect normal, alert and oriented x 3: No apparent distress, comfortable  EYES: Sclera white, pupils equal round and reactive to light  ENMT:  Hearing normal, trachea midline, ears externally intact  LYMPH: no obvious lympadenopathy in neck. RESP: Respiratory effort was normal with no retractions or use of accessory muscles. CV:  No pedal edema  GI/ Abdomen: Soft, nondistended, nontender, no guarding, no peritoneal signs  MSK: no clubbing/ no cyanosis/ gaitnormal       Assessment/Plan:  8mm pancreatic head cyst vs. Fat, morbid obesity BMI 66 (was 65)  - I reviewed their images prior to our office visit and we also reviewed them together today. - we discussed that she does not have any worrisome features. It also appears present in January 2020.  - stable pancreaetic head cyst  - US in 6 months due to hepatic steatosis  - CT scan yearly for 5 years.  (present since 2020 - year 3/5)  - discussed watching carbohydrate consumption     20 Minutes of which greater than 50% was spent counseling or coordinating her care. Thank you for the consultation allowing me to take part in Ms. Suarez' care.      Please send a copy of my note to Dr. Re Matamoros    Electronically signed by Jaguar Rodriguez Marylu Posada MD on 3/25/2022 at 11:54 AM

## 2022-03-25 NOTE — TELEPHONE ENCOUNTER
JEFFREY Watts received a call from patient wanting to reschedule her upcoming appointment with Dalila Cruz today (03/25/2022).  RN rescheduled pt for 04/1/2022 @ 10am.       Electronically signed by Davon Jones RN on 3/25/22 at 9:19 AM EDT

## 2022-03-28 RX ORDER — ANASTROZOLE 1 MG/1
1 TABLET ORAL DAILY
Qty: 30 TABLET | Refills: 3 | Status: SHIPPED
Start: 2022-03-28 | End: 2022-08-02

## 2022-04-04 DIAGNOSIS — E55.9 VITAMIN D DEFICIENCY: ICD-10-CM

## 2022-04-04 RX ORDER — CHOLECALCIFEROL (VITAMIN D3) 125 MCG
CAPSULE ORAL
Qty: 90 TABLET | Refills: 1 | OUTPATIENT
Start: 2022-04-04

## 2022-04-05 ENCOUNTER — OFFICE VISIT (OUTPATIENT)
Dept: FAMILY MEDICINE CLINIC | Age: 47
End: 2022-04-05
Payer: MEDICAID

## 2022-04-05 VITALS
DIASTOLIC BLOOD PRESSURE: 84 MMHG | RESPIRATION RATE: 16 BRPM | OXYGEN SATURATION: 95 % | WEIGHT: 293 LBS | HEIGHT: 63 IN | BODY MASS INDEX: 51.91 KG/M2 | SYSTOLIC BLOOD PRESSURE: 138 MMHG | TEMPERATURE: 97.4 F | HEART RATE: 90 BPM

## 2022-04-05 DIAGNOSIS — H66.92 LEFT OTITIS MEDIA, UNSPECIFIED OTITIS MEDIA TYPE: ICD-10-CM

## 2022-04-05 DIAGNOSIS — E55.9 VITAMIN D DEFICIENCY: ICD-10-CM

## 2022-04-05 DIAGNOSIS — R53.83 FATIGUE, UNSPECIFIED TYPE: Primary | ICD-10-CM

## 2022-04-05 DIAGNOSIS — E66.01 MORBID OBESITY (HCC): ICD-10-CM

## 2022-04-05 DIAGNOSIS — Z12.11 COLON CANCER SCREENING: ICD-10-CM

## 2022-04-05 PROCEDURE — 99213 OFFICE O/P EST LOW 20 MIN: CPT | Performed by: STUDENT IN AN ORGANIZED HEALTH CARE EDUCATION/TRAINING PROGRAM

## 2022-04-05 PROCEDURE — G8427 DOCREV CUR MEDS BY ELIG CLIN: HCPCS | Performed by: STUDENT IN AN ORGANIZED HEALTH CARE EDUCATION/TRAINING PROGRAM

## 2022-04-05 PROCEDURE — G8417 CALC BMI ABV UP PARAM F/U: HCPCS | Performed by: STUDENT IN AN ORGANIZED HEALTH CARE EDUCATION/TRAINING PROGRAM

## 2022-04-05 PROCEDURE — 1036F TOBACCO NON-USER: CPT | Performed by: STUDENT IN AN ORGANIZED HEALTH CARE EDUCATION/TRAINING PROGRAM

## 2022-04-05 RX ORDER — CHOLECALCIFEROL (VITAMIN D3) 50 MCG
2000 TABLET ORAL DAILY
Qty: 90 TABLET | Refills: 1 | Status: SHIPPED
Start: 2022-04-05 | End: 2022-11-02

## 2022-04-05 RX ORDER — AMOXICILLIN 500 MG/1
500 CAPSULE ORAL 2 TIMES DAILY
Qty: 20 CAPSULE | Refills: 0 | Status: SHIPPED | OUTPATIENT
Start: 2022-04-05 | End: 2022-04-15

## 2022-04-05 ASSESSMENT — ENCOUNTER SYMPTOMS
NAUSEA: 0
SINUS PAIN: 1
RHINORRHEA: 1
BACK PAIN: 0
EYE REDNESS: 0
COUGH: 0
SINUS PRESSURE: 1
ABDOMINAL PAIN: 1
CONSTIPATION: 0
SHORTNESS OF BREATH: 0
VOMITING: 0
SORE THROAT: 0
DIARRHEA: 1
EYE PAIN: 0

## 2022-04-05 NOTE — PROGRESS NOTES
4/5/2022    HPI  Chief Complaint   Patient presents with    Discuss Labs    Congestion     x 3 day    Cough    Otalgia       Anuj Crocker is a 40 y.o. female who  has a past medical history of Abscess of right breast, Anxiety, Arthritis, Breast abscess, Cancer ([address] Fatigue, GERD (gastroesophageal reflux disease), Hyperlipidemia, Morbid obesity due to excess calories ([address] and Obesity. Patient presents to the clinic for evaluation of cough congestion PND fatigue runny nose ear pain diarrhea x 3 days. The patient has taken benadryl for symptoms. The patient reports no known ill exposure. The patient denies acute loss of taste or smell, headache, cough, sore throat, rash. The patient denies body aches, chills, fever. The patient also denies chest pain, abdominal pain, shortness of breath, wheezing, and nausea / vomiting. Diarrhea has been ten days. Patient is going to have a colonoscopy and EGD soon with Dr. Alber Jacques, she is going to call and make an appointment with her. Patient is also going to be meeting with Dr. Daren Bartlett in a few days to discuss surgery for weight loss. Review of Systems   Constitutional: Positive for fatigue. Negative for chills and fever. HENT: Positive for congestion, ear pain, postnasal drip, rhinorrhea, sinus pressure and sinus pain. Negative for sore throat. Eyes: Negative for pain and redness. Respiratory: Negative for cough and shortness of breath. Cardiovascular: Negative for chest pain. Gastrointestinal: Positive for abdominal pain (cramping) and diarrhea. Negative for constipation, nausea and vomiting. Genitourinary: Negative for difficulty urinating and dysuria. Musculoskeletal: Negative for back pain, myalgias and neck pain. Skin: Negative for rash. Neurological: Negative for dizziness, light-headedness and numbness. Prior to Visit Medications    Medication Sig Taking?  Authorizing Provider   amoxicillin (AMOXIL) 500 MG capsule Take 1 capsule by mouth 2 times daily for 10 days Yes Katya Ferris DO   vitamin D (CHOLECALCIFEROL) 50 MCG (2000 UT) TABS tablet Take 1 tablet by mouth daily Yes Katya Ferris DO   anastrozole (ARIMIDEX) 1 MG tablet TAKE 1 TABLET BY MOUTH DAILY Yes Brittney Brower MD   cyanocobalamin 1000 MCG/ML injection Inject 1 mL into the muscle once for 1 dose Yes Katya Ferris DO   escitalopram (LEXAPRO) 10 MG tablet Take 1 tablet by mouth nightly Yes Katya Ferris DO   vitamin D (ERGOCALCIFEROL) 1.25 MG (92441 UT) CAPS capsule Take 1 capsule by mouth once a week  Patient taking differently: Take 50,000 Units by mouth once a week wednesday Yes JIHAN Bolden CNP        Allergies   Allergen Reactions    Vancomycin Nausea And Vomiting     Red man syndrome, had high fever and upset stomach       Past Medical History:   Diagnosis Date    Abscess of right breast 06/19/2018    Anxiety     Arthritis     Breast abscess 07/11/2018    Cancer (HCC)     Fatigue     GERD (gastroesophageal reflux disease)     Hyperlipidemia     Morbid obesity due to excess calories ([address]     Obesity 06/04/2013       Past Surgical History:   Procedure Laterality Date    ANKLE FRACTURE SURGERY  4/20/2012    right with hardware, subsequently removed    APPENDECTOMY  1984    BREAST BIOPSY Left 2020    BREAST BIOPSY Right 2018    CHOLECYSTECTOMY      2003    CYSTOSCOPY  7/25/2014    retrograde pyelogram, ureteroscopy, laser lithotripsy, left stent placement    FRACTURE SURGERY      MASTECTOMY Left 8/18/2021    LEFT BREAST SIMPLE MASTECTOMY WITH SENTINEL LYMPH NODE BIOPSY performed by Jennifer Ennis MD at [facility] HEMATOMA/FLUID Right 6/20/2018    RIGHT BREAST INCISION AND DRAINAGE POSSIBLE WOUND VAC performed by Jennifer Ennis MD at [facility] Right 7/11/2018    INCISION AND DRAINAGE RIGHT BREAST, WITH APPLICATION OF WOUND VAC (PATIENT HSS WOUND VAC WILL BRING IT)  performed by Diallo Martinez MD at [facility] 22 SALPINGO-OOPHORECTOMY  2021    Lap BSO, EMBx    SKIN GRAFT  1991    Left leg    UPPER GASTROINTESTINAL ENDOSCOPY      US BREAST CYST ASPIRATION LEFT Left 2021    US BREAST CYST ASPIRATION LEFT 2021 [facility]    US BREAST CYST ASPIRATION LEFT Left 2021    US BREAST CYST ASPIRATION LEFT [facility]    US BREAST CYST ASPIRATION LEFT Left 2021    US BREAST CYST ASPIRATION LEFT 2021 [facility]    US BREAST CYST ASPIRATION LEFT Left 10/22/2021    US BREAST CYST ASPIRATION LEFT 10/22/2021 [facility]    US BREAST NEEDLE BIOPSY LEFT  2020    US BREAST NEEDLE BIOPSY LEFT 2020 [facility]    US BREAST NEEDLE BIOPSY LEFT Left 2021    US BREAST NEEDLE BIOPSY LEFT 2021 [facility]       Social History     Socioeconomic History    Marital status: Single     Spouse name: Not on file    Number of children: 2    Years of education: Not on file    Highest education level: Not on file   Occupational History    Not on file   Tobacco Use    Smoking status: Former Smoker     Packs/day: 0.25     Years: 4.00     Pack years: 1.00     Types: Cigarettes     Quit date: 2008     Years since quittin.6    Smokeless tobacco: Never Used   Vaping Use    Vaping Use: Never used   Substance and Sexual Activity    Alcohol use: Yes     Alcohol/week: 0.0 standard drinks     Comment: rarely    Drug use: No    Sexual activity: Not on file   Other Topics Concern    Not on file   Social History Narrative    Not on file     Social Determinants of Health     Financial Resource Strain: Low Risk     Difficulty of Paying Living Expenses: Not hard at all   Food Insecurity: No Food Insecurity    Worried About [address] in the Last Year: Never true    [address] in the Last Year: Never true   Transportation Needs:     Lack of Transportation (Medical): Not on file    Lack of Transportation (Non-Medical):  Not on file Physical Activity:     Days of Exercise per Week: Not on file    Minutes of Exercise per Session: Not on file   Stress:     Feeling of Stress : Not on file   Social Connections:     Frequency of Communication with Friends and Family: Not on file    Frequency of Social Gatherings with Friends and Family: Not on file    Attends Yazidi Services: Not on file    Active Member of [address] [place] [address] [employer] or Organizations: Not on file    Attends Club or Organization Meetings: Not on file    Marital Status: Not on file   Intimate Partner Violence:     Fear of Current or Ex-Partner: Not on file    Emotionally Abused: Not on file    Physically Abused: Not on file    Sexually Abused: Not on file   Housing Stability:     Unable to Pay for Housing in the Last Year: Not on file    Number of [place] in the Last Year: Not on file    Unstable Housing in the Last Year: Not on file        Family History   Problem Relation Age of Onset    Diabetes Maternal Grandmother     High Blood Pressure Maternal Grandmother     High Cholesterol Maternal Grandmother     Breast Cancer Maternal Grandmother 79    High Cholesterol Mother     Breast Cancer Mother 54    Colon Cancer Mother     Stomach Cancer Mother     Cancer Mother         stomach, colon, intestine    Cancer Maternal Grandfather 60        throat    Ovarian Cancer Other            Vitals:    04/05/22 1225 04/05/22 1226   BP:  138/84   Pulse:  90   Resp: 16 16   Temp:  97.4 °F (36.3 °C)   TempSrc:  Infrared   SpO2:  95%   Weight:  (!) 373 lb 6.4 oz (169.4 kg)   Height:  5' 3\" (1.6 m)     Estimated body mass index is 66.14 kg/m² as calculated from the following:    Height as of this encounter: 5' 3\" (1.6 m). Weight as of this encounter: 373 lb 6.4 oz (169.4 kg).     Most Recent Labs  CBC  Lab Results   Component Value Date    WBC 10.4 03/15/2022    WBC 15.1 11/11/2021    WBC 13.0 08/18/2021    WBC 14.5 07/09/2021    RBC 4.75 03/15/2022    RBC 4.78 11/11/2021    RBC 5.01 08/18/2021    RBC 5.24 07/09/2021    HGB 13.7 03/15/2022    HGB 12.9 11/11/2021    HGB 13.7 08/18/2021    HCT 44.5 03/15/2022    HCT 40.4 11/11/2021    HCT 42.5 08/18/2021    MCV 93.7 03/15/2022    MCV 84.5 11/11/2021    MCV 84.8 08/18/2021     03/15/2022     11/11/2021     08/18/2021      CMP  Lab Results   Component Value Date     07/09/2021     05/13/2021     01/15/2020    K 3.7 07/09/2021    K 4.4 05/13/2021    K 4.3 01/15/2020    K 4.3 07/16/2018    K 3.9 07/15/2018    K 4.4 07/14/2018    CL 99 07/09/2021     05/13/2021     01/15/2020    CO2 23 07/09/2021    CO2 26 05/13/2021    CO2 22 01/15/2020    ANIONGAP 14 07/09/2021    ANIONGAP 13 05/13/2021    ANIONGAP 13 01/15/2020    GLUCOSE 96 07/09/2021    GLUCOSE 95 05/13/2021    GLUCOSE 104 01/15/2020    GLUCOSE 80 04/20/2012    GLUCOSE 80 03/08/2011    BUN 11 07/09/2021    BUN 11 05/13/2021    BUN 20 01/15/2020    CREATININE 0.7 07/09/2021    CREATININE 0.7 05/13/2021    CREATININE 0.6 01/15/2020    LABGLOM >60 07/09/2021    LABGLOM >60 05/13/2021    LABGLOM >60 01/15/2020    GFRAA >60 07/09/2021    GFRAA >60 05/13/2021    GFRAA >60 01/15/2020    CALCIUM 8.9 07/09/2021    CALCIUM 8.9 05/13/2021    CALCIUM 8.9 01/15/2020    PROT 7.6 07/09/2021    PROT 7.8 05/13/2021    PROT 7.4 01/15/2020    LABALBU 4.0 07/09/2021    LABALBU 3.9 05/13/2021    LABALBU 3.7 01/15/2020    LABALBU 3.6 04/20/2012    LABALBU 4.1 03/08/2011    BILITOT 0.9 07/09/2021    BILITOT 0.6 05/13/2021    BILITOT 0.4 01/15/2020    ALKPHOS 86 07/09/2021    ALKPHOS 83 05/13/2021    ALKPHOS 75 01/15/2020    AST 16 07/09/2021    AST 20 05/13/2021    AST 11 01/15/2020    ALT 20 07/09/2021    ALT 19 05/13/2021    ALT 21 01/15/2020     A1C  Lab Results   Component Value Date    LABA1C 5.8 05/13/2021    LABA1C 5.6 08/08/2019    LABA1C 5.6 04/16/2014     TSH  Lab Results   Component Value Date    TSH 2.960 03/15/2022    TSH 3.240 05/13/2021    TSH 3.800 08/08/2019     FREET4  Lab Results   Component Value Date    W6ZBNUK 15.2 02/08/2012     LIPID  Lab Results   Component Value Date    CHOL 172 05/13/2021    CHOL 170 08/08/2019    CHOL 148 06/01/2018    HDL 43 05/13/2021    HDL 50 08/08/2019    HDL 51 06/01/2018    LDLCALC 80 05/13/2021    LDLCALC 68 08/08/2019    LDLCALC 79 06/01/2018    TRIG 243 05/13/2021    TRIG 262 08/08/2019    TRIG 90 06/01/2018     VITAMIN D  Lab Results   Component Value Date    VITD25 18 03/15/2022    VITD25 18 05/13/2021    VITD25 17 08/08/2019     SALOME   Lab Results   Component Value Date    SALOME NEGATIVE 10/15/2019     RHEUMATOID FACTOR  Lab Results   Component Value Date    RF <10 10/15/2019     UA  Lab Results   Component Value Date    COLORU Yellow 01/15/2020    COLORU Yellow 05/05/2018    COLORU Yellow 07/14/2014    CLARITYU Clear 01/15/2020    CLARITYU Clear 05/05/2018    CLARITYU CLOUDY 07/14/2014    GLUCOSEU Negative 01/15/2020    GLUCOSEU neg 01/14/2020    GLUCOSEU Negative 05/05/2018    GLUCOSEU Negative 07/14/2014    BILIRUBINUR Negative 01/15/2020    BILIRUBINUR neg 01/14/2020    BILIRUBINUR Negative 05/05/2018    BILIRUBINUR Negative 07/14/2014    KETUA Negative 01/15/2020    KETUA neg 01/14/2020    KETUA Negative 05/05/2018    KETUA Negative 07/14/2014    SPECGRAV 1.015 01/15/2020    SPECGRAV 1.025 01/14/2020    SPECGRAV >=1.030 05/05/2018    SPECGRAV >=1.030 07/14/2014    BLOODU TRACE-INTACT 01/15/2020    BLOODU neg 01/14/2020    BLOODU LARGE 05/05/2018    BLOODU LARGE 07/14/2014    PHUR 6.0 01/15/2020    PHUR 5.5 01/14/2020    PHUR 6.0 05/05/2018    PHUR 6.0 07/14/2014    PROTEINU Negative 01/15/2020    PROTEINU trace 01/14/2020    PROTEINU Negative 05/05/2018    PROTEINU TRACE 07/14/2014    UROBILINOGEN 0.2 01/15/2020    UROBILINOGEN 0.2 05/05/2018    UROBILINOGEN 0.2 07/14/2014    NITRU Negative 01/15/2020    NITRU Negative 05/05/2018    NITRU Negative 07/14/2014    LEUKOCYTESUR Negative 01/15/2020    LEUKOCYTESUR neg 01/14/2020    LEUKOCYTESUR Negative 05/05/2018    LEUKOCYTESUR TRACE 07/14/2014       Physical Exam  Vitals and nursing note reviewed. Constitutional:       Appearance: Normal appearance. She is obese. HENT:      Head: Normocephalic and atraumatic. Right Ear: Tympanic membrane, ear canal and external ear normal.      Left Ear: External ear normal. Tympanic membrane is erythematous. Nose: Nose normal.      Mouth/Throat:      Mouth: Mucous membranes are moist.   Eyes:      Extraocular Movements: Extraocular movements intact. Conjunctiva/sclera: Conjunctivae normal.      Pupils: Pupils are equal, round, and reactive to light. Cardiovascular:      Rate and Rhythm: Normal rate and regular rhythm. Pulses: Normal pulses. Heart sounds: Normal heart sounds. Pulmonary:      Effort: Pulmonary effort is normal.      Breath sounds: Normal breath sounds. Abdominal:      General: Bowel sounds are normal.      Palpations: Abdomen is soft. Musculoskeletal:         General: Normal range of motion. Cervical back: Normal range of motion and neck supple. Skin:     General: Skin is warm and dry. Capillary Refill: Capillary refill takes less than 2 seconds. Neurological:      General: No focal deficit present. Mental Status: She is alert and oriented to person, place, and time. Psychiatric:         Mood and Affect: Mood normal.         Behavior: Behavior normal.         Thought Content: Thought content normal.         ASSESSMENT/PLAN:  Osmar Dean was seen today for discuss labs, congestion, cough and otalgia. Diagnoses and all orders for this visit:    Fatigue, unspecified type    Vitamin D deficiency  -     vitamin D (CHOLECALCIFEROL) 50 MCG (2000 UT) TABS tablet; Take 1 tablet by mouth daily    Colon cancer screening    Morbid obesity ([facility]    BMI 60.0-69.9, adult (HCC)    Left otitis media, unspecified otitis media type  -     amoxicillin (AMOXIL) 500 MG capsule;  Take 1 capsule by mouth 2 times daily for 10 days       The patient is asked to make an attempt to improve diet and exercise patterns to aid in medical management of this problem. Patient following with Dr. Kermit Sandifer for colonoscopy and EGD     As above. Call or go to the nearest ED immediately if symptoms worsen or persist.  Educational materials and/or home exercises printed for patient's review and were included in patient instructions on his/her After Visit Summary and given to patient at the end of visit. Counseled regarding above diagnosis, including possible risks and complications,  especially if left uncontrolled. Counseled regarding the possible side effects, risks, benefits and alternatives to treatment; patient and/or guardian verbalizes understanding, agrees, feels comfortable with and wishes to proceed with above treatment plan. Advised patient to call with any new medication issues, and read all Rx info from pharmacy to assure aware of all possible risks and side effects of medication before taking. Reviewed age and gender appropriate health screening exams and vaccinations. Advised patient regarding importance of keeping up with recommended health maintenance and to schedule as soon as possible if overdue, as this is important in assessing for undiagnosed pathology, especially cancer, as well as protecting against potentially harmful/life threatening disease. Patient and/or guardian verbalizes understanding and agrees with above counseling, assessment and plan. All questions answered. Return in about 3 months (around 7/5/2022). An  electronic signature was used to authenticate this note. --Abe Cardona,  on 4/5/2022 at 2:08 PM    This document was prepared at least partially through the use of voice recognition software. Although effort is taken to assure the accuracy of this document, it is possible that grammatical, syntax,  or spelling errors may occur.

## 2022-04-22 ENCOUNTER — OFFICE VISIT (OUTPATIENT)
Dept: BREAST CENTER | Age: 47
End: 2022-04-22
Payer: MEDICAID

## 2022-04-22 VITALS
HEIGHT: 63 IN | WEIGHT: 293 LBS | DIASTOLIC BLOOD PRESSURE: 86 MMHG | OXYGEN SATURATION: 96 % | TEMPERATURE: 98.1 F | SYSTOLIC BLOOD PRESSURE: 144 MMHG | RESPIRATION RATE: 14 BRPM | BODY MASS INDEX: 51.91 KG/M2 | HEART RATE: 89 BPM

## 2022-04-22 DIAGNOSIS — I89.0 LYMPHEDEMA: ICD-10-CM

## 2022-04-22 DIAGNOSIS — C50.412 MALIGNANT NEOPLASM OF UPPER-OUTER QUADRANT OF LEFT BREAST IN FEMALE, ESTROGEN RECEPTOR POSITIVE (HCC): Primary | ICD-10-CM

## 2022-04-22 DIAGNOSIS — Z12.31 ENCOUNTER FOR SCREENING MAMMOGRAM FOR MALIGNANT NEOPLASM OF BREAST: ICD-10-CM

## 2022-04-22 DIAGNOSIS — Z17.0 MALIGNANT NEOPLASM OF UPPER-OUTER QUADRANT OF LEFT BREAST IN FEMALE, ESTROGEN RECEPTOR POSITIVE (HCC): Primary | ICD-10-CM

## 2022-04-22 PROCEDURE — 99213 OFFICE O/P EST LOW 20 MIN: CPT | Performed by: SURGERY

## 2022-04-22 PROCEDURE — G8427 DOCREV CUR MEDS BY ELIG CLIN: HCPCS | Performed by: SURGERY

## 2022-04-22 PROCEDURE — G8417 CALC BMI ABV UP PARAM F/U: HCPCS | Performed by: SURGERY

## 2022-04-22 PROCEDURE — 99214 OFFICE O/P EST MOD 30 MIN: CPT | Performed by: SURGERY

## 2022-04-22 PROCEDURE — 1036F TOBACCO NON-USER: CPT | Performed by: SURGERY

## 2022-04-22 RX ORDER — BISACODYL 5 MG
10 TABLET, DELAYED RELEASE (ENTERIC COATED) ORAL 2 TIMES DAILY
Qty: 4 TABLET | Refills: 0 | Status: ON HOLD
Start: 2022-04-22 | End: 2022-05-11 | Stop reason: HOSPADM

## 2022-04-22 ASSESSMENT — ENCOUNTER SYMPTOMS
BACK PAIN: 0
SHORTNESS OF BREATH: 0
CONSTIPATION: 0
COUGH: 0
VOMITING: 0
BLOOD IN STOOL: 0
ALLERGIC/IMMUNOLOGIC NEGATIVE: 1
RESPIRATORY NEGATIVE: 1
NAUSEA: 0
ANAL BLEEDING: 0
EYES NEGATIVE: 1
ABDOMINAL PAIN: 1
ABDOMINAL DISTENTION: 0
DIARRHEA: 1

## 2022-04-22 NOTE — PATIENT INSTRUCTIONS
[facility]    Instructions for Clear liquid diet  Definition  A clear liquid diet consists of clear liquids, such as water, broth and plain gelatin, that are easily digested and leave no undigested residue in your intestinal tract. Your doctor may prescribe a clear liquid diet before certain medical procedures or if you have certain digestive problems. Because a clear liquid diet can't provide you with adequate calories and nutrients, it shouldn't be continued for more than a few days. Purpose  A clear liquid diet is often used before tests, procedures or surgeries that require no food in your stomach or intestines, such as before colonoscopy. It may also be recommended as a short-term diet if you have certain digestive problems, such as nausea, vomiting or diarrhea, or after certain types of surgery. Diet details  A clear liquid diet helps maintain adequate hydration, provides some important electrolytes, such as sodium and potassium, and gives some energy at a time when a full diet isn't possible or recommended. The following foods are allowed in a clear liquid diet:    Plain water    Fruit juices without pulp, such as apple juice, white grape juice.  Strained lemonade    Clear, fat-free broth (bouillon or consomme)    Clear sodas     Plain gelatin (Not RED or PURPLE)   Honey    Ice pops without bits of fruit or fruit pulp    Tea or coffee without milk or cream   ** NOTHING RED OR PURPLE   **Do not eat corn, tomatoes, peas or watermelon 3 to 5 days before procedure. Any foods not on the above list should be avoided. Also, for certain tests, such as colon exams, your doctor may ask you to avoid liquids or gelatin with red coloring.      A typical menu on the clear liquid diet may look like this:   Breakfast:  1 glass fruit juice  1 cup coffee or tea (without dairy products)  1 cup broth  1 bowl gelatin     Snack:  1 glass fruit juice  1 bowl gelatin     Lunch:  1 glass fruit juice  1 glass water  1 cup broth  1 bowl gelatin     Snack:  1 ice pop (without fruit pulp)  1 cup coffee or tea (without dairy products) or a soft drink     Dinner:  1 cup juice or water  1 cup broth  1 bowl gelatin  1 cup coffee or tea     Purchase this over the counter:  1. GATORADE/Clear liquid (64 ounces)   Pick these up at the pharmacy:  2. DULCOLAX 5 mg tablets (four tablets)  3. MIRALAX BOTTLE 500 grams     The DAY BEFORE your colonoscopy:   Drink only clear liquids. (Absolutely no solid food)    COLONOSCOPY PREP:  12 PM: Take 2 DULCOLAX tablets and mix the entire bottle/bottles of MIRALAX into the 64 ounces of GATORADE. (Put half the bottle in each 32 ounce bottle if have one large bottle). Shake the solution until fully dissolved. Drink an 8 ounce glass every 30 minutes until the solution is gone. 4 PM: Take the last 2 DULCOLAX tablets. **DO NOT EAT OR DRINK ANYTHING AFTER MIDNIGHT**          The DAY OF your colonoscopy: You may take any necessary medications with a sip of water. Bring along someone to take you home. REMEMBER: The preparation is very important. An adequate clean out allows for the best evaluation of your entire colon. During the prep, using baby wipes may ease some of your discomfort. You should NOT plan on working or driving the rest of the day due to sedation given at the procedure. Any questions or concerns, please contact my medical assistant Fifi at [phone]. Patient Information and Instructions for  Upper GI Endoscopy or Esophagogastroduodenoscopy [EGD])         Definition Upper GI Endoscopy or Esophagogastroduodenoscopy [EGD])  This is a test that uses a fiberoptic scope to examine the esophagus (throat), stomach, and upper part of the small intestines.      Upper GI endoscopy may be recommended if you have:   Abdominal pain   Severe heartburn   Persistent nausea and vomiting   Difficulty swallowing   Blood in stool or vomit   Abnormal x-ray or other examinations of the gastrointestinal tract     Conditions that can be diagnosed with upper GI endoscopy include:   Ulcers   Tumors   Polyps   Abnormal narrowing   Inflammation     Possible Complications   Complications are rare, but no procedure is completely free of risk. If you are planning to have upper GI endoscopy, your doctor will review a list of possible complications, which may include:   Bleeding   Damage to the esophagus, stomach, or intestine   Infection   Respiratory depression (reduced breathing rate and/or depth)   Reaction to sedatives or anesthesia causing your blood pressure to drop    Some factors that may increase the risk of complications include:   Age: 61 or older   Pregnancy   Obesity   Smoking , alcoholism , or drug use   Malnutrition   Recent illness   Diabetes   Heart or lung problems   Bleeding disorders   Use of certain medicines     Be sure to discuss these risks with your doctor before the test.     What to Expect   Prior to test   Leading up to the test:   Arrange for a ride home after the test. Also, arrange for help at home. The night before, eat a light meal.  Do not eat or drink anything after midnight the night before the test.   Talk to your doctor about your medicines. You may be asked to stop taking some medicines up to one week before the procedure, like:   Anti-inflammatory drugs (e.g., aspirin )   Blood thinners, like clopidogrel (Plavix) or warfarin (Coumadin)     Description of the Test   You will be asked to lie on your left side. You will have monitors tracking your breathing, heart rate, and blood oxygen levels. You will be given supplemental oxygen to breathe through your nose. A mouthpiece will be positioned to help keep your mouth open. Your throat may be sprayed with a numbing medicine.  You will be given a sedative through an IV to help you relax during the test.  During the test, a small suction tube will be used to clear saliva and fluids from your mouth. The endoscope will be lubricated and placed in your mouth. You will be asked to try to swallow it. Then, it will be carefully and slowly advanced down your throat. It will be passed through your esophagus and into your stomach and intestine. While the endoscope is being advanced, your doctor will view the images on the screen. Air will be passed through the endoscope into your digestive tract. This will be done to smooth the normal folds in the tissues, allowing your doctor to view the tissue more easily. Tiny tools may be passed through the endoscope in order to take biopsies or do other tests. After Test   After the test, you will be observed for an hour. Then, you will be allowed to go home. When you return home after the test, do the following to help ensure a smooth recovery:   Rest when you get home. Ask your doctor if you can resume your normal diet. In most cases, you will be able to. Sedatives can slow your reaction time. Do not drive or use machinery for the rest of the day. Avoid alcohol for the rest of the day. Be sure to follow your doctor's instructions . How Long Will It Take? Usually about 10-15 minutes     Will It Hurt? Most people do not feel anything during the test and will not remember the test.  After the test, your throat may be sore and you may feel bloated. Results   This test gives your doctor information about the health of your digestive system. The results can help to explain your symptoms. You and your doctor will talk about the results and your treatment plan.      Call Your Doctor   After the test, call your doctor if any of the following occurs:   Signs of infection, including fever and chills   Severe abdominal pain   Hard, swollen abdomen   Difficulty swallowing or breathing   Any change or increase in your original symptoms   Bloody or black tarry colored stools   Nausea and/or vomiting   Cough, shortness of breath, or chest pain   Bleeding     In case of emergency, call 911. Patient Information and Instructions for Colonoscopy         Definition of Colonoscopy   A colonoscopy is the visual exam of the rectum and colon (large intestine). The exam is done with a tool called a colonoscope. The colonoscope is a flexible tube with a tiny camera on the end. This instrument allows the doctor to view the inside of your rectum and colon. Sigmoidoscopy is a shorter scope that views only the last one third of the colon. Reasons for Colonoscopy   It is used to examine, diagnose, and treat problems in your large intestine. The procedure is most often done for the following reasons: To determine the cause of abdominal pain, rectal bleeding, or a change in bowel habits   To detect and treat colon cancer or colon polyps   To obtain tissue samples for testing   To stop intestinal bleeding   Monitor response to treatment if you have inflammatory bowel disease     Possible Complications   Complications are rare, but no procedure is completely free of risk. If you are planning to have a colonoscopy, your doctor will review a list of possible complications, which may include:   Bleeding   Reaction to the sedation causing drop in your blood pressure or problems breathing  Perforation or puncture of the bowel     Factors that may increase the risk of complications include:   Pre-existing heart or kidney condition   Treatment with certain medicines, including aspirin and other drugs with anticoagulant or blood-thinning properties   Prior abdominal surgery or radiation treatments   Active colitis , diverticulitis , or other acute bowel disease   Previous treatment with radiation therapy     Be sure to discuss these risks with your doctor before the procedure.      What to Expect   Prior to Procedure   Your doctor will likely do the following:   Physical exam   Health history   Review of medicines   Test your stool for hidden blood (called \"occult blood\")     Your colon must be completely clean before the procedure. Any stool left in the intestine will block the view. This preparation may start several days before the procedure. Follow your doctor's instructions. Leading up to your procedure:   Talk to your doctor about your medicines. You may be asked to stop taking some medicines up to one week before the procedure, like:   Anti-inflammatory drugs (e.g., aspirin )   Blood thinners like clopidogrel (Plavix) or warfarin (Coumadin)   Iron supplements or vitamins containing iron   The day or days before your procedure, go on a clear liquid diet (clear broth, clear juice, clear jello) with no red coloring  Do not eat or drink anything after midnight. Wear comfortable clothing. If you have diabetes, ask your doctor if you need to adjust your diabetes medicine on the day prior to your procedure and the day of your procedure. Arrange for a ride home after the procedure. Anesthesia   You will receive intravenous sedation medicine for the procedure so you will not feel anything during the procedure. Description of the Procedure   You will lie on your left side with knees bent and drawn up toward your chest. The colonoscope will be slowly inserted through the rectum and into the bowel. The colonoscope will inject air into the colon. A small attached video camera will allow the doctor to view the colon's lining on a screen. The doctor will continue guiding the tool through the bowel and assess the lining. A tissue sample or polyps may be removed during the procedure. How Long Will It Take? Usually it takes about 30 to 45 minutes     Will It Hurt? Most people do not feel anything during the procedure and will not remember the procedure. After the procedure, gas pains and cramping are common. These pains should go away with the passing of gas. Post-procedure Care   If any tissue was removed: It will be sent to a lab to be examined.  It may take 1-2 weeks for results. The doctor will usually give an initial report after the scope is removed. Other tests may be recommended. A small amount of bleeding may occur during the first few days after the procedure. When you return home after the procedure, be sure to follow your doctor's instructions, which may include:   Resume medicines as instructed by your doctor. Resume normal diet, unless directed otherwise by your doctor. The sedative will make you drowsy. Avoid driving, operating machinery, or making important decisions for the rest of the day. Rest for the remainder of the day. After arriving home, contact your doctor if any of the following occurs:   Bleeding from your rectum, notify your doctor if you pass a teaspoonful of blood or more. Black, tarry stools   Severe abdominal pain   Hard, swollen abdomen   Signs of infection, including fever or chills   Inability to pass gas or stool   Coughing, shortness of breath, chest pain, severe nausea or vomiting     In case of an emergency, CALL 911 .

## 2022-04-22 NOTE — PROGRESS NOTES
[facility]  HISTORY & PHYSICAL  ATTENDING NOTE    Chief Complaint   Patient presents with    Breast Pain     left chest wall pain and goes into axilla. Patient states she feels something. HPI:  40 y.o. female denies weight loss, constipation, melena, hematochezia, N/V, abdominal pain. she denies family history ofCrohn's disease or colitis. She presents today for left chest wall pain and possible palpable mass in her left axilla per her. She states that she holds her arm to her chest a lot. She has been taking a lot of Tylenol. She has been using a heating pad. She denies any range of motion difficulties. She states that since she has been on Arimidex has been having some diarrhea.     Past Medical History:   Diagnosis Date    Abscess of right breast 06/19/2018    Anxiety     Arthritis     Breast abscess 07/11/2018    [facility] ([facility] [address]     Fatigue     GERD (gastroesophageal reflux disease)     Hyperlipidemia     Morbid obesity due to excess calories ([facility] [address]     Obesity 06/04/2013       Past Surgical History:   Procedure Laterality Date    ANKLE FRACTURE SURGERY  4/20/2012    right with hardware, subsequently removed   [facility] Left 2020    BREAST BIOPSY Right 2018    CHOLECYSTECTOMY      2003    CYSTOSCOPY  7/25/2014    retrograde pyelogram, ureteroscopy, laser lithotripsy, left stent placement    FRACTURE SURGERY      MASTECTOMY Left 8/18/2021    LEFT BREAST SIMPLE MASTECTOMY WITH SENTINEL LYMPH NODE BIOPSY performed by Jennifer Ennis MD at [address] [place] [address] OF HEMATOMA/FLUID Right 6/20/2018    RIGHT BREAST INCISION AND DRAINAGE POSSIBLE WOUND VAC performed by Jennifer Ennis MD at [address] [place] [address] Right 7/11/2018    INCISION AND DRAINAGE RIGHT BREAST, WITH APPLICATION OF WOUND VAC (PATIENT HSS WOUND VAC WILL BRING IT)  performed by Jennifer Ennis MD at [place] [address] [facility]-OOPHORECTOMY 2021    Lap BSO, EMBx    SKIN GRAFT  1991    Left leg    UPPER GASTROINTESTINAL ENDOSCOPY      US BREAST CYST ASPIRATION LEFT Left 2021    US BREAST CYST ASPIRATION LEFT 2021 [facility]    US BREAST CYST ASPIRATION LEFT Left 2021    US BREAST CYST ASPIRATION LEFT [facility]    US BREAST CYST ASPIRATION LEFT Left 2021    US BREAST CYST ASPIRATION LEFT 2021 [facility]    US BREAST CYST ASPIRATION LEFT Left 10/22/2021    US BREAST CYST ASPIRATION LEFT 10/22/2021 [facility]    US BREAST NEEDLE BIOPSY LEFT  2020    US BREAST NEEDLE BIOPSY LEFT 2020 [facility]    US BREAST NEEDLE BIOPSY LEFT Left 2021    US BREAST NEEDLE BIOPSY LEFT 2021 [facility]       Family History   Problem Relation Age of Onset    Diabetes Maternal Grandmother     High Blood Pressure Maternal Grandmother     High Cholesterol Maternal Grandmother     Breast Cancer Maternal Grandmother 79    High Cholesterol Mother     Breast Cancer Mother 54    Colon Cancer Mother     Stomach Cancer Mother     Cancer Mother         stomach, colon, intestine    Cancer Maternal Grandfather 60        throat    Ovarian Cancer Other        Allergies   Allergen Reactions    Vancomycin Nausea And Vomiting     Red man syndrome, had high fever and upset stomach       Social History     Tobacco Use    Smoking status: Former Smoker     Packs/day: 0.25     Years: 4.00     Pack years: 1.00     Types: Cigarettes     Quit date: 2008     Years since quittin.6    Smokeless tobacco: Never Used   Vaping Use    Vaping Use: Never used   Substance Use Topics    Alcohol use:  Yes     Alcohol/week: 0.0 standard drinks     Comment: rarely    Drug use: No       Current Outpatient Medications   Medication Sig Dispense Refill    Sod Picosulfate-Mag Ox-Cit Acd (CLENPIQ) 10-3.5-12 MG-GM -GM/160ML SOLN solution Take 160 mLs by mouth in the morning and at bedtime 320 mL 0    vitamin D (CHOLECALCIFEROL) 50 MCG (2000 UT) TABS tablet Take 1 tablet by mouth daily 90 tablet 1    anastrozole (ARIMIDEX) 1 MG tablet TAKE 1 TABLET BY MOUTH DAILY 30 tablet 3    cyanocobalamin 1000 MCG/ML injection Inject 1 mL into the muscle once for 1 dose 1 mL 3    escitalopram (LEXAPRO) 10 MG tablet Take 1 tablet by mouth nightly 90 tablet 0    vitamin D (ERGOCALCIFEROL) 1.25 MG (22023 UT) CAPS capsule Take 1 capsule by mouth once a week (Patient taking differently: Take 50,000 Units by mouth once a week wednesday) 12 capsule 1     No current facility-administered medications for this visit. Review of Systems   Constitutional: Negative. Negative for activity change, appetite change and unexpected weight change. HENT: Negative. Eyes: Negative. Respiratory: Negative. Negative for cough and shortness of breath. Cardiovascular: Positive for chest pain (reproducible). Negative for leg swelling. Gastrointestinal: Positive for abdominal pain (epigastric) and diarrhea. Negative for abdominal distention, anal bleeding, blood in stool, constipation, nausea and vomiting. Endocrine: Negative. Genitourinary: Negative. Musculoskeletal: Positive for myalgias. Negative for arthralgias, back pain, gait problem and joint swelling. Skin: Negative. Allergic/Immunologic: Negative. Neurological: Negative. Negative for dizziness, weakness and headaches. Hematological: Negative. Psychiatric/Behavioral: Negative. Negative for confusion, decreased concentration and sleep disturbance. BP (!) 144/86 (Site: Right Upper Arm, Position: Sitting, Cuff Size: Medium Adult)   Pulse 89   Temp 98.1 °F (36.7 °C) (Temporal)   Resp 14   Ht 5' 3\" (1.6 m)   Wt (!) 372 lb (168.7 kg)   LMP 10/24/2021   SpO2 96%   BMI 65.90 kg/m²   Physical Exam  Constitutional:       Appearance: Normal appearance. She is obese. HENT:      Head: Normocephalic and atraumatic.       Nose: Nose normal.      Mouth/Throat: Mouth: Mucous membranes are moist.      Pharynx: Oropharynx is clear. Eyes:      Extraocular Movements: Extraocular movements intact. Pupils: Pupils are equal, round, and reactive to light. Cardiovascular:      Rate and Rhythm: Normal rate and regular rhythm. Pulses: Normal pulses. Heart sounds: Normal heart sounds. Pulmonary:      Effort: Pulmonary effort is normal.      Breath sounds: Normal breath sounds. Chest:   Breasts:      Right: No swelling, bleeding, inverted nipple, mass, nipple discharge, skin change, tenderness, axillary adenopathy or supraclavicular adenopathy. Left: Tenderness present. No axillary adenopathy or supraclavicular adenopathy. Abdominal:      General: There is no distension. Palpations: Abdomen is soft. Tenderness: There is abdominal tenderness (mild epigastric pain). Musculoskeletal:         General: No tenderness or signs of injury. Cervical back: Normal range of motion and neck supple. Lymphadenopathy:      Upper Body:      Right upper body: No supraclavicular or axillary adenopathy. Left upper body: No supraclavicular or axillary adenopathy. Skin:     General: Skin is warm and dry. Neurological:      General: No focal deficit present. Mental Status: She is alert and oriented to person, place, and time. Psychiatric:         Mood and Affect: Mood normal.         Behavior: Behavior normal.         Thought Content: Thought content normal.         Judgment: Judgment normal.           ASSESSMENT/PLAN:  1. Age is a risk factor for colon cancer-- plan for colonoscopy  2. Family history of colon cancer-plan for colonoscopy  3. Epigastric pain--plan for EGD  4. Left chest wall pain--advised patient to try Voltaren gel, refer to occupational therapy for lymphedema management    Prep: Clenpiq    Colonoscopy. The patient was explained the risks/benefits/alternatives/expected outcomes of the procedure.   The patient was explained the risks of the procedure, including, but not limited to, the risk of reaction to the anesthesia medicine and the risk of perforation requiring further surgery. The patient was informed that they may require biopsy or polypectomy. These procedures may increase the risk of complication. All questions were answered. The patient verbalized understanding and agreed to proceed. I recommended esophagogastroduodenoscopy with possible biopsy and explained the risk, benefits, expected outcome, and alternatives to the procedure. Risks included but are not limited to bleeding, infection, respiratory distress, hypotension, and perforation of the esophagus, stomach, or duodenum. Patient understands and is in agreement.       Isaiah Calix MD, MSc, FACS  4/22/2022  12:45 PM

## 2022-04-29 ENCOUNTER — TELEPHONE (OUTPATIENT)
Dept: BREAST CENTER | Age: 47
End: 2022-04-29

## 2022-04-29 NOTE — TELEPHONE ENCOUNTER
JEFFREY Glover called and spoke to [facility] and scheduled PT for EGD & Colonoscopy on 2022 @ 11am with Dr. Keenan Palacio. PT does not taking ASA/blood thinner products. PT has received both COVID 19 vaccines. PT verbalized she understood prep instructions, and NPO after midnight. PT verbalized that she understood appointment date/time, as well as to arrive 1 hours prior to procedure. PT advised PAT will call to go over all medication and advise on where to park and enter the hospital at for procedure. PT has been told to make sure they have a ride to and from procedure as they are not allowed to drive after procedure. PT procedure letter was given to them with all instructions also on it. RN instructed PT to call the office at [phone] with any questions, comments, or concerns about the procedure. Prior Authorization Form:      DEMOGRAPHICS:                     Patient Name:  Jeet Villafuerte  Patient :  1975            Insurance:  Payor: [facility] / Plan: [facility] / Product Type: *No Product type* /   Insurance ID Number:    Payor/Plan Subscr  Sex Relation Sub.  Ins. ID Effective Group Num   1. [place] 1922 A 1975 Female Self [id number]                                   [address]         DIAGNOSIS & PROCEDURE:                       Procedure/Operation: EGD & Colonoscopy           CPT Code: 10724 & 21435    Diagnosis:  abd pain and screening     ICD10 Code: R10.84 & Z12.11    Location:  [facility]    Surgeon:  Caryle Delude INFORMATION:                          Date: 2022    Time: 11am              Anesthesia:  MAC/TIVA                                                       Status:  Outpatient        Special Comments:  N/A       Electronically signed by Kylah Pepper RN on 2022 at 10:20 AM

## 2022-04-30 DIAGNOSIS — E55.9 VITAMIN D DEFICIENCY: ICD-10-CM

## 2022-05-02 DIAGNOSIS — Z85.3 PERSONAL HISTORY OF BREAST CANCER: Primary | ICD-10-CM

## 2022-05-02 RX ORDER — ERGOCALCIFEROL 1.25 MG/1
CAPSULE ORAL
Qty: 12 CAPSULE | Refills: 1 | Status: SHIPPED | OUTPATIENT
Start: 2022-05-02

## 2022-05-02 NOTE — TELEPHONE ENCOUNTER
Last Appointment:  5/18/2021  Future Appointments   Date Time Provider Tiana Johnsoni   5/4/2022  9:45 AM [facility] ONC FAST TRACK [address] [place]   5/4/2022 10:00 AM Gaylin Essex, MD MED ONC [facility]   5/5/2022 12:30 PM Tobias Barrera OT [place] OT [facility]   5/20/2022  9:00 AM Joy Tapia MD Surg [facility]   7/5/2022 11:45 AM Katya Ferris DO [facility]

## 2022-05-05 ENCOUNTER — TELEPHONE (OUTPATIENT)
Dept: OCCUPATIONAL THERAPY | Age: 47
End: 2022-05-05

## 2022-05-05 NOTE — PROGRESS NOTES
[facility] 37 PRE-ADMISSION TESTING   ENDOSCOPY/ COLONSCOPY INSTRUCTIONS  PAT- Phone Number: [phone]    ENDOSCOPY/ COLONSCOPY INSTRUCTIONS:     [x] Bowel Prep instructions reviewed. [x] Colonoscopy- The day prior: No solid foods. Clear liquids only. [x] Nothing by mouth after midnight. Including no gum, candy, mints, or water. [x] You may brush your teeth, gargle, but do NOT swallow water. [x] Do not wear makeup, lotions, powders, deodorant. [x] Arrange transportation with a responsible adult  to and from the hospital. If you do not have a responsible adult  to transport you, you will need to make arrangements with a medical transportation company. Arrange for someone to be with you for the remainder of the day and for 24 hours after your procedure due to having had anesthesia. -Who will be your  for transportation? Nicolle Hand  -Who will be staying with you for 24 hrs after your procedure? Family    PARKING INSTRUCTIONS:     [x] ARRIVAL TIME: 5/11/22 @ 10:00am.   · [x] Enter into the [facility]. Two people may accompany you. Masks are required. · [x] Parking Lot \"I\" is where you will park. It is located on the corner of [facility] and [facility]. The entrance is on [facility]. · To enter, press the button and the gate will lift. A free token will be provided to exit the lot. EDUCATION INSTRUCTIONS:    [x] Bring a complete list of your medications, please write the last time you took the medicine, give this list to the nurse. [x] Take the following medications the morning of surgery with 1-2 ounces of water: None. [x] Stop herbal supplements and vitamins 5 days before your surgery. [] DO NOT take any diabetic medicine the morning of surgery. Follow instructions for insulin the day before surgery.   [] If you are diabetic and your blood sugar is low or you feel symptomatic, you may drink 1-2 ounces of apple juice or take

## 2022-05-09 DIAGNOSIS — Z79.811 USE OF AROMATASE INHIBITORS: Primary | ICD-10-CM

## 2022-05-10 ENCOUNTER — PREP FOR PROCEDURE (OUTPATIENT)
Dept: SURGERY | Age: 47
End: 2022-05-10

## 2022-05-10 RX ORDER — SODIUM CHLORIDE 0.9 % (FLUSH) 0.9 %
5-40 SYRINGE (ML) INJECTION PRN
Status: CANCELLED | OUTPATIENT
Start: 2022-05-10

## 2022-05-10 RX ORDER — SODIUM CHLORIDE 0.9 % (FLUSH) 0.9 %
5-40 SYRINGE (ML) INJECTION EVERY 12 HOURS SCHEDULED
Status: CANCELLED | OUTPATIENT
Start: 2022-05-10

## 2022-05-10 RX ORDER — SODIUM CHLORIDE 9 MG/ML
25 INJECTION, SOLUTION INTRAVENOUS PRN
Status: CANCELLED | OUTPATIENT
Start: 2022-05-10

## 2022-05-10 RX ORDER — SODIUM CHLORIDE 9 MG/ML
INJECTION, SOLUTION INTRAVENOUS CONTINUOUS
Status: CANCELLED | OUTPATIENT
Start: 2022-05-10

## 2022-05-10 NOTE — H&P
[facility]  HISTORY & PHYSICAL  ATTENDING NOTE          Chief Complaint   Patient presents with    Breast Pain       left chest wall pain and goes into axilla. Patient states she feels something.             HPI:  40 y.o. female denies weight loss, constipation, melena, hematochezia, N/V, abdominal pain. she denies family history ofCrohn's disease or colitis. She presents today for left chest wall pain and possible palpable mass in her left axilla per her. She states that she holds her arm to her chest a lot. She has been taking a lot of Tylenol. She has been using a heating pad. She denies any range of motion difficulties.   She states that since she has been on Arimidex has been having some diarrhea.     Past Medical History        Past Medical History:   Diagnosis Date    Abscess of right breast 06/19/2018    Anxiety      Arthritis      Breast abscess 07/11/2018    Cancer (HCC)      Fatigue      GERD (gastroesophageal reflux disease)      Hyperlipidemia      Morbid obesity due to excess calories ([address]      Obesity 06/04/2013            Past Surgical History   Past Surgical History:   Procedure Laterality Date    ANKLE FRACTURE SURGERY   4/20/2012     right with hardware, subsequently removed   [address] Left 2020    BREAST BIOPSY Right 2018    CHOLECYSTECTOMY         2003    CYSTOSCOPY   7/25/2014     retrograde pyelogram, ureteroscopy, laser lithotripsy, left stent placement    FRACTURE SURGERY        MASTECTOMY Left 8/18/2021     LEFT BREAST SIMPLE MASTECTOMY WITH SENTINEL LYMPH NODE BIOPSY performed by Yamilet Ruby MD at [address] [place] [address] OF HEMATOMA/FLUID Right 6/20/2018     RIGHT BREAST INCISION AND DRAINAGE POSSIBLE WOUND VAC performed by Yamilet Ruby MD at [facility] Right 7/11/2018     INCISION AND DRAINAGE RIGHT BREAST, WITH APPLICATION OF WOUND VAC (PATIENT HSS WOUND VAC WILL BRING IT)  performed by Werner Reyes MD at [facility]   2021     Lap BSO, EMBx    SKIN GRAFT   1991     Left leg    UPPER GASTROINTESTINAL ENDOSCOPY        US BREAST CYST ASPIRATION LEFT Left 2021     US BREAST CYST ASPIRATION LEFT 2021 [facility]    US BREAST CYST ASPIRATION LEFT Left 2021     US BREAST CYST ASPIRATION LEFT [facility]    US BREAST CYST ASPIRATION LEFT Left 2021     US BREAST CYST ASPIRATION LEFT 2021 [facility]    US BREAST CYST ASPIRATION LEFT Left 10/22/2021     US BREAST CYST ASPIRATION LEFT 10/22/2021 [facility]    US BREAST NEEDLE BIOPSY LEFT   2020     US BREAST NEEDLE BIOPSY LEFT 2020 [facility]    US BREAST NEEDLE BIOPSY LEFT Left 2021     US BREAST NEEDLE BIOPSY LEFT 2021 [facility]            Family History         Family History   Problem Relation Age of Onset    Diabetes Maternal Grandmother      High Blood Pressure Maternal Grandmother      High Cholesterol Maternal Grandmother      Breast Cancer Maternal Grandmother 79    High Cholesterol Mother      Breast Cancer Mother 54    Colon Cancer Mother      Stomach Cancer Mother      Cancer Mother           stomach, colon, intestine    Cancer Maternal Grandfather 60         throat    Ovarian Cancer Other                    Allergies   Allergen Reactions    Vancomycin Nausea And Vomiting       Red man syndrome, had high fever and upset stomach         Social History            Tobacco Use    Smoking status: Former Smoker       Packs/day: 0.25       Years: 4.00       Pack years: 1.00       Types: Cigarettes       Quit date: 2008       Years since quittin.6    Smokeless tobacco: Never Used   Vaping Use    Vaping Use: Never used   Substance Use Topics    Alcohol use: Yes       Alcohol/week: 0.0 standard drinks       Comment: rarely    Drug use:  No         Current Facility-Administered Medications          Current Outpatient Medications   Medication Sig Dispense Refill    Sod Picosulfate-Mag Ox-Cit Acd (CLENPIQ) 10-3.5-12 MG-GM -GM/160ML SOLN solution Take 160 mLs by mouth in the morning and at bedtime 320 mL 0    vitamin D (CHOLECALCIFEROL) 50 MCG (2000 UT) TABS tablet Take 1 tablet by mouth daily 90 tablet 1    anastrozole (ARIMIDEX) 1 MG tablet TAKE 1 TABLET BY MOUTH DAILY 30 tablet 3    cyanocobalamin 1000 MCG/ML injection Inject 1 mL into the muscle once for 1 dose 1 mL 3    escitalopram (LEXAPRO) 10 MG tablet Take 1 tablet by mouth nightly 90 tablet 0    vitamin D (ERGOCALCIFEROL) 1.25 MG (57769 UT) CAPS capsule Take 1 capsule by mouth once a week (Patient taking differently: Take 50,000 Units by mouth once a week wednesday) 12 capsule 1      No current facility-administered medications for this visit.            Review of Systems   Constitutional: Negative. Negative for activity change, appetite change and unexpected weight change. HENT: Negative. Eyes: Negative. Respiratory: Negative. Negative for cough and shortness of breath. Cardiovascular: Positive for chest pain (reproducible). Negative for leg swelling. Gastrointestinal: Positive for abdominal pain (epigastric) and diarrhea. Negative for abdominal distention, anal bleeding, blood in stool, constipation, nausea and vomiting. Endocrine: Negative. Genitourinary: Negative. Musculoskeletal: Positive for myalgias. Negative for arthralgias, back pain, gait problem and joint swelling. Skin: Negative. Allergic/Immunologic: Negative. Neurological: Negative. Negative for dizziness, weakness and headaches. Hematological: Negative. Psychiatric/Behavioral: Negative.   Negative for confusion, decreased concentration and sleep disturbance.         BP (!) 144/86 (Site: Right Upper Arm, Position: Sitting, Cuff Size: Medium Adult)   Pulse 89   Temp 98.1 °F (36.7 °C) (Temporal)   Resp 14   Ht 5' 3\" (1.6 m)   Wt (!) 372 lb (168.7 kg) LMP 10/24/2021   SpO2 96%   BMI 65.90 kg/m²   Physical Exam  Constitutional:       Appearance: Normal appearance. She is obese. HENT:      Head: Normocephalic and atraumatic. Nose: Nose normal.      Mouth/Throat:      Mouth: Mucous membranes are moist.      Pharynx: Oropharynx is clear. Eyes:      Extraocular Movements: Extraocular movements intact. Pupils: Pupils are equal, round, and reactive to light. Cardiovascular:      Rate and Rhythm: Normal rate and regular rhythm. Pulses: Normal pulses. Heart sounds: Normal heart sounds. Pulmonary:      Effort: Pulmonary effort is normal.      Breath sounds: Normal breath sounds. Chest:   Breasts:      Right: No swelling, bleeding, inverted nipple, mass, nipple discharge, skin change, tenderness, axillary adenopathy or supraclavicular adenopathy. Left: Tenderness present. No axillary adenopathy or supraclavicular adenopathy.          Abdominal:      General: There is no distension. Palpations: Abdomen is soft. Tenderness: There is abdominal tenderness (mild epigastric pain). Musculoskeletal:         General: No tenderness or signs of injury. Cervical back: Normal range of motion and neck supple. Lymphadenopathy:      Upper Body:      Right upper body: No supraclavicular or axillary adenopathy. Left upper body: No supraclavicular or axillary adenopathy. Skin:     General: Skin is warm and dry. Neurological:      General: No focal deficit present. Mental Status: She is alert and oriented to person, place, and time. Psychiatric:         Mood and Affect: Mood normal.         Behavior: Behavior normal.         Thought Content: Thought content normal.         Judgment: Judgment normal.               ASSESSMENT/PLAN:  1. Age is a risk factor for colon cancer-- plan for colonoscopy  2. Family history of colon cancer-plan for colonoscopy  3. Epigastric pain--plan for EGD  4.  Left chest wall pain--advised patient to try Voltaren gel, refer to occupational therapy for lymphedema management     Prep: Clenpiq     Colonoscopy. The patient was explained the risks/benefits/alternatives/expected outcomes of the procedure. The patient was explained the risks of the procedure, including, but not limited to, the risk of reaction to the anesthesia medicine and the risk of perforation requiring further surgery. The patient was informed that they may require biopsy or polypectomy. These procedures may increase the risk of complication. All questions were answered. The patient verbalized understanding and agreed to proceed.     I recommended esophagogastroduodenoscopy with possible biopsy and explained the risk, benefits, expected outcome, and alternatives to the procedure. Risks included but are not limited to bleeding, infection, respiratory distress, hypotension, and perforation of the esophagus, stomach, or duodenum.   Patient understands and is in agreement.  Bri Ochoa MD, MSc, FACS  4/22/2022  12:45 PM

## 2022-05-10 NOTE — H&P (VIEW-ONLY)
[facility]  HISTORY & PHYSICAL  ATTENDING NOTE          Chief Complaint   Patient presents with    Breast Pain       left chest wall pain and goes into axilla. Patient states she feels something.             HPI:  40 y.o. female denies weight loss, constipation, melena, hematochezia, N/V, abdominal pain. she denies family history ofCrohn's disease or colitis. She presents today for left chest wall pain and possible palpable mass in her left axilla per her. She states that she holds her arm to her chest a lot. She has been taking a lot of Tylenol. She has been using a heating pad. She denies any range of motion difficulties.   She states that since she has been on Arimidex has been having some diarrhea.     Past Medical History        Past Medical History:   Diagnosis Date    Abscess of right breast 06/19/2018    Anxiety      Arthritis      Breast abscess 07/11/2018    Cancer (HCC)      Fatigue      GERD (gastroesophageal reflux disease)      Hyperlipidemia      Morbid obesity due to excess calories ([address]      Obesity 06/04/2013            Past Surgical History   Past Surgical History:   Procedure Laterality Date    ANKLE FRACTURE SURGERY   4/20/2012     right with hardware, subsequently removed   [address] Left 2020    BREAST BIOPSY Right 2018    CHOLECYSTECTOMY         2003    CYSTOSCOPY   7/25/2014     retrograde pyelogram, ureteroscopy, laser lithotripsy, left stent placement    FRACTURE SURGERY        MASTECTOMY Left 8/18/2021     LEFT BREAST SIMPLE MASTECTOMY WITH SENTINEL LYMPH NODE BIOPSY performed by Gopi Sewell MD at [address] [place] [address] OF HEMATOMA/FLUID Right 6/20/2018     RIGHT BREAST INCISION AND DRAINAGE POSSIBLE WOUND VAC performed by Gopi Sewell MD at [facility] Right 7/11/2018     INCISION AND DRAINAGE RIGHT BREAST, WITH APPLICATION OF WOUND VAC (PATIENT HSS WOUND VAC WILL Outpatient Medications   Medication Sig Dispense Refill    Sod Picosulfate-Mag Ox-Cit Acd (CLENPIQ) 10-3.5-12 MG-GM -GM/160ML SOLN solution Take 160 mLs by mouth in the morning and at bedtime 320 mL 0    vitamin D (CHOLECALCIFEROL) 50 MCG (2000 UT) TABS tablet Take 1 tablet by mouth daily 90 tablet 1    anastrozole (ARIMIDEX) 1 MG tablet TAKE 1 TABLET BY MOUTH DAILY 30 tablet 3    cyanocobalamin 1000 MCG/ML injection Inject 1 mL into the muscle once for 1 dose 1 mL 3    escitalopram (LEXAPRO) 10 MG tablet Take 1 tablet by mouth nightly 90 tablet 0    vitamin D (ERGOCALCIFEROL) 1.25 MG (91943 UT) CAPS capsule Take 1 capsule by mouth once a week (Patient taking differently: Take 50,000 Units by mouth once a week wednesday) 12 capsule 1      No current facility-administered medications for this visit.            Review of Systems   Constitutional: Negative. Negative for activity change, appetite change and unexpected weight change. HENT: Negative. Eyes: Negative. Respiratory: Negative. Negative for cough and shortness of breath. Cardiovascular: Positive for chest pain (reproducible). Negative for leg swelling. Gastrointestinal: Positive for abdominal pain (epigastric) and diarrhea. Negative for abdominal distention, anal bleeding, blood in stool, constipation, nausea and vomiting. Endocrine: Negative. Genitourinary: Negative. Musculoskeletal: Positive for myalgias. Negative for arthralgias, back pain, gait problem and joint swelling. Skin: Negative. Allergic/Immunologic: Negative. Neurological: Negative. Negative for dizziness, weakness and headaches. Hematological: Negative. Psychiatric/Behavioral: Negative.   Negative for confusion, decreased concentration and sleep disturbance.         BP (!) 144/86 (Site: Right Upper Arm, Position: Sitting, Cuff Size: Medium Adult)   Pulse 89   Temp 98.1 °F (36.7 °C) (Temporal)   Resp 14   Ht 5' 3\" (1.6 m)   Wt (!) 372 lb (168.7 kg) LMP 10/24/2021   SpO2 96%   BMI 65.90 kg/m²   Physical Exam  Constitutional:       Appearance: Normal appearance. She is obese. HENT:      Head: Normocephalic and atraumatic. Nose: Nose normal.      Mouth/Throat:      Mouth: Mucous membranes are moist.      Pharynx: Oropharynx is clear. Eyes:      Extraocular Movements: Extraocular movements intact. Pupils: Pupils are equal, round, and reactive to light. Cardiovascular:      Rate and Rhythm: Normal rate and regular rhythm. Pulses: Normal pulses. Heart sounds: Normal heart sounds. Pulmonary:      Effort: Pulmonary effort is normal.      Breath sounds: Normal breath sounds. Chest:   Breasts:      Right: No swelling, bleeding, inverted nipple, mass, nipple discharge, skin change, tenderness, axillary adenopathy or supraclavicular adenopathy. Left: Tenderness present. No axillary adenopathy or supraclavicular adenopathy.          Abdominal:      General: There is no distension. Palpations: Abdomen is soft. Tenderness: There is abdominal tenderness (mild epigastric pain). Musculoskeletal:         General: No tenderness or signs of injury. Cervical back: Normal range of motion and neck supple. Lymphadenopathy:      Upper Body:      Right upper body: No supraclavicular or axillary adenopathy. Left upper body: No supraclavicular or axillary adenopathy. Skin:     General: Skin is warm and dry. Neurological:      General: No focal deficit present. Mental Status: She is alert and oriented to person, place, and time. Psychiatric:         Mood and Affect: Mood normal.         Behavior: Behavior normal.         Thought Content: Thought content normal.         Judgment: Judgment normal.               ASSESSMENT/PLAN:  1. Age is a risk factor for colon cancer-- plan for colonoscopy  2. Family history of colon cancerplan for colonoscopy  3. Epigastric painplan for EGD  4.  Left chest wall painadvised patient to try Voltaren gel, refer to occupational therapy for lymphedema management     Prep: Clenpiq     Colonoscopy. The patient was explained the risks/benefits/alternatives/expected outcomes of the procedure. The patient was explained the risks of the procedure, including, but not limited to, the risk of reaction to the anesthesia medicine and the risk of perforation requiring further surgery. The patient was informed that they may require biopsy or polypectomy. These procedures may increase the risk of complication. All questions were answered. The patient verbalized understanding and agreed to proceed.     I recommended esophagogastroduodenoscopy with possible biopsy and explained the risk, benefits, expected outcome, and alternatives to the procedure. Risks included but are not limited to bleeding, infection, respiratory distress, hypotension, and perforation of the esophagus, stomach, or duodenum.   Patient understands and is in agreement.  Meredith Menjivar MD, MSc, FACS  4/22/2022  12:45 PM

## 2022-05-11 ENCOUNTER — ANESTHESIA EVENT (OUTPATIENT)
Dept: ENDOSCOPY | Age: 47
End: 2022-05-11
Payer: MEDICAID

## 2022-05-11 ENCOUNTER — HOSPITAL ENCOUNTER (OUTPATIENT)
Age: 47
Setting detail: OUTPATIENT SURGERY
Discharge: HOME | End: 2022-05-11
Attending: SURGERY | Admitting: SURGERY
Payer: MEDICAID

## 2022-05-11 ENCOUNTER — ANESTHESIA (OUTPATIENT)
Dept: ENDOSCOPY | Age: 47
End: 2022-05-11
Payer: MEDICAID

## 2022-05-11 VITALS
TEMPERATURE: 97.8 F | DIASTOLIC BLOOD PRESSURE: 60 MMHG | HEART RATE: 93 BPM | HEIGHT: 63 IN | RESPIRATION RATE: 16 BRPM | OXYGEN SATURATION: 93 % | SYSTOLIC BLOOD PRESSURE: 124 MMHG | BODY MASS INDEX: 51.91 KG/M2 | WEIGHT: 293 LBS

## 2022-05-11 VITALS — OXYGEN SATURATION: 98 % | SYSTOLIC BLOOD PRESSURE: 147 MMHG | DIASTOLIC BLOOD PRESSURE: 85 MMHG

## 2022-05-11 PROCEDURE — 3609012400 HC EGD TRANSORAL BIOPSY SINGLE/MULTIPLE: Performed by: SURGERY

## 2022-05-11 PROCEDURE — 45378 DIAGNOSTIC COLONOSCOPY: CPT | Performed by: SURGERY

## 2022-05-11 PROCEDURE — 88305 TISSUE EXAM BY PATHOLOGIST: CPT

## 2022-05-11 PROCEDURE — 88341 IMHCHEM/IMCYTCHM EA ADD ANTB: CPT

## 2022-05-11 PROCEDURE — 3700000000 HC ANESTHESIA ATTENDED CARE: Performed by: SURGERY

## 2022-05-11 PROCEDURE — 3700000001 HC ADD 15 MINUTES (ANESTHESIA): Performed by: SURGERY

## 2022-05-11 PROCEDURE — 7100000011 HC PHASE II RECOVERY - ADDTL 15 MIN: Performed by: SURGERY

## 2022-05-11 PROCEDURE — 6360000002 HC RX W HCPCS: Performed by: NURSE ANESTHETIST, CERTIFIED REGISTERED

## 2022-05-11 PROCEDURE — 88342 IMHCHEM/IMCYTCHM 1ST ANTB: CPT

## 2022-05-11 PROCEDURE — 2580000003 HC RX 258: Performed by: NURSE ANESTHETIST, CERTIFIED REGISTERED

## 2022-05-11 PROCEDURE — 2709999900 HC NON-CHARGEABLE SUPPLY: Performed by: SURGERY

## 2022-05-11 PROCEDURE — 43239 EGD BIOPSY SINGLE/MULTIPLE: CPT | Performed by: SURGERY

## 2022-05-11 PROCEDURE — 7100000010 HC PHASE II RECOVERY - FIRST 15 MIN: Performed by: SURGERY

## 2022-05-11 PROCEDURE — 3609027000 HC COLONOSCOPY: Performed by: SURGERY

## 2022-05-11 RX ORDER — PROPOFOL 10 MG/ML
INJECTION, EMULSION INTRAVENOUS PRN
Status: DISCONTINUED | OUTPATIENT
Start: 2022-05-11 | End: 2022-05-11 | Stop reason: SDUPTHER

## 2022-05-11 RX ORDER — PANTOPRAZOLE SODIUM 40 MG/1
40 TABLET, DELAYED RELEASE ORAL DAILY
Qty: 30 TABLET | Refills: 1 | Status: SHIPPED | OUTPATIENT
Start: 2022-05-11 | End: 2022-06-29 | Stop reason: SDUPTHER

## 2022-05-11 RX ORDER — SODIUM CHLORIDE 9 MG/ML
INJECTION, SOLUTION INTRAVENOUS CONTINUOUS PRN
Status: DISCONTINUED | OUTPATIENT
Start: 2022-05-11 | End: 2022-05-11 | Stop reason: SDUPTHER

## 2022-05-11 RX ADMIN — SODIUM CHLORIDE: 9 INJECTION, SOLUTION INTRAVENOUS at 10:10

## 2022-05-11 RX ADMIN — PROPOFOL 550 MG: 10 INJECTION, EMULSION INTRAVENOUS at 11:07

## 2022-05-11 ASSESSMENT — PULMONARY FUNCTION TESTS
PIF_VALUE: 0

## 2022-05-11 ASSESSMENT — PAIN SCALES - GENERAL: PAINLEVEL_OUTOF10: 0

## 2022-05-11 NOTE — INTERVAL H&P NOTE
Update History & Physical    The patient's History and Physical of April 22, 2022 was reviewed with the patient and I examined the patient. There was no change. The surgical site was confirmed by the patient and me. Plan: The risks, benefits, expected outcome, and alternative to the recommended procedure have been discussed with the patient. Patient understands and wants to proceed with the procedure.      Electronically signed by Madhav Vail MD on 5/11/2022 at 10:57 AM

## 2022-05-11 NOTE — OP NOTE
[facility]  ENDOSCOPY LAB  UPPER ENDOSCOPY REPORT      DATE OF PROCEDURE: 5/11/2022     PREOPERATIVE DIAGNOSIS: epigastric pain    POSTOPERATIVE DIAGNOSIS/FINDINGS: gastritis    SURGEON: Filippo Heath MD    ASSISTANT: none    OPERATION: Esophagogastroduodenoscopy with biopsies    ANESTHESIA: Local monitored anesthesia. COMPLICATIONS: None. EBL:  5cc    SPECIMENS:  Antral biopsies x 4    PRIOR TO EXAM: Gen: comfortable, no distress, awake and alert; Lungs: Clear;  Heart:regular rate and rhythm, normal S1S2     BRIEF HISTORY:  This is a 40 y.o. female who presents with the complaint of epigastric pain. My recommendation is to proceed with esophagogastroduodenoscopy. The patient was advised of the risks, benefits, complications and options including the risk of bleeding and perforation. The patient understood and agreed to proceed. PROCEDURE:  Under [facility] ATH\[facility]\"" anesthesia, the patient was positioned in the left side down decubitus position. A bite block was inserted. Under direct visualization the scope was passed through the oral cavity, into the esophagus and then into the stomach. The scope was then passed  into the duodenum.  Findings are as follows:    Duodenum: Normal to D3    Antrum/pylorus: Active gastritis, cold forcep biopsies taken x4    Gastric body: Normal    Gastric fundus/cardia: Normal    Esophagus: Normal    GEJ: Normal at 40 cm    THE PATIENT TOLERATED THE PROCEDURE WELL      PLAN:  PPI  Follow-up biopsies      Filippo Heath MD  5/11/2022  11:32 AM

## 2022-05-11 NOTE — PROGRESS NOTES
Discharge instructions reviewed with patient and patient's friend Esequiel Christiansen. Understanding stated of instructions.   Electronically signed by Yancy Michelle RN on 5/11/2022 at 12:50 PM

## 2022-05-11 NOTE — ANESTHESIA POSTPROCEDURE EVALUATION
Department of Anesthesiology  Postprocedure Note    Patient: Elmer Gabriel  MRN: [id number]  YOB: 1975  Date of evaluation: 5/11/2022  Time:  11:38 AM     Procedure Summary     Date: 05/11/22 Room / Location: [facility] / [facility]    Anesthesia Start: 1059 Anesthesia Stop: 1138    Procedures:       EGD BIOPSY (N/A )      COLORECTAL CANCER SCREENING, NOT HIGH RISK (N/A ) Diagnosis: (ABDOMEN PAIN AND SCREENING COLONSCOPY)    Surgeons: Arnaldo Harrison MD Responsible Provider: Violette Zuluaga MD    Anesthesia Type: MAC ASA Status: 3          Anesthesia Type: MAC    Lui Phase I: Lui Score: 10    Lui Phase II:      Last vitals: Reviewed and per EMR flowsheets.        Anesthesia Post Evaluation    Patient location during evaluation: PACU  Patient participation: complete - patient participated  Level of consciousness: awake  Airway patency: patent  Nausea & Vomiting: no nausea and no vomiting  Complications: no  Cardiovascular status: hemodynamically stable  Respiratory status: acceptable  Hydration status: euvolemic

## 2022-05-11 NOTE — PROGRESS NOTES
CLINICAL PHARMACY NOTE: MEDS TO BEDS    Total # of Prescriptions Filled: 1   The following medications were delivered to the patient:  · PROTONIX 40 MG     Additional Documentation:

## 2022-05-11 NOTE — ANESTHESIA PRE PROCEDURE
Department of Anesthesiology  Preprocedure Note       Name:  Manolo Lundberg   Age:  40 y.o.  :  1975                                          MRN:  [id number]         Date:  2022      Surgeon: Kaya Tolbert):  Hayde Jang MD    Procedure: Procedure(s):  EGD/COLONOSCOPY      Medications prior to admission:   Prior to Admission medications    Medication Sig Start Date End Date Taking? Authorizing Provider   vitamin D (ERGOCALCIFEROL) 1.25 MG (96401 UT) CAPS capsule TAKE 1 CAPSULE BY MOUTH 1 TIME A WEEK  Patient taking differently: 50,000 Units once a week Sundays 5/2/22   Katya Ferris DO   Sod Picosulfate-Mag Ox-Cit Acd (CLENPIQ) 10-3.5-12 MG-GM -GM/160ML SOLN solution Take 160 mLs by mouth in the morning and at bedtime 22   Hayde Jang MD   bisacodyl (BISACODYL) 5 MG EC tablet Take 2 tablets by mouth 2 times daily 22   Hayde Jang MD   magnesium citrate solution Take 592 mLs by mouth once for 1 dose 22  Hayde Jang MD   vitamin D (CHOLECALCIFEROL) 50 MCG (2000 UT) TABS tablet Take 1 tablet by mouth daily 22   Katya Ferris DO   anastrozole (ARIMIDEX) 1 MG tablet TAKE 1 TABLET BY MOUTH DAILY 3/28/22   Dk Díaz MD   cyanocobalamin 1000 MCG/ML injection Inject 1 mL into the muscle once for 1 dose  Patient taking differently: Inject 1,000 mcg into the muscle once Once a month 3/17/22 4/22/22  Katya Ferris DO   escitalopram (LEXAPRO) 10 MG tablet Take 1 tablet by mouth nightly 3/1/22   Katya Ferris DO       Current medications:    No current facility-administered medications for this encounter. Allergies:     Allergies   Allergen Reactions    Vancomycin Nausea And Vomiting     Red man syndrome, had high fever and upset stomach       Problem List:    Patient Active Problem List   Diagnosis Code    Morbid obesity ([facility] [address] E66.01    Anxiety F41.9    Vitamin D deficiency E55.9    Stress incontinence N39.3    Obesity E66.9    Fatigue R53.83  Hypertriglyceridemia E78.1    Palpitations R00.2    Chronic pain of right knee M25.561, G89.29    Malignant neoplasm of upper-outer quadrant of left breast in female, estrogen receptor positive (HCC) C50.412, Z17.0    Acute pain after mastectomy G89.18, Z90.10    Seroma of breast N64.89    Surgical site infection T81.49XA    Abnormal uterine bleeding (AUB) N93.9       Past Medical History:        Diagnosis Date    Abscess of right breast 06/19/2018    Anxiety     Arthritis     Breast abscess 07/11/2018    Cancer ([facility] [address]     Fatigue     GERD (gastroesophageal reflux disease)     Hyperlipidemia     Morbid obesity due to excess calories ([address]     Obesity 06/04/2013       Past Surgical History:        Procedure Laterality Date    ANKLE FRACTURE SURGERY  4/20/2012    right with hardware, subsequently removed   [address] Left 2020    BREAST BIOPSY Right 2018    CHOLECYSTECTOMY      2003    CYSTOSCOPY  7/25/2014    retrograde pyelogram, ureteroscopy, laser lithotripsy, left stent placement    FRACTURE SURGERY      ankle    MASTECTOMY Left 8/18/2021    LEFT BREAST SIMPLE MASTECTOMY WITH SENTINEL LYMPH NODE BIOPSY performed by Tessy Miller MD at [facility] OF HEMATOMA/FLUID Right 6/20/2018    RIGHT BREAST INCISION AND DRAINAGE POSSIBLE WOUND VAC performed by Tessy Miller MD at [facility] Right 7/11/2018    INCISION AND DRAINAGE RIGHT BREAST, WITH APPLICATION OF WOUND VAC (PATIENT HSS WOUND VAC WILL BRING IT)  performed by Tessy Miller MD at [facility] 22 SALPINGO-OOPHORECTOMY  11/11/2021    Lap BSO, EMBx    SKIN GRAFT  1991    Left leg    UPPER GASTROINTESTINAL ENDOSCOPY      US BREAST CYST ASPIRATION LEFT Left 9/1/2021    US BREAST CYST ASPIRATION LEFT 9/1/2021 [facility]    US BREAST CYST ASPIRATION LEFT Left 9/7/2021    US BREAST CYST ASPIRATION LEFT [facility]    US BREAST CYST ASPIRATION LEFT Left 2021    US BREAST CYST ASPIRATION LEFT 2021 [facility]    US BREAST CYST ASPIRATION LEFT Left 10/22/2021    US BREAST CYST ASPIRATION LEFT 10/22/2021 [facility]    US BREAST NEEDLE BIOPSY LEFT  2020    US BREAST NEEDLE BIOPSY LEFT 2020 [facility]    US BREAST NEEDLE BIOPSY LEFT Left 2021    US BREAST NEEDLE BIOPSY LEFT 2021 [facility]       Social History:    Social History     Tobacco Use    Smoking status: Former Smoker     Packs/day: 0.25     Years: 4.00     Pack years: 1.00     Types: Cigarettes     Quit date: 2008     Years since quittin.7    Smokeless tobacco: Never Used   Substance Use Topics    Alcohol use: Yes     Alcohol/week: 0.0 standard drinks     Comment: rarely                                Counseling given: Not Answered      Vital Signs (Current):   Vitals:    22 1021   Weight: (!) 372 lb (168.7 kg)   Height: 5' 3\" (1.6 m)                                              BP Readings from Last 3 Encounters:   22 (!) 144/86   22 138/84   22 (!) 153/96       NPO Status: Time of last liquid consumption:                         Time of last solid consumption: 1500                        Date of last liquid consumption: 05/10/22                        Date of last solid food consumption: 22    BMI:   Wt Readings from Last 3 Encounters:   22 (!) 372 lb (168.7 kg)   22 (!) 372 lb (168.7 kg)   22 (!) 373 lb 6.4 oz (169.4 kg)     Body mass index is 65.9 kg/m².     CBC:   Lab Results   Component Value Date    WBC 10.4 03/15/2022    RBC 4.75 03/15/2022    RBC 4.78 2021    HGB 13.7 03/15/2022    HCT 44.5 03/15/2022    MCV 93.7 03/15/2022    RDW 14.2 03/15/2022     03/15/2022       CMP:   Lab Results   Component Value Date     2021    K 3.7 2021    K 4.3 2018    CL 99 2021    CO2 23 2021    BUN 11 2021    CREATININE 0.7 2021    GFRAA >60 2021 LABGLOM >60 07/09/2021    GLUCOSE 96 07/09/2021    GLUCOSE 80 04/20/2012    PROT 7.6 07/09/2021    CALCIUM 8.9 07/09/2021    BILITOT 0.9 07/09/2021    ALKPHOS 86 07/09/2021    AST 16 07/09/2021    ALT 20 07/09/2021       POC Tests: No results for input(s): POCGLU, POCNA, POCK, POCCL, POCBUN, POCHEMO, POCHCT in the last 72 hours. Coags:   Lab Results   Component Value Date    PROTIME 13.8 07/15/2018    INR 1.2 07/15/2018    APTT 28.1 05/06/2014       HCG (If Applicable):   Lab Results   Component Value Date    PREGTESTUR negative 07/11/2018        ABGs: No results found for: PHART, PO2ART, CMP2UWX, CFF1JKC, BEART, Q9RUHUMN     Type & Screen (If Applicable):  No results found for: LABABO, LABRH  Us Breast Limited Right  Radiology  Result Date: 6/1/2018  INDICATION: Right Ultrasound  HISTORY: The patient has no personal history of cancer. The patient has the following family history of breast cancer:  mother, at age 54, breast cancer and maternal grandmother, breast cancer, UNSURE OF AGE. ULTRASOUND TECHNIQUE: High-resolution real-time ultrasound scanning was performed. COMPARISON: The present examination has been compared to a prior imaging study performed at [address] [place] [address] on 04/18/2018. ULTRASOUND FINDINGS:   Finding 1: Sonography was performed in the right breast using a radial and anti-radial approach. There is a sonographic appearance consistent with edema and skin thickening seen in the right breast at 9 o'clock, at 10 o'clock and at 11 o'clock. This finding is most consistent with cellulitis. No drainable fluid collection seen. No sonographic evidence of suspicious solid or cystic mass lesions. No focal areas of suspicious atypical echogenicity. IMPRESSION: Sonographic appearance consistent with edema and skin thickening in the right breast is benign. Appropriate antibiotic therapy is advised.   Screening mammogram in 3 months is recommended.  ======================================= BI-RADS Category 2:  Benign =======================================       Anesthesia Evaluation  Patient summary reviewed and Nursing notes reviewed no history of anesthetic complications:   Airway: Mallampati: I  TM distance: <3 FB   Neck ROM: full  Mouth opening: > = 3 FB Dental:      Comment: Patient denies loose, missing, chipped teeth    Pulmonary:normal exam                              ROS comment: Probable JUAN based on body habitus and h/o snoring; Never had sleep study    Prior \"social smoker\"    Cardiovascular:Negative CV ROS  Exercise tolerance: good (>4 METS),           Rhythm: regular  Rate: normal                    Neuro/Psych:   (+) depression/anxiety             GI/Hepatic/Renal:   (+) GERD:, morbid obesity          Endo/Other:                      ROS comment: S/p right abscess I&D on 6/20/18 Abdominal:   (+) obese,           Vascular: negative vascular ROS. Other Findings:               Anesthesia Plan      MAC     ASA 3       Induction: intravenous. Anesthetic plan and risks discussed with patient. Plan discussed with CRNA. Chart reviewed . Patient assessed before induction. I agree with the above note.   JIHAN Clancy - CRNA          Ap Aguero MD   5/11/2022

## 2022-05-11 NOTE — ANESTHESIA POSTPROCEDURE EVALUATION
Department of Anesthesiology  Postprocedure Note    Patient: Peyton Diaz  MRN: [id number]  YOB: 1975  Date of evaluation: 5/11/2022  Time:  12:00 PM     Procedure Summary     Date: 05/11/22 Room / Location: [facility] / [facility]    Anesthesia Start:  Anesthesia Stop:     Procedures:       EGD ESOPHAGOGASTRODUODENOSCOPY ( DR. Renetta Marquez PREFERS ROOM 3) (N/A )      COLORECTAL CANCER SCREENING, NOT HIGH RISK (N/A ) Diagnosis: (ABDOMEN PAIN AND SCREENING COLONSCOPY)    Surgeons: Bernett Mortimer, MD Responsible Provider:     Anesthesia Type: MAC ASA Status: 3          Anesthesia Type: MAC    Lui Phase I: Lui Score: 10    Lui Phase II: Lui Score: 10    Last vitals: Reviewed and per EMR flowsheets.        Anesthesia Post Evaluation    Patient location during evaluation: PACU  Patient participation: complete - patient participated  Level of consciousness: awake  Pain score: 3  Airway patency: patent  Nausea & Vomiting: no nausea and no vomiting  Complications: no  Cardiovascular status: blood pressure returned to baseline  Respiratory status: acceptable  Hydration status: euvolemic

## 2022-05-12 ENCOUNTER — HOSPITAL ENCOUNTER (OUTPATIENT)
Dept: INFUSION THERAPY | Age: 47
Discharge: HOME | End: 2022-05-12
Payer: MEDICAID

## 2022-05-12 ENCOUNTER — TELEPHONE (OUTPATIENT)
Dept: ONCOLOGY | Age: 47
End: 2022-05-12

## 2022-05-12 ENCOUNTER — OFFICE VISIT (OUTPATIENT)
Dept: ONCOLOGY | Age: 47
End: 2022-05-12
Payer: MEDICAID

## 2022-05-12 ENCOUNTER — TELEPHONE (OUTPATIENT)
Dept: CASE MANAGEMENT | Age: 47
End: 2022-05-12

## 2022-05-12 VITALS
TEMPERATURE: 97.6 F | DIASTOLIC BLOOD PRESSURE: 67 MMHG | BODY MASS INDEX: 51.91 KG/M2 | OXYGEN SATURATION: 94 % | HEIGHT: 63 IN | WEIGHT: 293 LBS | SYSTOLIC BLOOD PRESSURE: 141 MMHG | HEART RATE: 80 BPM | RESPIRATION RATE: 18 BRPM

## 2022-05-12 DIAGNOSIS — Z85.3 PERSONAL HISTORY OF BREAST CANCER: ICD-10-CM

## 2022-05-12 DIAGNOSIS — Z85.3 PERSONAL HISTORY OF BREAST CANCER: Primary | ICD-10-CM

## 2022-05-12 LAB
ALBUMIN SERPL-MCNC: 3.8 G/DL (ref 3.5–5.2)
ALP BLD-CCNC: 89 U/L (ref 35–104)
ALT SERPL-CCNC: 21 U/L (ref 0–32)
ANION GAP SERPL CALCULATED.3IONS-SCNC: 10 MMOL/L (ref 7–16)
AST SERPL-CCNC: 24 U/L (ref 0–31)
BASOPHILS ABSOLUTE: 0.04 E9/L (ref 0–0.2)
BASOPHILS RELATIVE PERCENT: 0.4 % (ref 0–2)
BILIRUB SERPL-MCNC: 0.5 MG/DL (ref 0–1.2)
BUN BLDV-MCNC: 13 MG/DL (ref 6–20)
CALCIUM SERPL-MCNC: 9 MG/DL (ref 8.6–10.2)
CHLORIDE BLD-SCNC: 104 MMOL/L (ref 98–107)
CO2: 26 MMOL/L (ref 22–29)
CREAT SERPL-MCNC: 0.7 MG/DL (ref 0.5–1)
EOSINOPHILS ABSOLUTE: 0.15 E9/L (ref 0.05–0.5)
EOSINOPHILS RELATIVE PERCENT: 1.5 % (ref 0–6)
GFR AFRICAN AMERICAN: >60
GFR NON-AFRICAN AMERICAN: >60 ML/MIN/1.73
GLUCOSE BLD-MCNC: 95 MG/DL (ref 74–99)
HCT VFR BLD CALC: 42.7 % (ref 34–48)
HEMOGLOBIN: 13.3 G/DL (ref 11.5–15.5)
IMMATURE GRANULOCYTES #: 0.07 E9/L
IMMATURE GRANULOCYTES %: 0.7 % (ref 0–5)
LYMPHOCYTES ABSOLUTE: 1.65 E9/L (ref 1.5–4)
LYMPHOCYTES RELATIVE PERCENT: 16.2 % (ref 20–42)
MCH RBC QN AUTO: 27.8 PG (ref 26–35)
MCHC RBC AUTO-ENTMCNC: 31.1 % (ref 32–34.5)
MCV RBC AUTO: 89.1 FL (ref 80–99.9)
MONOCYTES ABSOLUTE: 0.78 E9/L (ref 0.1–0.95)
MONOCYTES RELATIVE PERCENT: 7.7 % (ref 2–12)
NEUTROPHILS ABSOLUTE: 7.49 E9/L (ref 1.8–7.3)
NEUTROPHILS RELATIVE PERCENT: 73.5 % (ref 43–80)
PDW BLD-RTO: 14.5 FL (ref 11.5–15)
PLATELET # BLD: 320 E9/L (ref 130–450)
PMV BLD AUTO: 9.5 FL (ref 7–12)
POTASSIUM SERPL-SCNC: 4.6 MMOL/L (ref 3.5–5)
RBC # BLD: 4.79 E12/L (ref 3.5–5.5)
SODIUM BLD-SCNC: 140 MMOL/L (ref 132–146)
TOTAL PROTEIN: 7.7 G/DL (ref 6.4–8.3)
WBC # BLD: 10.2 E9/L (ref 4.5–11.5)

## 2022-05-12 PROCEDURE — 80053 COMPREHEN METABOLIC PANEL: CPT

## 2022-05-12 PROCEDURE — G8417 CALC BMI ABV UP PARAM F/U: HCPCS | Performed by: INTERNAL MEDICINE

## 2022-05-12 PROCEDURE — 1036F TOBACCO NON-USER: CPT | Performed by: INTERNAL MEDICINE

## 2022-05-12 PROCEDURE — 99212 OFFICE O/P EST SF 10 MIN: CPT

## 2022-05-12 PROCEDURE — 99215 OFFICE O/P EST HI 40 MIN: CPT | Performed by: INTERNAL MEDICINE

## 2022-05-12 PROCEDURE — 85025 COMPLETE CBC W/AUTO DIFF WBC: CPT

## 2022-05-12 PROCEDURE — 36415 COLL VENOUS BLD VENIPUNCTURE: CPT

## 2022-05-12 PROCEDURE — G8427 DOCREV CUR MEDS BY ELIG CLIN: HCPCS | Performed by: INTERNAL MEDICINE

## 2022-05-12 NOTE — TELEPHONE ENCOUNTER
Met with pt in conjunction with medical oncology visit as social work follow up. Pt is 72-year-old female being managed for breast cancer. Pt's mood appeared euthymic with full affect, she appeared A&Ox4, and she was willing/able to participate in session. She appeared appropriately dressed/groomed and was able to ambulate/transfer without assistance. Pt indicated that she has been doing well. Stated that she has had some periods of increased depression which she described as difficulty getting up for several days at a time. Noted that she has a strong support system of friends. Pt discussed that mother's cancer has progressed and is now in her lungs. Stated that Mother's Day was particularly difficult but explored plans to travel with her children to [place] to visit her mother over the summer. Provided support and reviewed role of oncology SW. Encouraged pt to contact this provider if needs arise or if she wishes to re engage in counseling services.     JULIANA Odonnell, LISW-S  Oncology Social Worker

## 2022-05-12 NOTE — PROGRESS NOTES
Department of [facility] Oncology  Attending Clinic Note    Reason for Visit: Follow-up on a patient with Left Breast Cancer    PCP:  Jelena Shaffer DO    History of Present Illness:  56year old female with Left Breast Cancer    Breast cancer risk factors include family hx on mother's side, mom with breast CA, family hx of colon CA and age and gender    Bilateral Diagnostic Mammogram/Left Breast U/S on 07/01/2021  Left sided ill defined hypoechoic region at the 2 o'clock position measuring 2 cm. On 07/01/2021 Left breast, 2:00 core needle biopsy:   Invasive, moderately differentiated mammary carcinoma (grade 2)     Comment: Microscopic examination shows an invasive carcinoma with linear growth pattern dispersed throughout fibrous stroma.  The tumor has a Bayard histologic score of 3 (tubule formation) +2 (nuclear pleomorphism) +1 (mitotic count) = 6.  The tumor cells are immunoreactive with pankeratin and E-cadherin which favors ductal origin.  The p63 highlights myoepithelial cells in benign ducts.  Intradepartmental consultation is obtained. Breast Cancer Marker Studies:   Estrogen Receptors (ER): 60%   Progesterone Receptors (CA): 60%   Her-2/markie: Negative/1+     CXR 07/09/2021 noted no pneumonia or pleural effusion    Left axillary US 07/14/2021 noted no LN    Left simple mastectomy, sentinel lymph node biopsy, injection of methylene blue dye 08/18/2021  A.  Left breast, total mastectomy:   - Invasive carcinoma with mixed ductal and lobular features, bicentric, grade 2;   - Lobular carcinoma in situ and atypical lobular hyperplasia;   - Rare small ducts showing atypical ductal hyperplasia;   - Fibrocystic changes with usual ductal hyperplasia without atypia, adenosis, columnar cell change, fibroadenomatoid nodule, ductal ectasia, microcysts and stromal fibrosis;   - Changes of previous biopsy sites (2).      B.  [place] lymph nodes #1, biopsy:   - Three of six (3/6) lymph nodes positive for metastatic carcinoma (micrometastasis), see comment. C.  Left breast, new inferior/medial margin, excision:   - Unremarkable fibroadipose tissue and skeletal muscle negative for malignancy. D.  Left breast, additional inferior/lateral margin, excision:   - Unremarkable fibroadipose tissue, negative for malignancy. Comment: Sections of the mastectomy specimen revealed two isolated, similar sized tumor masses. Both are composed of infiltrative neoplastic cells showing single cell or single cell line arrangement. Immunostaining for E-cadherin was performed on sections of two selected tissue blocks (A 7 and A 12).  Some of the invasive neoplastic cells (A 7) show strong membrane positivity for E-cadherin, consistent with ductal differentiation while others (A 12) show absence or aberrant expression   of E-cadherin, indicative of lobular differentiation.  Therefore, this is an invasive carcinoma with mixed ductal and lobular features. The presence of lobular carcinoma in situ (LCIS) and atypical lobular hyperplasia [facility]) is confirmed by immunostaining for p63 and E-cadherin on sections of tissue block A 9 showing the presence of a p63 positive myoepithelial cells layer at the periphery and absence of E-cadherin expression of the epithelial cells inside of the myoepithelial layer. Total six lymph nodes are identified from the submitted tissue in specimen B and each node is serially sectioned.  Histologically, there are rare small foci suspicious for metastatic carcinoma. Immunostaining for pankeratin on sections of selected nodes (B2, B4, B5, B12 and B13)   was then performed.  Positively stained foci of micrometastasis (0.2-2 mm) are identified on sections of B2, B4/B5 (the same lymph node) and B13.  Intradepartmental consultation is obtained. CANCER CASE SUMMARY   Procedure:  Total mastectomy   Specimen Laterality: Left   TUMOR   Tumor Site: 2:00 (between upper-outer and lower-outer quadrant) Histologic Type: Invasive carcinoma with mixed ductal and lobular   features   Histologic Grade: [place] Histologic Score        Glandular/tubular differentiation: Score 3        Nuclear pleomorphism: Score 2        Right articular rate: Score 1        Overall grade: Grade 2 (scores of 6)   Tumor Size: 21 x 15 x 10 mm   Tumor Focality: Multiple foci of invasive carcinoma        Number of foci: 2        Sizes of individual foci in millimeters: 20 x 15 x 7 mm        Ductal Carcinoma In Situ (DCIS): Not identified        Lobular Carcinoma In Situ (LCIS): Present   Lymphovascular invasion: Not identified   Dermal lymphovascular invasion: Not identified   Microcalcifications: Not identified   Treatment effect: No known presurgical therapy   MARGINS   All margins negative for invasive carcinoma    Distance from invasive carcinoma to closest margin: 6.0 cm from tumor #2; 6.5 cm from tumor #1    Closest margin to invasive carcinoma: Superior/anterior (for tumor #2); posterior (for tumor #1)   REGIONAL LYMPH NODES    Tumor present in regional lymph nodes    Number of lymph nodes with macrometastases (>2 mm): 0    Number of lymph nodes with micrometastasis (0.2-2 mm): 3    Number of lymph nodes with isolated tumor cells 0    Size of largest shelia metastatic deposit: 2 mm    Extranodal extension: Not identified   Total number of lymph nodes examined: 6   Number of sentinel nodes examined: 6   DISTANT METASTASIS: Not applicable   PATHOLOGIC STAGE (pTNM, AJCC 8th Edition)   TNM descriptors: m (multiple foci of invasive carcinoma)   mpT2 pN1mi     Oncotype Recurrence score:15    Left sided IDC/ILC T2N1mi M0 ER/ID positive HER2 negative. CT chest/abdomen/pelvis 09/14/2021 Postsurgical changes in the left breast with the fluid collection and inflammation as noted likely postoperative hematoma/seroma. No evidence of metastatic disease. Non-specific 8 mm hypodense lesion at the head of the pancreas; HBP team following.   CT abdomen 03/15/2022: Stable 9 mm low-density indeterminate lesion in the head of the pancreas which is stable and unchanged when compared to prior examinations. Diffuse fatty infiltration identified of the liver  Stable pancreatic head cyst. US in 6 months due to hepatic steatosis  CT scan yearly for 5 years (present since 2020 - year 3/5)  NM Bone Scan 09/14/2021 without metastatic disease. NCCN guidelines reviewed  TAILORx suggested that in women ? 50 years and with an intermediate RS (11-25), chemotherapy plus endocrine therapy was associated with a lower rate of distant recurrence relative to endocrine therapy alone, particularly for those with high-intermediate scores (21 to 25) or clinical risk features. However, this was based only on exploratory subset analyses, and moreover, some of the benefit hypothetically may have been due to ovarian suppression in premenopausal women. Ovarian suppression was not assessed as a treatment strategy in this trial, however. In light of limitations in the data, either chemotherapy and endocrine therapy, or endocrine therapy only (with ovarian suppression in premenopausal women) are acceptable options. [address] 7.6 09/22/2021 Estradiol 45.9 09/22/2021    She was leaning more to proceed with endocrine therapy only with ovarian suppression  Tamoxifen was started on 09/23/2021. Evaluated by Dalton Villagomez for BSO; Endometrial biopsy BSO done on 11/11/2021  A. ENDOMETRIUM, ENDOMETRIAL BIOPSY:   - DYSSYNCHRONOUS SECRETORY ENDOMETRIUM. - BENIGN ENDOCERVICAL POLYP AND BENIGN SQUAMOUS MUCOSA. B. OVARY AND FALLOPIAN TUBES, RIGHT AND LEFT, BILATERAL SALPINGECTOMY:   - OVARIES IDENTIFIED, BILATERALLY; HEMORRHAGIC CORPUS LUTEAL CYSTS. - COMPLETE CROSS-SECTION OF FALLOPIAN TUBES IDENTIFIED, BILATERALLY    RT was completed on 01/11/2022  Arimidex 1 mg po daily was started on 11/24/2021 with good tolerance so far. Mild hot flashes manageable.  Recent EGD/Colonoscopy 05/11/2022 Colonoscopy normal. Repeat in 5 years due to family history. EGD 05/11/2022 gastritis. Pathology pending    Review of Systems;  CONSTITUTIONAL: Mild hot flashes manageable  ENMT: Eyes: No diplopia; Nose: No epistaxis. Mouth: No sore throat. RESPIRATORY: No hemoptysis, shortness of breath, cough. CARDIOVASCULAR: No chest pain, palpitations. GASTROINTESTINAL: see HPI (EGD/Colonoscopy 05/11/2022)  GENITOURINARY: No dysuria, urinary frequency, hematuria. NEURO: No syncope, presyncope, headache. Remainder:  ROS NEGATIVE    Past Medical History:      Diagnosis Date    Abscess of right breast 06/19/2018    Anxiety     Arthritis     Breast abscess 07/11/2018    Cancer (HCC)     Fatigue     GERD (gastroesophageal reflux disease)     Hyperlipidemia     Morbid obesity due to excess calories ([facility] [address]     Obesity 06/04/2013     Medications:  Reviewed and reconciled. Allergies: Allergies   Allergen Reactions    Vancomycin Nausea And Vomiting     Red man syndrome, had high fever and upset stomach     Physical Exam:  BP (!) 141/67   Pulse 80   LMP 10/24/2021   SpO2 94%   GENERAL: Alert, oriented x 3, not in acute distress. Lungs: CTA Martin  CVS: RRR  EXTREMITIES: Without clubbing or cyanosis or edema.   ECOG 0    Lab Results   Component Value Date    WBC 10.2 05/12/2022    HGB 13.3 05/12/2022    HCT 42.7 05/12/2022    MCV 89.1 05/12/2022     05/12/2022     Lab Results   Component Value Date     05/12/2022    K 4.6 05/12/2022     05/12/2022    CO2 26 05/12/2022    BUN 13 05/12/2022    CREATININE 0.7 05/12/2022    GLUCOSE 95 05/12/2022    CALCIUM 9.0 05/12/2022    PROT 7.7 05/12/2022    LABALBU 3.8 05/12/2022    BILITOT 0.5 05/12/2022    ALKPHOS 89 05/12/2022    AST 24 05/12/2022    ALT 21 05/12/2022    LABGLOM >60 05/12/2022    GFRAA >60 05/12/2022     Impression/Plan:  57 y/o female with Left IDC/ILC breast cancer, T2N1mi(sn)M0 ER+TX+HER2-, grade 2, Stage IB  Genetic Testing: Negative    Left simple mastectomy, sentinel lymph node biopsy, injection of methylene blue dye 08/18/2021  CANCER CASE SUMMARY   Procedure: Total mastectomy   Specimen Laterality: Left   TUMOR   Tumor Site: 2:00 (between upper-outer and lower-outer quadrant)   Histologic Type: Invasive carcinoma with mixed ductal and lobular features   Histologic Grade: [place] Histologic Score        Glandular/tubular differentiation: Score 3        Nuclear pleomorphism: Score 2        Right articular rate: Score 1        Overall grade: Grade 2 (scores of 6)   Tumor Size: 21 x 15 x 10 mm   Tumor Focality: Multiple foci of invasive carcinoma        Number of foci: 2        Sizes of individual foci in millimeters: 20 x 15 x 7 mm        Ductal Carcinoma In Situ (DCIS): Not identified        Lobular Carcinoma In Situ (LCIS): Present   Lymphovascular invasion: Not identified   Dermal lymphovascular invasion: Not identified   Microcalcifications: Not identified   Treatment effect: No known presurgical therapy   MARGINS   All margins negative for invasive carcinoma   Distance from invasive carcinoma to closest margin: 6.0 cm from tumor #2; 6.5 cm from tumor #1   Closest margin to invasive carcinoma: Superior/anterior (for tumor #2); posterior (for tumor #1)   REGIONAL LYMPH NODES    Tumor present in regional lymph nodes    Number of lymph nodes with macrometastases (>2 mm): 0    Number of lymph nodes with micrometastasis (0.2-2 mm): 3    Number of lymph nodes with isolated tumor cells 0    Size of largest shelia metastatic deposit: 2 mm    Extranodal extension: Not identified   Total number of lymph nodes examined: 6   Number of sentinel nodes examined: 6   DISTANT METASTASIS: Not applicable   PATHOLOGIC STAGE (pTNM, AJCC 8th Edition)   TNM descriptors: m (multiple foci of invasive carcinoma)   mpT2 pN1mi     Left sided IDC/ILC T2N1mi M0 ER/AZ positive HER2 negative.      CT chest/abdomen/pelvis 09/14/2021 Postsurgical changes in the left breast with the fluid collection and inflammation as noted likely postoperative hematoma/seroma. No evidence of metastatic disease. Non-specific 8 mm hypodense lesion at the head of the pancreas; HBP team following. CT abdomen 03/15/2022: Stable 9 mm low-density indeterminate lesion in the head of the pancreas which is stable and unchanged when compared to prior examinations. Diffuse fatty infiltration identified of the liver  Stable pancreatic head cyst. US in 6 months due to hepatic steatosis  CT scan yearly for 5 years (present since 2020 - year 3/5)  NM Bone Scan 09/14/2021 without metastatic disease. NCCN guidelines reviewed  TAILORx suggested that in women ? 50 years and with an intermediate RS (11-25), chemotherapy plus endocrine therapy was associated with a lower rate of distant recurrence relative to endocrine therapy alone, particularly for those with high-intermediate scores (21 to 25) or clinical risk features. However, this was based only on exploratory subset analyses, and moreover, some of the benefit hypothetically may have been due to ovarian suppression in premenopausal women. Ovarian suppression was not assessed as a treatment strategy in this trial, however. In light of limitations in the data, either chemotherapy and endocrine therapy, or endocrine therapy only (with ovarian suppression in premenopausal women) are acceptable options. [address] 7.6 09/22/2021 Estradiol 45.9 09/22/2021    She was leaning more to proceed with endocrine therapy only with ovarian suppression;   Tamoxifen was started on 09/23/2021. Evaluated by Benitez Brown [address] for BSO; Endometrial biopsy BSO done on 11/11/2021  A. ENDOMETRIUM, ENDOMETRIAL BIOPSY:   - DYSSYNCHRONOUS SECRETORY ENDOMETRIUM. - BENIGN ENDOCERVICAL POLYP AND BENIGN SQUAMOUS MUCOSA. B. OVARY AND FALLOPIAN TUBES, RIGHT AND LEFT, BILATERAL SALPINGECTOMY:   - OVARIES IDENTIFIED, BILATERALLY; HEMORRHAGIC CORPUS LUTEAL CYSTS.    - COMPLETE CROSS-SECTION OF FALLOPIAN TUBES IDENTIFIED, BILATERALLY    RT was completed on 01/11/2022  Arimidex 1 mg po daily was started on 11/24/2021 with good tolerance so far. Mild hot flashes manageable. DEXA scan 01/20/2022 normal bone mineral density by WHO criteria  MBI testing 03/10/2022: No evidence of neoplasm  Imaging reviewed. Labs reviewed. Recent EGD/Colonoscopy reviewed. HARRISON  Continue Arimidex Ca,VitD    RTC 4 Months with labs.  Mammogram July 2022 05/12/2022  Ranjit Myers MD

## 2022-05-12 NOTE — TELEPHONE ENCOUNTER
Met with patient during her follow up appointment with Dr. Conner Yuan today. Patient completed her active treatment for her breast cancer. Patient appears in good spirits. Provided support and encouragement. Instructed patient in detail on her Cancer Treatment Summary and Survivorship Care Plan. Copy sent to patient's PCP Katya Ferris DO. Provided patient with [facility] Survivorship booklet,Local Resources for Cancer Survivors and Oncology Nutrition booklet. I also provided patient with written information on Lymphedema, and [employer], [employer], [employer] and my business card. Patient is scheduled for follow up appointment in September. Instructed to call with any questions or concerns. Verbally agreed. Patient appreciative of visit and information. I will discharge patient from a navigation standpoint.

## 2022-05-16 ENCOUNTER — TELEPHONE (OUTPATIENT)
Dept: SURGERY | Age: 47
End: 2022-05-16

## 2022-05-16 NOTE — TELEPHONE ENCOUNTER
I called Kang Green and went over her pathology results with her. Advised her to stay on the protonix for now.

## 2022-05-26 ENCOUNTER — HOSPITAL ENCOUNTER (OUTPATIENT)
Dept: OCCUPATIONAL THERAPY | Age: 47
Setting detail: THERAPIES SERIES
Discharge: HOME | End: 2022-05-26
Payer: MEDICAID

## 2022-05-26 PROCEDURE — 97165 OT EVAL LOW COMPLEX 30 MIN: CPT | Performed by: OCCUPATIONAL THERAPIST

## 2022-05-26 NOTE — PROGRESS NOTES
OCCUPATIONAL THERAPY INITIAL LYMPHEDEMA [address], [facility] [address]  [address] [place] [address]  [address]              Phone: [phone]             Fax: [phone]       Date:  2022  Initial Evaluation Date: 2022       Evaluating Therapist: WING Le/LASHON, SEGUNDOT    Patient Name:  Jeet Villafuerte    :  1975    Restrictions/Precautions:  BMI, fall risk  Diagnosis:  Lymphedema (I89.0 [ICD-10-CM])       Date of Surgery/Injury: 21: LEFT BREAST SIMPLE MASTECTOMY WITH SENTINEL LYMPH NODE BIOPSY    Insurance/Certification information:  [facility] PL: [id number]  Plan of care signed (Y/N): N  Visit# / total visits: evaluation    Referring Practitioner/NPI#:   Tessy Miller, JOAQUIM;[id number]   Specific Practitioner Orders: Evaluation and treatment    Assessment of current deficits   []Pain  []Skin Integrity   [x]Lymphedema   []Functional transfers/mobility   []ADLs   []Strength    []Cognition  []IADLs   []Safety Awareness   []  Motor Endurance    []Fine Motor Coordination   []Balance   []Vision/perception  []Sensation []Gross Motor Coordination  []ROM     OT PLAN OF CARE   OT POC based on physician orders, patient diagnosis and results of clinical assessment    Frequency/Duration:  1-2x/week for 12 treatment sessions from 22 through 22   Projected units Requested:48    Specific OT Treatment to include:     Plan of Care: 08945, 73992, 64284, 13839    [x]97140-Manual Lymph Drainage and Combined Decongestive Therapy  [x]01044- Therapeutic Exercise/ HEP including Education  [Z]46469- Skin Care Education/ADLs/self-care/bandaging  [x]82936-Nmcxoll Multilayer Short Stretch Compression Bandaging/ Self MLD  [x]Education for Lymphedema Risks/Precautions     [x] Caregiver Education    []other:      Patient Specific Goal: Increase confidence, ROM and strength      STG: 3 weeks  1-Patient will demonstrate knowledge and understanding for lymphedema precautions, skin care and self-management to decrease progression of lymphedema and risk of infection. 2-Patient will present with decreased limb volume in the involved extremity from moderate to mild for improved comfort and indep in ADLs. 3-Patient will demonstrate compliance with compression therapy for independent management of lymphedema. 4-Pt will perform HEP with min assistance to reduce swelling and improve ROM in L UE extremity flex to 150° abduction 160°for participation in ADLs and client centered tasks. LT weeks  1-Patient will be independent with self-management of lymphedema including understanding garment wear schedule, self-compression bandaging, HEP and self-care. 2-Patient will be fitted for appropriate compression garments for long term management of lymphedema. 3- Patient will present with decreased limb volume in the involved extremity from mild to min for improved functional mobility.            Past Medical History:   Past Medical History:   Diagnosis Date    Abscess of right breast 2018    Anxiety     Arthritis     Breast abscess 2018    Cancer ([address]     Fatigue     GERD (gastroesophageal reflux disease)     Hyperlipidemia     Morbid obesity due to excess calories ([address]     Obesity 2013     Past Surgical History:   Past Surgical History:   Procedure Laterality Date    ANKLE FRACTURE SURGERY  2012    right with hardware, subsequently removed   [facility] Left 2020    BREAST BIOPSY Right 2018    CHOLECYSTECTOMY          COLONOSCOPY N/A 2022    COLORECTAL CANCER SCREENING, NOT HIGH RISK performed by Kalyan Ramirez MD at [facility]  2014    retrograde pyelogram, ureteroscopy, laser lithotripsy, left stent placement    FRACTURE SURGERY      ankle    MASTECTOMY Left 2021    LEFT BREAST SIMPLE MASTECTOMY WITH SENTINEL LYMPH NODE BIOPSY performed by Kalyan Ramirez MD at [address] [place] [address]  NC DRAINAGE OF HEMATOMA/FLUID Right 6/20/2018    RIGHT BREAST INCISION AND DRAINAGE POSSIBLE WOUND VAC performed by Bridget Waller MD at [facility] 80 EXPLO/DRAIN BREAST ABSCESS Right 7/11/2018    INCISION AND DRAINAGE RIGHT BREAST, WITH APPLICATION OF WOUND VAC (PATIENT HSS WOUND VAC WILL BRING IT)  performed by Bridget Waller MD at [facility] 22 SALPINGO-OOPHORECTOMY  11/11/2021    Lap BSO, EMBx    SKIN GRAFT  1991    Left leg    UPPER GASTROINTESTINAL ENDOSCOPY      UPPER GASTROINTESTINAL ENDOSCOPY N/A 5/11/2022    EGD BIOPSY performed by Bridget Waller MD at [address] [place] [address] Rd LEFT Left 9/1/2021    US BREAST CYST ASPIRATION LEFT 9/1/2021 [facility]    US BREAST CYST ASPIRATION LEFT Left 9/7/2021    US BREAST CYST ASPIRATION LEFT [facility]    US BREAST CYST ASPIRATION LEFT Left 9/17/2021    US BREAST CYST ASPIRATION LEFT 9/17/2021 [facility]    US BREAST CYST ASPIRATION LEFT Left 10/22/2021    US BREAST CYST ASPIRATION LEFT 10/22/2021 [facility]    US BREAST NEEDLE BIOPSY LEFT  9/17/2020    US BREAST NEEDLE BIOPSY LEFT 9/17/2020 [facility]    US BREAST NEEDLE BIOPSY LEFT Left 7/1/2021    US BREAST NEEDLE BIOPSY LEFT 7/1/2021 [facility]       [x]Lymph node removal: 6 from left breast Aug. 2021  [x]Radiation Therapy: yes with burining, skin discoloration, scaring, seromas-multiple with drains  []Chemotherapy: none  []History of Cellulitis/Infection: none  []Family history of lymphedema: none  [x]Previous treatment for lymphedema: educated on skin precautions  [x]Current compression garment use: compression bra that she has worn only a few times with poor fit. Over 3year old    Reason for Referral: Pt was referred to [facility] due to intractable lymphedema of the L breast, unrelieved by current management.   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    Pain Level: L flank and shoulder; moderate, aching, heaviness, hot, pressure, sharp and shooting  Pitting Edema: 1+ to Left lateral and medical breast, 2+ to left flank and axilla  Fibrotic tissue: To lower breast lateral and medial  Skin Integrity: darkened skin to posterior breast, scars, puckering, warmth    Vision: WFL        Hearing: WFL     ADL  Feeding:  indep  Grooming:  indep  Bathing:  indep  UE Dressing:  indep but difficult   LE Dressing:  indep  Toileting:  indep  Transfer:  indep    UE ASSESSMENT:   Hand Dominance is Right  ROM:   Right Flex:150° Abduction:165° Internal Rotation:WFL  External Rotation: WFL  Left Flex: 142°  Abduction:151° Internal Rotation:WFL  External Rotation:WFL  Strength generally:4+/5MMT L UE    Sensation: numbness and heaviness L UE    Lymphedema Measurements:  Lymphedema Upper Extremity Measurements    Measurements are made in 4cm increments and are circumferential. BOLD are larger measurements.       CM Follow up #4  Left -  Follow up #3  Left -  Follow up #2  Left -  Follow up #1  Left -  Evaluation  Left - 5/26/2022    Follow up #4  Right -  Follow up #3  Right -  Follow up #2  Right -  Follow up #1 Right -  Evaluation Right -  5/26/2022       wrist     18.25     18.25   palm     21.25     21.   4cm     21.75     22.25   8cm     26.     29.25   12cm     29.5     30.75   16cm     31.     30.25   20cm     31.25     34.75   24cm     35.5     38.25   28cm     41.     43.75   32cm     48.     50.5   36cm     52.     53.   40cm             44cm Fingers are measured in cm and between mp and pip and between pip and dip each digit. Digit Follow up #4  Left -  Follow up #3  Left -  Follow up #2  Left -  Follow up #1  Left -  Evaluation   Left - 5/26/2022    Follow up #4  Right -  Follow up #3  Right -  Follow up #2  Right -  Follow up #1  Right -  Evaluation Right - 5/26/2022      First Digit     6.5     6.75   Second Digit     7. 6.75   Third Digit     6.5     6.75   Fourth Digit     6.5     6.75   Fifth Digit     5.75     5.75   Functional Outcome Measurements:  -Lymphedema Life Impact Scale Score:  53  Percent Impairment:  74% CL    -Functional Assessment of Chronic Illness Therapy Fatigue Scale Version 4:14/52; severe fatigue     Eval Complexity: Low  Profile and History- Min  Assessment of Occupational Performance and Identification of Deficits- 1-3   Clinical Decision Making- min    Rehab Potential:                                 [x] Good  [] Fair  [] Poor        Suggested Professional Referral:       [x] No  [] Yes:  Barriers to Goal Achievement:          [x] No  [] Yes:  Domestic Concerns:                           [x] No  [] Yes:       Patient. Education:  [x] Plans/Goals, Risks/Benefits discussed  [] Home exercise program  Method of Education: [x] Verbal  [] Demo  [x] Written  Comprehension of Education:  [x] Verbalizes understanding. [] Demonstrates understanding. [x] Needs Review. [] Demonstrates/verbalizes understanding of HEP/Ed previously given. Patient understands diagnosis/prognosis and consents to treatment, plan and goals: [x] Yes    [] No   This patient demonstrates the ability to follow the plan of care and progress towards goals. Rehab potential is good       Assessment: Secondary Lymphedema, Stage 2, Affecting the L breast and Extremity.   Patient will benefit from a Complete Decongestive Therapy protocol using manual lymphatic drainage techniques, skilled multilayer compression bandaging using short stretch

## 2022-06-15 ENCOUNTER — HOSPITAL ENCOUNTER (OUTPATIENT)
Dept: OCCUPATIONAL THERAPY | Age: 47
Setting detail: THERAPIES SERIES
End: 2022-06-15
Payer: MEDICAID

## 2022-06-15 NOTE — PROGRESS NOTES
Occupational Therapy    [facility]CCUPATIONAL THERAPY PROGRESS NOTE  [facility] [address]  [address]              Phone: [phone]             Fax: [phone]       Date: 6/15/2022  Patient Name: Manjit Huynh        : 1975       [] Pt Refusal           [x] Pt Unavailable due to:  Pt rescheduled for the next day.          Maribell Lawton, OT, CLT Date: 6/15/2022

## 2022-06-16 ENCOUNTER — HOSPITAL ENCOUNTER (OUTPATIENT)
Dept: OCCUPATIONAL THERAPY | Age: 47
Setting detail: THERAPIES SERIES
Discharge: HOME | End: 2022-06-16
Payer: MEDICAID

## 2022-06-16 PROCEDURE — 97140 MANUAL THERAPY 1/> REGIONS: CPT | Performed by: OCCUPATIONAL THERAPIST

## 2022-06-16 PROCEDURE — 97530 THERAPEUTIC ACTIVITIES: CPT | Performed by: OCCUPATIONAL THERAPIST

## 2022-06-16 NOTE — PROGRESS NOTES
Occupational Therapy    OCCUPATIONAL THERAPY PROGRESS NOTE  [place] [address]              Phone: [phone]             Fax: [phone]       Date:  2022         Initial Evaluation Date: 2022                  Evaluating Therapist: WING Chin/LASHON CLT     Patient Name:  Olinda Plata                        :  1975     Restrictions/Precautions:  BMI, fall risk  Diagnosis:  Lymphedema (I89.0 [ICD-10-CM])                                                          Date of Surgery/Injury: 21: LEFT BREAST SIMPLE MASTECTOMY WITH SENTINEL LYMPH NODE BIOPSY     Insurance/Certification information:  [facility] PL: [id number]  Plan of care signed (Y/N): yes-22  Visit# / total visits: evaluation +1     Referring Practitioner/NPI#: CINDY Madsen;[id number]   Specific Practitioner Orders: Evaluation and treatment     Assessment of current deficits   []? Pain  []? Skin Integrity   [x]? Lymphedema   []? Functional transfers/mobility   []? ADLs   []? Strength    []? Cognition  []? IADLs   []? Safety Awareness   []? Motor Endurance    []? Fine Motor Coordination   []? Balance   []? Vision/perception  []? Sensation []? Gross Motor Coordination  []? ROM      OT PLAN OF CARE   OT POC based on physician orders, patient diagnosis and results of clinical assessment     Frequency/Duration:  1-2x/week for 12 treatment sessions from 22 through 22   Projected units Requested:48  Approved: 13     Specific OT Treatment to include:      Plan of Care: 23014, [id number], 80947, 42103     [x]? 97140-Manual Lymph Drainage and Combined Decongestive Therapy  [x]? 99688- Therapeutic Exercise/ HEP including Education  [x]?78882- Skin Care Education/ADLs/self-care/bandaging  [x]?42337-Pbkwgin Multilayer Short Stretch Compression Bandaging/ Self MLD  [x]? Education for Lymphedema Risks/Precautions     [x]?  Caregiver Education []?other:        Patient Specific Goal: Increase confidence, ROM and strength     STG: 3 weeks  1-Patient will demonstrate knowledge and understanding for lymphedema precautions, skin care and self-management to decrease progression of lymphedema and risk of infection. 6/16:Pt presents with fair- understanding of lymphedema treatment and/or precautions, skin care or infection prevention. Spoke about potential additional surgeries and its impact on lymphedema. Therapist continued patient education regarding the above for safety and for self management. Patient able to verbalize understanding. Patient progressing toward goal.  2-Patient will present with decreased limb volume in the involved extremity from moderate to mild for improved comfort and indep in ADLs. 6/16: Initiated education on self MLD with demonstration, hands on training and hands outs. Pt was able to complete in sitting and supine, asking appropriate questions throughout. Completed SMLD followed by MLD sequence with cervical/shoulder AROM exercised, short neck sequence, left inguinal, axillary-inguinal and L breast with to improve tissue extensibility; increase range of motion; induce relaxation; mobilize or manipulate soft tissue and joints; modulate pain; and reduce edema for carry over into self care and client centered tasks. Pt to continue to trial over the weekend and return next week with questions and concerns. Fair tolerance throughout session with pain in axilla and upper breast. Min reported decreased discomfort and increased ease with AROM following. To continue addressing. 3-Patient will demonstrate compliance with compression therapy for independent management of lymphedema. 6/16: Therapist did fax face sheet to Jasiel Grissom  for fitting for a compression bra. Pt provided with phone number to follow up with vendor prior to appointment next week. Educated pt in importance of compression management.  Pt arrived in sports bra, but did seem to be digging in at arm pit. 4-Pt will perform HEP with min assistance to reduce swelling and improve ROM in L UE extremity flex to 150° abduction 160°for participation in ADLs and client centered tasks.    : Provided pt with information regarding weekend activity with focus on self MLD and to incorporate L UE ROM next session. LT weeks  1-Patient will be independent with self-management of lymphedema including understanding garment wear schedule, self-compression bandaging, HEP and self-care. 2-Patient will be fitted for appropriate compression garments for long term management of lymphedema. 3- Patient will present with decreased limb volume in the involved extremity from mild to min for improved functional mobility.        Lymphedema Measurements:  Lymphedema Upper Extremity Measurements     Measurements are made in 4cm increments and are circumferential. BOLD are larger measurements.       CM Follow up #4  Left -  Follow up #3  Left -  Follow up #2  Left -  Follow up #1  Left -  Evaluation  Left - 2022    Follow up #4  Right -  Follow up #3  Right -  Follow up #2  Right -  Follow up #1 Right -  Evaluation Right -  2022       wrist         18.25         18.25   palm         21.25         21.   4cm         21.75         22.25   8cm         26.         29.25   12cm         29.5         30.75   16cm         31.         30.25   20cm         31.25         34.75   24cm         35. 5         38.25   28cm         41.         43.75   32cm         48.         50.5   36cm         52.         53.   40cm                       44cm                          Fingers are measured in cm and between mp and pip and between pip and dip each digit.     Digit Follow up #4  Left -  Follow up #3  Left -  Follow up #2  Left -  Follow up #1  Left -  Evaluation   Left - 2022    Follow up #4  Right -  Follow up #3  Right -  Follow up #2  Right -  Follow up #1  Right -  Evaluation Right - 2022      First Digit         6.5         6.75   Second Digit         7.         6.75   Third Digit         6.5         6.75   Fourth Digit         6.5         6.75   Fifth Digit         5.75         5.75     Restrictions/Precautions:  [] No blood pressure/blood draw in: [x] Left Upper Extremity     [] Right Upper Extremity        [] Fall Risk       Intervention:   []Other    Pain:  [] No    [x] Yes  Location: L superior breast and axilla  Pain Rating (0-10 pain scale): 8/10 with MLD and ROM  Pain Description: sharp, tight  Interruption of Treatment [x] Yes with modifications provided  [] No    Came to Clinic:  [] Bandaged    [x]Unbandaged    [] With Stocking     [] With Sleeve     [] Kinesiotaped    [] Acquanetta Flatness    [x] With Alternative Compression: sports bra    Skin Integrity:  [x] Normal [] Dry  []Rough []Moist []Rash  Location of problem area/s: L breast and axilla  [] Wound: Location/Description   [x] Fibrosis: lower breast with scar tissue  [] Keratosis: Location/Description  [] Papillomas: Location/Description  [] Other    Color:  [] Normal [] Mottled [x] Flushed [] Other/Description  Location of problem area/s:L lower breast  Edema:  Edema Location: L breast-lower  [] 1+ Edema [] 2+ Edema  [x] 3+ Edema [] 4+ Edema  Edema Location: L breast- upper  [] 1+ Edema [x] 2+ Edema  [] 3+ Edema [] 4+ Edema  Edema Location: L axilla  [] 1+ Edema [x] 2+ Edema  [] 3+ Edema [] 4+ Edema    Subjective:Pt reports increased discomfort in arm, shoulder and upper breast.   Objective:  [] Measurement [x]Manual Lymph Drainage  []Bandaging   []Kinesiotaping [x] Education    [x]Self Massage   []Self Bandaging [x] Exercise    []Wound Care  [x]Hygiene  [] Other    Assessment:  Knowledge of home program:   [] Good [x] Fair-  [] Poor  Patient is programming at home:             [] Yes  [x] No  Family is assisting with programming: [] Yes  [x] No  Home programming is consistent:             [] Yes  [x] No  Other:   Response to treatment: Fair- response with treat due to heat, humidity and increased swelling to upper breast.   Instructions for patient:   [x] Verbal [x] Written  Instructions addressed: Pt to complete SMLD program over the weekend and to call DME to schedule bra fitting.      Time In: 1210            Time Out: 1305                     Timed Code Treatment Minutes: 55 minutes      CODE  Minutes  Units   48262 OT Eval Low     09226 OT Eval Medium     92601 OT Eval High     30813 Fluidotherapy     38696 Manual 26 2   93837 Therapeutic Ex     66071 Therapeutic Activity 29 2   66426 ADL/COMP Tech Train     89434 Neuromuscular Re-Ed     70954 OrthoManagementTraining     68871 Paraffin     92630 Electrical Stim - Attended     88460 Iontophoresis     13503 Ultrasound      Other     Total  55 4       Plan: Continue to address:  []Bandaging  [] Self Bandaging/Family Assist  [x]MLD  [x]Self Massage  [x]Exercise  [x]Education  [x]Obtain referral for garments  []Medical Hold  []Discharge to [facility]ING Onofre/LASHON MENSAH

## 2022-06-20 ENCOUNTER — TELEPHONE (OUTPATIENT)
Dept: BREAST CENTER | Age: 47
End: 2022-06-20

## 2022-06-20 NOTE — TELEPHONE ENCOUNTER
JEFFREY Muniz contacted patient to schedule an appointment for follow up. RN scheduled pt for 09/16/22 @ 10:30am with  in [facility]. Patient to have right mammogram @ 10am. Patient verbalized appointment date, time and location. RN verified number that was good to do reminder call was the one listed in chart.          Electronically signed by Lashaun Kilgore RN on 6/20/22 at 9:06 AM EDT

## 2022-06-20 NOTE — TELEPHONE ENCOUNTER
----- Message from Reid Chance MA sent at 1/28/2022 11:46 AM EST -----   breast-Pt needs appt in 6 months personal hx left breast cancer- mastectomy DOS 8/18/2021 discuss colonoscopy-Oncologist .              Pt last seen 01/28/2022      Electronically signed by Reid Chance MA on 1/28/22 at 11:47 AM EST

## 2022-06-21 ENCOUNTER — HOSPITAL ENCOUNTER (OUTPATIENT)
Dept: OCCUPATIONAL THERAPY | Age: 47
Setting detail: THERAPIES SERIES
Discharge: HOME | End: 2022-06-21
Payer: MEDICAID

## 2022-06-21 NOTE — PROGRESS NOTES
OCCUPATIONAL THERAPY PROGRESS NOTE  [address]  [address]              Phone: [phone]             Fax: [phone]       Date: 2022  Patient Name: José Miranda        : 1975       [] Pt Refusal           [x] Pt Unavailable due to: No show this date.          Raimundo Guardado OTR/LASHON Date: 2022

## 2022-06-23 ENCOUNTER — HOSPITAL ENCOUNTER (OUTPATIENT)
Dept: OCCUPATIONAL THERAPY | Age: 47
Setting detail: THERAPIES SERIES
Discharge: HOME | End: 2022-06-23
Payer: MEDICAID

## 2022-06-23 NOTE — PROGRESS NOTES
[facility] THERAPY [facility]  [facility] [address]  [address]              Phone: [phone]             Fax: [phone]       Date: 2022  Patient Name: Vargas Nicholson        : 1975       [] Pt Refusal           [x] Pt Unavailable due to: Pt canceled due to not feeling well this session. Did provide her the option to change therapists for more availability for days and times.          Radha Hopkins, OTR/L Date: 2022

## 2022-06-28 ENCOUNTER — HOSPITAL ENCOUNTER (OUTPATIENT)
Dept: OCCUPATIONAL THERAPY | Age: 47
Setting detail: THERAPIES SERIES
Discharge: HOME | End: 2022-06-28
Payer: MEDICAID

## 2022-06-28 NOTE — PROGRESS NOTES
Occupational Therapy    OCCUPATIONAL THERAPY DISCHARGE NOTE  CHILDRENS [address]  [address]              Phone: [phone]             Fax: [phone]       Date:  2022    Initial Evaluation Date: 2022                  Evaluating Therapist: TAWNYA Duenas CLT     Patient Name:  Twyla Hodgkins                        :  1975     Restrictions/Precautions:  BMI, fall risk  Diagnosis:  Lymphedema (I89.0 [ICD-10-CM])                                                          Date of Surgery/Injury: 21: LEFT BREAST SIMPLE MASTECTOMY WITH SENTINEL LYMPH NODE BIOPSY     Insurance/Certification information:  [facility] PL: [id number]  Plan of care signed (Y/N): yes-22  Visit# / total visits: evaluation +1     Referring Practitioner/NPI#: XUAN Goins;[id number]   Specific Practitioner Orders: Evaluation and treatment     Assessment of current deficits   []? Pain  []? Skin Integrity   [x]? Lymphedema   []? Functional transfers/mobility   []? ADLs   []? Strength    []? Cognition  []? IADLs   []? Safety Awareness   []? Motor Endurance    []? Fine Motor Coordination   []? Balance   []? Vision/perception  []? Sensation []? Gross Motor Coordination  []? ROM      OT PLAN OF CARE   OT POC based on physician orders, patient diagnosis and results of clinical assessment     Frequency/Duration:  1-2x/week for 12 treatment sessions from 22 through 22   Projected units Requested:48  Approved: 13     Specific OT Treatment to include:      Plan of Care: 88390, [id number], 87721, 18496     [x]? 97140-Manual Lymph Drainage and Combined Decongestive Therapy  [x]? 23040- Therapeutic Exercise/ HEP including Education  [x]?64497- Skin Care Education/ADLs/self-care/bandaging  [x]?96148-Ntujpyj Multilayer Short Stretch Compression Bandaging/ Self MLD  [x]? Education for Lymphedema Risks/Precautions     [x]? Caregiver Education    []? other:        Patient Specific Goal: Increase confidence, ROM and strength     STG: 3 weeks  1-Patient will demonstrate knowledge and understanding for lymphedema precautions, skin care and self-management to decrease progression of lymphedema and risk of infection. 6/28:Pt presents with fair- understanding of lymphedema treatment and/or precautions, skin care or infection prevention. Spoke about potential additional surgeries and its impact on lymphedema. Therapist continued patient education regarding the above for safety and for self management. Patient able to verbalize understanding. Goal not met. 2-Patient will present with decreased limb volume in the involved extremity from moderate to mild for improved comfort and indep in ADLs.   6/28: Review of self MLD with demonstration, hands on training and hands outs. Pt was able to complete in sitting and supine, asking appropriate questions throughout. Completed SMLD followed by MLD sequence with cervical/shoulder AROM exercised, short neck sequence, left inguinal, axillary-inguinal and L breast with to improve tissue extensibility; increase range of motion; induce relaxation; mobilize or manipulate soft tissue and joints; modulate pain; and reduce edema for carry over into self care and client centered tasks. Pt to continue to trial over the weekend and return next week with questions and concerns. Fair tolerance throughout session with pain in axilla and upper breast. Min reported decreased discomfort and increased ease with AROM following. To continue addressing. Goal not met. 3-Patient will demonstrate compliance with compression therapy for independent management of lymphedema. 6/28: Therapist did fax face sheet to [employer] for fitting for a compression bra. Pt provided with phone number to follow up with vendor prior to appointment next week. Educated pt in importance of compression management.  Pt arrived in sports bra, but did seem to be digging in at arm pit. -Goal not met.   4-Pt will perform HEP with min assistance to reduce swelling and improve ROM in L UE extremity flex to 150° abduction 160°for participation in ADLs and client centered tasks.    : Provided pt with information regarding weekend activity with focus on self MLD and to incorporate L UE ROM next session. -Goal not met. LT weeks  1-Patient will be independent with self-management of lymphedema including understanding garment wear schedule, self-compression bandaging, HEP and self-care. 2-Patient will be fitted for appropriate compression garments for long term management of lymphedema. 3- Patient will present with decreased limb volume in the involved extremity from mild to min for improved functional mobility.        Lymphedema Measurements:  Lymphedema Upper Extremity Measurements     Measurements are made in 4cm increments and are circumferential. BOLD are larger measurements.       CM Follow up #4  Left -  Follow up #3  Left -  Follow up #2  Left -  Follow up #1  Left -  Evaluation  Left - 2022    Follow up #4  Right -  Follow up #3  Right -  Follow up #2  Right -  Follow up #1 Right -  Evaluation Right -  2022       wrist         18.25         18.25   palm         21.25         21.   4cm         21.75         22.25   8cm         26.         29.25   12cm         29.5         30.75   16cm         31.         30.25   20cm         31.25         34.75   24cm         35. 5         38.25   28cm         41.         43.75   32cm         48.         50.5   36cm         52.         53.   40cm                       44cm                          Fingers are measured in cm and between mp and pip and between pip and dip each digit.     Digit Follow up #4  Left -  Follow up #3  Left -  Follow up #2  Left -  Follow up #1  Left -  Evaluation   Left - 2022    Follow up #4  Right -  Follow up #3  Right -  Follow up #2  Right -  Follow up #1  Right -  Evaluation Right - 2022      First Digit         6.5         6.75   Second Digit         7.         6.75   Third Digit         6.5         6.75   Fourth Digit         6.5         6.75   Fifth Digit         5.75         5.75     Restrictions/Precautions:  [] No blood pressure/blood draw in: [x] Left Upper Extremity     [] Right Upper Extremity        [] Fall Risk       Intervention:   []Other    Pain:  [] No    [x] Yes  Location: L superior breast and axilla  Pain Rating (0-10 pain scale): 8/10 with MLD and ROM  Pain Description: sharp, tight  Interruption of Treatment [x] Yes with modifications provided  [] No    Came to Clinic:  [] Bandaged    [x]Unbandaged    [] With Stocking     [] With Sleeve     [] Kinesiotaped    [] Lavena Hoof    [x] With Alternative Compression: sports bra    Skin Integrity:  [x] Normal [] Dry  []Rough []Moist []Rash  Location of problem area/s: L breast and axilla  [] Wound: Location/Description   [x] Fibrosis: lower breast with scar tissue  [] Keratosis: Location/Description  [] Papillomas: Location/Description  [] Other    Color:  [] Normal [] Mottled [x] Flushed [] Other/Description  Location of problem area/s:L lower breast  Edema:  Edema Location: L breast-lower  [] 1+ Edema [] 2+ Edema  [x] 3+ Edema [] 4+ Edema  Edema Location: L breast- upper  [] 1+ Edema [x] 2+ Edema  [] 3+ Edema [] 4+ Edema  Edema Location: L axilla  [] 1+ Edema [x] 2+ Edema  [] 3+ Edema [] 4+ Edema  Objective:  [] Measurement [x]Manual Lymph Drainage  []Bandaging   []Kinesiotaping [x] Education    [x]Self Massage   []Self Bandaging [x] Exercise    []Wound Care  [x]Hygiene  [] Other    Assessment:  Knowledge of home program:   [] Good [x] Fair-  [] Poor  Patient is programming at home:             [] Yes  [x] No  Family is assisting with programming: [] Yes  [x] No  Home programming is consistent:             [] Yes  [x] No  Other:   Response to treatment: Fair- response with treat due to heat, humidity and increased swelling to upper breast.   Instructions for patient:   [x] Verbal [x] Written  Instructions addressed: Pt to complete SMLD program over the weekend and to call DME to schedule bra fitting. Rehab Potential: [] Excellent [x] Good [] Fair  [] Poor     Goal Status:  [] Achieved [] Partially Achieved  [x] Not Achieved     Pt canceled todays session and 3 additional sessions since evaluation. Pt reports that she would like to be discharged from services due to our offices being an inconvenient location for her. She reported that she will be beginning therapy in another location at this time. Discharge information from last session/assessment.      Time In: 0            Time Out: 0                     Timed Code Treatment Minutes: 0minutes      CODE  Minutes  Units   98345 OT Eval Low     96266 OT Eval Medium     00865 OT Eval High     [id number] Fluidotherapy     97508 Manual     12325 Therapeutic Ex     23084 Therapeutic Activity     93826 ADL/COMP Tech Train     79423 Neuromuscular Re-Ed     31161 OrthoManagementTraining     61203 Paraffin     40756 Electrical Stim - Attended     42387 Iontophoresis     99768 Ultrasound      Other     Total          Plan: Continue to address:  []Bandaging  [] Self Bandaging/Family Assist  []MLD  []Self Massage  []Exercise  []Education  []Obtain referral for garments  []Medical Hold  [x]Discharge to [facility]

## 2022-06-29 ENCOUNTER — TELEPHONE (OUTPATIENT)
Dept: SURGERY | Age: 47
End: 2022-06-29

## 2022-06-29 RX ORDER — PANTOPRAZOLE SODIUM 40 MG/1
40 TABLET, DELAYED RELEASE ORAL DAILY
Qty: 30 TABLET | Refills: 1 | Status: SHIPPED | OUTPATIENT
Start: 2022-06-29

## 2022-06-29 RX ORDER — DICYCLOMINE HYDROCHLORIDE 10 MG/1
10 CAPSULE ORAL 4 TIMES DAILY
Qty: 120 CAPSULE | Refills: 3 | Status: SHIPPED | OUTPATIENT
Start: 2022-06-29

## 2022-06-29 NOTE — TELEPHONE ENCOUNTER
MA informed pt of Dr. Young Granados advisement.   Electronically signed by Kelly Tomas on 6/29/2022 at 1:27 PM

## 2022-06-29 NOTE — TELEPHONE ENCOUNTER
MA received a call from pt questioning if she needs to stay on the Protonix any longer? If so she states that she needs it sent to the [facility]'s in [place] (pharmcy has been updated In chart) pt also is requesting Bentyl as she states that she is still having abdominal pain at times, MA routing to Dr. Sofi Posadas for advisement.     Electronically signed by Rosemarie Piper on 6/29/2022 at 11:05 AM

## 2022-06-30 ENCOUNTER — APPOINTMENT (OUTPATIENT)
Dept: OCCUPATIONAL THERAPY | Age: 47
End: 2022-06-30
Payer: MEDICAID

## 2022-07-05 DIAGNOSIS — E53.8 VITAMIN B12 DEFICIENCY: ICD-10-CM

## 2022-07-05 RX ORDER — CYANOCOBALAMIN 1000 UG/ML
1000 INJECTION INTRAMUSCULAR; SUBCUTANEOUS ONCE
Qty: 1 ML | Refills: 5 | Status: SHIPPED | OUTPATIENT
Start: 2022-07-05 | End: 2022-09-20

## 2022-07-05 NOTE — TELEPHONE ENCOUNTER
Last Appointment:  4/5/2022  Future Appointments   Date Time Provider Tiana Batista   7/5/2022 11:45 AM Katya NEWELL [facility]   7/29/2022  9:45 AM Odalys Hwang MD Surg Weight [facility]   9/15/2022 10:00 AM SEYZ MED ONC FAST TRACK 2 [facility] Med Onc [facility]   9/15/2022 10:15 AM Yared Joseph MD MED ONC [facility]   9/16/2022 10:00 AM [facility] ABDU ARTHUR RM 1 [facility] ABDU [facility]   9/16/2022 10:30 AM Beatriz Hernandez MD [address]

## 2022-07-20 NOTE — TELEPHONE ENCOUNTER
Patient returned call. Patient states she does not use the topical ointment anymore as she is doing better. Pharmacy request will be canceled.

## 2022-07-20 NOTE — TELEPHONE ENCOUNTER
RN received a call back medication was not requested by patient and she is no longer using.          Electronically signed by Abner Edwards RN on 7/20/22 at 10:15 AM EDT

## 2022-07-29 ENCOUNTER — INITIAL CONSULT (OUTPATIENT)
Dept: BARIATRICS/WEIGHT MGMT | Age: 47
End: 2022-07-29
Payer: MEDICAID

## 2022-07-29 VITALS
HEIGHT: 63 IN | DIASTOLIC BLOOD PRESSURE: 79 MMHG | RESPIRATION RATE: 20 BRPM | HEART RATE: 95 BPM | WEIGHT: 293 LBS | SYSTOLIC BLOOD PRESSURE: 167 MMHG | TEMPERATURE: 97.3 F | BODY MASS INDEX: 51.91 KG/M2

## 2022-07-29 DIAGNOSIS — K21.9 GASTROESOPHAGEAL REFLUX DISEASE WITHOUT ESOPHAGITIS: Primary | ICD-10-CM

## 2022-07-29 DIAGNOSIS — Z01.818 PRE-OP EVALUATION: Primary | ICD-10-CM

## 2022-07-29 DIAGNOSIS — E66.01 MORBID OBESITY DUE TO EXCESS CALORIES (HCC): ICD-10-CM

## 2022-07-29 PROCEDURE — G8417 CALC BMI ABV UP PARAM F/U: HCPCS | Performed by: SURGERY

## 2022-07-29 PROCEDURE — G8427 DOCREV CUR MEDS BY ELIG CLIN: HCPCS | Performed by: SURGERY

## 2022-07-29 PROCEDURE — 99214 OFFICE O/P EST MOD 30 MIN: CPT | Performed by: SURGERY

## 2022-07-29 PROCEDURE — 99202 OFFICE O/P NEW SF 15 MIN: CPT

## 2022-07-29 PROCEDURE — 1036F TOBACCO NON-USER: CPT | Performed by: SURGERY

## 2022-07-29 RX ORDER — SODIUM CHLORIDE 0.9 % (FLUSH) 0.9 %
5-40 SYRINGE (ML) INJECTION PRN
Status: CANCELLED | OUTPATIENT
Start: 2022-07-29

## 2022-07-29 RX ORDER — SODIUM CHLORIDE 9 MG/ML
INJECTION, SOLUTION INTRAVENOUS PRN
Status: CANCELLED | OUTPATIENT
Start: 2022-07-29

## 2022-07-29 RX ORDER — SODIUM CHLORIDE 0.9 % (FLUSH) 0.9 %
5-40 SYRINGE (ML) INJECTION EVERY 12 HOURS SCHEDULED
Status: CANCELLED | OUTPATIENT
Start: 2022-07-29

## 2022-07-29 RX ORDER — SODIUM CHLORIDE 9 MG/ML
INJECTION, SOLUTION INTRAVENOUS CONTINUOUS
Status: CANCELLED | OUTPATIENT
Start: 2022-07-29

## 2022-07-29 NOTE — PATIENT INSTRUCTIONS
What is the next step to proceed with weight loss surgery? Please be aware that any co-pays or deductibles may be requested prior to testing and / or procedures. You will need to schedule a psychological evaluation for weight loss surgery. Patients will be required to complete all psychological testing as required by the mental health provider. Patients must also follow all of the provider's recommendations before weight loss surgery can be scheduled. The evaluation must be done a standard way for weight loss surgery. We strongly recommend that you contact one of our preferred providers listed below to arrange this:      Rosalina Villasenor, [facility]  [address] [place], [place]   [phone]    Josef Ken and [address]   [phone]    Dr. Chidi Mendez, PhD    Martha Galloway. [place], [place]    [phone]      You will also need to plan on attending a 2 hour nutrition class at the [facility] prior to your surgery. We will schedule this for you when we schedule your surgery. Please remember to have your labs drawn 10 days prior to your first scheduled dietary appointment. Please remember, that while we will submit your case to insurance for surgery authorization, it is your responsibility to know if your plan covers weight loss surgery and keep up-to-date with changes to your insurance coverage. We will do everything possible to help you get approved for weight loss surgery, but cannot guarantee an approval.     Please note that you will not be submitted to your insurance company until all pre-operative testing requirements are met.

## 2022-07-29 NOTE — PROGRESS NOTES
Kailyn MOLINA Gettings  7/29/2022  [facility]    Initial Evaluation  Bariatric Surgery and EGD       Subjective:  CHIEF COMPLAINT: Morbid obesity, malnutrition, Type 2 Diabetes Mellitus, Hyperlipidemia, GERD, Anxiety, History of Cholelithiasis, History of Nephrolithiasis, and breast cancer    HISTORY OF PRESENT ILLNESS: Manjit Huynh is a morbidly-obese 56 y.o.  female, who weighs (!) 374 lb (169.6 kg). She is 227 pounds over her ideal body weight. The severity is severe with Body mass index is 66.25 kg/m². She has multiple medical problems aggravated by her obesity. They have been overweight since age 22. She wishes to have bariatric surgery so that she can lose a large amount of weight to a goal of their ideal body weight based on height, build and sex, and keep the weight off. I have met with her in the [facility] where we discussed the surgery in great detail and went over the risks and benefits. She has watched our informational video so she understands all of the extensive risks involved. She states that she understands all of the risks and wishes to proceed with the evaluation. We have discussed the different surgical options in detail with use of diagrams and drawings to detail the procedures, risks and alternatives. We discussed the postoperative diet and proposed life long diet for weight management after surgery.      They are a nonsmoker  They have no significant exposure to second hand smoke    Past Medical History  Patient Active Problem List   Diagnosis    Morbid obesity ([address]    Anxiety    Vitamin D deficiency    Stress incontinence    Obesity    Fatigue    Hypertriglyceridemia    Palpitations    Chronic pain of right knee    Malignant neoplasm of upper-outer quadrant of left breast in female, estrogen receptor positive ([address]    Acute pain after mastectomy    Seroma of breast    Surgical site infection    Abnormal uterine bleeding (AUB)     Past Surgical History  Past Surgical History:   Procedure Laterality Date    ANKLE FRACTURE SURGERY  4/20/2012    right with hardware, subsequently removed    [facility] [facility] Left 2020    BREAST BIOPSY Right 2018    CHOLECYSTECTOMY      2003    COLONOSCOPY N/A 5/11/2022    COLORECTAL CANCER SCREENING, NOT HIGH RISK performed by Toma Tamayo MD at [address]  7/25/2014    retrograde pyelogram, ureteroscopy, laser lithotripsy, left stent placement    FRACTURE SURGERY      ankle    MASTECTOMY Left 8/18/2021    LEFT BREAST SIMPLE MASTECTOMY WITH SENTINEL LYMPH NODE BIOPSY performed by Toma Tamayo MD at [address] HEMATOMA/FLUID Right 6/20/2018    RIGHT BREAST INCISION AND DRAINAGE POSSIBLE WOUND VAC performed by Toma Tamayo MD at [facility] EXPLO/DRAIN BREAST ABSCESS Right 7/11/2018    INCISION AND DRAINAGE RIGHT BREAST, WITH APPLICATION OF WOUND VAC (PATIENT HSS WOUND VAC WILL BRING IT)  performed by Toma Tamayo MD at [address]    SALPINGO-OOPHORECTOMY  11/11/2021    Lap BSO, EMBx    SKIN GRAFT  1991    Left leg    UPPER GASTROINTESTINAL ENDOSCOPY      UPPER GASTROINTESTINAL ENDOSCOPY N/A 5/11/2022    EGD BIOPSY performed by Toma Tamayo MD at [facility] LEFT Left 9/1/2021    US BREAST CYST ASPIRATION LEFT 9/1/2021 [facility] ABDU [facility]    US BREAST CYST ASPIRATION LEFT Left 9/7/2021    US BREAST CYST ASPIRATION LEFT [facility]    US BREAST CYST ASPIRATION LEFT Left 9/17/2021    US BREAST CYST ASPIRATION LEFT 9/17/2021 [facility]    US BREAST CYST ASPIRATION LEFT Left 10/22/2021    US BREAST CYST ASPIRATION LEFT 10/22/2021 [facility]    US BREAST NEEDLE BIOPSY LEFT  9/17/2020    US BREAST NEEDLE BIOPSY LEFT 9/17/2020 [facility]    US BREAST NEEDLE BIOPSY LEFT Left 7/1/2021    US BREAST NEEDLE BIOPSY LEFT 7/1/2021 [facility] A[facility]      Allergies: Vancomycin     Home Medications  Current Outpatient Medications Medication Sig Dispense Refill    cyanocobalamin 1000 MCG/ML injection Inject 1 mL into the muscle once for 1 dose Once a month 1 mL 5    pantoprazole (PROTONIX) 40 MG tablet Take 1 tablet by mouth daily 30 tablet 1    dicyclomine (BENTYL) 10 MG capsule Take 1 capsule by mouth 4 times daily 120 capsule 3    vitamin D (ERGOCALCIFEROL) 1.25 MG (99560 UT) CAPS capsule TAKE 1 CAPSULE BY MOUTH 1 TIME A WEEK (Patient taking differently: 50,000 Units once a week Sundays) 12 capsule 1    vitamin D (CHOLECALCIFEROL) 50 MCG (2000 UT) TABS tablet Take 1 tablet by mouth daily 90 tablet 1    anastrozole (ARIMIDEX) 1 MG tablet TAKE 1 TABLET BY MOUTH DAILY 30 tablet 3    escitalopram (LEXAPRO) 10 MG tablet Take 1 tablet by mouth nightly 90 tablet 0     No current facility-administered medications for this visit. Social History:   TOBACCO:   reports that she quit smoking about 13 years ago. Her smoking use included cigarettes. She has a 1.00 pack-year smoking history. She has never used smokeless tobacco.  All smokers must join the free smoking cessation program and stop smoking for 3 months before having any Bariatric surgery. ETOH:    reports current alcohol use. The patient has a family history that is negative for severe cardiovascular or respiratory issues, negative for reaction to anesthesia. Review of Systems    A complete 10 system review was performed and are otherwise negative unless mentioned in the above HPI. Specific negatives are listed below but may not include all those reviewed.     General ROS: negative obtundation, AMS  ENT ROS: negative rhinorrhea, epistaxis  Allergy and Immunology ROS: negative itchy/watery eyes or nasal congestion  Hematological and Lymphatic ROS: negative spontaneous bleeding or bruising  Endocrine ROS: negative  lethargy, mood swings, palpitations or polydipsia/polyuria  Respiratory ROS: negative sputum changes, stridor, tachypnea or wheezing  Cardiovascular ROS: negative for - loss of consciousness, murmur or orthopnea  Gastrointestinal ROS: negative for - hematochezia or hematemesis  Genito-Urinary ROS: negative for -  genital discharge or hematuria  Musculoskeletal ROS: negative for - focal weakness, gangrene  Psych/Neuro ROS: negative for - visual or auditory hallucinations, suicidal ideation      Physical Exam:   VITALS: BP (!) 167/79 (Site: Right Lower Arm, Position: Sitting, Cuff Size: Medium Adult)   Pulse 95   Temp 97.3 °F (36.3 °C) (Temporal)   Resp 20   Ht 5' 3\" (1.6 m)   Wt (!) 374 lb (169.6 kg)   LMP 10/24/2021   BMI 66.25 kg/m²   General appearance: AAO, NAD  Eyes: PERRL, EOMI, red conjunctiva  Lungs: equal chest rise bilateral, no wheeze, no rhonchi  Chest wall: atraumatic, no tenderness, no echymosis or abrasions  Heart: reg rate, no murmur  Abdomen: soft, nondistended, nontender, obese  Extremities: full ROM all 4 ext, no gross motor or sensory deficits  Pulses: 2+ distal  Skin: warm and dry  Neurologic: spontanous eye opening, purposeful, follows complex commands    Assessment/Plan:  Jaquelin Curtis is a morbidly-obese 56 y.o. female with failure of medical management, inability to keep the weight off with diet and exercise. She is interested in Surgical weight loss and specifically we discussed at length the options of Laparoscopic Nikunj-en- Y Gastric Bypass or Sleeve Gastrectomy. We discussed that our goal is to ameliorate the medical problems and not to obtain a specific body mass index. She understands the risks and benefits, and wishes to proceed with the evaluation. 1. Pre-op evaluation  EGD with biopsy- Done in May and reviewed       2. Morbid obesity due to excess calories ([address]    - medical weight loss management- dietary and nutritional counseling provided for 15min during our conversation with emphasis on protein intake, low calorie, limit late night eating and eating away from the table with other distractions.     - Comprehensive Metabolic Panel; Future  - CBC; Future  - Hemoglobin A1C; Future  - Ferritin; Future  - Triglyceride; Future  - Cholesterol, Total; Future  - Zinc; Future  - Vitamin B12; Future  - Folate; Future  - Vitamin B1; Future  - Vitamin D 25 Hydroxy; Future  - Prealbumin; Future       4. Gastroesophageal reflux disease without esophagitis    EGD completed and path reviewed  Continue tums prn      Psych evaluation, medical and cardiac clearance, right upper quadrant ultrasound to evaluate for fatty liver disease and gallbladder abnormalities. These patients often have vitamin deficiencies so I will do screening labs for malnutrition. I will do an upper endoscopy to check for hiatal hernias, ulcers and H. Pylori while we wait for insurance approval for the surgery. I have spent over 45min in evaluation of the patient including greater than 50% of that time face to face discussing surgical options, medical and surgical history, plans for medical weight loss management and preoperative clearance for surgery. They did not have family present for the discussion and visual aides and drawings were used to explain anatomy and surgical procedures.      Follow up with surgical weight loss office after completed the above to begin dietary and nutritional counseling to achieve weight loss at or near 10% their excess body weight prior to surgery    Physician Signature: Electronically signed by Dr. Katie Ruiz MD    Send copy of H&P to PCP, Ivis Sanderson DO

## 2022-08-02 RX ORDER — ANASTROZOLE 1 MG/1
1 TABLET ORAL DAILY
Qty: 30 TABLET | Refills: 3 | Status: SHIPPED | OUTPATIENT
Start: 2022-08-02

## 2022-08-03 ENCOUNTER — TELEPHONE (OUTPATIENT)
Dept: BARIATRICS/WEIGHT MGMT | Age: 47
End: 2022-08-03

## 2022-08-09 ENCOUNTER — INITIAL CONSULT (OUTPATIENT)
Dept: BARIATRICS/WEIGHT MGMT | Age: 47
End: 2022-08-09

## 2022-08-09 VITALS — WEIGHT: 293 LBS | HEIGHT: 63 IN | BODY MASS INDEX: 51.91 KG/M2

## 2022-08-09 DIAGNOSIS — Z71.3 DIETARY COUNSELING: Primary | ICD-10-CM

## 2022-08-09 DIAGNOSIS — E66.01 MORBID OBESITY DUE TO EXCESS CALORIES (HCC): ICD-10-CM

## 2022-08-09 PROCEDURE — 99999 PR OFFICE/OUTPT VISIT,PROCEDURE ONLY: CPT

## 2022-08-09 NOTE — PATIENT INSTRUCTIONS
[facility]  Dietary Initial Appointment Patient Instructions    By: Jim Kay RD / KAMRAN  [phone]      At your initial dietary appointment you have received the following information packets:    Please be aware at each visit you have been instructed that in order for your insurance company to approve your surgery you must show a consistent weight loss of 2 lbs or greater at each visit. We can not guarantee an approval by your insurance company we can only provide the information given to us it is up to you the patient to show compliancy to your insurance company. If you do gain weight during your supervised weight loss counseling sessions insurance companies starting in 2018 are denying patients for not showing consistent weight loss results when part of a supervised weight loss counseling program. Pt was instructed on 8/9/22. Pt verbalized understanding. Low-Fat Diet  - Please be sure to begin your low-fat diet from today's date until your scheduled surgery date. If you are not at goal weight by your history and physical appointment with your surgeon your surgery will be cancelled. Goal Weight: 352 lbs (BMI o 62.3)  Low-Fat Diet Contract - Patient Acknowledge and Signed  Supplement List - Please be sure to be saving to purchase your 3 month supply of supplements. If you do not bring supplements to your history and physical appointment your surgery will be cancelled. Supplement Contract - Patient Acknowledge and Signed  Please be sure to take a daily Multi-vitamin from now until surgery to reduce your incidence of infection. If your insurance company requires additional dietary counseling you will be scheduled to see the dietitian the following month. If your psychological evaluation is completed and all your dietary requirements for your individual insurance company have been completed you will be submitted to insurance.  Please make sure to check

## 2022-08-09 NOTE — PROGRESS NOTES
[id number] [address] [facility]:  Initial Nutrition Consultation    Today's Date: 8/9/2022  Patients Name:Mryiam Suarez     Height: 5' 3\" (1.6 m)   Weight: (!) 370 lb (167.8 kg)   IBW: 147 lbs   Excess Weight: 223 lbs   BMI: Body mass index is 65.54 kg/m². Subjective Information:   Patient has tried multiple means of weight loss including diet and exercise in the past and has been unable to maintain a healthy weight. Pt states he / she has tried the following avoiding soda. Patients overall goal is to be able to lose weight with diet and exercise by changing lifestyle, habits and maintain a healthy weight for a lifetime. Are your currently Diabetic no  Last Blood Glucose Reading  not avail  Last HGBA1C not avail    Patient states the following will be the most difficult change after weight loss surgery? Avoiding pepsi, avoiding caffeine    Most successful diet in the past? Avoiding soda  Length of time patient was on the diet listed above? 3 months  Weight lost on the diet listed above? none  Patient stated he / she maintained his / her weight for the following time? N/a    Patient takes the following vitamins, minerals and dietary supplements? Vitamin D daily and vitamin D - 50,000 iu once weekly     Patient consumes the following beverage daily? Water, milk, coffee, Pepsi - two daily     Pre-Op Labs  not avail - pt to do labs today     Patient states he / she has the following problems:  no - Eating  no - Chewing  no - Swallowing  yes - Nausea  no - Vomiting  yes - Diarrhea  no - Constipation  no - Hair Changes  no - Skin Changes    Food Allergies: no   Food Intolerances: no     Yes - Past medically assisted weight loss history reviewed. Yes - Provided education regarding the basic role of nutrition in junction with Bariatric Surgery. Yes - Provided overview of required dietary and behavioral changes pre and post operatively.   Yes - Stated / reinforced that changes in dietary behaviors are critical to successful, long term weight Loss. Patient is aware that in order to proceed with bariatric surgery he / she will need to follow a low-fat diet prior to surgery. Patient is aware that bariatric surgery is a lifetime change that will require the patient to follow a low-fat, low-calorie, low-sugar, portion controlled diet with at least 30 minutes of physical activity daily. Patient is aware that certain foods may not be tolerated after bariatric surgery and certain foods will need avoided all together. [address] / LD feels that this patient knowledge / readiness to make appropriate diet / lifestyle changes assessed to be? - Good    RD / LD feels this patients expected adherence for post surgical diet? - Good    Patient was instructed and signed consents on a low-fat diet and required supplementation at initial consult. Comments: Patient is able to verbalize diet concepts for bariatric surgery. Patient is aware diet concepts will be reinforced at 2-hour nutrition education consult. RD / LD will monitor progress.

## 2022-08-23 DIAGNOSIS — F41.9 ANXIETY: ICD-10-CM

## 2022-08-23 RX ORDER — ESCITALOPRAM OXALATE 10 MG/1
TABLET ORAL
Qty: 30 TABLET | Refills: 1 | Status: SHIPPED
Start: 2022-08-23 | End: 2022-10-31

## 2022-08-25 ENCOUNTER — OFFICE VISIT (OUTPATIENT)
Dept: CARDIOLOGY CLINIC | Age: 47
End: 2022-08-25
Payer: MEDICAID

## 2022-08-25 VITALS
HEIGHT: 63 IN | BODY MASS INDEX: 51.91 KG/M2 | WEIGHT: 293 LBS | HEART RATE: 87 BPM | RESPIRATION RATE: 16 BRPM | DIASTOLIC BLOOD PRESSURE: 80 MMHG | SYSTOLIC BLOOD PRESSURE: 132 MMHG

## 2022-08-25 DIAGNOSIS — E66.01 MORBID OBESITY (HCC): Primary | ICD-10-CM

## 2022-08-25 PROCEDURE — 99202 OFFICE O/P NEW SF 15 MIN: CPT | Performed by: INTERNAL MEDICINE

## 2022-08-25 PROCEDURE — 1036F TOBACCO NON-USER: CPT | Performed by: INTERNAL MEDICINE

## 2022-08-25 PROCEDURE — G8417 CALC BMI ABV UP PARAM F/U: HCPCS | Performed by: INTERNAL MEDICINE

## 2022-08-25 PROCEDURE — G8427 DOCREV CUR MEDS BY ELIG CLIN: HCPCS | Performed by: INTERNAL MEDICINE

## 2022-08-25 PROCEDURE — 93000 ELECTROCARDIOGRAM COMPLETE: CPT | Performed by: INTERNAL MEDICINE

## 2022-08-25 ASSESSMENT — ENCOUNTER SYMPTOMS
CONSTIPATION: 0
SHORTNESS OF BREATH: 1
VOMITING: 0
ABDOMINAL PAIN: 0
COUGH: 0
NAUSEA: 0
WHEEZING: 0
DIARRHEA: 0
BLOOD IN STOOL: 0
BACK PAIN: 0

## 2022-08-25 NOTE — PROGRESS NOTES
OUTPATIENT CARDIOLOGY CONSULT    Name: Manjit Huynh    Age: 56 y.o. Date of Service: 8/25/2022    Reason for Consultation:   Chief Complaint   Patient presents with    Cardiac Clearance     Consult/Dr. Forde/[facility] for bariatric surgery. Patient         Referring Physician: Domingo Tineo DO    History of Present Illness:  27-year-old morbidly obese female who is referred for cardiac clearance prior to bariatric surgery. She has history of hyperlipidemia, GERD, left breast cancer, post mastectomy, XRT, hysterectomy, arthritis, vitamin D deficiency and anxiety. Patient does not smoke. She is fairly active. She can go up by 10-12 steps with no chest discomfort but feels dyspnea on exertion. She feels palpitation when emotionally stressed. She denies dizziness, syncope or lower extremity edema. She has no significant family for premature CAD. EKG done today revealed sinus rhythm at 87 bpm, left atrial enlargement, left anterior fascicular block, poor R wave progression and artifacts. Review of Systems:  Review of Systems   Constitutional:  Negative for chills, fatigue and fever. HENT:  Negative for nosebleeds. Respiratory:  Positive for shortness of breath. Negative for cough and wheezing. She probably snores at night but denies gasping for air. Gastrointestinal:  Negative for abdominal pain, blood in stool, constipation, diarrhea, nausea and vomiting. Genitourinary:  Negative for dysuria and hematuria. Musculoskeletal:  Negative for back pain, joint swelling and myalgias. Aching. Neurological:  Negative for syncope and light-headedness. Psychiatric/Behavioral:  The patient is not nervous/anxious.          Past Medical History:  Past Medical History:   Diagnosis Date    Abscess of right breast 06/19/2018    Anxiety     Arthritis     Breast abscess 07/11/2018    Cancer (HCC)     Fatigue     GERD (gastroesophageal reflux disease)     Hyperlipidemia     Morbid obesity due to excess calories ([facility] [address]     Obesity 06/04/2013       Past Surgical History:  Past Surgical History:   Procedure Laterality Date    ANKLE FRACTURE SURGERY  4/20/2012    right with hardware, subsequently removed    [address] Left 2020    BREAST BIOPSY Right 2018    CHOLECYSTECTOMY      2003    COLONOSCOPY N/A 5/11/2022    COLORECTAL CANCER SCREENING, NOT HIGH RISK performed by Beatriz Hernandez MD at [place] [address]  7/25/2014    retrograde pyelogram, ureteroscopy, laser lithotripsy, left stent placement    FRACTURE SURGERY      ankle    MASTECTOMY Left 8/18/2021    LEFT BREAST SIMPLE MASTECTOMY WITH SENTINEL LYMPH NODE BIOPSY performed by Beatriz Hernandez MD at [address] [facility] HEMATOMA/FLUID Right 6/20/2018    RIGHT BREAST INCISION AND DRAINAGE POSSIBLE WOUND VAC performed by Beatriz Hernandez MD at [facility] Dr ONESIMO/DRAIN BREAST ABSCESS Right 7/11/2018    INCISION AND DRAINAGE RIGHT BREAST, WITH APPLICATION OF WOUND VAC (PATIENT HSS WOUND VAC WILL BRING IT)  performed by Beatriz Hernandez MD at [address] [place] [address]    SALPINGO-OOPHORECTOMY  11/11/2021    Lap BSO, EMBx    SKIN GRAFT  1991    Left leg    UPPER GASTROINTESTINAL ENDOSCOPY      UPPER GASTROINTESTINAL ENDOSCOPY N/A 5/11/2022    EGD BIOPSY performed by Beatriz Hernandez MD at [address] [place] [address] LEFT Left 9/1/2021    US BREAST CYST ASPIRATION LEFT 9/1/2021 [facility]    US BREAST CYST ASPIRATION LEFT Left 9/7/2021    US BREAST CYST ASPIRATION LEFT [facility]    US BREAST CYST ASPIRATION LEFT Left 9/17/2021    US BREAST CYST ASPIRATION LEFT 9/17/2021 [facility]    US BREAST CYST ASPIRATION LEFT Left 10/22/2021    US BREAST CYST ASPIRATION LEFT 10/22/2021 [facility]    US BREAST NEEDLE BIOPSY LEFT  9/17/2020    US BREAST NEEDLE BIOPSY LEFT 9/17/2020 [facility]    US BREAST NEEDLE BIOPSY LEFT Left 7/1/2021    US BREAST NEEDLE BIOPSY LEFT 7/1/2021 [facility]       Family muscle once for 1 dose Once a month 1 mL 5    pantoprazole (PROTONIX) 40 MG tablet Take 1 tablet by mouth daily 30 tablet 1    dicyclomine (BENTYL) 10 MG capsule Take 1 capsule by mouth 4 times daily 120 capsule 3    vitamin D (ERGOCALCIFEROL) 1.25 MG (48116 UT) CAPS capsule TAKE 1 CAPSULE BY MOUTH 1 TIME A WEEK (Patient taking differently: 50,000 Units once a week Sundays) 12 capsule 1    vitamin D (CHOLECALCIFEROL) 50 MCG (2000 UT) TABS tablet Take 1 tablet by mouth daily 90 tablet 1     No current facility-administered medications for this visit. Physical Exam:  Ht 5' 3\" (1.6 m)   LMP 10/24/2021   BMI 65.54 kg/m²   Wt Readings from Last 3 Encounters:   08/09/22 (!) 370 lb (167.8 kg)   07/29/22 (!) 374 lb (169.6 kg)   05/12/22 (!) 369 lb (167.4 kg)     Physical Exam:  Ht 5' 3\" (1.6 m)   LMP 10/24/2021   BMI 65.54 kg/m²   Wt Readings from Last 3 Encounters:   08/09/22 (!) 370 lb (167.8 kg)   07/29/22 (!) 374 lb (169.6 kg)   05/12/22 (!) 369 lb (167.4 kg)     Physical Exam  Constitutional:       General: She is not in acute distress. Appearance: She is well-developed. She is obese. HENT:      Head: Normocephalic and atraumatic. Neck:      Vascular: No carotid bruit or JVD. Cardiovascular:      Rate and Rhythm: Normal rate and regular rhythm. Heart sounds: No murmur heard. No friction rub. No gallop. Pulmonary:      Breath sounds: No wheezing or rales. Comments: Fair air entry with no wheezing or crackles. Chest:      Chest wall: No tenderness. Abdominal:      General: Bowel sounds are normal. There is no distension. Palpations: Abdomen is soft. There is no mass. Tenderness: There is no abdominal tenderness. Comments: No abdominal bruit. Musculoskeletal:      Cervical back: Neck supple. Right lower leg: No edema. Left lower leg: No edema. Skin:     General: Skin is warm and dry.    Neurological:      Mental Status: She is alert and oriented to person, place, and time. Laboratory Tests:  Lab Results   Component Value Date    CREATININE 0.7 05/12/2022    BUN 13 05/12/2022     05/12/2022    K 4.6 05/12/2022     05/12/2022    CO2 26 05/12/2022       Lab Results   Component Value Date    WBC 10.2 05/12/2022    HGB 13.3 05/12/2022    HCT 42.7 05/12/2022    MCV 89.1 05/12/2022     05/12/2022     Lab Results   Component Value Date    ALT 21 05/12/2022    AST 24 05/12/2022    ALKPHOS 89 05/12/2022    BILITOT 0.5 05/12/2022     Lab Results   Component Value Date    TROPONINI <0.01 01/15/2020    TROPONINI <0.01 01/18/2017     Lab Results   Component Value Date    INR 1.2 07/15/2018    INR 1.0 05/06/2014    INR 1.0 05/02/2014    PROTIME 13.8 (H) 07/15/2018    PROTIME 10.6 05/06/2014    PROTIME 10.3 05/02/2014     Lab Results   Component Value Date    TSH 2.960 03/15/2022     Lab Results   Component Value Date    LABA1C 5.8 (H) 05/13/2021       Lab Results   Component Value Date    CHOL 172 05/13/2021    CHOL 170 08/08/2019    CHOL 148 06/01/2018     Lab Results   Component Value Date    TRIG 243 (H) 05/13/2021    TRIG 262 (H) 08/08/2019    TRIG 90 06/01/2018     Lab Results   Component Value Date    HDL 43 05/13/2021    HDL 50 08/08/2019    HDL 51 06/01/2018     Lab Results   Component Value Date    LDLCALC 80 05/13/2021    LDLCALC 68 08/08/2019    LDLCALC 79 06/01/2018     Lab Results   Component Value Date    LABVLDL 49 05/13/2021    LABVLDL 52 08/08/2019    LABVLDL 18 06/01/2018         ASSESSMENT / PLAN:  -Cardiac clearance: The patient bariatric surgery carries a low risk for perioperative cardiovascular events. Patient is cleared to undergo her surgery from the cardiac standpoint of view since she has no chest discomfort with no ischemia on resting EKG. obtaining a [employer] might yield a false positive test due to her morbid obesity.   -Hypertriglyceridemia: Due to obesity and prediabetes. -Prediabetes.   -Mildly elevated diastolic blood pressure at the office: Could be due to whitecoat hypertension.  -History of breast cancer.  -Vitamin D deficiency. -Morbid obesity.  -Anxiety. No cardiac testing prior to bariatric surgery. Patient was advised to have low-salt diet and monitor blood pressure. Will follow-up at the office on a as needed basis. Thank you for allowing me to participate in your patient's care. Please feel free to contact me if you have any questions or concerns.     Gerianne Baumgarten, MD [facility], [address] [facility]

## 2022-09-01 ENCOUNTER — HOSPITAL ENCOUNTER (OUTPATIENT)
Dept: ULTRASOUND IMAGING | Age: 47
Discharge: HOME | End: 2022-09-03
Payer: MEDICAID

## 2022-09-01 DIAGNOSIS — K75.81 NASH (NONALCOHOLIC STEATOHEPATITIS): ICD-10-CM

## 2022-09-01 PROCEDURE — 76705 ECHO EXAM OF ABDOMEN: CPT

## 2022-09-02 RX ORDER — PANTOPRAZOLE SODIUM 40 MG/1
40 TABLET, DELAYED RELEASE ORAL DAILY
Qty: 30 TABLET | Refills: 1 | OUTPATIENT
Start: 2022-09-02

## 2022-09-08 ENCOUNTER — OFFICE VISIT (OUTPATIENT)
Dept: HEMATOLOGY | Age: 47
End: 2022-09-08
Payer: MEDICAID

## 2022-09-08 VITALS
RESPIRATION RATE: 18 BRPM | BODY MASS INDEX: 51.91 KG/M2 | WEIGHT: 293 LBS | HEIGHT: 63 IN | HEART RATE: 94 BPM | TEMPERATURE: 98.3 F | SYSTOLIC BLOOD PRESSURE: 166 MMHG | OXYGEN SATURATION: 94 % | DIASTOLIC BLOOD PRESSURE: 98 MMHG

## 2022-09-08 DIAGNOSIS — K76.0 NAFLD (NONALCOHOLIC FATTY LIVER DISEASE): Primary | ICD-10-CM

## 2022-09-08 PROCEDURE — 99213 OFFICE O/P EST LOW 20 MIN: CPT | Performed by: TRANSPLANT SURGERY

## 2022-09-08 PROCEDURE — G8427 DOCREV CUR MEDS BY ELIG CLIN: HCPCS | Performed by: TRANSPLANT SURGERY

## 2022-09-08 PROCEDURE — 1036F TOBACCO NON-USER: CPT | Performed by: TRANSPLANT SURGERY

## 2022-09-08 PROCEDURE — G8417 CALC BMI ABV UP PARAM F/U: HCPCS | Performed by: TRANSPLANT SURGERY

## 2022-09-08 NOTE — PROGRESS NOTES
Hepatobiliary and Pancreatic Surgery Attending History and Physical    Patient's Name/Date of Birth: Cassy Schofield /1975 (44 y.o.)    Date: September 8, 2022     CC: 8mm pancreatic head lesion    HPI:  Patient is a very pleasant 40year old unfortunate female whom recently underwent a left mastectomy for breast cancer where 3 positive lymph nodes were found. She underwent a staging workup where a 8mm hypodense mass was seen in her pancreas. She denies any weightloss or abdominal pain. She has had follow up imaging of her pancreas. She has had her hysterectomy. She saw Dr. Vishal Vines for surgical weightloss. Physical Exam:  BP (!) 166/98   Pulse 94   Temp 98.3 °F (36.8 °C) (Temporal)   Resp 18   Ht 5' 3\" (1.6 m)   Wt (!) 371 lb (168.3 kg)   LMP 10/24/2021   SpO2 94%   BMI 65.72 kg/m²       PSYCH: mood and affect normal, alert and oriented x 3: No apparent distress, comfortable  EYES: Sclera white, pupils equal round and reactive to light  ENMT:  Hearing normal, trachea midline, ears externally intact  LYMPH: no obvious lympadenopathy in neck. RESP: Respiratory effort was normal with no retractions or use of accessory muscles. CV:  No pedal edema  GI/ Abdomen: Soft, nondistended, nontender, no guarding, no peritoneal signs  MSK: no clubbing/ no cyanosis/ gaitnormal       Assessment/Plan:  8mm pancreatic head cyst, NALFD, morbid obesity BMI 66 (was 65)  - I reviewed their images prior to our office visit and we also reviewed them together today. - we discussed that she does not have any worrisome features. It also appears present in January 2020.  - CT scan yearly for 5 years.  (present since 2020 - year 3/5)  - CT scan in March 2022  - discussed watching carbohydrate consumption     20 Minutes of which greater than 50% was spent counseling or coordinating her care. Thank you for the consultation allowing me to take part in Ms. Suarez' care.      Please send a copy of my note to Dr. Mani Hernandez. Josy    Electronically signed by Darylene Righter, MD on 9/8/2022 at 1:24 PM

## 2022-09-13 DIAGNOSIS — Z85.3 PERSONAL HISTORY OF BREAST CANCER: Primary | ICD-10-CM

## 2022-09-15 ENCOUNTER — OFFICE VISIT (OUTPATIENT)
Dept: ONCOLOGY | Age: 47
End: 2022-09-15
Payer: MEDICAID

## 2022-09-15 ENCOUNTER — HOSPITAL ENCOUNTER (OUTPATIENT)
Dept: INFUSION THERAPY | Age: 47
Discharge: HOME | End: 2022-09-15
Payer: MEDICAID

## 2022-09-15 ENCOUNTER — INITIAL CONSULT (OUTPATIENT)
Dept: BARIATRICS/WEIGHT MGMT | Age: 47
End: 2022-09-15
Payer: MEDICAID

## 2022-09-15 VITALS
HEIGHT: 63 IN | WEIGHT: 293 LBS | BODY MASS INDEX: 51.91 KG/M2 | TEMPERATURE: 97.6 F | OXYGEN SATURATION: 95 % | DIASTOLIC BLOOD PRESSURE: 83 MMHG | HEART RATE: 89 BPM | SYSTOLIC BLOOD PRESSURE: 148 MMHG

## 2022-09-15 DIAGNOSIS — F50.9 COMPULSIVE EATING PATTERNS: ICD-10-CM

## 2022-09-15 DIAGNOSIS — Z71.89 ENCOUNTER FOR PSYCHOLOGICAL ASSESSMENT PRIOR TO BARIATRIC SURGERY: Primary | ICD-10-CM

## 2022-09-15 DIAGNOSIS — Z85.3 PERSONAL HISTORY OF BREAST CANCER: ICD-10-CM

## 2022-09-15 DIAGNOSIS — Z85.3 PERSONAL HISTORY OF BREAST CANCER: Primary | ICD-10-CM

## 2022-09-15 LAB
ALBUMIN SERPL-MCNC: 3.9 G/DL (ref 3.5–5.2)
ALP BLD-CCNC: 108 U/L (ref 35–104)
ALT SERPL-CCNC: 28 U/L (ref 0–32)
ANION GAP SERPL CALCULATED.3IONS-SCNC: 10 MMOL/L (ref 7–16)
AST SERPL-CCNC: 23 U/L (ref 0–31)
BASOPHILS ABSOLUTE: 0.04 E9/L (ref 0–0.2)
BASOPHILS RELATIVE PERCENT: 0.4 % (ref 0–2)
BILIRUB SERPL-MCNC: 0.5 MG/DL (ref 0–1.2)
BUN BLDV-MCNC: 14 MG/DL (ref 6–20)
CALCIUM SERPL-MCNC: 9.3 MG/DL (ref 8.6–10.2)
CHLORIDE BLD-SCNC: 102 MMOL/L (ref 98–107)
CO2: 27 MMOL/L (ref 22–29)
CREAT SERPL-MCNC: 0.7 MG/DL (ref 0.5–1)
EOSINOPHILS ABSOLUTE: 0.16 E9/L (ref 0.05–0.5)
EOSINOPHILS RELATIVE PERCENT: 1.5 % (ref 0–6)
GFR AFRICAN AMERICAN: >60
GFR NON-AFRICAN AMERICAN: >60 ML/MIN/1.73
GLUCOSE BLD-MCNC: 98 MG/DL (ref 74–99)
HCT VFR BLD CALC: 43 % (ref 34–48)
HEMOGLOBIN: 13.7 G/DL (ref 11.5–15.5)
IMMATURE GRANULOCYTES #: 0.04 E9/L
IMMATURE GRANULOCYTES %: 0.4 % (ref 0–5)
LYMPHOCYTES ABSOLUTE: 1.67 E9/L (ref 1.5–4)
LYMPHOCYTES RELATIVE PERCENT: 15.9 % (ref 20–42)
MCH RBC QN AUTO: 28.3 PG (ref 26–35)
MCHC RBC AUTO-ENTMCNC: 31.9 % (ref 32–34.5)
MCV RBC AUTO: 88.8 FL (ref 80–99.9)
MONOCYTES ABSOLUTE: 0.6 E9/L (ref 0.1–0.95)
MONOCYTES RELATIVE PERCENT: 5.7 % (ref 2–12)
NEUTROPHILS ABSOLUTE: 8 E9/L (ref 1.8–7.3)
NEUTROPHILS RELATIVE PERCENT: 76.1 % (ref 43–80)
PDW BLD-RTO: 15.2 FL (ref 11.5–15)
PLATELET # BLD: 319 E9/L (ref 130–450)
PMV BLD AUTO: 9.8 FL (ref 7–12)
POTASSIUM SERPL-SCNC: 4.3 MMOL/L (ref 3.5–5)
RBC # BLD: 4.84 E12/L (ref 3.5–5.5)
SODIUM BLD-SCNC: 139 MMOL/L (ref 132–146)
TOTAL PROTEIN: 8 G/DL (ref 6.4–8.3)
WBC # BLD: 10.5 E9/L (ref 4.5–11.5)

## 2022-09-15 PROCEDURE — 85025 COMPLETE CBC W/AUTO DIFF WBC: CPT

## 2022-09-15 PROCEDURE — 90791 PSYCH DIAGNOSTIC EVALUATION: CPT | Performed by: SOCIAL WORKER

## 2022-09-15 PROCEDURE — 80053 COMPREHEN METABOLIC PANEL: CPT

## 2022-09-15 PROCEDURE — 99214 OFFICE O/P EST MOD 30 MIN: CPT | Performed by: INTERNAL MEDICINE

## 2022-09-15 PROCEDURE — 99212 OFFICE O/P EST SF 10 MIN: CPT

## 2022-09-15 PROCEDURE — 1036F TOBACCO NON-USER: CPT | Performed by: SOCIAL WORKER

## 2022-09-15 PROCEDURE — G8427 DOCREV CUR MEDS BY ELIG CLIN: HCPCS | Performed by: INTERNAL MEDICINE

## 2022-09-15 PROCEDURE — G8417 CALC BMI ABV UP PARAM F/U: HCPCS | Performed by: INTERNAL MEDICINE

## 2022-09-15 PROCEDURE — 1036F TOBACCO NON-USER: CPT | Performed by: INTERNAL MEDICINE

## 2022-09-15 PROCEDURE — 36415 COLL VENOUS BLD VENIPUNCTURE: CPT

## 2022-09-15 NOTE — PROGRESS NOTES
Department of [facility] Oncology  Attending Clinic Note    Reason for Visit: Follow-up on a patient with Left Breast Cancer    PCP:  Morena Conway DO    History of Present Illness:  56year old female with Left Breast Cancer    Breast cancer risk factors include family hx on mother's side, mom with breast CA, family hx of colon CA and age and gender    Bilateral Diagnostic Mammogram/Left Breast U/S on 07/01/2021  Left sided ill defined hypoechoic region at the 2 o'clock position measuring 2 cm. On 07/01/2021 Left breast, 2:00 core needle biopsy:   Invasive, moderately differentiated mammary carcinoma (grade 2)     Comment: Microscopic examination shows an invasive carcinoma with linear growth pattern dispersed throughout fibrous stroma. The tumor has a [place] histologic score of 3 (tubule formation) +2 (nuclear pleomorphism) +1 (mitotic count) = 6. The tumor cells are immunoreactive with pankeratin and E-cadherin which favors ductal origin. The p63 highlights myoepithelial cells in benign ducts. Intradepartmental consultation is obtained. Breast Cancer Marker Studies:   Estrogen Receptors (ER): 60%   Progesterone Receptors (MT): 60%   Her-2/markie: Negative/1+     CXR 07/09/2021 noted no pneumonia or pleural effusion    Left axillary US 07/14/2021 noted no LN    Left simple mastectomy, sentinel lymph node biopsy, injection of methylene blue dye 08/18/2021  A.   Left breast, total mastectomy:   - Invasive carcinoma with mixed ductal and lobular features, bicentric, grade 2;   - Lobular carcinoma in situ and atypical lobular hyperplasia;   - Rare small ducts showing atypical ductal hyperplasia;   - Fibrocystic changes with usual ductal hyperplasia without atypia, adenosis, columnar cell change, fibroadenomatoid nodule, ductal ectasia, microcysts and stromal fibrosis;   - Changes of previous biopsy sites (2).     B.  May lymph nodes #1, biopsy:   - Three of six (3/6) lymph nodes positive for metastatic carcinoma (micrometastasis), see comment. C.  Left breast, new inferior/medial margin, excision:   - Unremarkable fibroadipose tissue and skeletal muscle negative for malignancy. D.  Left breast, additional inferior/lateral margin, excision:   - Unremarkable fibroadipose tissue, negative for malignancy. Comment: Sections of the mastectomy specimen revealed two isolated, similar sized tumor masses. Both are composed of infiltrative neoplastic cells showing single cell or single cell line arrangement. Immunostaining for E-cadherin was performed on sections of two selected tissue blocks (A 7 and A 12). Some of the invasive neoplastic cells (A 7) show strong membrane positivity for E-cadherin, consistent with ductal differentiation while others (A 12) show absence or aberrant expression   of E-cadherin, indicative of lobular differentiation. Therefore, this is an invasive carcinoma with mixed ductal and lobular features. The presence of lobular carcinoma in situ (LCIS) and atypical lobular hyperplasia [facility]) is confirmed by immunostaining for p63 and E-cadherin on sections of tissue block A 9 showing the presence of a p63 positive myoepithelial cells layer at the periphery and absence of E-cadherin expression of the epithelial cells inside of the myoepithelial layer. Total six lymph nodes are identified from the submitted tissue in specimen B and each node is serially sectioned. Histologically, there are rare small foci suspicious for metastatic carcinoma. Immunostaining for pankeratin on sections of selected nodes (B2, B4, B5, B12 and B13)   was then performed. Positively stained foci of micrometastasis (0.2-2 mm) are identified on sections of B2, B4/B5 (the same lymph node) and B13. Intradepartmental consultation is obtained. CANCER CASE SUMMARY   Procedure:  Total mastectomy   Specimen Laterality: Left   TUMOR   Tumor Site: 2:00 (between upper-outer and lower-outer quadrant) Histologic Type: Invasive carcinoma with mixed ductal and lobular   features   Histologic Grade: [place] Histologic Score        Glandular/tubular differentiation: Score 3        Nuclear pleomorphism: Score 2        Right articular rate: Score 1        Overall grade: Grade 2 (scores of 6)   Tumor Size: 21 x 15 x 10 mm   Tumor Focality: Multiple foci of invasive carcinoma        Number of foci: 2        Sizes of individual foci in millimeters: 20 x 15 x 7 mm        Ductal Carcinoma In Situ (DCIS): Not identified        Lobular Carcinoma In Situ (LCIS): Present   Lymphovascular invasion: Not identified   Dermal lymphovascular invasion: Not identified   Microcalcifications: Not identified   Treatment effect: No known presurgical therapy   MARGINS   All margins negative for invasive carcinoma    Distance from invasive carcinoma to closest margin: 6.0 cm from tumor #2; 6.5 cm from tumor #1    Closest margin to invasive carcinoma: Superior/anterior (for tumor #2); posterior (for tumor #1)   REGIONAL LYMPH NODES    Tumor present in regional lymph nodes    Number of lymph nodes with macrometastases (>2 mm): 0    Number of lymph nodes with micrometastasis (0.2-2 mm): 3    Number of lymph nodes with isolated tumor cells 0    Size of largest shelia metastatic deposit: 2 mm    Extranodal extension: Not identified   Total number of lymph nodes examined: 6   Number of sentinel nodes examined: 6   DISTANT METASTASIS: Not applicable   PATHOLOGIC STAGE (pTNM, AJCC 8th Edition)   TNM descriptors: m (multiple foci of invasive carcinoma)   mpT2 pN1mi     Oncotype Recurrence score:15    Left sided IDC/ILC T2N1mi M0 ER/HI positive HER2 negative. CT chest/abdomen/pelvis 09/14/2021 Postsurgical changes in the left breast with the fluid collection and inflammation as noted likely postoperative hematoma/seroma. No evidence of metastatic disease. Non-specific 8 mm hypodense lesion at the head of the pancreas; HBP team following.   CT abdomen 03/15/2022: Stable 9 mm low-density indeterminate lesion in the head of the pancreas which is stable and unchanged when compared to prior examinations. Diffuse fatty infiltration identified of the liver  Stable pancreatic head cyst. US in 6 months due to hepatic steatosis  CT scan yearly for 5 years (present since 2020 - year 3/5)  NM Bone Scan 09/14/2021 without metastatic disease. NCCN guidelines reviewed  TAILORx suggested that in women ? 50 years and with an intermediate RS (11-25), chemotherapy plus endocrine therapy was associated with a lower rate of distant recurrence relative to endocrine therapy alone, particularly for those with high-intermediate scores (21 to 25) or clinical risk features. However, this was based only on exploratory subset analyses, and moreover, some of the benefit hypothetically may have been due to ovarian suppression in premenopausal women. Ovarian suppression was not assessed as a treatment strategy in this trial, however. In light of limitations in the data, either chemotherapy and endocrine therapy, or endocrine therapy only (with ovarian suppression in premenopausal women) are acceptable options. [address] 7.6 09/22/2021 Estradiol 45.9 09/22/2021    She was leaning more to proceed with endocrine therapy only with ovarian suppression  Tamoxifen was started on 09/23/2021. Evaluated by Ag Johnston for BSO; Endometrial biopsy BSO done on 11/11/2021  A. ENDOMETRIUM, ENDOMETRIAL BIOPSY:   - DYSSYNCHRONOUS SECRETORY ENDOMETRIUM. - BENIGN ENDOCERVICAL POLYP AND BENIGN SQUAMOUS MUCOSA. B. OVARY AND FALLOPIAN TUBES, RIGHT AND LEFT, BILATERAL SALPINGECTOMY:   - OVARIES IDENTIFIED, BILATERALLY; HEMORRHAGIC CORPUS LUTEAL CYSTS. - COMPLETE CROSS-SECTION OF FALLOPIAN TUBES IDENTIFIED, BILATERALLY    RT was completed on 01/11/2022  Arimidex 1 mg po daily was started on 11/24/2021  Today 09/15/2022: Hot flashes manageable. Fair appetite and energy level.   Arthralgias noted.  No nausea vomiting    Review of Systems:  CONSTITUTIONAL: No fever. Hot flashes manageable. Fair appetite and energy level. ENMT: Eyes: No diplopia; Nose: No epistaxis. Mouth: No sore throat. RESPIRATORY: No hemoptysis, shortness of breath, cough. CARDIOVASCULAR: No chest pain, palpitations. GASTROINTESTINAL: No nausea vomiting  GENITOURINARY: No dysuria, urinary frequency, hematuria. NEURO: No syncope, presyncope, headache. MSK: Arthralgias noted  Remainder:  ROS NEGATIVE    Past Medical History:      Diagnosis Date    Abscess of right breast 06/19/2018    Anxiety     Arthritis     Breast abscess 07/11/2018    Cancer (HCC)     Fatigue     GERD (gastroesophageal reflux disease)     Hyperlipidemia     Morbid obesity due to excess calories ([address]     Obesity 06/04/2013     Medications:  Reviewed and reconciled. Allergies: Allergies   Allergen Reactions    Vancomycin Nausea And Vomiting     Red man syndrome, had high fever and upset stomach     Physical Exam:  BP (!) 148/83   Pulse 89   Temp 97.6 °F (36.4 °C)   Ht 5' 3\" (1.6 m)   Wt (!) 374 lb (169.6 kg)   LMP 10/24/2021   SpO2 95%   BMI 66.25 kg/m²   GENERAL: Alert, oriented x 3, not in acute distress. Lungs: CTA Martin  CVS: RRR  EXTREMITIES: Without clubbing or cyanosis or edema.   ECOG 1    Lab Results   Component Value Date    WBC 10.5 09/15/2022    HGB 13.7 09/15/2022    HCT 43.0 09/15/2022    MCV 88.8 09/15/2022     09/15/2022     Lab Results   Component Value Date     09/15/2022    K 4.3 09/15/2022     09/15/2022    CO2 27 09/15/2022    BUN 14 09/15/2022    CREATININE 0.7 09/15/2022    GLUCOSE 98 09/15/2022    CALCIUM 9.3 09/15/2022    PROT 8.0 09/15/2022    LABALBU 3.9 09/15/2022    BILITOT 0.5 09/15/2022    ALKPHOS 108 (H) 09/15/2022    AST 23 09/15/2022    ALT 28 09/15/2022    LABGLOM >60 09/15/2022    GFRAA >60 09/15/2022     Impression/Plan:  57 y/o female with Left IDC/ILC breast cancer, T2N1mi(sn)M0 ER+WI+HER2-, grade 2, Stage IB  Genetic Testing: Negative    Left simple mastectomy, sentinel lymph node biopsy, injection of methylene blue dye 08/18/2021  CANCER CASE SUMMARY   Procedure: Total mastectomy   Specimen Laterality: Left   TUMOR   Tumor Site: 2:00 (between upper-outer and lower-outer quadrant)   Histologic Type: Invasive carcinoma with mixed ductal and lobular features   Histologic Grade: [place] Histologic Score        Glandular/tubular differentiation: Score 3        Nuclear pleomorphism: Score 2        Right articular rate: Score 1        Overall grade: Grade 2 (scores of 6)   Tumor Size: 21 x 15 x 10 mm   Tumor Focality: Multiple foci of invasive carcinoma        Number of foci: 2        Sizes of individual foci in millimeters: 20 x 15 x 7 mm        Ductal Carcinoma In Situ (DCIS): Not identified        Lobular Carcinoma In Situ (LCIS): Present   Lymphovascular invasion: Not identified   Dermal lymphovascular invasion: Not identified   Microcalcifications: Not identified   Treatment effect: No known presurgical therapy   MARGINS   All margins negative for invasive carcinoma   Distance from invasive carcinoma to closest margin: 6.0 cm from tumor #2; 6.5 cm from tumor #1   Closest margin to invasive carcinoma: Superior/anterior (for tumor #2); posterior (for tumor #1)   REGIONAL LYMPH NODES    Tumor present in regional lymph nodes    Number of lymph nodes with macrometastases (>2 mm): 0    Number of lymph nodes with micrometastasis (0.2-2 mm): 3    Number of lymph nodes with isolated tumor cells 0    Size of largest shelia metastatic deposit: 2 mm    Extranodal extension: Not identified   Total number of lymph nodes examined: 6   Number of sentinel nodes examined: 6   DISTANT METASTASIS: Not applicable   PATHOLOGIC STAGE (pTNM, AJCC 8th Edition)   TNM descriptors: m (multiple foci of invasive carcinoma)   mpT2 pN1mi     Left sided IDC/ILC T2N1mi M0 ER/WI positive HER2 negative.      CT

## 2022-09-15 NOTE — PATIENT INSTRUCTIONS
Assignments/Recommendations: 1-2 follow-up sessions with SW for further education/evaluation. Complete entries in \"Why We Eat\", \"Reality Journal\" and \"Identifying and Handling Cravings\" to discuss next session. Attend [facility] support group. Follow-up with: referrals/present providers/all scheduled appointments at [facility]. PT will need a therapist and has agreed to engage in therapy. Handouts provided  in office.
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
## 2022-09-16 ENCOUNTER — TELEPHONE (OUTPATIENT)
Dept: ONCOLOGY | Age: 47
End: 2022-09-16

## 2022-09-16 NOTE — TELEPHONE ENCOUNTER
Met with pt in conjunction with medical oncology visit as social work follow up. Pt is 50-year-old female being managed for breast cancer. Pt's mood appeared euthymic with full affect, she appeared A&Ox4, and she was willing/able to participate in session. She appeared appropriately dressed/groomed and was able to ambulate/transfer without assistance. Pt indicated that she is doing well at this time. Noted that she is in the process of getting bariatric surgery. She expressed excitement and indicated that she is hopeful that this will assist her with developing a more active lifestyle. Stated that she continues to experience severe fatigue which impacts her ability to be present with her children. Pt additionally indicated that she has had some stress due to there being a new school nurse that is having difficulty assisting her children with managing their diabetes. She did report that they were able to travel to [place] to see her mother, who continues to almeida stage 4 breast cancer. Pt discussed ongoing management and stated that she will have follow up medical oncology in 3 months. Stated that she is slightly anxious about having less management by oncology. Reminded pt to contact office if concerns arise between appointment. No additional needs identified at this time. Reviewed role of oncology SW and encouraged pt to notify this provider if additional needs arise.     Merrill Reeves, JULIANA, JEROME-S  Oncology Social Worker

## 2022-09-18 ENCOUNTER — HOSPITAL ENCOUNTER (EMERGENCY)
Age: 47
Discharge: HOME | End: 2022-09-18
Attending: EMERGENCY MEDICINE
Payer: MEDICAID

## 2022-09-18 ENCOUNTER — APPOINTMENT (OUTPATIENT)
Dept: CT IMAGING | Age: 47
End: 2022-09-18
Payer: MEDICAID

## 2022-09-18 VITALS
SYSTOLIC BLOOD PRESSURE: 130 MMHG | WEIGHT: 293 LBS | BODY MASS INDEX: 51.91 KG/M2 | TEMPERATURE: 98.4 F | HEIGHT: 63 IN | HEART RATE: 102 BPM | DIASTOLIC BLOOD PRESSURE: 77 MMHG | OXYGEN SATURATION: 95 % | RESPIRATION RATE: 22 BRPM

## 2022-09-18 DIAGNOSIS — R19.7 DIARRHEA, UNSPECIFIED TYPE: Primary | ICD-10-CM

## 2022-09-18 LAB
ALBUMIN SERPL-MCNC: 4.1 G/DL (ref 3.5–5.2)
ALP BLD-CCNC: 115 U/L (ref 35–104)
ALT SERPL-CCNC: 32 U/L (ref 0–32)
ANION GAP SERPL CALCULATED.3IONS-SCNC: 15 MMOL/L (ref 7–16)
AST SERPL-CCNC: 20 U/L (ref 0–31)
BACTERIA: ABNORMAL /HPF
BASOPHILS ABSOLUTE: 0.03 E9/L (ref 0–0.2)
BASOPHILS RELATIVE PERCENT: 0.2 % (ref 0–2)
BILIRUB SERPL-MCNC: 0.7 MG/DL (ref 0–1.2)
BILIRUBIN DIRECT: <0.2 MG/DL (ref 0–0.3)
BILIRUBIN URINE: ABNORMAL
BILIRUBIN, INDIRECT: ABNORMAL MG/DL (ref 0–1)
BLOOD, URINE: NEGATIVE
BUN BLDV-MCNC: 12 MG/DL (ref 6–20)
CALCIUM SERPL-MCNC: 9.4 MG/DL (ref 8.6–10.2)
CHLORIDE BLD-SCNC: 100 MMOL/L (ref 98–107)
CLARITY: CLEAR
CO2: 19 MMOL/L (ref 22–29)
COLOR: YELLOW
CREAT SERPL-MCNC: 0.8 MG/DL (ref 0.5–1)
EOSINOPHILS ABSOLUTE: 0.16 E9/L (ref 0.05–0.5)
EOSINOPHILS RELATIVE PERCENT: 1.1 % (ref 0–6)
GFR AFRICAN AMERICAN: >60
GFR NON-AFRICAN AMERICAN: >60 ML/MIN/1.73
GLUCOSE BLD-MCNC: 133 MG/DL (ref 74–99)
GLUCOSE URINE: NEGATIVE MG/DL
HCT VFR BLD CALC: 46 % (ref 34–48)
HEMOGLOBIN: 15 G/DL (ref 11.5–15.5)
IMMATURE GRANULOCYTES #: 0.07 E9/L
IMMATURE GRANULOCYTES %: 0.5 % (ref 0–5)
KETONES, URINE: NEGATIVE MG/DL
LACTIC ACID: 2.2 MMOL/L (ref 0.5–2.2)
LEUKOCYTE ESTERASE, URINE: ABNORMAL
LIPASE: 18 U/L (ref 13–60)
LYMPHOCYTES ABSOLUTE: 1.41 E9/L (ref 1.5–4)
LYMPHOCYTES RELATIVE PERCENT: 9.7 % (ref 20–42)
MCH RBC QN AUTO: 28.2 PG (ref 26–35)
MCHC RBC AUTO-ENTMCNC: 32.6 % (ref 32–34.5)
MCV RBC AUTO: 86.5 FL (ref 80–99.9)
MONOCYTES ABSOLUTE: 0.7 E9/L (ref 0.1–0.95)
MONOCYTES RELATIVE PERCENT: 4.8 % (ref 2–12)
NEUTROPHILS ABSOLUTE: 12.18 E9/L (ref 1.8–7.3)
NEUTROPHILS RELATIVE PERCENT: 83.7 % (ref 43–80)
NITRITE, URINE: NEGATIVE
PDW BLD-RTO: 15.2 FL (ref 11.5–15)
PH UA: 6 (ref 5–9)
PLATELET # BLD: 372 E9/L (ref 130–450)
PMV BLD AUTO: 9.5 FL (ref 7–12)
POTASSIUM SERPL-SCNC: 3.5 MMOL/L (ref 3.5–5)
PROTEIN UA: 100 MG/DL
RBC # BLD: 5.32 E12/L (ref 3.5–5.5)
RBC UA: ABNORMAL /HPF (ref 0–2)
SODIUM BLD-SCNC: 134 MMOL/L (ref 132–146)
SPECIFIC GRAVITY UA: >=1.03 (ref 1–1.03)
TOTAL PROTEIN: 7.8 G/DL (ref 6.4–8.3)
UROBILINOGEN, URINE: 0.2 E.U./DL
WBC # BLD: 14.6 E9/L (ref 4.5–11.5)
WBC UA: ABNORMAL /HPF (ref 0–5)

## 2022-09-18 PROCEDURE — 81001 URINALYSIS AUTO W/SCOPE: CPT

## 2022-09-18 PROCEDURE — 87329 GIARDIA AG IA: CPT

## 2022-09-18 PROCEDURE — 6360000002 HC RX W HCPCS: Performed by: STUDENT IN AN ORGANIZED HEALTH CARE EDUCATION/TRAINING PROGRAM

## 2022-09-18 PROCEDURE — 80076 HEPATIC FUNCTION PANEL: CPT

## 2022-09-18 PROCEDURE — 99285 EMERGENCY DEPT VISIT HI MDM: CPT

## 2022-09-18 PROCEDURE — 87324 CLOSTRIDIUM AG IA: CPT

## 2022-09-18 PROCEDURE — 2580000003 HC RX 258: Performed by: PHYSICIAN ASSISTANT

## 2022-09-18 PROCEDURE — 6360000004 HC RX CONTRAST MEDICATION: Performed by: RADIOLOGY

## 2022-09-18 PROCEDURE — 96361 HYDRATE IV INFUSION ADD-ON: CPT

## 2022-09-18 PROCEDURE — 87449 NOS EACH ORGANISM AG IA: CPT

## 2022-09-18 PROCEDURE — 74177 CT ABD & PELVIS W/CONTRAST: CPT

## 2022-09-18 PROCEDURE — 83690 ASSAY OF LIPASE: CPT

## 2022-09-18 PROCEDURE — 80048 BASIC METABOLIC PNL TOTAL CA: CPT

## 2022-09-18 PROCEDURE — 85025 COMPLETE CBC W/AUTO DIFF WBC: CPT

## 2022-09-18 PROCEDURE — 87088 URINE BACTERIA CULTURE: CPT

## 2022-09-18 PROCEDURE — 96374 THER/PROPH/DIAG INJ IV PUSH: CPT

## 2022-09-18 PROCEDURE — 83605 ASSAY OF LACTIC ACID: CPT

## 2022-09-18 PROCEDURE — 36415 COLL VENOUS BLD VENIPUNCTURE: CPT

## 2022-09-18 RX ORDER — 0.9 % SODIUM CHLORIDE 0.9 %
1000 INTRAVENOUS SOLUTION INTRAVENOUS ONCE
Status: COMPLETED | OUTPATIENT
Start: 2022-09-18 | End: 2022-09-18

## 2022-09-18 RX ORDER — ONDANSETRON 4 MG/1
4 TABLET, FILM COATED ORAL 3 TIMES DAILY PRN
Qty: 15 TABLET | Refills: 0 | Status: SHIPPED | OUTPATIENT
Start: 2022-09-18

## 2022-09-18 RX ORDER — FENTANYL CITRATE 50 UG/ML
50 INJECTION, SOLUTION INTRAMUSCULAR; INTRAVENOUS ONCE
Status: COMPLETED | OUTPATIENT
Start: 2022-09-18 | End: 2022-09-18

## 2022-09-18 RX ADMIN — FENTANYL CITRATE 50 MCG: 50 INJECTION, SOLUTION INTRAMUSCULAR; INTRAVENOUS at 11:24

## 2022-09-18 RX ADMIN — SODIUM CHLORIDE 1000 ML: 9 INJECTION, SOLUTION INTRAVENOUS at 10:59

## 2022-09-18 RX ADMIN — IOPAMIDOL 75 ML: 755 INJECTION, SOLUTION INTRAVENOUS at 12:23

## 2022-09-18 ASSESSMENT — ENCOUNTER SYMPTOMS
DIARRHEA: 1
NAUSEA: 0
SHORTNESS OF BREATH: 0
ABDOMINAL PAIN: 1
RHINORRHEA: 0
ABDOMINAL DISTENTION: 0
CONSTIPATION: 0
COUGH: 0
VOMITING: 0
BLOOD IN STOOL: 0

## 2022-09-18 ASSESSMENT — PAIN - FUNCTIONAL ASSESSMENT: PAIN_FUNCTIONAL_ASSESSMENT: NONE - DENIES PAIN

## 2022-09-18 NOTE — ED PROVIDER NOTES
Patient is a 43-year-old female with a history of morbid obesity, hyperlipidemia, breast cancer status post left mastectomy, currently on chemotherapy, last XRT 1 year ago who presents to the emergency department with diarrheal illness. Patient has had diarrhea for the past 3 days with yellow, watery episodes multiple times a day. Patient endorses epigastric cramping that is nonradiating, intermittent, moderate in intensity. Patient states that her symptoms are improving for last night and today, she was able to tolerate soup and crackers last night. She has been taking in good fluids. She has not yet had a formed stool. She believes that she is dehydrated as she has had decreased urine output for yesterday and today. Patient denies any fevers or chills, headache, blurred vision or double vision, any neurodeficits. Patient states is a first-time occurrence. Patient denies camping, outdoors, travel. Patient does have well water at home. Patient with surgical history including cholecystectomy and appendectomy. Review of Systems   Constitutional:  Negative for chills and fever. HENT:  Negative for congestion and rhinorrhea. Eyes:  Negative for visual disturbance. Respiratory:  Negative for cough and shortness of breath. Cardiovascular:  Negative for chest pain, palpitations and leg swelling. Gastrointestinal:  Positive for abdominal pain and diarrhea. Negative for abdominal distention, blood in stool, constipation, nausea and vomiting. Endocrine: Negative for polyuria. Genitourinary:  Negative for dysuria and hematuria. Musculoskeletal:  Negative for arthralgias and myalgias. Skin:  Negative for rash and wound. Allergic/Immunologic: Negative for immunocompromised state. Neurological:  Positive for light-headedness. Negative for dizziness, syncope, weakness, numbness and headaches. Psychiatric/Behavioral:  Negative for confusion. The patient is not nervous/anxious. Physical Exam  Vitals and nursing note reviewed. Constitutional:       General: She is not in acute distress. Appearance: She is not ill-appearing. HENT:      Head: Normocephalic and atraumatic. Mouth/Throat:      Mouth: Mucous membranes are dry. Pharynx: Oropharynx is clear. Eyes:      Extraocular Movements: Extraocular movements intact. Pupils: Pupils are equal, round, and reactive to light. Cardiovascular:      Rate and Rhythm: Normal rate and regular rhythm. Pulses: Normal pulses. Heart sounds: Normal heart sounds. Pulmonary:      Effort: Pulmonary effort is normal.      Breath sounds: Normal breath sounds. Abdominal:      General: There is no distension. Palpations: Abdomen is soft. Tenderness: There is abdominal tenderness in the epigastric area. There is no guarding or rebound. Musculoskeletal:         General: Normal range of motion. Cervical back: Normal range of motion and neck supple. Skin:     General: Skin is warm and dry. Capillary Refill: Capillary refill takes less than 2 seconds. Neurological:      General: No focal deficit present. Mental Status: She is alert and oriented to person, place, and time. GCS: GCS eye subscore is 4. GCS verbal subscore is 5. GCS motor subscore is 6. Sensory: Sensation is intact. Motor: Motor function is intact. MDM  Number of Diagnoses or Management Options  Diarrhea, unspecified type  Diagnosis management comments: Patient is a 60-year-old female presents to the emergency department with complaint of diarrheal illness. She states is been ongoing for 3 days and improving. Patient still has continuous abdominal cramps. Patient presented awake, alert, following all commands, ill-appearing. Vital signs were noted, mild tachycardia. Physical exam shows epigastric tenderness, generalized abdominal tenderness. Patient is ill-appearing. Patient was treated with analgesia. Work-up shows mild leukocytosis, no source of infection is identified. Urinalysis is mildly concerning for urinary tract infection, urine culture will be followed. Patient does not have any urinary symptoms and will not be treated at this time. This may be contamination from diarrhea on this. CT scan of the abdomen was obtained showing large amounts of diarrhea and through the bowel. There is no other intra-abdominal pathology. Patient's diarrheal illness is improving,, no significant intra-abdominal process. Patient has only had mild bowel movement since being in the emergency department. Cramping is continuing. Patient does not have any alarm symptoms requiring admission at this time. Work-up was discussed with patient as well as plan for outpatient follow-up and discharge. She is agreeable with this plan. Patient will be given antiemetics to use at home. Patient will self monitor with strict return instructions. All questions were answered. Patient was monitored until stable and discharged in same. Amount and/or Complexity of Data Reviewed  Clinical lab tests: ordered and reviewed  Tests in the radiology section of CPT®: ordered and reviewed    Electrolytes within normal limits, lipase is negative. Lactic acid is mildly elevated, patient was given IV fluids. Urinalysis is  --------------------------------------------- PAST HISTORY ---------------------------------------------  Past Medical History:  has a past medical history of Abscess of right breast, Anxiety, Arthritis, Breast abscess, Cancer ([address] Fatigue, GERD (gastroesophageal reflux disease), Hyperlipidemia, Morbid obesity due to excess calories ([address] and Obesity. Past Surgical History:  has a past surgical history that includes Skin graft (1991); Ankle fracture surgery (4/20/2012); Cholecystectomy;  Cystoscopy (7/25/2014); pr drainage of hematoma/fluid (Right, 6/20/2018); pr explo/drain breast abscess (Right, 7/11/2018); US BREAST BIOPSY W LOC DEVICE 1ST LESION LEFT (9/17/2020); Breast biopsy (Left, 2020); Breast biopsy (Right, 2018); US BREAST BIOPSY W LOC DEVICE 1ST LESION LEFT (Left, 7/1/2021); Mastectomy (Left, 8/18/2021); US ASP BREAST CYST LEFT (Left, 9/1/2021); US ASP BREAST CYST LEFT (Left, 9/7/2021); US ASP BREAST CYST LEFT (Left, 9/17/2021); US ASP BREAST CYST LEFT (Left, 10/22/2021); Salpingo-oophorectomy (11/11/2021); Appendectomy (1984); Upper gastrointestinal endoscopy; fracture surgery; Upper gastrointestinal endoscopy (N/A, 5/11/2022); and Colonoscopy (N/A, 5/11/2022). Social History:  reports that she quit smoking about 14 years ago. Her smoking use included cigarettes. She has a 1.00 pack-year smoking history. She has never used smokeless tobacco. She reports current alcohol use. She reports that she does not use drugs. Family History: family history includes Breast Cancer (age of onset: 54) in her mother; Breast Cancer (age of onset: 79) in her maternal grandmother; Cancer in her mother; Cancer (age of onset: 61) in her maternal grandfather; Colon Cancer in her mother; Diabetes in her maternal grandmother; High Blood Pressure in her maternal grandmother; High Cholesterol in her maternal grandmother and mother; Ovarian Cancer in an other family member; Stomach Cancer in her mother. The patients home medications have been reviewed.     Allergies: Vancomycin    -------------------------------------------------- RESULTS -------------------------------------------------  Labs:  Results for orders placed or performed during the hospital encounter of 09/18/22   CBC with Auto Differential   Result Value Ref Range    WBC 14.6 (H) 4.5 - 11.5 E9/L    RBC 5.32 3.50 - 5.50 E12/L    Hemoglobin 15.0 11.5 - 15.5 g/dL    Hematocrit 46.0 34.0 - 48.0 %    MCV 86.5 80.0 - 99.9 fL    MCH 28.2 26.0 - 35.0 pg    MCHC 32.6 32.0 - 34.5 %    RDW 15.2 (H) 11.5 - 15.0 fL    Platelets 493 828 - 253 E9/L    MPV 9.5 7.0 - 12.0 fL Neutrophils % 83.7 (H) 43.0 - 80.0 %    Immature Granulocytes % 0.5 0.0 - 5.0 %    Lymphocytes % 9.7 (L) 20.0 - 42.0 %    Monocytes % 4.8 2.0 - 12.0 %    Eosinophils % 1.1 0.0 - 6.0 %    Basophils % 0.2 0.0 - 2.0 %    Neutrophils Absolute 12.18 (H) 1.80 - 7.30 E9/L    Immature Granulocytes # 0.07 E9/L    Lymphocytes Absolute 1.41 (L) 1.50 - 4.00 E9/L    Monocytes Absolute 0.70 0.10 - 0.95 E9/L    Eosinophils Absolute 0.16 0.05 - 0.50 E9/L    Basophils Absolute 0.03 0.00 - 0.20 P7/U   Basic Metabolic Panel   Result Value Ref Range    Sodium 134 132 - 146 mmol/L    Potassium 3.5 3.5 - 5.0 mmol/L    Chloride 100 98 - 107 mmol/L    CO2 19 (L) 22 - 29 mmol/L    Anion Gap 15 7 - 16 mmol/L    Glucose 133 (H) 74 - 99 mg/dL    BUN 12 6 - 20 mg/dL    Creatinine 0.8 0.5 - 1.0 mg/dL    GFR Non-African American >60 >=60 mL/min/1.73    GFR African American >60     Calcium 9.4 8.6 - 10.2 mg/dL   Lipase   Result Value Ref Range    Lipase 18 13 - 60 U/L   Hepatic Function Panel   Result Value Ref Range    Total Protein 7.8 6.4 - 8.3 g/dL    Albumin 4.1 3.5 - 5.2 g/dL    Alkaline Phosphatase 115 (H) 35 - 104 U/L    ALT 32 0 - 32 U/L    AST 20 0 - 31 U/L    Total Bilirubin 0.7 0.0 - 1.2 mg/dL    Bilirubin, Direct <0.2 0.0 - 0.3 mg/dL    Bilirubin, Indirect see below 0.0 - 1.0 mg/dL   Lactic Acid   Result Value Ref Range    Lactic Acid 2.2 0.5 - 2.2 mmol/L   Urinalysis   Result Value Ref Range    Color, UA Yellow Straw/Yellow    Clarity, UA Clear Clear    Glucose, Ur Negative Negative mg/dL    Bilirubin Urine SMALL (A) Negative    Ketones, Urine Negative Negative mg/dL    Specific Gravity, UA >=1.030 1.005 - 1.030    Blood, Urine Negative Negative    pH, UA 6.0 5.0 - 9.0    Protein,  (A) Negative mg/dL    Urobilinogen, Urine 0.2 <2.0 E.U./dL    Nitrite, Urine Negative Negative    Leukocyte Esterase, Urine SMALL (A) Negative   Microscopic Urinalysis   Result Value Ref Range    WBC, UA 5-10 (A) 0 - 5 /HPF    RBC, UA NONE 0 - 2 /HPF Bacteria, UA RARE (A) None Seen /HPF       Radiology:  CT ABDOMEN PELVIS W IV CONTRAST Additional Contrast? None   Final Result   1. Presence of fluid contents in the left-sided colon which indicates a   diarrhea like condition, please correlate clinically. There is no thickening   of the colonic bowel wall or stranding of the pericolonic fat planes. 2.  Hepatomegaly with diffuse fatty infiltration of the liver.           ------------------------- NURSING NOTES AND VITALS REVIEWED ---------------------------  Date / Time Roomed:  9/18/2022  9:49 AM  ED Bed Assignment:  10/10    The nursing notes within the ED encounter and vital signs as below have been reviewed. /77   Pulse (!) 102   Temp 98.4 °F (36.9 °C) (Oral)   Resp 22   Ht 5' 3\" (1.6 m)   Wt (!) 360 lb (163.3 kg)   LMP 10/24/2021   SpO2 95%   BMI 63.77 kg/m²   Oxygen Saturation Interpretation: Normal    ------------------------------------------ PROGRESS NOTES ------------------------------------------  12:59 PM EDT  I have spoken with the patient and discussed todays results, in addition to providing specific details for the plan of care and counseling regarding the diagnosis and prognosis. Their questions are answered at this time and they are agreeable with the plan. I discussed at length with them reasons for immediate return here for re evaluation. They will followup with their primary care physician by calling their office on Monday.    --------------------------------- ADDITIONAL PROVIDER NOTES ---------------------------------  At this time the patient is without objective evidence of an acute process requiring hospitalization or inpatient management. They have remained hemodynamically stable throughout their entire ED visit and are stable for discharge with outpatient follow-up.      The plan has been discussed in detail and they are aware of the specific conditions for emergent return, as well as the importance of follow-up. Discharge Medication List as of 9/18/2022  1:35 PM        START taking these medications    Details   ondansetron (ZOFRAN) 4 MG tablet Take 1 tablet by mouth 3 times daily as needed for Nausea or Vomiting, Disp-15 tablet, R-0Normal           Diagnosis:  1. Diarrhea, unspecified type      Disposition:  Patient's disposition: Discharge to home  Patient's condition is stable. 9/18/22, 12:59 PM EDT.     This note is prepared by Jelly Pinzon MD -PGY- 3              Jelly Pinzon MD  Resident  09/18/22 1495

## 2022-09-18 NOTE — ED NOTES
Department of Emergency Medicine  FIRST PROVIDER TRIAGE NOTE             Independent MLP           9/18/22  10:07 AM EDT    Date of Encounter: 9/18/22   MRN: [id number]      HPI: Peter Holm is a 56 y.o. female who presents to the ED for Diarrhea (Pt reports severe diarrhea since Thursday night along with stomach cramps) and Fatigue       ROS: Negative for cp or sob. PE: Gen Appearance/Constitutional: alert  HEENT: NC/NT. PERRLA,  Airway patent. Neck: supple     Initial Plan of Care: All treatment areas with department are currently occupied. Plan to order/Initiate the following while awaiting opening in ED: labs and IV fluids.   Initiate Treatment-Testing, Proceed toTreatment Area When Bed Available for ED Attending/MLP to Continue Care    Electronically signed by Hadassah Paget, PA   DD: 9/18/22       HEATHER Bah  09/18/22 1007

## 2022-09-18 NOTE — ED PROVIDER NOTES
ATTENDING PROVIDER ATTESTATION:     Ivonne Cam presented to the emergency department for evaluation of Diarrhea (Pt reports severe diarrhea since Thursday night along with stomach cramps) and Fatigue   and was initially evaluated by the Medical Resident. See Original ED Note for H&P and ED course above. I have reviewed and discussed the case, including pertinent history (medical, surgical, family and social) and exam findings with the Medical Resident assigned to Ivonne Cam. I have personally performed and/or participated in the history, exam, medical decision making, and procedures and agree with all pertinent clinical information and any additional changes or corrections are noted below in my assessment and plan. I have discussed this patient in detail with the resident, and provided the instruction and education,       I have reviewed my findings and recommendations with the assigned Medical Resident, Ivonne Cam and members of family present at the time of disposition. I have performed a history and physical examination of this patient and directly supervised the resident caring for this patient      History of Present Illness:    Presents to the ED for diarrhea and dehydration, beginning several days ago. The complaint has been constant, moderate in severity, and worsened by nothing. Patient reports that she has been sick for several days. She reports that she ate sushi and then later that evening she started having significant amounts of abdominal cramps and diarrhea. Nonbloody diarrhea. Numerous episodes that has since Thursday. No vomiting. Some nausea. No chest pain or shortness of breath. No fever. No recent antibiotic usage. No history of C. difficile. She denies any travel. No one else is sick with any other symptoms. No family members are sick with similar symptoms.         Review of Systems:   A complete review of systems was performed and pertinent positives and negatives are stated within HPI, all other systems reviewed and are negative.    --------------------------------------------- PAST HISTORY ---------------------------------------------  Past Medical History:  has a past medical history of Abscess of right breast, Anxiety, Arthritis, Breast abscess, Cancer [address] [address] Fatigue, GERD (gastroesophageal reflux disease), Hyperlipidemia, Morbid [facility] due to excess calories [facility] and Obesity. Past Surgical History:  has a past surgical history that includes Skin graft (1991); Ankle fracture surgery (4/20/2012); Cholecystectomy; Cystoscopy (7/25/2014); pr drainage of hematoma/fluid (Right, 6/20/2018); pr explo/drain breast abscess (Right, 7/11/2018); US BREAST BIOPSY W LOC DEVICE 1ST LESION LEFT (9/17/2020); Breast biopsy (Left, 2020); Breast biopsy (Right, 2018);  BREAST BIOPSY W LOC DEVICE 1ST LESION LEFT (Left, 7/1/2021); Mastectomy (Left, 8/18/2021); US ASP BREAST CYST LEFT (Left, 9/1/2021);  ASP BREAST CYST LEFT (Left, 9/7/2021);  ASP BREAST CYST LEFT (Left, 9/17/2021);  ASP BREAST CYST LEFT (Left, 10/22/2021); Salpingo-oophorectomy (11/11/2021); Appendectomy (1984); Upper gastrointestinal endoscopy; fracture surgery; Upper gastrointestinal endoscopy (N/A, 5/11/2022); and Colonoscopy (N/A, 5/11/2022). Social History:  reports that she quit smoking about 14 years ago. Her smoking use included cigarettes. She has a 1.00 pack-year smoking history. She has never used smokeless tobacco. She reports current alcohol use. She reports that she does not use drugs.     Family History: family history includes Breast Cancer (age of onset: 54) in her mother; Breast Cancer (age of onset: 79) in her maternal grandmother; Cancer in her mother; Cancer (age of onset: 61) in her maternal grandfather; Colon Cancer in her mother; Diabetes in her maternal grandmother; High Blood Pressure in her maternal grandmother; High Cholesterol in her maternal grandmother and mother; Ovarian

## 2022-09-19 LAB
C DIFF TOXIN/ANTIGEN: NORMAL
GIARDIA ANTIGEN STOOL: NORMAL

## 2022-09-20 ENCOUNTER — OFFICE VISIT (OUTPATIENT)
Dept: BARIATRICS/WEIGHT MGMT | Age: 47
End: 2022-09-20
Payer: MEDICAID

## 2022-09-20 VITALS
HEIGHT: 63 IN | RESPIRATION RATE: 20 BRPM | SYSTOLIC BLOOD PRESSURE: 159 MMHG | HEART RATE: 86 BPM | BODY MASS INDEX: 51.91 KG/M2 | TEMPERATURE: 97.7 F | WEIGHT: 293 LBS | DIASTOLIC BLOOD PRESSURE: 90 MMHG

## 2022-09-20 DIAGNOSIS — E66.01 MORBID OBESITY DUE TO EXCESS CALORIES (HCC): Primary | ICD-10-CM

## 2022-09-20 DIAGNOSIS — Z71.3 DIETARY COUNSELING: ICD-10-CM

## 2022-09-20 DIAGNOSIS — K21.9 GASTROESOPHAGEAL REFLUX DISEASE WITHOUT ESOPHAGITIS: ICD-10-CM

## 2022-09-20 LAB — URINE CULTURE, ROUTINE: NORMAL

## 2022-09-20 PROCEDURE — G8417 CALC BMI ABV UP PARAM F/U: HCPCS | Performed by: NURSE PRACTITIONER

## 2022-09-20 PROCEDURE — 99213 OFFICE O/P EST LOW 20 MIN: CPT | Performed by: NURSE PRACTITIONER

## 2022-09-20 PROCEDURE — 1036F TOBACCO NON-USER: CPT | Performed by: NURSE PRACTITIONER

## 2022-09-20 PROCEDURE — 99211 OFF/OP EST MAY X REQ PHY/QHP: CPT

## 2022-09-20 PROCEDURE — G8427 DOCREV CUR MEDS BY ELIG CLIN: HCPCS | Performed by: NURSE PRACTITIONER

## 2022-09-20 NOTE — PROGRESS NOTES
Sukhjinder Lee  9/20/2022  Bariatric Weight loss appointment for Obesity  Nutrition Education Session      Subjective:   Sukhjinder Lee is a 56 y.o. female here for pre-surgical dietary education today. Patient is accompanied by herself at today's visit. Patient reports that they are doing good following their previous dietary education. Weight=(!) 364 lb (165.1 kg)  Today's weight represents a total weight loss to date of 10 pounds. Today we will discuss the topics of protein supplements and serving size. She reports that she does drink coffee and pop daily. She recently had food poisoning and hasn't been able to eat like she normal does. Patients previous 24 hour dietary recall includes:  Breakfast: toast with butter   Snack: none  Lunch: none  Snack: none  Dinner: small amount mac and cheese  Snack: none  Water intake: 60 ounces  Other beverages:tea  Exercise: none    Prior to Admission medications    Medication Sig Start Date End Date Taking?  Authorizing Provider   ondansetron (ZOFRAN) 4 MG tablet Take 1 tablet by mouth 3 times daily as needed for Nausea or Vomiting 9/18/22  Yes Umer Banda MD   escitalopram (LEXAPRO) 10 MG tablet TAKE 1 TABLET BY MOUTH EVERY NIGHT 8/23/22  Yes Katya Ferris DO   anastrozole (ARIMIDEX) 1 MG tablet TAKE 1 TABLET BY MOUTH DAILY 8/2/22  Yes Charbel Ye MD   cyanocobalamin 1000 MCG/ML injection Inject 1 mL into the muscle once for 1 dose Once a month 7/5/22 9/20/22 Yes Katya Ferris DO   pantoprazole (PROTONIX) 40 MG tablet Take 1 tablet by mouth daily 6/29/22  Yes Nadia Troy MD   dicyclomine (BENTYL) 10 MG capsule Take 1 capsule by mouth 4 times daily 6/29/22  Yes Nadia Troy MD   vitamin D (ERGOCALCIFEROL) 1.25 MG (55789 UT) CAPS capsule TAKE 1 CAPSULE BY MOUTH 1 TIME A WEEK 5/2/22  Yes Katya Ferris DO   vitamin D (CHOLECALCIFEROL) 50 MCG (2000 UT) TABS tablet Take 1 tablet by mouth daily 4/5/22  Yes Katya Ferris DO         Allergies Allergen Reactions    Vancomycin Nausea And Vomiting     Red man syndrome, had high fever and upset stomach     Past Medical History:   Diagnosis Date    Abscess of right breast 06/19/2018    Anxiety     Arthritis     Breast abscess 07/11/2018    Cancer (HCC)     Fatigue     GERD (gastroesophageal reflux disease)     Hyperlipidemia     Morbid obesity due to excess calories ([facility] [address]     Obesity 06/04/2013       Past Surgical History:   Procedure Laterality Date    ANKLE FRACTURE SURGERY  4/20/2012    right with hardware, subsequently removed    [address] [facility] [facility] Left 2020    BREAST BIOPSY Right 2018    CHOLECYSTECTOMY      2003    COLONOSCOPY N/A 5/11/2022    COLORECTAL CANCER SCREENING, NOT HIGH RISK performed by Indu Rivas MD at [place] [address]  7/25/2014    retrograde pyelogram, ureteroscopy, laser lithotripsy, left stent placement    FRACTURE SURGERY      ankle    MASTECTOMY Left 8/18/2021    LEFT BREAST SIMPLE MASTECTOMY WITH SENTINEL LYMPH NODE BIOPSY performed by Indu Rivas MD at [address] [facility] HEMATOMA/FLUID Right 6/20/2018    RIGHT BREAST INCISION AND DRAINAGE POSSIBLE WOUND VAC performed by Indu Rivas MD at [facility] [address] EXPLO/DRAIN BREAST ABSCESS Right 7/11/2018    INCISION AND DRAINAGE RIGHT BREAST, WITH APPLICATION OF WOUND VAC (PATIENT HSS WOUND VAC WILL BRING IT)  performed by Indu Rivas MD at [address]    SALPINGO-OOPHORECTOMY  11/11/2021    Lap BSO, EMBx    SKIN GRAFT  1991    Left leg    UPPER GASTROINTESTINAL ENDOSCOPY      UPPER GASTROINTESTINAL ENDOSCOPY N/A 5/11/2022    EGD BIOPSY performed by Indu Rivas MD at [address] [facility] LEFT Left 9/1/2021    US BREAST CYST ASPIRATION LEFT 9/1/2021 [facility]    US BREAST CYST ASPIRATION LEFT Left 9/7/2021    US BREAST CYST ASPIRATION LEFT [facility]    US BREAST CYST ASPIRATION LEFT Left 9/17/2021    US BREAST CYST ASPIRATION LEFT 9/17/2021 [facility]    US BREAST CYST ASPIRATION LEFT Left 10/22/2021    US BREAST CYST ASPIRATION LEFT 10/22/2021 [facility]    US BREAST NEEDLE BIOPSY LEFT  9/17/2020    US BREAST NEEDLE BIOPSY LEFT 9/17/2020 [facility]    US BREAST NEEDLE BIOPSY LEFT Left 7/1/2021    US BREAST NEEDLE BIOPSY LEFT 7/1/2021 [facility]       Current Outpatient Medications   Medication Sig Dispense Refill    ondansetron (ZOFRAN) 4 MG tablet Take 1 tablet by mouth 3 times daily as needed for Nausea or Vomiting 15 tablet 0    escitalopram (LEXAPRO) 10 MG tablet TAKE 1 TABLET BY MOUTH EVERY NIGHT 30 tablet 1    anastrozole (ARIMIDEX) 1 MG tablet TAKE 1 TABLET BY MOUTH DAILY 30 tablet 3    cyanocobalamin 1000 MCG/ML injection Inject 1 mL into the muscle once for 1 dose Once a month 1 mL 5    pantoprazole (PROTONIX) 40 MG tablet Take 1 tablet by mouth daily 30 tablet 1    dicyclomine (BENTYL) 10 MG capsule Take 1 capsule by mouth 4 times daily 120 capsule 3    vitamin D (ERGOCALCIFEROL) 1.25 MG (56224 UT) CAPS capsule TAKE 1 CAPSULE BY MOUTH 1 TIME A WEEK 12 capsule 1    vitamin D (CHOLECALCIFEROL) 50 MCG (2000 UT) TABS tablet Take 1 tablet by mouth daily 90 tablet 1     No current facility-administered medications for this visit.        Allergies   Allergen Reactions    Vancomycin Nausea And Vomiting     Red man syndrome, had high fever and upset stomach       Family History   Problem Relation Age of Onset    Diabetes Maternal Grandmother     High Blood Pressure Maternal Grandmother     High Cholesterol Maternal Grandmother     Breast Cancer Maternal Grandmother 79    High Cholesterol Mother     Breast Cancer Mother 54    Colon Cancer Mother     Stomach Cancer Mother     Cancer Mother         stomach, colon, intestine    Cancer Maternal Grandfather 60        throat    Ovarian Cancer Other        Social History     Socioeconomic History    Marital status: Single     Spouse name: Not on file    Number of children: 2    Years of education: Not on file    Highest education level: Not on file   Occupational History    Not on file   Tobacco Use    Smoking status: Former     Packs/day: 0.25     Years: 4.00     Pack years: 1.00     Types: Cigarettes     Quit date: 2008     Years since quittin.0    Smokeless tobacco: Never   Vaping Use    Vaping Use: Never used   Substance and Sexual Activity    Alcohol use: Yes     Alcohol/week: 0.0 standard drinks     Comment: rarely    Drug use: No    Sexual activity: Not on file   Other Topics Concern    Not on file   Social History Narrative    Not on file     Social Determinants of Health     Financial Resource Strain: Not on file   Food Insecurity: Not on file   Transportation Needs: Not on file   Physical Activity: Not on file   Stress: Not on file   Social Connections: Not on file   Intimate Partner Violence: Not on file   Housing Stability: Not on file           A complete 10 system review was performed and are otherwise negative unless mentioned in the above HPI. Specific negatives are listed below but may not include all those reviewed.     General ROS: negative obtundation, AMS  ENT ROS: negative rhinorrhea, epistaxis  Allergy and Immunology ROS: negative itchy/watery eyes or nasal congestion  Hematological and Lymphatic ROS: negative spontaneous bleeding or bruising  Endocrine ROS: negative  lethargy, mood swings, palpitations or polydipsia/polyuria  Respiratory ROS: negative sputum changes, stridor, tachypnea or wheezing  Cardiovascular ROS: negative for - loss of consciousness, murmur or orthopnea  Gastrointestinal ROS: negative for - hematochezia or hematemesis  Genito-Urinary ROS: negative for -  genital discharge or hematuria  Musculoskeletal ROS: negative for - focal weakness, gangrene  Psych/Neuro ROS: negative for - visual or auditory hallucinations, suicidal ideation    Physical exam:   BP (!) 159/90 (Site: Right Lower Arm, Position: Sitting, Cuff Size: Large Adult)   Pulse 86   Temp 97.7 °F (36.5 °C) (Temporal)   Resp 20   Ht 5' 3\" (1.6 m)   Wt (!) 364 lb (165.1 kg)   LMP 10/24/2021   BMI 64.48 kg/m²   General appearance: AAO, NAD  Eyes: PERRL, EOMI, red conjunctiva  Lungs: Equal chest rise bilateral  Chest wall: atraumatic, no tenderness, no echymosis or abrasions  Heart: reg rate, no murmur  Abdomen: soft, nondistended, tender minimal, no hernias, no peritioneal signs  Extremities: full ROM all 4 ext, no gross motor or sensory deficits  Pulses: 2+ distal  Skin: warm and dry  Neurologic: spontanous eye opening, purposeful, follows complex commands  Psych: No tremor, no suicidal ideation, no hallucinations      Assessment:   1. Morbid obesity due to excess calories ([address]  See below    2. Gastroesophageal reflux disease without esophagitis  See below    3. Dietary counseling  Patient was instructed on types of protein supplements and usage of protein supplements before and after surgery. The goal of protein intake after bariatric surgery is 60-80 grams daily. Patient was able to verbalize the instruction of how to take the supplements and the importance of their use before and after surgery. 2nd session of dietary education pre weight loss surgery. Patient has 1 more sessions to attend. Continue to keep food diet, bring to next appointment with Nurse Practitioner or RD/LD  Continue to read food labels and make smart food choices  Increase protein and fluid intake as discussed in the Patient Guide to Bariatric Surgery  Increase physical activity at home    I have spent  minutes educating patient on nutrition. All questions were answered to patients and family's satisfaction. They verbalize the importance of pre surgical weight loss at this time.      Provider Signature: Electronically signed by JIHAN Omalley - SAURABH

## 2022-10-28 ENCOUNTER — HOSPITAL ENCOUNTER (OUTPATIENT)
Dept: GENERAL RADIOLOGY | Age: 47
Discharge: HOME | End: 2022-10-30
Payer: MEDICAID

## 2022-10-28 ENCOUNTER — OFFICE VISIT (OUTPATIENT)
Dept: BREAST CENTER | Age: 47
End: 2022-10-28
Payer: MEDICAID

## 2022-10-28 VITALS
DIASTOLIC BLOOD PRESSURE: 88 MMHG | OXYGEN SATURATION: 98 % | RESPIRATION RATE: 14 BRPM | SYSTOLIC BLOOD PRESSURE: 138 MMHG | BODY MASS INDEX: 51.91 KG/M2 | WEIGHT: 293 LBS | HEART RATE: 84 BPM | TEMPERATURE: 98.2 F | HEIGHT: 63 IN

## 2022-10-28 DIAGNOSIS — Z12.31 ENCOUNTER FOR SCREENING MAMMOGRAM FOR MALIGNANT NEOPLASM OF BREAST: ICD-10-CM

## 2022-10-28 DIAGNOSIS — Z85.3 PERSONAL HISTORY OF BREAST CANCER: Primary | ICD-10-CM

## 2022-10-28 PROCEDURE — G8427 DOCREV CUR MEDS BY ELIG CLIN: HCPCS | Performed by: SURGERY

## 2022-10-28 PROCEDURE — 99213 OFFICE O/P EST LOW 20 MIN: CPT | Performed by: SURGERY

## 2022-10-28 PROCEDURE — 77067 SCR MAMMO BI INCL CAD: CPT

## 2022-10-28 PROCEDURE — 1036F TOBACCO NON-USER: CPT | Performed by: SURGERY

## 2022-10-28 PROCEDURE — G8417 CALC BMI ABV UP PARAM F/U: HCPCS | Performed by: SURGERY

## 2022-10-28 PROCEDURE — 99212 OFFICE O/P EST SF 10 MIN: CPT | Performed by: SURGERY

## 2022-10-28 PROCEDURE — G8484 FLU IMMUNIZE NO ADMIN: HCPCS | Performed by: SURGERY

## 2022-10-28 ASSESSMENT — ENCOUNTER SYMPTOMS
EYES NEGATIVE: 1
RESPIRATORY NEGATIVE: 1
ABDOMINAL DISTENTION: 0
VOMITING: 0
COUGH: 0
CONSTIPATION: 0
DIARRHEA: 0
BLOOD IN STOOL: 0
NAUSEA: 0
ANAL BLEEDING: 0
SHORTNESS OF BREATH: 0
BACK PAIN: 1
ALLERGIC/IMMUNOLOGIC NEGATIVE: 1
ABDOMINAL PAIN: 1

## 2022-10-28 NOTE — PROGRESS NOTES
[facility]  PROGRESS NOTE  ATTENDING NOTE    Chief Complaint   Patient presents with    Breast Cancer     6 month follow up - left breast cancer- Patient had imaging done prior to appointment. S: 71-year-old female who presents for follow-up of her left breast cancer. She had stopped going to lymphedema clinic as she states it was too difficult and making her too nauseous for them to do massage of her left chest wall. She still up and down with her weight. She has gone to surgical weight loss clinic and is highly considering having this done. If she is a candidate we suggested that she get it done sooner than later before the end of the year. She wanted to wait till after the first year but she is encouraged to get it done as soon as she can. Review of Systems   Constitutional:  Positive for fatigue. Negative for activity change, appetite change and unexpected weight change. Night sweats   HENT: Negative. Eyes: Negative. Respiratory: Negative. Negative for cough and shortness of breath. Cardiovascular:  Positive for chest pain (left chest wall). Negative for leg swelling. Gastrointestinal:  Positive for abdominal pain (muscle cramps). Negative for abdominal distention, anal bleeding, blood in stool, constipation, diarrhea, nausea and vomiting. Endocrine: Negative. Genitourinary: Negative. Musculoskeletal:  Positive for arthralgias, back pain and myalgias. Negative for gait problem and joint swelling. Mostly side effects from AI   Skin: Negative. Allergic/Immunologic: Negative. Neurological:  Positive for headaches. Negative for dizziness and weakness. Hematological: Negative. Psychiatric/Behavioral: Negative. Negative for confusion, decreased concentration and sleep disturbance.       /88 (Site: Left Upper Arm, Position: Sitting, Cuff Size: Medium Adult)   Pulse 84   Temp 98.2 °F (36.8 °C) (Temporal)   Resp 14   Ht 5' 3\" positive, HER2 negative, grade 2  --continue arimidex and f/u with oncology  --encouraged to move forward with RYGB  --continue monthly self breast exam and if any changes, bring them to our attention    RTC 6 months    Jc Soriano MD, MSc, FACS  10/28/2022  1:24 PM

## 2022-10-29 DIAGNOSIS — F41.9 ANXIETY: ICD-10-CM

## 2022-10-31 RX ORDER — ESCITALOPRAM OXALATE 10 MG/1
TABLET ORAL
Qty: 30 TABLET | Refills: 1 | Status: SHIPPED | OUTPATIENT
Start: 2022-10-31

## 2022-10-31 NOTE — TELEPHONE ENCOUNTER
Last Appointment:  4/5/2022  Future Appointments   Date Time Provider Tiana Batista   11/17/2022 11:00 AM JEROME Villarreal Surg Weight [facility]   12/1/2022  9:00 AM Adilson Stahl RD, LD Surg Weight [facility]   1/19/2023 10:00 AM YZ MED ONC FAST TRACK 2 [facility] Med Onc [facility]   1/19/2023 10:15 AM Remigio Ly MD MED ONC [facility]

## 2022-11-02 DIAGNOSIS — E55.9 VITAMIN D DEFICIENCY: ICD-10-CM

## 2022-11-02 RX ORDER — CHOLECALCIFEROL (VITAMIN D3) 125 MCG
CAPSULE ORAL
Qty: 90 TABLET | Refills: 1 | Status: SHIPPED
Start: 2022-11-02 | End: 2022-11-16 | Stop reason: SDUPTHER

## 2022-11-02 NOTE — TELEPHONE ENCOUNTER
Last Appointment:  4/5/2022  Future Appointments   Date Time Provider Tiana Batista   11/17/2022 11:00 AM JEROME Govea Surg Weight [facility]   12/1/2022  9:00 AM Jovanna Busch RD, LD Surg Weight [facility]   1/19/2023 10:00 AM [facility] MED ONC FAST TRACK 2 [facility] Med Onc [facility]   1/19/2023 10:15 AM Nancy Vanegas MD MED ONC [facility]

## 2022-11-16 ENCOUNTER — HOSPITAL ENCOUNTER (OUTPATIENT)
Age: 47
Discharge: HOME | End: 2022-11-16
Payer: MEDICAID

## 2022-11-16 ENCOUNTER — OFFICE VISIT (OUTPATIENT)
Dept: FAMILY MEDICINE CLINIC | Age: 47
End: 2022-11-16
Payer: MEDICAID

## 2022-11-16 VITALS
HEIGHT: 63 IN | HEART RATE: 81 BPM | WEIGHT: 293 LBS | TEMPERATURE: 97 F | RESPIRATION RATE: 20 BRPM | DIASTOLIC BLOOD PRESSURE: 72 MMHG | SYSTOLIC BLOOD PRESSURE: 138 MMHG | OXYGEN SATURATION: 96 % | BODY MASS INDEX: 51.91 KG/M2

## 2022-11-16 DIAGNOSIS — F41.9 ANXIETY: ICD-10-CM

## 2022-11-16 DIAGNOSIS — M25.561 CHRONIC PAIN OF RIGHT KNEE: ICD-10-CM

## 2022-11-16 DIAGNOSIS — G89.29 CHRONIC PAIN OF RIGHT KNEE: ICD-10-CM

## 2022-11-16 DIAGNOSIS — R53.83 FATIGUE, UNSPECIFIED TYPE: ICD-10-CM

## 2022-11-16 DIAGNOSIS — E53.8 VITAMIN B12 DEFICIENCY: ICD-10-CM

## 2022-11-16 DIAGNOSIS — E55.9 VITAMIN D DEFICIENCY: ICD-10-CM

## 2022-11-16 DIAGNOSIS — E78.1 HYPERTRIGLYCERIDEMIA: ICD-10-CM

## 2022-11-16 DIAGNOSIS — Z23 NEED FOR VACCINATION: Primary | ICD-10-CM

## 2022-11-16 LAB
ALBUMIN SERPL-MCNC: 4 G/DL (ref 3.5–5.2)
ALP BLD-CCNC: 109 U/L (ref 35–104)
ALT SERPL-CCNC: 21 U/L (ref 0–32)
ANION GAP SERPL CALCULATED.3IONS-SCNC: 13 MMOL/L (ref 7–16)
AST SERPL-CCNC: 15 U/L (ref 0–31)
BASOPHILS ABSOLUTE: 0.02 E9/L (ref 0–0.2)
BASOPHILS RELATIVE PERCENT: 0.2 % (ref 0–2)
BILIRUB SERPL-MCNC: 0.6 MG/DL (ref 0–1.2)
BUN BLDV-MCNC: 11 MG/DL (ref 6–20)
CALCIUM SERPL-MCNC: 9.5 MG/DL (ref 8.6–10.2)
CHLORIDE BLD-SCNC: 101 MMOL/L (ref 98–107)
CHOLESTEROL, TOTAL: 156 MG/DL (ref 0–199)
CO2: 28 MMOL/L (ref 22–29)
CREAT SERPL-MCNC: 0.7 MG/DL (ref 0.5–1)
EOSINOPHILS ABSOLUTE: 0.13 E9/L (ref 0.05–0.5)
EOSINOPHILS RELATIVE PERCENT: 1.3 % (ref 0–6)
GFR SERPL CREATININE-BSD FRML MDRD: >60 ML/MIN/1.73
GLUCOSE BLD-MCNC: 92 MG/DL (ref 74–99)
HBA1C MFR BLD: 5.6 % (ref 4–5.6)
HCT VFR BLD CALC: 43.5 % (ref 34–48)
HDLC SERPL-MCNC: 45 MG/DL
HEMOGLOBIN: 13.9 G/DL (ref 11.5–15.5)
IMMATURE GRANULOCYTES #: 0.04 E9/L
IMMATURE GRANULOCYTES %: 0.4 % (ref 0–5)
LDL CHOLESTEROL CALCULATED: 66 MG/DL (ref 0–99)
LYMPHOCYTES ABSOLUTE: 1.67 E9/L (ref 1.5–4)
LYMPHOCYTES RELATIVE PERCENT: 16.2 % (ref 20–42)
MCH RBC QN AUTO: 28.4 PG (ref 26–35)
MCHC RBC AUTO-ENTMCNC: 32 % (ref 32–34.5)
MCV RBC AUTO: 88.8 FL (ref 80–99.9)
MONOCYTES ABSOLUTE: 0.53 E9/L (ref 0.1–0.95)
MONOCYTES RELATIVE PERCENT: 5.1 % (ref 2–12)
NEUTROPHILS ABSOLUTE: 7.93 E9/L (ref 1.8–7.3)
NEUTROPHILS RELATIVE PERCENT: 76.8 % (ref 43–80)
PDW BLD-RTO: 14.9 FL (ref 11.5–15)
PLATELET # BLD: 354 E9/L (ref 130–450)
PMV BLD AUTO: 10.1 FL (ref 7–12)
POTASSIUM SERPL-SCNC: 4.3 MMOL/L (ref 3.5–5)
RBC # BLD: 4.9 E12/L (ref 3.5–5.5)
SODIUM BLD-SCNC: 142 MMOL/L (ref 132–146)
TOTAL PROTEIN: 7.9 G/DL (ref 6.4–8.3)
TRIGL SERPL-MCNC: 225 MG/DL (ref 0–149)
TSH SERPL DL<=0.05 MIU/L-ACNC: 3.3 UIU/ML (ref 0.27–4.2)
VITAMIN B-12: 462 PG/ML (ref 211–946)
VITAMIN D 25-HYDROXY: 18 NG/ML (ref 30–100)
VLDLC SERPL CALC-MCNC: 45 MG/DL
WBC # BLD: 10.3 E9/L (ref 4.5–11.5)

## 2022-11-16 PROCEDURE — G8482 FLU IMMUNIZE ORDER/ADMIN: HCPCS | Performed by: STUDENT IN AN ORGANIZED HEALTH CARE EDUCATION/TRAINING PROGRAM

## 2022-11-16 PROCEDURE — 1036F TOBACCO NON-USER: CPT | Performed by: STUDENT IN AN ORGANIZED HEALTH CARE EDUCATION/TRAINING PROGRAM

## 2022-11-16 PROCEDURE — 36415 COLL VENOUS BLD VENIPUNCTURE: CPT

## 2022-11-16 PROCEDURE — G8427 DOCREV CUR MEDS BY ELIG CLIN: HCPCS | Performed by: STUDENT IN AN ORGANIZED HEALTH CARE EDUCATION/TRAINING PROGRAM

## 2022-11-16 PROCEDURE — 82306 VITAMIN D 25 HYDROXY: CPT

## 2022-11-16 PROCEDURE — 90471 IMMUNIZATION ADMIN: CPT | Performed by: STUDENT IN AN ORGANIZED HEALTH CARE EDUCATION/TRAINING PROGRAM

## 2022-11-16 PROCEDURE — G8417 CALC BMI ABV UP PARAM F/U: HCPCS | Performed by: STUDENT IN AN ORGANIZED HEALTH CARE EDUCATION/TRAINING PROGRAM

## 2022-11-16 PROCEDURE — 83036 HEMOGLOBIN GLYCOSYLATED A1C: CPT

## 2022-11-16 PROCEDURE — 80061 LIPID PANEL: CPT

## 2022-11-16 PROCEDURE — 99214 OFFICE O/P EST MOD 30 MIN: CPT | Performed by: STUDENT IN AN ORGANIZED HEALTH CARE EDUCATION/TRAINING PROGRAM

## 2022-11-16 PROCEDURE — 82607 VITAMIN B-12: CPT

## 2022-11-16 PROCEDURE — 90674 CCIIV4 VAC NO PRSV 0.5 ML IM: CPT | Performed by: STUDENT IN AN ORGANIZED HEALTH CARE EDUCATION/TRAINING PROGRAM

## 2022-11-16 PROCEDURE — 84443 ASSAY THYROID STIM HORMONE: CPT

## 2022-11-16 PROCEDURE — 80053 COMPREHEN METABOLIC PANEL: CPT

## 2022-11-16 PROCEDURE — 85025 COMPLETE CBC W/AUTO DIFF WBC: CPT

## 2022-11-16 RX ORDER — ERGOCALCIFEROL 1.25 MG/1
CAPSULE ORAL
Qty: 12 CAPSULE | Refills: 1 | Status: SHIPPED | OUTPATIENT
Start: 2022-11-16

## 2022-11-16 RX ORDER — CHOLECALCIFEROL (VITAMIN D3) 125 MCG
CAPSULE ORAL
Qty: 90 TABLET | Refills: 1 | Status: SHIPPED | OUTPATIENT
Start: 2022-11-16

## 2022-11-16 SDOH — ECONOMIC STABILITY: FOOD INSECURITY: WITHIN THE PAST 12 MONTHS, THE FOOD YOU BOUGHT JUST DIDN'T LAST AND YOU DIDN'T HAVE MONEY TO GET MORE.: NEVER TRUE

## 2022-11-16 SDOH — ECONOMIC STABILITY: FOOD INSECURITY: WITHIN THE PAST 12 MONTHS, YOU WORRIED THAT YOUR FOOD WOULD RUN OUT BEFORE YOU GOT MONEY TO BUY MORE.: NEVER TRUE

## 2022-11-16 ASSESSMENT — LIFESTYLE VARIABLES
HOW MANY STANDARD DRINKS CONTAINING ALCOHOL DO YOU HAVE ON A TYPICAL DAY: PATIENT DOES NOT DRINK
HOW OFTEN DO YOU HAVE A DRINK CONTAINING ALCOHOL: NEVER

## 2022-11-16 ASSESSMENT — PATIENT HEALTH QUESTIONNAIRE - PHQ9
SUM OF ALL RESPONSES TO PHQ QUESTIONS 1-9: 0
1. LITTLE INTEREST OR PLEASURE IN DOING THINGS: 0
SUM OF ALL RESPONSES TO PHQ QUESTIONS 1-9: 0
SUM OF ALL RESPONSES TO PHQ9 QUESTIONS 1 & 2: 0
SUM OF ALL RESPONSES TO PHQ QUESTIONS 1-9: 0
SUM OF ALL RESPONSES TO PHQ QUESTIONS 1-9: 0
2. FEELING DOWN, DEPRESSED OR HOPELESS: 0

## 2022-11-16 ASSESSMENT — ENCOUNTER SYMPTOMS
COUGH: 0
SORE THROAT: 0
ABDOMINAL PAIN: 0
DIARRHEA: 0
SINUS PRESSURE: 0
EYE REDNESS: 0
VOMITING: 0
SINUS PAIN: 0
EYE PAIN: 0
BACK PAIN: 0
NAUSEA: 0
RHINORRHEA: 0
CONSTIPATION: 0
SHORTNESS OF BREATH: 0

## 2022-11-16 ASSESSMENT — SOCIAL DETERMINANTS OF HEALTH (SDOH): HOW HARD IS IT FOR YOU TO PAY FOR THE VERY BASICS LIKE FOOD, HOUSING, MEDICAL CARE, AND HEATING?: NOT HARD AT ALL

## 2022-11-16 NOTE — PROGRESS NOTES
11/16/2022    [facility]  Chief Complaint   Patient presents with    Fatigue    Joint Pain       Carmelina Crump is a 56 y.o. female who  has a past medical history of Abscess of right breast, Anxiety, Arthritis, Breast abscess, Cancer ([address] Fatigue, GERD (gastroesophageal reflux disease), Hyperlipidemia, Morbid obesity due to excess calories ([address] and Obesity. Fatigue  Patient complains of fatigue. Symptoms began several months ago. The patient feels the fatigue began with:  when she started taking her medicine for her breast cancer . Symptoms of her fatigue have been anxiousness, feelings of depression, general malaise, and lack of interest in usual activities. Patient denies excessive menstrual bleeding, fever, and GI blood loss. Symptoms have gradually worsened. Symptom severity: moderate. Previous visits for this problem:  yes seen in the past . Patient does have a lot of anxiety but wants to keep the lexapro at 10 mg. She also does not want anything to help her sleep at night due to both of her children being diabetic and she needs to monitor them. She believes all her fatigue and joint pain is due to the medications but she states that her oncologist told her to follow up here. Patient has not had a sleep study but is going for weight loss consultation for the surgery. We will see if she is able to be approved for the surgery first then we can discuss a sleep study    Review of Systems   Constitutional:  Positive for fatigue. Negative for chills and fever. HENT:  Negative for congestion, ear pain, postnasal drip, rhinorrhea, sinus pressure, sinus pain and sore throat. Eyes:  Negative for pain and redness. Respiratory:  Negative for cough and shortness of breath. Cardiovascular:  Negative for chest pain. Gastrointestinal:  Negative for abdominal pain, constipation, diarrhea, nausea and vomiting. Genitourinary:  Negative for difficulty urinating and dysuria.    Musculoskeletal:  Positive for Attempted to call, no answer.  Will call again shortly   myalgias. Negative for back pain and neck pain. Skin:  Negative for rash. Neurological:  Negative for dizziness, light-headedness and numbness. Psychiatric/Behavioral:  Positive for dysphoric mood and sleep disturbance. The patient is nervous/anxious. Prior to Visit Medications    Medication Sig Taking?  Authorizing Provider   Cholecalciferol (VITAMIN D3) 50 MCG (2000 UT) TABS TAKE 1 TABLET BY MOUTH DAILY Yes Katya Ferris DO   escitalopram (LEXAPRO) 10 MG tablet TAKE 1 TABLET BY MOUTH EVERY NIGHT Yes Katya Ferris DO   ondansetron (ZOFRAN) 4 MG tablet Take 1 tablet by mouth 3 times daily as needed for Nausea or Vomiting Yes Lo Zaldivar MD   anastrozole (ARIMIDEX) 1 MG tablet TAKE 1 TABLET BY MOUTH DAILY Yes Martha Naqvi MD   pantoprazole (PROTONIX) 40 MG tablet Take 1 tablet by mouth daily Yes Lydia Delvalle MD   dicyclomine (BENTYL) 10 MG capsule Take 1 capsule by mouth 4 times daily Yes Lydia Delvalle MD   vitamin D (ERGOCALCIFEROL) 1.25 MG (44029 UT) CAPS capsule TAKE 1 CAPSULE BY MOUTH 1 TIME A WEEK Yes Katya Ferris DO   cyanocobalamin 1000 MCG/ML injection Inject 1 mL into the muscle once for 1 dose Once a month  Katya Ferris DO        Allergies   Allergen Reactions    Vancomycin Nausea And Vomiting     Red man syndrome, had high fever and upset stomach       Past Medical History:   Diagnosis Date    Abscess of right breast 06/19/2018    Anxiety     Arthritis     Breast abscess 07/11/2018    Cancer (HCC)     Fatigue     GERD (gastroesophageal reflux disease)     Hyperlipidemia     Morbid obesity due to excess calories ([facility] [address]     Obesity 06/04/2013       Past Surgical History:   Procedure Laterality Date    ANKLE FRACTURE SURGERY  4/20/2012    right with hardware, subsequently removed    [address] [facility] Left 2020    BREAST BIOPSY Right 2018    CHOLECYSTECTOMY      2003    COLONOSCOPY N/A 5/11/2022    COLORECTAL CANCER SCREENING, NOT HIGH RISK performed by Rohit Grove MD at [facility]  2014    retrograde pyelogram, ureteroscopy, laser lithotripsy, left stent placement    FRACTURE SURGERY      ankle    MASTECTOMY Left 2021    LEFT BREAST SIMPLE MASTECTOMY WITH SENTINEL LYMPH NODE BIOPSY performed by Rohit Grove MD at [address] [facility] HEMATOMA/FLUID Right 2018    RIGHT BREAST INCISION AND DRAINAGE POSSIBLE WOUND VAC performed by Rhoit Grove MD at [address] [facility] [address] EXPLO/DRAIN BREAST ABSCESS Right 2018    INCISION AND DRAINAGE RIGHT BREAST, WITH APPLICATION OF WOUND VAC (PATIENT HSS WOUND VAC WILL BRING IT)  performed by Rohit Grove MD at [address]    SALPINGO-OOPHORECTOMY  2021    Lap BSO, EMBx    SKIN GRAFT      Left leg    UPPER GASTROINTESTINAL ENDOSCOPY      UPPER GASTROINTESTINAL ENDOSCOPY N/A 2022    EGD BIOPSY performed by Rohit Grove MD at [address] [facility] LEFT Left 2021    US BREAST CYST ASPIRATION LEFT 2021 [facility]    US BREAST CYST ASPIRATION LEFT Left 2021    US BREAST CYST ASPIRATION LEFT [facility]    US BREAST CYST ASPIRATION LEFT Left 2021    US BREAST CYST ASPIRATION LEFT 2021 [facility]    US BREAST CYST ASPIRATION LEFT Left 10/22/2021    US BREAST CYST ASPIRATION LEFT 10/22/2021 [facility]    US BREAST NEEDLE BIOPSY LEFT  2020    US BREAST NEEDLE BIOPSY LEFT 2020 [facility]    US BREAST NEEDLE BIOPSY LEFT Left 2021    US BREAST NEEDLE BIOPSY LEFT 2021 [facility]       Social History     Socioeconomic History    Marital status: Single     Spouse name: Not on file    Number of children: 2    Years of education: Not on file    Highest education level: Not on file   Occupational History    Not on file   Tobacco Use    Smoking status: Former     Packs/day: 0.25     Years: 4.00     Pack years: 1.00     Types: Cigarettes     Quit date: 2008     Years since quittin.2 Smokeless tobacco: Never   Vaping Use    Vaping Use: Never used   Substance and Sexual Activity    Alcohol use: Yes     Alcohol/week: 0.0 standard drinks     Comment: rarely    Drug use: No    Sexual activity: Not on file   Other Topics Concern    Not on file   Social History Narrative    Not on file     Social Determinants of Health     Financial Resource Strain: Low Risk     Difficulty of Paying Living Expenses: Not hard at all   Food Insecurity: No Food Insecurity    Worried About Running Out of Food in the Last Year: Never true    Ran Out of Food in the Last Year: Never true   Transportation Needs: Not on file   Physical Activity: Not on file   Stress: Not on file   Social Connections: Not on file   Intimate Partner Violence: Not on file   Housing Stability: Not on file        Family History   Problem Relation Age of Onset    High Cholesterol Mother     Breast Cancer Mother 54    Colon Cancer Mother     Stomach Cancer Mother     Cancer Mother         stomach,spine,colon    Diabetes Maternal Grandmother     High Blood Pressure Maternal Grandmother     High Cholesterol Maternal Grandmother     Breast Cancer Maternal Grandmother 79    Cancer Maternal Grandfather 60        throat    Ovarian Cancer Other            Vitals:    11/16/22 0915   BP: 138/72   Pulse: 81   Resp: 20   Temp: 97 °F (36.1 °C)   SpO2: 96%   Weight: (!) 373 lb (169.2 kg)   Height: 5' 3\" (1.6 m)     Estimated body mass index is 66.07 kg/m² as calculated from the following:    Height as of this encounter: 5' 3\" (1.6 m). Weight as of this encounter: 373 lb (169.2 kg).     Most Recent Labs  CBC  Lab Results   Component Value Date/Time    WBC 14.6 09/18/2022 10:29 AM    WBC 10.5 09/15/2022 10:59 AM    WBC 10.2 05/12/2022 10:26 AM    RBC 5.32 09/18/2022 10:29 AM    RBC 4.84 09/15/2022 10:59 AM    RBC 4.79 05/12/2022 10:26 AM    RBC 4.78 11/11/2021 07:55 AM    HGB 15.0 09/18/2022 10:29 AM    HGB 13.7 09/15/2022 10:59 AM    HGB 13.3 05/12/2022 10:26 AM HCT 46.0 09/18/2022 10:29 AM    HCT 43.0 09/15/2022 10:59 AM    HCT 42.7 05/12/2022 10:26 AM    MCV 86.5 09/18/2022 10:29 AM    MCV 88.8 09/15/2022 10:59 AM    MCV 89.1 05/12/2022 10:26 AM     09/18/2022 10:29 AM     09/15/2022 10:59 AM     05/12/2022 10:26 AM      CMP  Lab Results   Component Value Date/Time     09/18/2022 10:29 AM     09/15/2022 10:59 AM     05/12/2022 10:26 AM    K 3.5 09/18/2022 10:29 AM    K 4.3 09/15/2022 10:59 AM    K 4.6 05/12/2022 10:26 AM    K 4.3 07/16/2018 08:00 AM    K 3.9 07/15/2018 07:23 AM    K 4.4 07/14/2018 07:07 AM     09/18/2022 10:29 AM     09/15/2022 10:59 AM     05/12/2022 10:26 AM    CO2 19 09/18/2022 10:29 AM    CO2 27 09/15/2022 10:59 AM    CO2 26 05/12/2022 10:26 AM    ANIONGAP 15 09/18/2022 10:29 AM    ANIONGAP 10 09/15/2022 10:59 AM    ANIONGAP 10 05/12/2022 10:26 AM    GLUCOSE 133 09/18/2022 10:29 AM    GLUCOSE 98 09/15/2022 10:59 AM    GLUCOSE 95 05/12/2022 10:26 AM    GLUCOSE 80 04/20/2012 04:36 AM    GLUCOSE 80 03/08/2011 08:33 AM    BUN 12 09/18/2022 10:29 AM    BUN 14 09/15/2022 10:59 AM    BUN 13 05/12/2022 10:26 AM    CREATININE 0.8 09/18/2022 10:29 AM    CREATININE 0.7 09/15/2022 10:59 AM    CREATININE 0.7 05/12/2022 10:26 AM    LABGLOM >60 09/18/2022 10:29 AM    LABGLOM >60 09/15/2022 10:59 AM    LABGLOM >60 05/12/2022 10:26 AM    GFRAA >60 09/18/2022 10:29 AM    GFRAA >60 09/15/2022 10:59 AM    GFRAA >60 05/12/2022 10:26 AM    CALCIUM 9.4 09/18/2022 10:29 AM    CALCIUM 9.3 09/15/2022 10:59 AM    CALCIUM 9.0 05/12/2022 10:26 AM    PROT 7.8 09/18/2022 10:29 AM    PROT 8.0 09/15/2022 10:59 AM    PROT 7.7 05/12/2022 10:26 AM    LABALBU 4.1 09/18/2022 10:29 AM    LABALBU 3.9 09/15/2022 10:59 AM    LABALBU 3.8 05/12/2022 10:26 AM    LABALBU 3.6 04/20/2012 04:36 AM    LABALBU 4.1 03/08/2011 08:33 AM    BILITOT 0.7 09/18/2022 10:29 AM    BILITOT 0.5 09/15/2022 10:59 AM    BILITOT 0.5 05/12/2022 10:26 AM ALKPHOS 115 09/18/2022 10:29 AM    ALKPHOS 108 09/15/2022 10:59 AM    ALKPHOS 89 05/12/2022 10:26 AM    AST 20 09/18/2022 10:29 AM    AST 23 09/15/2022 10:59 AM    AST 24 05/12/2022 10:26 AM    ALT 32 09/18/2022 10:29 AM    ALT 28 09/15/2022 10:59 AM    ALT 21 05/12/2022 10:26 AM     A1C  Lab Results   Component Value Date/Time    LABA1C 5.8 05/13/2021 12:00 PM    LABA1C 5.6 08/08/2019 12:30 PM    LABA1C 5.6 04/16/2014 07:37 AM     TSH  Lab Results   Component Value Date/Time    TSH 2.960 03/15/2022 12:00 PM    TSH 3.240 05/13/2021 12:00 PM    TSH 3.800 08/08/2019 12:30 PM     FREET4  Lab Results   Component Value Date/Time    I8EQUFP 15.2 02/08/2012 04:00 PM     LIPID  Lab Results   Component Value Date/Time    CHOL 172 05/13/2021 12:00 PM    CHOL 170 08/08/2019 12:30 PM    CHOL 148 06/01/2018 12:00 PM    HDL 43 05/13/2021 12:00 PM    HDL 50 08/08/2019 12:30 PM    HDL 51 06/01/2018 12:00 PM    LDLCALC 80 05/13/2021 12:00 PM    LDLCALC 68 08/08/2019 12:30 PM    LDLCALC 79 06/01/2018 12:00 PM    TRIG 243 05/13/2021 12:00 PM    TRIG 262 08/08/2019 12:30 PM    TRIG 90 06/01/2018 12:00 PM     VITAMIN D  Lab Results   Component Value Date/Time    VITD25 18 03/15/2022 12:00 PM    VITD25 18 05/13/2021 12:00 PM    VITD25 17 08/08/2019 12:30 PM     SALOME   Lab Results   Component Value Date/Time    SAOLME NEGATIVE 10/15/2019 12:00 PM     RHEUMATOID FACTOR  Lab Results   Component Value Date/Time    RF <10 10/15/2019 12:00 PM     UA  Lab Results   Component Value Date/Time    COLORU Yellow 09/18/2022 10:29 AM    COLORU Yellow 01/15/2020 12:30 AM    COLORU Yellow 05/05/2018 06:55 PM    CLARITYU Clear 09/18/2022 10:29 AM    CLARITYU Clear 01/15/2020 12:30 AM    CLARITYU Clear 05/05/2018 06:55 PM    GLUCOSEU Negative 09/18/2022 10:29 AM    GLUCOSEU Negative 01/15/2020 12:30 AM    GLUCOSEU neg 01/14/2020 12:23 PM    GLUCOSEU Negative 05/05/2018 06:55 PM    BILIRUBINUR SMALL 09/18/2022 10:29 AM    BILIRUBINUR Negative 01/15/2020 12:30 AM    BILIRUBINUR neg 01/14/2020 12:23 PM    BILIRUBINUR Negative 05/05/2018 06:55 PM    KETUA Negative 09/18/2022 10:29 AM    KETUA Negative 01/15/2020 12:30 AM    KETUA neg 01/14/2020 12:23 PM    KETUA Negative 05/05/2018 06:55 PM    SPECGRAV >=1.030 09/18/2022 10:29 AM    SPECGRAV 1.015 01/15/2020 12:30 AM    SPECGRAV 1.025 01/14/2020 12:23 PM    SPECGRAV >=1.030 05/05/2018 06:55 PM    BLOODU Negative 09/18/2022 10:29 AM    BLOODU TRACE-INTACT 01/15/2020 12:30 AM    BLOODU neg 01/14/2020 12:23 PM    BLOODU LARGE 05/05/2018 06:55 PM    PHUR 6.0 09/18/2022 10:29 AM    PHUR 6.0 01/15/2020 12:30 AM    PHUR 5.5 01/14/2020 12:23 PM    PHUR 6.0 05/05/2018 06:55 PM    PROTEINU 100 09/18/2022 10:29 AM    PROTEINU Negative 01/15/2020 12:30 AM    PROTEINU trace 01/14/2020 12:23 PM    PROTEINU Negative 05/05/2018 06:55 PM    UROBILINOGEN 0.2 09/18/2022 10:29 AM    UROBILINOGEN 0.2 01/15/2020 12:30 AM    UROBILINOGEN 0.2 05/05/2018 06:55 PM    NITRU Negative 09/18/2022 10:29 AM    NITRU Negative 01/15/2020 12:30 AM    NITRU Negative 05/05/2018 06:55 PM    LEUKOCYTESUR SMALL 09/18/2022 10:29 AM    LEUKOCYTESUR Negative 01/15/2020 12:30 AM    LEUKOCYTESUR neg 01/14/2020 12:23 PM    LEUKOCYTESUR Negative 05/05/2018 06:55 PM       Physical Exam  Vitals and nursing note reviewed. Constitutional:       Appearance: Normal appearance. She is obese. HENT:      Head: Normocephalic and atraumatic. Right Ear: External ear normal.      Left Ear: External ear normal.   Eyes:      Extraocular Movements: Extraocular movements intact. Conjunctiva/sclera: Conjunctivae normal.      Pupils: Pupils are equal, round, and reactive to light. Cardiovascular:      Rate and Rhythm: Normal rate and regular rhythm. Pulses: Normal pulses. Heart sounds: Normal heart sounds. Pulmonary:      Effort: Pulmonary effort is normal.      Breath sounds: Normal breath sounds.    Abdominal:      General: Bowel sounds are normal. Palpations: Abdomen is soft. Musculoskeletal:         General: Normal range of motion. Cervical back: Normal range of motion and neck supple. Skin:     General: Skin is warm and dry. Capillary Refill: Capillary refill takes less than 2 seconds. Neurological:      General: No focal deficit present. Mental Status: She is alert and oriented to person, place, and time. Psychiatric:         Mood and Affect: Mood is anxious. Affect is tearful. Behavior: Behavior normal.         Thought Content: Thought content normal.       ASSESSMENT/PLAN:  Fatoumata Oneal was seen today for fatigue and joint pain. Diagnoses and all orders for this visit:    Need for vaccination  -     Influenza, FLUCELVAX, (age 10 mo+), IM, Preservative Free, 0.5 mL    Vitamin D deficiency  -     Vitamin D 25 Hydroxy; Future    Anxiety  -     CBC with Auto Differential; Future  -     Comprehensive Metabolic Panel; Future    Vitamin B12 deficiency  -     Vitamin B12; Future    Fatigue, unspecified type  -     Hemoglobin A1C; Future  -     TSH; Future    Hypertriglyceridemia  -     Lipid Panel; Future   Patient may need a sleep study in the future  Labs ordered    As above. Call or go to the nearest ED immediately if symptoms worsen or persist.  Educational materials and/or home exercises printed for patient's review and were included in patient instructions on his/her After Visit Summary and given to patient at the end of visit. Counseled regarding above diagnosis, including possible risks and complications,  especially if left uncontrolled. Counseled regarding the possible side effects, risks, benefits and alternatives to treatment; patient and/or guardian verbalizes understanding, agrees, feels comfortable with and wishes to proceed with above treatment plan.      Advised patient to call with any new medication issues, and read all Rx info from pharmacy to assure aware of all possible risks and side effects of medication before taking. Reviewed age and gender appropriate health screening exams and vaccinations. Advised patient regarding importance of keeping up with recommended health maintenance and to schedule as soon as possible if overdue, as this is important in assessing for undiagnosed pathology, especially cancer, as well as protecting against potentially harmful/life threatening disease. Patient and/or guardian verbalizes understanding and agrees with above counseling, assessment and plan. All questions answered. Return in about 3 months (around 2/16/2023). An  electronic signature was used to authenticate this note. --Allie Rich DO on 11/16/2022 at 9:49 AM    This document was prepared at least partially through the use of voice recognition software. Although effort is taken to assure the accuracy of this document, it is possible that grammatical, syntax,  or spelling errors may occur.

## 2022-12-01 ENCOUNTER — INITIAL CONSULT (OUTPATIENT)
Dept: BARIATRICS/WEIGHT MGMT | Age: 47
End: 2022-12-01

## 2022-12-01 ENCOUNTER — TELEPHONE (OUTPATIENT)
Dept: BARIATRICS/WEIGHT MGMT | Age: 47
End: 2022-12-01

## 2022-12-01 VITALS — WEIGHT: 293 LBS | BODY MASS INDEX: 51.91 KG/M2 | HEIGHT: 63 IN

## 2022-12-01 DIAGNOSIS — Z13.89 SCREENING FOR SUBSTANCE ABUSE: ICD-10-CM

## 2022-12-01 DIAGNOSIS — Z02.6 ENCOUNTER FOR EXAMINATION FOR INSURANCE PURPOSES: Primary | ICD-10-CM

## 2022-12-01 DIAGNOSIS — Z71.3 NUTRITIONAL COUNSELING: ICD-10-CM

## 2022-12-01 DIAGNOSIS — Z00.8 NUTRITIONAL ASSESSMENT: Primary | ICD-10-CM

## 2022-12-01 DIAGNOSIS — Z87.891 FORMER SMOKER: ICD-10-CM

## 2022-12-01 NOTE — PROGRESS NOTES
Weight Loss Assessment: Completed by Nutrition Services RD/KAMRAN Certified in Adult Weight Management:  Phone Number:  [phone]  Fax Number:   [phone]    Laron Means   12/1/22  Weight Loss Appointment: 3rd Weight Loss Appointment      Wt Readings from Last 3 Encounters:   12/01/22 (!) 373 lb (169.2 kg)   11/16/22 (!) 373 lb (169.2 kg)   10/28/22 (!) 374 lb (169.6 kg)        (!) 373 lb (169.2 kg)  IBW: 147 lbs         % IBW: 254%       % EBWL: 0%           ABW: 204 lbs  % ABW: 183%       BMI: Body mass index is 66.07 kg/m². Patient's 24 Hour Recall:  Breakfast: Coffee  Snack: None  Lunch: None  Snack: Hot Pepper Cheese  Dinner: Tuna Pouches Garlic and Herb Pouches 3 of them and  Tortilla Chips -   Snack: None  Water Intake: 3 to 4 - 16.9 oz Bottles  Other Beverages: Pepsi  Exercise: ADL's - Pt is active during the day    Education:   1. Weigh yourself daily and record it. 2. Keep documented food records daily   3. 220-225 minutes a week of moderate physical activity   4. Just be more active in day to day routine   5. Higher protein intake and a higher fiber intake. Not a high protein or a high fiber diet just a higher intake. Popeye Mark addressed the following with the pt:  - Popeye Mark enc pt to comply with nutrition recommendations  - Popeye Mark enc to go back to maintenance of regular physical activity  - Periodic assessment to prevent and treat eating or other psychiatric disorders   - Popeye Mark enc participation in support group meetings  - Popeye Mark enc pt to go back to maintenance of daily food records and weight monitoring records   - Popeye Mark reviewed the importance of adequate sleep and stress management  - Popeye Makr reviewed nonfood strategies to cope with emotions and stress  - Popeye Mark encouraged pt to practice the following: Mindful eating: Eating slowly:  Focusing on the eating experience without distraction  - Popeye Mark enc. pt to pay attention to hunger and fullness cues  - Popeye Mark enc meal planning  - Popeye Mark enc pt to chose nutrient dense whole foods instead of soft, high calorie foods  - Rd / Ld enc not drinking large amounts of fluids with or immediately after meals    Portion control, meal planning and avoiding empty calorie consumption. Weight loss goal for next follow-up appointment: 10 lbs    Patient has established the following three goals for the next follow-up appointment. 1.Pt states she wants to increase exercise ad PA. 2.Pt states she wants to stop picking at food and wants to meal plan and meal prep so that she is having 6 small meals a day. 3.Pt states she wants to make meals healthier food choices and avoid processed foods for meal and meal consumption. Patient has established the following exercise goal for next follow-up appointment:  ADL's - Walking the track - 2 Day's a week - Duration: 30 - 45 Minutes    Did the patient keep food records:No -  Pt was instructed by the Popeye Mark that she / he needs to keep daily food records and bring the food records to all f/u appointments. Pt was instructed this is an important part of compliancy and long-term lifestyle changes and will help establish long-term weight loss and weight maintenance success after weight loss surgery. Popeye Mark reviewed with the pt how to keep track of protein intake along with calories, fat and sugar content. Pt needs to be able to see that he / she is meeting his / her macro nutrient needs daily. Popeye Mark reviewed different ways to keep foods records either manually or with computer apps. Pt was able to verbalize understanding. Failure to keep food records show poor compliancy following weight loss surgery. Pt has been given all the tools and education necessary to complete this task. Education spent with pt just on food records 20 minutes.          Pt. is aware if they do not comply with [facility] Guidelines that this can lead to the patient being dismissed from the program.    The registered dietitian spent the following time 60 minutes educating the patient and providing the patient with nutritional handouts to follow. __________________________________________________________________________________  Primary Care Physician Follow-up:  Pt. was seen by Gemma Vences RD/KAMRAN regarding weight loss education and follow-up on 12/1/22. This was the patients 3rd appointment with the registered dietitian. The registered dietitian spent the following amount of time with the patient 60 minutes. Please Sycuan the following: The Primary Care Physician reviewed the above nutrition assessment and patient education and agrees with current diet plan. The Primary Care Physician wants the current diet plan changed to the following:_____________________________________________________________________________________________________________________________________________________________________________________________________________. Physician Signature:__________________________ Date:______________  Once signed please [facility] to [facility] [phone]. We thank you for allowing us to participate in your patients care.

## 2022-12-01 NOTE — TELEPHONE ENCOUNTER
Last Dietary Appointment Notes: 22    [address]    Surgery Requested by Patient: As of 22 - RYGB    Date: 2 Hour Nutrition Class: Once all testing is complete    Rd / Ld reviewed the following with the patient:    Rd / Ld at the [facility] reviewed with the patient that he / she has not completed the following in order to proceed with bariatric surgery:     - Initial Appt with Surgeon was 22. Initial Appt is only good until 23. Testing will be required again after this date per your insurance company policy. Please remember just because you finished all of your requirements if you did not finish the requirements in a timely manner they can  and you can be required to complete these requirements over again. Each [employer] has its own set of requirements with its own set of deadlines. Once everything listed below is completed you will need to complete the following to proceed with sx. The [facility] will contact you to complete this process. You will be called in order to select your surgery date for insurance submission. You will be called and scheduled to attend a 3 Hour Nutrition Class on the type of surgery you are having completed. These are always scheduled on  from 8:00 am to 11:00 am and dates vary depending on the type of surgery you are having completed. You will need to purchase your bariatric supplements at this appointment cost is $140.00 to $240.00. Failure to purchase supplements or attend the class will lead to your surgery being cancelled. You will need final Medical Clearance from your Primary Care Physician. Failure to complete will lead to your surgery being cancelled. You will be scheduled for a H&P appointment with your surgeon . It is at this appointment you will need to make goal weight. Failure to complete will lead to your surgery being cancelled.     You will be scheduled for PAT at [address] [place] [address] Hospital usually the week before your scheduled surgery. Failure to complete will lead to your surgery being cancelled. Remember after all testing that is required it is your responsibility as a patient to call The [facility] to review that we received any testing results or requirements that you had completed. The [facility] is not responsible for tracking of results and testing. Your phone call will help facilitate if what is required was received and completed. - Pt is working on completing her Psych Evaluation.  - Pt will need her pre-op initial labs drawn scripts given - Zinc, Vitamin B1, Nicotine, Folate, Ferritin, Prealbumin  - Nicotine Script Given 12/1/22 // Quite Date 2009 // Draw Date ASAP // Pt instructed to call 10 day's after to review results.     - Rd / Ld at the [facility] reviewed that he / she is not at his / her pre-surgery goal weight of 352 lbs. Patient currently weighs 373 lbs and must return to the [facility] for a weight check on H&P before the patient can be scheduled for surgery. This has been reviewed with the patient and the patient is in agreement. Rd / Ld reviewed with the patient that he / she must purchase a 3 month supply of supplements before his / her surgery or at the time of his / her H&P appointment and the patient states he / she is going to purchase the 3 month supply of supplements on H&P. Patient is aware that failure to purchase the supplements at this appointment will cancel the patients surgery date. Patient states at this time from the time of his / her initial consult here at the [facility] there has been no changes in his / her medical history. Patient is aware failure to disclose information can lead to his / her surgery being cancelled. Patient received a copy of this at the time of his / her final dietary consult.

## 2022-12-07 NOTE — ADDENDUM NOTE
Addended by: Gloria Toribio on: 1/14/2020 03:34 PM     Modules accepted: Orders Spoke with the patient, informed her of Dr. Armando Mitchell message and that she should check with her insurance. I gave her information about Visiting Physicians who could possible come out to her home.

## 2022-12-14 RX ORDER — ANASTROZOLE 1 MG/1
1 TABLET ORAL DAILY
Qty: 90 TABLET | Refills: 0 | Status: SHIPPED | OUTPATIENT
Start: 2022-12-14

## 2023-01-13 ENCOUNTER — OFFICE VISIT (OUTPATIENT)
Dept: FAMILY MEDICINE CLINIC | Age: 48
End: 2023-01-13
Payer: MEDICAID

## 2023-01-13 VITALS
HEART RATE: 100 BPM | OXYGEN SATURATION: 96 % | HEIGHT: 62 IN | DIASTOLIC BLOOD PRESSURE: 70 MMHG | BODY MASS INDEX: 53.92 KG/M2 | WEIGHT: 293 LBS | SYSTOLIC BLOOD PRESSURE: 120 MMHG | TEMPERATURE: 96.4 F

## 2023-01-13 DIAGNOSIS — J06.9 UPPER RESPIRATORY TRACT INFECTION, UNSPECIFIED TYPE: ICD-10-CM

## 2023-01-13 DIAGNOSIS — H66.91 RIGHT OTITIS MEDIA, UNSPECIFIED OTITIS MEDIA TYPE: Primary | ICD-10-CM

## 2023-01-13 LAB — S PYO AG THROAT QL: NORMAL

## 2023-01-13 PROCEDURE — 1036F TOBACCO NON-USER: CPT

## 2023-01-13 PROCEDURE — 99213 OFFICE O/P EST LOW 20 MIN: CPT

## 2023-01-13 PROCEDURE — 87880 STREP A ASSAY W/OPTIC: CPT

## 2023-01-13 PROCEDURE — G8482 FLU IMMUNIZE ORDER/ADMIN: HCPCS

## 2023-01-13 PROCEDURE — G8417 CALC BMI ABV UP PARAM F/U: HCPCS

## 2023-01-13 PROCEDURE — G8427 DOCREV CUR MEDS BY ELIG CLIN: HCPCS

## 2023-01-13 RX ORDER — AMOXICILLIN AND CLAVULANATE POTASSIUM 875; 125 MG/1; MG/1
1 TABLET, FILM COATED ORAL 2 TIMES DAILY
Qty: 20 TABLET | Refills: 0 | Status: SHIPPED | OUTPATIENT
Start: 2023-01-13 | End: 2023-01-23

## 2023-01-13 NOTE — PROGRESS NOTES
Chief Complaint       Pharyngitis, Cough, and Otalgia (X 3 days.)  Tylenol and throat spray   History of Present Illness   Source of history provided by:  patient. Mariann Marino is a 56 y.o. old female presenting to the walk in clinic for evaluation of sore throat x 3 days. Reports associated pain with swallowing, nasal congestion, rhinorrhea, and bilateral otalgia (R>L). Denies any fever, chills, loss of taste/smell, dyspnea, dysphagia, CP, SOB, cough, nausea, vomiting, rash, or lethargy. Denies any known strep exposures. Denies any contact with any individuals with known COVID-19 infection or under investigation for COVID-19 infection. Pt has been vaccinated for COVID-19. ROS    Unless otherwise stated in this report or unable to obtain because of the patient's clinical or mental status as evidenced by the medical record, this patients's positive and negative responses for Review of Systems, constitutional, psych, eyes, ENT, cardiovascular, respiratory, gastrointestinal, neurological, genitourinary, musculoskeletal, integument systems and systems related to the presenting problem are either stated in the preceding or were not pertinent or were negative for the symptoms and/or complaints related to the medical problem. Physical Exam         VS:  /70   Pulse 100   Temp (!) 96.4 °F (35.8 °C)   Ht 5' 2\" (1.575 m)   Wt (!) 376 lb (170.6 kg)   LMP 10/24/2021   SpO2 96%   BMI 68.77 kg/m²    Oxygen Saturation Interpretation: Normal.    Constitutional:  Alert, development consistent with age. .  Ears:  Right TM with moderate erythema and bulging. Left TM mild erythema. No perforation bilaterally. External canals clear without swelling or exudate. Throat: Airway patent. Posterior pharynx with moderate erythema and no tonsillar hypertrophy. No exudate noted. Neck:  Supple with full ROM. There is diffuse anterior bilateral adenopathy.     Lungs:  Clear to auscultation and breath sounds equal. CV: Regular rate and rhythm, normal heart sounds, without pathological murmurs, ectopy, gallops, or rubs. Skin:  No rashes, erythema present. Lymphatics: No lymphangitis or adenopathy noted other then stated above. Neurological:  Alert and orientated. Motor functions intact. Responds to commands. Lab / Imaging Results   (All laboratory and radiology results have been personally reviewed by myself)  Labs:  Results for orders placed or performed in visit on 01/13/23   POCT rapid strep A   Result Value Ref Range    Strep A Ag None Detected None Detected       Imaging: All Radiology results interpreted by Radiologist unless otherwise noted. Assessment / Plan     Impression(s):  Anabel Gleason was seen today for pharyngitis, cough and otalgia. Diagnoses and all orders for this visit:    Right otitis media, unspecified otitis media type  -     amoxicillin-clavulanate (AUGMENTIN) 875-125 MG per tablet; Take 1 tablet by mouth 2 times daily for 10 days    Upper respiratory tract infection, unspecified type  -     POCT rapid strep A    Disposition:  Disposition: Discharge to home. Script written for Augmentin, side effects discussed. Increase fluids and rest. NSAIDs prn pain/fever. F/u PCP in 5-7 days if symptoms persist. ED sooner if symptoms worsen or change. ED immediately with the development of refractory fever, shaking chills, dyspnea, dysphagia, neck stiffness, vomiting, etc. Pt is in agreement with this care plan. All questions answered. HEATHER Parikh    **This report was transcribed using voice recognition software. Every effort was made to ensure accuracy; however, inadvertent computerized transcription errors may be present.

## 2023-01-17 DIAGNOSIS — F41.9 ANXIETY: ICD-10-CM

## 2023-01-18 ENCOUNTER — OFFICE VISIT (OUTPATIENT)
Dept: BARIATRICS/WEIGHT MGMT | Age: 48
End: 2023-01-18
Payer: MEDICAID

## 2023-01-18 VITALS
HEIGHT: 63 IN | BODY MASS INDEX: 51.91 KG/M2 | TEMPERATURE: 97.2 F | HEART RATE: 96 BPM | SYSTOLIC BLOOD PRESSURE: 169 MMHG | WEIGHT: 293 LBS | DIASTOLIC BLOOD PRESSURE: 102 MMHG | RESPIRATION RATE: 20 BRPM

## 2023-01-18 DIAGNOSIS — K21.9 GASTROESOPHAGEAL REFLUX DISEASE WITHOUT ESOPHAGITIS: ICD-10-CM

## 2023-01-18 DIAGNOSIS — E66.01 MORBID OBESITY DUE TO EXCESS CALORIES (HCC): ICD-10-CM

## 2023-01-18 DIAGNOSIS — Z71.3 NUTRITIONAL COUNSELING: Primary | ICD-10-CM

## 2023-01-18 PROCEDURE — G8417 CALC BMI ABV UP PARAM F/U: HCPCS | Performed by: NURSE PRACTITIONER

## 2023-01-18 PROCEDURE — G8482 FLU IMMUNIZE ORDER/ADMIN: HCPCS | Performed by: NURSE PRACTITIONER

## 2023-01-18 PROCEDURE — 1036F TOBACCO NON-USER: CPT | Performed by: NURSE PRACTITIONER

## 2023-01-18 PROCEDURE — G8427 DOCREV CUR MEDS BY ELIG CLIN: HCPCS | Performed by: NURSE PRACTITIONER

## 2023-01-18 PROCEDURE — 99213 OFFICE O/P EST LOW 20 MIN: CPT | Performed by: NURSE PRACTITIONER

## 2023-01-18 NOTE — PATIENT INSTRUCTIONS
Goals  Exercise - walk the track - 3 days - 10-15 minutes, or home exercise videos  Cut out pop  Eat smaller meals, or protein drinks/snacks. What is the next step to proceed with weight loss surgery? Please be aware that any co-pays or deductibles may be requested prior to testing and / or procedures. You will need to schedule a psychological evaluation for weight loss surgery. Patients will be required to complete all psychological testing as required by the mental health provider. Patients must also follow all of the provider's recommendations before weight loss surgery can be scheduled. The evaluation must be done a standard way for weight loss surgery. We strongly recommend that you contact one of our preferred providers listed below to arrange this:      Rosalina Hamilton, [facility]  [address] [facility], [address] [place] [address]   [phone]    [facility] and [address] [facility] [address] [facility], [address] [place] [address]   [phone]    Dr. Yulisa House, PhD    M[place] Lowry. [place], [address] [place] [address]    [phone]      You will also need to plan on attending a 2 hour nutrition class at [facility] prior to your surgery. We will schedule this for you when we schedule your surgery. Please remember to have your labs drawn 10 days prior to your first scheduled dietary appointment. Please remember, that while we will submit your case to insurance for surgery authorization, it is your responsibility to know if your plan covers weight loss surgery and keep up-to-date with changes to your insurance coverage. We will do everything possible to help you get approved for weight loss surgery, but cannot guarantee an approval.     Please note that you will not be submitted to your insurance company until all pre-operative testing requirements are met.

## 2023-01-18 NOTE — PROGRESS NOTES
Concetta Bazan  2/1/2023  Bariatric Weight loss appointment for Obesity  Nutrition Education Session      Subjective:   Cocnetta Bazan is a 56 y.o. female here for pre-surgical weight check today. Patient is accompanied by herself at today's visit. Patient reports that she has not followed any of the diet plans that she has been given. She has a lot of stress and health issues at this time. She understands the importance of weight loss prior to surgery. She reports that she has had issues with her water at home, has been doing a lot of takeout recently. She is drinking pop daily, however it has been cut down to one glass per day. Weight=(!) 374 lb (169.6 kg)  Today's weight represents a total weight loss to date of 0 pounds. Today we will discuss the topics of weight loss. .         Prior to Admission medications    Medication Sig Start Date End Date Taking?  Authorizing Provider   anastrozole (ARIMIDEX) 1 MG tablet TAKE 1 TABLET BY MOUTH DAILY 12/14/22  Yes Evangeline Goldmann, MD   [facility] Does not apply route Cannot walk more than 200 feet  Expires 11/2027 11/16/22  Yes Katya Ferris DO   vitamin D (ERGOCALCIFEROL) 1.25 MG (75926 UT) CAPS capsule TAKE 1 CAPSULE BY MOUTH 1 TIME A WEEK 11/16/22  Yes Katya Ferris DO   Cholecalciferol (VITAMIN D3) 50 MCG (2000 UT) TABS TAKE 1 TABLET BY MOUTH DAILY 11/16/22  Yes Katya Ferris DO   ondansetron (ZOFRAN) 4 MG tablet Take 1 tablet by mouth 3 times daily as needed for Nausea or Vomiting 9/18/22  Yes Deana Ye MD   pantoprazole (PROTONIX) 40 MG tablet Take 1 tablet by mouth daily 6/29/22  Yes Scott Leal MD   dicyclomine (BENTYL) 10 MG capsule Take 1 capsule by mouth 4 times daily 6/29/22  Yes Scott Leal MD   hydrOXYzine HCl (ATARAX) 25 MG tablet Take 1 tablet by mouth nightly 1/24/23 2/23/23  Katya Ferris DO   ondansetron (ZOFRAN) 4 MG tablet Take 1 tablet by mouth daily as needed for Nausea or Vomiting 1/23/23   Shantell PAINTER Dee Moralez MD   escitalopram (LEXAPRO) 10 MG tablet TAKE 1 TABLET BY MOUTH EVERY NIGHT 1/19/23   Katya Ferris DO   cyanocobalamin 1000 MCG/ML injection Inject 1 mL into the muscle once for 1 dose Once a month  Patient not taking: Reported on 1/13/2023 7/5/22 9/20/22  Katya Ferris DO         Allergies   Allergen Reactions    Vancomycin Nausea And Vomiting     Red man syndrome, had high fever and upset stomach     Past Medical History:   Diagnosis Date    Abscess of right breast 06/19/2018    Anxiety     Arthritis     Breast abscess 07/11/2018    Cancer (HCC)     Fatigue     GERD (gastroesophageal reflux disease)     Hyperlipidemia     Morbid obesity due to excess calories ([address]     Obesity 06/04/2013       Past Surgical History:   Procedure Laterality Date    ANKLE FRACTURE SURGERY  4/20/2012    right with hardware, subsequently removed    [facility] Left 2020    BREAST BIOPSY Right 2018    CHOLECYSTECTOMY      2003    COLONOSCOPY N/A 5/11/2022    COLORECTAL CANCER SCREENING, NOT HIGH RISK performed by Abdulaziz Smith MD at [place] [address]  7/25/2014    retrograde pyelogram, ureteroscopy, laser lithotripsy, left stent placement    FRACTURE SURGERY      ankle    MASTECTOMY Left 8/18/2021    LEFT BREAST SIMPLE MASTECTOMY WITH SENTINEL LYMPH NODE BIOPSY performed by Abdulaziz Smith MD at [facility] Dr I&D HEMATOMA SEROMA/FLUID COLLECTION Right 6/20/2018    RIGHT BREAST INCISION AND DRAINAGE POSSIBLE WOUND VAC performed by Abdulaziz Smith MD at [place] [address] Right 7/11/2018    INCISION AND DRAINAGE RIGHT BREAST, WITH APPLICATION OF WOUND VAC (PATIENT HSS WOUND VAC WILL BRING IT)  performed by Abdulaziz Smith MD at [address] [place] [address]    SALPINGO-OOPHORECTOMY  11/11/2021    [facility]    SKIN GRAFT  1991    Left leg    UPPER GASTROINTESTINAL ENDOSCOPY      UPPER GASTROINTESTINAL ENDOSCOPY N/A 5/11/2022    EGD BIOPSY performed by Xi Sahu MD [place] at [facility]    US ASP BREAST CYST LEFT Left 9/1/2021    US BREAST CYST ASPIRATION LEFT 9/1/2021 [facility]    US ASP BREAST CYST LEFT Left 9/7/2021    US BREAST CYST ASPIRATION LEFT [facility]    US ASP BREAST CYST LEFT Left 9/17/2021    US BREAST CYST ASPIRATION LEFT 9/17/2021 [facility]    US ASP BREAST CYST LEFT Left 10/22/2021    US BREAST CYST ASPIRATION LEFT 10/22/2021 [facility]    US BREAST BIOPSY W LOC DEVICE 1ST LESION LEFT  9/17/2020    US BREAST NEEDLE BIOPSY LEFT 9/17/2020 [facility]    US BREAST BIOPSY W LOC DEVICE 1ST LESION LEFT Left 7/1/2021    US BREAST NEEDLE BIOPSY LEFT 7/1/2021 [facility]       Current Outpatient Medications   Medication Sig Dispense Refill    anastrozole (ARIMIDEX) 1 MG tablet TAKE 1 TABLET BY MOUTH DAILY 90 tablet 0    Handicap Placard MISC by Does not apply route Cannot walk more than 200 feet  Expires 11/2027 1 each 0    vitamin D (ERGOCALCIFEROL) 1.25 MG (11970 UT) CAPS capsule TAKE 1 CAPSULE BY MOUTH 1 TIME A WEEK 12 capsule 1    Cholecalciferol (VITAMIN D3) 50 MCG (2000 UT) TABS TAKE 1 TABLET BY MOUTH DAILY 90 tablet 1    ondansetron (ZOFRAN) 4 MG tablet Take 1 tablet by mouth 3 times daily as needed for Nausea or Vomiting 15 tablet 0    pantoprazole (PROTONIX) 40 MG tablet Take 1 tablet by mouth daily 30 tablet 1    dicyclomine (BENTYL) 10 MG capsule Take 1 capsule by mouth 4 times daily 120 capsule 3    hydrOXYzine HCl (ATARAX) 25 MG tablet Take 1 tablet by mouth nightly 30 tablet 3    ondansetron (ZOFRAN) 4 MG tablet Take 1 tablet by mouth daily as needed for Nausea or Vomiting 30 tablet 0    escitalopram (LEXAPRO) 10 MG tablet TAKE 1 TABLET BY MOUTH EVERY NIGHT 30 tablet 1    cyanocobalamin 1000 MCG/ML injection Inject 1 mL into the muscle once for 1 dose Once a month (Patient not taking: Reported on 1/13/2023) 1 mL 5     No current facility-administered medications for this visit.       Allergies   Allergen Reactions    Vancomycin  Nausea And Vomiting     Red man syndrome, had high fever and upset stomach       Family History   Problem Relation Age of Onset    High Cholesterol Mother     Breast Cancer Mother 54    Colon Cancer Mother     Stomach Cancer Mother     Cancer Mother         stomach,spine,colon    Diabetes Maternal Grandmother     High Blood Pressure Maternal Grandmother     High Cholesterol Maternal Grandmother     Breast Cancer Maternal Grandmother 79    Cancer Maternal Grandfather 60        throat    Ovarian Cancer Other        Social History     Socioeconomic History    Marital status: Single     Spouse name: Not on file    Number of children: 2    Years of education: Not on file    Highest education level: Not on file   Occupational History    Not on file   Tobacco Use    Smoking status: Former     Packs/day: 0.25     Years: 4.00     Pack years: 1.00     Types: Cigarettes     Quit date: 2008     Years since quittin.4    Smokeless tobacco: Never   Vaping Use    Vaping Use: Never used   Substance and Sexual Activity    Alcohol use: Yes     Alcohol/week: 0.0 standard drinks     Comment: rarely    Drug use: No    Sexual activity: Not on file   Other Topics Concern    Not on file   Social History Narrative    Not on file     Social Determinants of Health     Financial Resource Strain: Low Risk     Difficulty of Paying Living Expenses: Not hard at all   Food Insecurity: No Food Insecurity    Worried About Running Out of Food in the Last Year: Never true    Ran Out of Food in the Last Year: Never true   Transportation Needs: Not on file   Physical Activity: Not on file   Stress: Not on file   Social Connections: Not on file   Intimate Partner Violence: Not on file   Housing Stability: Not on file           A complete 10 system review was performed and are otherwise negative unless mentioned in the above HPI. Specific negatives are listed below but may not include all those reviewed.     General ROS: negative obtundation, AMS  ENT ROS: negative rhinorrhea, epistaxis  Allergy and Immunology ROS: negative itchy/watery eyes or nasal congestion  Hematological and Lymphatic ROS: negative spontaneous bleeding or bruising  Endocrine ROS: negative  lethargy, mood swings, palpitations or polydipsia/polyuria  Respiratory ROS: negative sputum changes, stridor, tachypnea or wheezing  Cardiovascular ROS: negative for - loss of consciousness, murmur or orthopnea  Gastrointestinal ROS: negative for - hematochezia or hematemesis  Genito-Urinary ROS: negative for -  genital discharge or hematuria  Musculoskeletal ROS: negative for - focal weakness, gangrene  Psych/Neuro ROS: negative for - visual or auditory hallucinations, suicidal ideation    Physical exam:   BP (!) 169/102 (Site: Right Lower Arm, Position: Sitting, Cuff Size: Large Adult)   Pulse 96   Temp 97.2 °F (36.2 °C) (Temporal)   Resp 20   Ht 5' 3\" (1.6 m)   Wt (!) 374 lb (169.6 kg)   LMP 10/24/2021   BMI 66.25 kg/m²   General appearance: AAO, NAD  Eyes: PERRL, EOMI, red conjunctiva  Lungs: Equal chest rise bilateral  Chest wall: atraumatic, no tenderness, no echymosis or abrasions  Heart: reg rate, no murmur  Abdomen: soft, nondistended, tender minimal, no hernias, no peritioneal signs  Extremities: full ROM all 4 ext, no gross motor or sensory deficits  Pulses: 2+ distal  Skin: warm and dry  Neurologic: spontanous eye opening, purposeful, follows complex commands  Psych: No tremor, no suicidal ideation, no hallucinations      Assessment:   1. Nutritional counseling  Discussed need for patient to lose weight prior to getting scheduled for surgery  She reports that she will try harder to not eat junk or fast food, make healthier choices when ordering out  Exercise - walking 3-5 days per week    2. Gastroesophageal reflux disease without esophagitis  See  below    3. Morbid obesity due to excess calories ([address]  See below    4th session of dietary education pre weight loss surgery. Patient has 2 more sessions to attend. Plan:   Continue to keep food diet, bring to next appointment with Nurse Practitioner or RD/LD  Continue to read food labels and make smart food choices    Increase physical activity at home    I have spent 20 minutes educating patient on nutrition. All questions were answered to patients and family's satisfaction. They verbalize the importance of pre surgical weight loss at this time.      Provider Signature: Electronically signed by JIHAN Hair CNP

## 2023-01-19 ENCOUNTER — HOSPITAL ENCOUNTER (OUTPATIENT)
Dept: INFUSION THERAPY | Age: 48
Discharge: HOME | End: 2023-01-19
Payer: MEDICAID

## 2023-01-19 ENCOUNTER — OFFICE VISIT (OUTPATIENT)
Dept: ONCOLOGY | Age: 48
End: 2023-01-19
Payer: MEDICAID

## 2023-01-19 VITALS
TEMPERATURE: 97 F | HEART RATE: 90 BPM | RESPIRATION RATE: 20 BRPM | WEIGHT: 293 LBS | SYSTOLIC BLOOD PRESSURE: 132 MMHG | DIASTOLIC BLOOD PRESSURE: 63 MMHG | BODY MASS INDEX: 53.92 KG/M2 | HEIGHT: 62 IN | OXYGEN SATURATION: 93 %

## 2023-01-19 DIAGNOSIS — Z85.3 PERSONAL HISTORY OF BREAST CANCER: Primary | ICD-10-CM

## 2023-01-19 DIAGNOSIS — Z85.3 PERSONAL HISTORY OF BREAST CANCER: ICD-10-CM

## 2023-01-19 DIAGNOSIS — Z79.811 USE OF AROMATASE INHIBITORS: ICD-10-CM

## 2023-01-19 LAB
ALBUMIN SERPL-MCNC: 3.8 G/DL (ref 3.5–5.2)
ALP BLD-CCNC: 108 U/L (ref 35–104)
ALT SERPL-CCNC: 23 U/L (ref 0–32)
ANION GAP SERPL CALCULATED.3IONS-SCNC: 11 MMOL/L (ref 7–16)
AST SERPL-CCNC: 16 U/L (ref 0–31)
BASOPHILS ABSOLUTE: 0.04 E9/L (ref 0–0.2)
BASOPHILS RELATIVE PERCENT: 0.4 % (ref 0–2)
BILIRUB SERPL-MCNC: 0.6 MG/DL (ref 0–1.2)
BUN BLDV-MCNC: 11 MG/DL (ref 6–20)
CALCIUM SERPL-MCNC: 9.4 MG/DL (ref 8.6–10.2)
CHLORIDE BLD-SCNC: 98 MMOL/L (ref 98–107)
CO2: 28 MMOL/L (ref 22–29)
CREAT SERPL-MCNC: 0.6 MG/DL (ref 0.5–1)
EOSINOPHILS ABSOLUTE: 0.15 E9/L (ref 0.05–0.5)
EOSINOPHILS RELATIVE PERCENT: 1.4 % (ref 0–6)
GFR SERPL CREATININE-BSD FRML MDRD: >60 ML/MIN/1.73
GLUCOSE BLD-MCNC: 136 MG/DL (ref 74–99)
HCT VFR BLD CALC: 41.8 % (ref 34–48)
HEMOGLOBIN: 13.4 G/DL (ref 11.5–15.5)
IMMATURE GRANULOCYTES #: 0.06 E9/L
IMMATURE GRANULOCYTES %: 0.5 % (ref 0–5)
LYMPHOCYTES ABSOLUTE: 1.96 E9/L (ref 1.5–4)
LYMPHOCYTES RELATIVE PERCENT: 17.7 % (ref 20–42)
MCH RBC QN AUTO: 27.6 PG (ref 26–35)
MCHC RBC AUTO-ENTMCNC: 32.1 % (ref 32–34.5)
MCV RBC AUTO: 86 FL (ref 80–99.9)
MONOCYTES ABSOLUTE: 0.42 E9/L (ref 0.1–0.95)
MONOCYTES RELATIVE PERCENT: 3.8 % (ref 2–12)
NEUTROPHILS ABSOLUTE: 8.42 E9/L (ref 1.8–7.3)
NEUTROPHILS RELATIVE PERCENT: 76.2 % (ref 43–80)
PDW BLD-RTO: 14.7 FL (ref 11.5–15)
PLATELET # BLD: 320 E9/L (ref 130–450)
PMV BLD AUTO: 9.7 FL (ref 7–12)
POTASSIUM SERPL-SCNC: 3.9 MMOL/L (ref 3.5–5)
RBC # BLD: 4.86 E12/L (ref 3.5–5.5)
SODIUM BLD-SCNC: 137 MMOL/L (ref 132–146)
TOTAL PROTEIN: 7.4 G/DL (ref 6.4–8.3)
WBC # BLD: 11.1 E9/L (ref 4.5–11.5)

## 2023-01-19 PROCEDURE — 99212 OFFICE O/P EST SF 10 MIN: CPT

## 2023-01-19 PROCEDURE — 99214 OFFICE O/P EST MOD 30 MIN: CPT | Performed by: INTERNAL MEDICINE

## 2023-01-19 PROCEDURE — 36415 COLL VENOUS BLD VENIPUNCTURE: CPT

## 2023-01-19 PROCEDURE — G8417 CALC BMI ABV UP PARAM F/U: HCPCS | Performed by: INTERNAL MEDICINE

## 2023-01-19 PROCEDURE — 1036F TOBACCO NON-USER: CPT | Performed by: INTERNAL MEDICINE

## 2023-01-19 PROCEDURE — 85025 COMPLETE CBC W/AUTO DIFF WBC: CPT

## 2023-01-19 PROCEDURE — G8482 FLU IMMUNIZE ORDER/ADMIN: HCPCS | Performed by: INTERNAL MEDICINE

## 2023-01-19 PROCEDURE — G8427 DOCREV CUR MEDS BY ELIG CLIN: HCPCS | Performed by: INTERNAL MEDICINE

## 2023-01-19 PROCEDURE — 80053 COMPREHEN METABOLIC PANEL: CPT

## 2023-01-19 RX ORDER — ESCITALOPRAM OXALATE 10 MG/1
TABLET ORAL
Qty: 30 TABLET | Refills: 1 | Status: SHIPPED | OUTPATIENT
Start: 2023-01-19

## 2023-01-19 NOTE — PROGRESS NOTES
Department of [facility] Oncology  Attending Clinic Note    Reason for Visit: Follow-up on a patient with Left Breast Cancer    PCP:  Bry Macias DO    History of Present Illness:  56year old female with Left Breast Cancer    Breast cancer risk factors include family hx on mother's side, mom with breast CA, family hx of colon CA and age and gender    Bilateral Diagnostic Mammogram/Left Breast U/S on 07/01/2021  Left sided ill defined hypoechoic region at the 2 o'clock position measuring 2 cm. On 07/01/2021 Left breast, 2:00 core needle biopsy:   Invasive, moderately differentiated mammary carcinoma (grade 2)     Comment: Microscopic examination shows an invasive carcinoma with linear growth pattern dispersed throughout fibrous stroma. The tumor has a Stacia histologic score of 3 (tubule formation) +2 (nuclear pleomorphism) +1 (mitotic count) = 6. The tumor cells are immunoreactive with pankeratin and E-cadherin which favors ductal origin. The p63 highlights myoepithelial cells in benign ducts. Intradepartmental consultation is obtained. Breast Cancer Marker Studies:   Estrogen Receptors (ER): 60%   Progesterone Receptors (IN): 60%   Her-2/markie: Negative/1+     CXR 07/09/2021 noted no pneumonia or pleural effusion    Left axillary US 07/14/2021 noted no LN    Left simple mastectomy, sentinel lymph node biopsy, injection of methylene blue dye 08/18/2021  A.   Left breast, total mastectomy:   - Invasive carcinoma with mixed ductal and lobular features, bicentric, grade 2;   - Lobular carcinoma in situ and atypical lobular hyperplasia;   - Rare small ducts showing atypical ductal hyperplasia;   - Fibrocystic changes with usual ductal hyperplasia without atypia, adenosis, columnar cell change, fibroadenomatoid nodule, ductal ectasia, microcysts and stromal fibrosis;   - Changes of previous biopsy sites (2).     B.  [place] lymph nodes #1, biopsy:   - Three of six (3/6) lymph nodes positive for metastatic carcinoma (micrometastasis), see comment.     C.  Left breast, new inferior/medial margin, excision:   - Unremarkable fibroadipose tissue and skeletal muscle negative for malignancy.     D.  Left breast, additional inferior/lateral margin, excision:   - Unremarkable fibroadipose tissue, negative for malignancy.     Comment: Sections of the mastectomy specimen revealed two isolated, similar sized tumor masses. Both are composed of infiltrative neoplastic cells showing single cell or single cell line arrangement.   Immunostaining for E-cadherin was performed on sections of two selected tissue blocks (A 7 and A 12).  Some of the invasive neoplastic cells (A 7) show strong membrane positivity for E-cadherin, consistent with ductal differentiation while others (A 12) show absence or aberrant expression   of E-cadherin, indicative of lobular differentiation.  Therefore, this is an invasive carcinoma with mixed ductal and lobular features.     The presence of lobular carcinoma in situ (LCIS) and atypical lobular hyperplasia (ALH) is confirmed by immunostaining for p63 and E-cadherin on sections of tissue block A 9 showing the presence of a p63 positive myoepithelial cells layer at the periphery and absence of E-cadherin expression of the epithelial cells inside of the myoepithelial layer.     Total six lymph nodes are identified from the submitted tissue in specimen B and each node is serially sectioned.  Histologically, there are rare small foci suspicious for metastatic carcinoma. Immunostaining for pankeratin on sections of selected nodes (B2, B4, B5, B12 and B13)   was then performed.  Positively stained foci of micrometastasis (0.2-2 mm) are identified on sections of B2, B4/B5 (the same lymph node) and B13.  Intradepartmental consultation is obtained.     CANCER CASE SUMMARY   Procedure: Total mastectomy   Specimen Laterality: Left   TUMOR   Tumor Site: 2:00 (between upper-outer and lower-outer quadrant)  Histologic Type: Invasive carcinoma with mixed ductal and lobular   features   Histologic Grade: Calvin Histologic Score        Glandular/tubular differentiation: Score 3        Nuclear pleomorphism: Score 2        Right articular rate: Score 1        Overall grade: Grade 2 (scores of 6)   Tumor Size: 21 x 15 x 10 mm   Tumor Focality: Multiple foci of invasive carcinoma        Number of foci: 2        Sizes of individual foci in millimeters: 20 x 15 x 7 mm        Ductal Carcinoma In Situ (DCIS): Not identified        Lobular Carcinoma In Situ (LCIS): Present   Lymphovascular invasion: Not identified   Dermal lymphovascular invasion: Not identified   Microcalcifications: Not identified   Treatment effect: No known presurgical therapy   MARGINS   All margins negative for invasive carcinoma    Distance from invasive carcinoma to closest margin: 6.0 cm from tumor #2; 6.5 cm from tumor #1    Closest margin to invasive carcinoma: Superior/anterior (for tumor #2); posterior (for tumor #1)   REGIONAL LYMPH NODES    Tumor present in regional lymph nodes    Number of lymph nodes with macrometastases (>2 mm): 0    Number of lymph nodes with micrometastasis (0.2-2 mm): 3    Number of lymph nodes with isolated tumor cells 0    Size of largest shelia metastatic deposit: 2 mm    Extranodal extension: Not identified   Total number of lymph nodes examined: 6   Number of sentinel nodes examined: 6   DISTANT METASTASIS: Not applicable   PATHOLOGIC STAGE (pTNM, AJCC 8th Edition)   TNM descriptors: m (multiple foci of invasive carcinoma)   mpT2 pN1mi     Oncotype Recurrence score:15    Left sided IDC/ILC T2N1mi M0 ER/WV positive HER2 negative. CT chest/abdomen/pelvis 09/14/2021 Postsurgical changes in the left breast with the fluid collection and inflammation as noted likely postoperative hematoma/seroma. No evidence of metastatic disease. Non-specific 8 mm hypodense lesion at the head of the pancreas; HBP team following.   CT abdomen 03/15/2022: Stable 9 mm low-density indeterminate lesion in the head of the pancreas which is stable and unchanged when compared to prior examinations. Diffuse fatty infiltration identified of the liver  Stable pancreatic head cyst. US in 6 months due to hepatic steatosis  CT scan yearly for 5 years (present since 2020 - year 3/5)  NM Bone Scan 09/14/2021 without metastatic disease. NCCN guidelines reviewed  TAILORx suggested that in women ? 50 years and with an intermediate RS (11-25), chemotherapy plus endocrine therapy was associated with a lower rate of distant recurrence relative to endocrine therapy alone, particularly for those with high-intermediate scores (21 to 25) or clinical risk features. However, this was based only on exploratory subset analyses, and moreover, some of the benefit hypothetically may have been due to ovarian suppression in premenopausal women. Ovarian suppression was not assessed as a treatment strategy in this trial, however. In light of limitations in the data, either chemotherapy and endocrine therapy, or endocrine therapy only (with ovarian suppression in premenopausal women) are acceptable options. [address] 7.6 09/22/2021 Estradiol 45.9 09/22/2021    She was leaning more to proceed with endocrine therapy only with ovarian suppression  Tamoxifen was started on 09/23/2021. Evaluated by Alyssa Quiroz for BSO; Endometrial biopsy BSO done on 11/11/2021  A. ENDOMETRIUM, ENDOMETRIAL BIOPSY:   - DYSSYNCHRONOUS SECRETORY ENDOMETRIUM. - BENIGN ENDOCERVICAL POLYP AND BENIGN SQUAMOUS MUCOSA. B. OVARY AND FALLOPIAN TUBES, RIGHT AND LEFT, BILATERAL SALPINGECTOMY:   - OVARIES IDENTIFIED, BILATERALLY; HEMORRHAGIC CORPUS LUTEAL CYSTS. - COMPLETE CROSS-SECTION OF FALLOPIAN TUBES IDENTIFIED, BILATERALLY    RT was completed on 01/11/2022  Arimidex 1 mg po daily was started on 11/24/2021    Today 01/19/2023; No fever. Fair appetite and energy level. Mild arthralgias.  Hot flashes. No chest pain or shortness of breath. No nausea vomiting. Review of Systems:  CONSTITUTIONAL: No fever. Fair appetite and energy level. + Hot flashes. ENMT: Eyes: No diplopia; Nose: No epistaxis. Mouth: No sore throat. RESPIRATORY: No hemoptysis, shortness of breath, cough. CARDIOVASCULAR: No chest pain, palpitations. GASTROINTESTINAL: No nausea vomiting  GENITOURINARY: No dysuria, urinary frequency, hematuria. NEURO: No syncope, presyncope, headache. MSK: Mild arthralgias. Remainder:  ROS NEGATIVE    Past Medical History:      Diagnosis Date    Abscess of right breast 06/19/2018    Anxiety     Arthritis     Breast abscess 07/11/2018    Cancer (HCC)     Fatigue     GERD (gastroesophageal reflux disease)     Hyperlipidemia     Morbid obesity due to excess calories ([facility] [address]     Obesity 06/04/2013     Medications:  Reviewed and reconciled. Allergies: Allergies   Allergen Reactions    Vancomycin Nausea And Vomiting     Red man syndrome, had high fever and upset stomach     Physical Exam:  /63 (Site: Right Upper Arm, Position: Sitting)   Pulse 90   Temp 97 °F (36.1 °C) (Infrared)   Resp 20   Ht 5' 2\" (1.575 m)   Wt (!) 377 lb (171 kg)   LMP 10/24/2021   SpO2 93%   BMI 68.95 kg/m²   GENERAL: Alert, oriented x 3, not in acute distress. Lungs: CTA Martin  CVS: RRR  EXTREMITIES: Without clubbing or cyanosis or edema. ECOG 1    Lab Results   Component Value Date    WBC 11.1 01/19/2023    HGB 13.4 01/19/2023    HCT 41.8 01/19/2023    MCV 86.0 01/19/2023     01/19/2023     Impression/Plan:  55 y/o female with Left IDC/ILC breast cancer, T2N1mi(sn)M0 ER+ND+HER2-, grade 2, Stage IB  Genetic Testing: Negative    Left simple mastectomy, sentinel lymph node biopsy, injection of methylene blue dye 08/18/2021  CANCER CASE SUMMARY   Procedure: Total mastectomy   Specimen Laterality: Left   TUMOR   Tumor Site: 2:00 (between upper-outer and lower-outer quadrant)   Histologic Type:  Invasive carcinoma with mixed ductal and lobular features   Histologic Grade: Lucile Histologic Score        Glandular/tubular differentiation: Score 3        Nuclear pleomorphism: Score 2        Right articular rate: Score 1        Overall grade: Grade 2 (scores of 6)   Tumor Size: 21 x 15 x 10 mm   Tumor Focality: Multiple foci of invasive carcinoma        Number of foci: 2        Sizes of individual foci in millimeters: 20 x 15 x 7 mm        Ductal Carcinoma In Situ (DCIS): Not identified        Lobular Carcinoma In Situ (LCIS): Present   Lymphovascular invasion: Not identified   Dermal lymphovascular invasion: Not identified   Microcalcifications: Not identified   Treatment effect: No known presurgical therapy   MARGINS   All margins negative for invasive carcinoma   Distance from invasive carcinoma to closest margin: 6.0 cm from tumor #2; 6.5 cm from tumor #1   Closest margin to invasive carcinoma: Superior/anterior (for tumor #2); posterior (for tumor #1)   REGIONAL LYMPH NODES    Tumor present in regional lymph nodes    Number of lymph nodes with macrometastases (>2 mm): 0    Number of lymph nodes with micrometastasis (0.2-2 mm): 3    Number of lymph nodes with isolated tumor cells 0    Size of largest shelia metastatic deposit: 2 mm    Extranodal extension: Not identified   Total number of lymph nodes examined: 6   Number of sentinel nodes examined: 6   DISTANT METASTASIS: Not applicable   PATHOLOGIC STAGE (pTNM, AJCC 8th Edition)   TNM descriptors: m (multiple foci of invasive carcinoma)   mpT2 pN1mi     Left sided IDC/ILC T2N1mi M0 ER/CO positive HER2 negative. CT chest/abdomen/pelvis 09/14/2021 Postsurgical changes in the left breast with the fluid collection and inflammation as noted likely postoperative hematoma/seroma. No evidence of metastatic disease. Non-specific 8 mm hypodense lesion at the head of the pancreas; HBP team following.   CT abdomen 03/15/2022: Stable 9 mm low-density indeterminate lesion in the head of the pancreas which is stable and unchanged when compared to prior examinations. Diffuse fatty infiltration identified of the liver  Stable pancreatic head cyst. US in 6 months due to hepatic steatosis  CT scan yearly for 5 years (present since 2020 - year 3/5)  NM Bone Scan 09/14/2021 without metastatic disease. NCCN guidelines reviewed  TAILORx suggested that in women ? 50 years and with an intermediate RS (11-25), chemotherapy plus endocrine therapy was associated with a lower rate of distant recurrence relative to endocrine therapy alone, particularly for those with high-intermediate scores (21 to 25) or clinical risk features. However, this was based only on exploratory subset analyses, and moreover, some of the benefit hypothetically may have been due to ovarian suppression in premenopausal women. Ovarian suppression was not assessed as a treatment strategy in this trial, however. In light of limitations in the data, either chemotherapy and endocrine therapy, or endocrine therapy only (with ovarian suppression in premenopausal women) are acceptable options. [address] 7.6 09/22/2021 Estradiol 45.9 09/22/2021    She was leaning more to proceed with endocrine therapy only with ovarian suppression;   Tamoxifen was started on 09/23/2021. Evaluated by Denise Rhoades for BSO; Endometrial biopsy BSO done on 11/11/2021  A. ENDOMETRIUM, ENDOMETRIAL BIOPSY:   - DYSSYNCHRONOUS SECRETORY ENDOMETRIUM. - BENIGN ENDOCERVICAL POLYP AND BENIGN SQUAMOUS MUCOSA. B. OVARY AND FALLOPIAN TUBES, RIGHT AND LEFT, BILATERAL SALPINGECTOMY:   - OVARIES IDENTIFIED, BILATERALLY; HEMORRHAGIC CORPUS LUTEAL CYSTS.    - COMPLETE CROSS-SECTION OF FALLOPIAN TUBES IDENTIFIED, BILATERALLY    RT was completed on 01/11/2022    Arimidex 1 mg po daily was started on 11/24/2021     DEXA scan 01/20/2022 normal bone mineral density by WHO criteria  MBI testing 03/10/2022: No evidence of neoplasm\  Right Screening Mammogram 10/28/2022: No evidence of malignancy. Imaging reviewed. Labs reviewed. HARRISON  Continue Arimidex, Ca. VitD.   Recommended Effexor for hot flashes  Recommended Tylenol or NSAIDs for mild arthralgias    RTC 4 months with labs     01/19/2023  Estefania Abbasi MD

## 2023-01-20 ENCOUNTER — TELEPHONE (OUTPATIENT)
Dept: FAMILY MEDICINE CLINIC | Age: 48
End: 2023-01-20

## 2023-01-20 NOTE — TELEPHONE ENCOUNTER
Pt called in stating her mother is in her final stages of her cancer and pt stated she doesn't know how long her mother is going to make it. pt is very upset and her anxiety is extremely bad. Pt is unable to see her mother due to her living in a different state and is having a very hard time pt was wondering if you can call her in something to help? She stated she doesn't know if she can have xanax or what she should take since she has never taken anything like that before and stated if she can speak with you directly if that's okay. Please advise.

## 2023-01-23 ENCOUNTER — TELEPHONE (OUTPATIENT)
Dept: FAMILY MEDICINE CLINIC | Age: 48
End: 2023-01-23

## 2023-01-23 RX ORDER — ONDANSETRON 4 MG/1
4 TABLET, FILM COATED ORAL DAILY PRN
Qty: 30 TABLET | Refills: 0 | Status: SHIPPED | OUTPATIENT
Start: 2023-01-23

## 2023-01-23 NOTE — TELEPHONE ENCOUNTER
Did they give her the capsules or tablets? If tablets she can cut in half, if not I will send in 25 mg.  Thank you

## 2023-01-24 DIAGNOSIS — F41.9 ANXIETY: Primary | ICD-10-CM

## 2023-01-24 RX ORDER — HYDROXYZINE HYDROCHLORIDE 25 MG/1
25 TABLET, FILM COATED ORAL NIGHTLY
Qty: 30 TABLET | Refills: 3 | Status: SHIPPED | OUTPATIENT
Start: 2023-01-24 | End: 2023-02-23

## 2023-02-15 ENCOUNTER — TELEPHONE (OUTPATIENT)
Dept: HEMATOLOGY | Age: 48
End: 2023-02-15

## 2023-02-15 NOTE — TELEPHONE ENCOUNTER
[place] for CT abdomen from [facility] with Greg Romo #[id number] with approval dates 2/1/23-3/18/23. I called patient and she called and got the CT scheduled on 3/3/23 at [facility]. When she called back after she scheduled the CT I made her a follow up the same day at [facility] clinic at [facility] and she was instructed to come to office after the CT is completed. She verbalized understanding and she confirmed this appt.     Electronically signed by Aleshia Kuhn RN on 2/15/2023 at 2:04 PM

## 2023-02-16 ENCOUNTER — OFFICE VISIT (OUTPATIENT)
Dept: FAMILY MEDICINE CLINIC | Age: 48
End: 2023-02-16
Payer: MEDICAID

## 2023-02-16 VITALS
RESPIRATION RATE: 16 BRPM | BODY MASS INDEX: 53.92 KG/M2 | TEMPERATURE: 97.2 F | OXYGEN SATURATION: 97 % | DIASTOLIC BLOOD PRESSURE: 72 MMHG | HEIGHT: 62 IN | SYSTOLIC BLOOD PRESSURE: 126 MMHG | HEART RATE: 83 BPM | WEIGHT: 293 LBS

## 2023-02-16 DIAGNOSIS — F41.9 ANXIETY: Primary | ICD-10-CM

## 2023-02-16 DIAGNOSIS — Z17.0 MALIGNANT NEOPLASM OF UPPER-OUTER QUADRANT OF LEFT BREAST IN FEMALE, ESTROGEN RECEPTOR POSITIVE (HCC): ICD-10-CM

## 2023-02-16 DIAGNOSIS — E66.01 MORBID OBESITY (HCC): ICD-10-CM

## 2023-02-16 DIAGNOSIS — C50.412 MALIGNANT NEOPLASM OF UPPER-OUTER QUADRANT OF LEFT BREAST IN FEMALE, ESTROGEN RECEPTOR POSITIVE (HCC): ICD-10-CM

## 2023-02-16 PROCEDURE — G8482 FLU IMMUNIZE ORDER/ADMIN: HCPCS | Performed by: STUDENT IN AN ORGANIZED HEALTH CARE EDUCATION/TRAINING PROGRAM

## 2023-02-16 PROCEDURE — 1036F TOBACCO NON-USER: CPT | Performed by: STUDENT IN AN ORGANIZED HEALTH CARE EDUCATION/TRAINING PROGRAM

## 2023-02-16 PROCEDURE — G8427 DOCREV CUR MEDS BY ELIG CLIN: HCPCS | Performed by: STUDENT IN AN ORGANIZED HEALTH CARE EDUCATION/TRAINING PROGRAM

## 2023-02-16 PROCEDURE — G8417 CALC BMI ABV UP PARAM F/U: HCPCS | Performed by: STUDENT IN AN ORGANIZED HEALTH CARE EDUCATION/TRAINING PROGRAM

## 2023-02-16 PROCEDURE — 99214 OFFICE O/P EST MOD 30 MIN: CPT | Performed by: STUDENT IN AN ORGANIZED HEALTH CARE EDUCATION/TRAINING PROGRAM

## 2023-02-16 RX ORDER — HYDROXYZINE PAMOATE 50 MG/1
CAPSULE ORAL
Qty: 90 CAPSULE | Refills: 1 | Status: SHIPPED | OUTPATIENT
Start: 2023-02-16

## 2023-02-16 RX ORDER — VENLAFAXINE HYDROCHLORIDE 37.5 MG/1
37.5 CAPSULE, EXTENDED RELEASE ORAL DAILY
Qty: 30 CAPSULE | Refills: 3 | Status: SHIPPED | OUTPATIENT
Start: 2023-02-16

## 2023-02-16 RX ORDER — HYDROXYZINE PAMOATE 50 MG/1
CAPSULE ORAL
COMMUNITY
Start: 2023-01-21 | End: 2023-02-16 | Stop reason: SDUPTHER

## 2023-02-16 SDOH — ECONOMIC STABILITY: HOUSING INSECURITY
IN THE LAST 12 MONTHS, WAS THERE A TIME WHEN YOU DID NOT HAVE A STEADY PLACE TO SLEEP OR SLEPT IN A SHELTER (INCLUDING NOW)?: NO

## 2023-02-16 SDOH — ECONOMIC STABILITY: FOOD INSECURITY: WITHIN THE PAST 12 MONTHS, YOU WORRIED THAT YOUR FOOD WOULD RUN OUT BEFORE YOU GOT MONEY TO BUY MORE.: SOMETIMES TRUE

## 2023-02-16 SDOH — ECONOMIC STABILITY: FOOD INSECURITY: WITHIN THE PAST 12 MONTHS, THE FOOD YOU BOUGHT JUST DIDN'T LAST AND YOU DIDN'T HAVE MONEY TO GET MORE.: SOMETIMES TRUE

## 2023-02-16 SDOH — ECONOMIC STABILITY: INCOME INSECURITY: HOW HARD IS IT FOR YOU TO PAY FOR THE VERY BASICS LIKE FOOD, HOUSING, MEDICAL CARE, AND HEATING?: SOMEWHAT HARD

## 2023-02-16 ASSESSMENT — ENCOUNTER SYMPTOMS
BACK PAIN: 0
SORE THROAT: 0
NAUSEA: 0
SINUS PAIN: 0
VOMITING: 0
COUGH: 0
ABDOMINAL PAIN: 0
SHORTNESS OF BREATH: 0
SINUS PRESSURE: 0
EYE PAIN: 0
DIARRHEA: 0
CONSTIPATION: 0
EYE REDNESS: 0
RHINORRHEA: 0

## 2023-02-16 ASSESSMENT — PATIENT HEALTH QUESTIONNAIRE - PHQ9
SUM OF ALL RESPONSES TO PHQ QUESTIONS 1-9: 2
1. LITTLE INTEREST OR PLEASURE IN DOING THINGS: 1
SUM OF ALL RESPONSES TO PHQ QUESTIONS 1-9: 2
SUM OF ALL RESPONSES TO PHQ9 QUESTIONS 1 & 2: 2
2. FEELING DOWN, DEPRESSED OR HOPELESS: 1
SUM OF ALL RESPONSES TO PHQ QUESTIONS 1-9: 2
SUM OF ALL RESPONSES TO PHQ QUESTIONS 1-9: 2

## 2023-02-16 NOTE — PROGRESS NOTES
2/16/2023    [facility]  Chief Complaint   Patient presents with    Anxiety    Discuss Medications     Lexapro to Effexor        Tejas Pierre is a 56 y.o. female who  has a past medical history of Abscess of right breast, Anxiety, Arthritis, Breast abscess, Cancer ([address] Fatigue, GERD (gastroesophageal reflux disease), Hyperlipidemia, Morbid obesity due to excess calories ([address] and Obesity. Tejas Pierre is a 56 y.o. female who presents for follow up of anxiety disorder. Current symptoms: chest pain, fatigue, feelings of losing control, palpitations, racing thoughts, sweating. She denies current suicidal and homicidal ideation. She complains of the following side effects from the treatment: none. She did speak with her oncologist who recommended a switch from lexapro to effexor which could help with her weight along with the hot flashes. Patient states that she notices that she does get chest pain at times. However she has seen cardiology and did have an EKG and this was normal.  She did bring this up to them as well. We discussed that this could be due to her anxiety. She is going through a lot of stress with her cancer of the weight loss and her mother having issues with her health as well. If she continues to have chest pain we will can do this further. Not currently having chest pain at this time    Review of Systems   Constitutional:  Positive for fatigue. Negative for chills and fever. HENT:  Negative for congestion, ear pain, postnasal drip, rhinorrhea, sinus pressure, sinus pain and sore throat. Eyes:  Negative for pain and redness. Respiratory:  Negative for cough and shortness of breath. Cardiovascular:  Positive for chest pain (intermittent; 2/2 anxiety?). Gastrointestinal:  Negative for abdominal pain, constipation, diarrhea, nausea and vomiting. Genitourinary:  Negative for difficulty urinating and dysuria. Musculoskeletal:  Positive for myalgias.  Negative for back pain and neck pain. Skin:  Negative for rash. Neurological:  Negative for dizziness, light-headedness and numbness. Psychiatric/Behavioral:  Positive for dysphoric mood and sleep disturbance. The patient is nervous/anxious. Prior to Visit Medications    Medication Sig Taking?  Authorizing Provider   venlafaxine (EFFEXOR XR) 37.5 MG extended release capsule Take 1 capsule by mouth daily Yes Katya Ferris DO   hydrOXYzine pamoate (VISTARIL) 50 MG capsule TAKE 1 CAPSULE BY MOUTH EVERY NIGHT AT BEDTIME Yes Katya Ferris DO   hydrOXYzine HCl (ATARAX) 25 MG tablet Take 1 tablet by mouth nightly Yes Katya Ferris DO   ondansetron (ZOFRAN) 4 MG tablet Take 1 tablet by mouth daily as needed for Nausea or Vomiting Yes Caden Michael MD   escitalopram (LEXAPRO) 10 MG tablet TAKE 1 TABLET BY MOUTH EVERY NIGHT Yes Katya Ferris DO   anastrozole (ARIMIDEX) 1 MG tablet TAKE 1 TABLET BY MOUTH DAILY Yes Caden Michael MD   Handicap Placard MISC by Does not apply route Cannot walk more than 200 feet  Expires 11/2027 Yes Katya Ferris DO   vitamin D (ERGOCALCIFEROL) 1.25 MG (15228 UT) CAPS capsule TAKE 1 CAPSULE BY MOUTH 1 TIME A WEEK Yes Katya Ferris DO   Cholecalciferol (VITAMIN D3) 50 MCG (2000 UT) TABS TAKE 1 TABLET BY MOUTH DAILY Yes Katya Ferris DO   ondansetron (ZOFRAN) 4 MG tablet Take 1 tablet by mouth 3 times daily as needed for Nausea or Vomiting Yes Yelena Sexton MD   pantoprazole (PROTONIX) 40 MG tablet Take 1 tablet by mouth daily Yes Daren Andino MD   dicyclomine (BENTYL) 10 MG capsule Take 1 capsule by mouth 4 times daily Yes Daren Andino MD        Allergies   Allergen Reactions    Vancomycin Nausea And Vomiting     Red man syndrome, had high fever and upset stomach       Past Medical History:   Diagnosis Date    Abscess of right breast 06/19/2018    Anxiety     Arthritis     Breast abscess 07/11/2018    Cancer (HCC)     Fatigue     GERD (gastroesophageal reflux disease) Hyperlipidemia     Morbid obesity due to excess calories [address] [address]     Obesity 06/04/2013       Past Surgical History:   Procedure Laterality Date    ANKLE FRACTURE SURGERY  4/20/2012    right with hardware, subsequently removed    [address] Left 2020    BREAST BIOPSY Right 2018    CHOLECYSTECTOMY      2003    COLONOSCOPY N/A 5/11/2022    COLORECTAL CANCER SCREENING, NOT HIGH RISK performed by Ginger Otero MD at [place] [address]  7/25/2014    retrograde pyelogram, ureteroscopy, laser lithotripsy, left stent placement    FRACTURE SURGERY      ankle    MASTECTOMY Left 8/18/2021    LEFT BREAST SIMPLE MASTECTOMY WITH SENTINEL LYMPH NODE BIOPSY performed by Ginger Otero MD at [facility] Dr I&D HEMATOMA SEROMA/FLUID COLLECTION Right 6/20/2018    RIGHT BREAST INCISION AND DRAINAGE POSSIBLE WOUND VAC performed by Ginger Otero MD at [address] [facility] Right 7/11/2018    INCISION AND DRAINAGE RIGHT BREAST, WITH APPLICATION OF WOUND VAC (PATIENT HSS WOUND VAC WILL BRING IT)  performed by Ginger Otero MD at [address]    SALPINGO-OOPHORECTOMY  11/11/2021    Lap BSO, EMBx    SKIN GRAFT  1991    Left leg    UPPER GASTROINTESTINAL ENDOSCOPY      UPPER GASTROINTESTINAL ENDOSCOPY N/A 5/11/2022    EGD BIOPSY performed by Ginger Otero MD at [address] ASP BREAST CYST LEFT Left 9/1/2021    US BREAST CYST ASPIRATION LEFT 9/1/2021 [facility]    US ASP BREAST CYST LEFT Left 9/7/2021    US BREAST CYST ASPIRATION LEFT [facility]    US ASP BREAST CYST LEFT Left 9/17/2021    US BREAST CYST ASPIRATION LEFT 9/17/2021 [facility]    US ASP BREAST CYST LEFT Left 10/22/2021    US BREAST CYST ASPIRATION LEFT 10/22/2021 [facility]    US BREAST BIOPSY W LOC DEVICE 1ST LESION LEFT  9/17/2020    US BREAST NEEDLE BIOPSY LEFT 9/17/2020 [facility]    US BREAST BIOPSY W LOC DEVICE 1ST LESION LEFT Left 7/1/2021    US BREAST NEEDLE BIOPSY LEFT 2021 TRINA CHATTERJEE [facility]       Social History     Socioeconomic History    Marital status: Single     Spouse name: Not on file    Number of children: 2    Years of education: Not on file    Highest education level: Not on file   Occupational History    Not on file   Tobacco Use    Smoking status: Former     Packs/day: 0.25     Years: 4.00     Pack years: 1.00     Types: Cigarettes     Quit date: 2008     Years since quittin.4    Smokeless tobacco: Never   Vaping Use    Vaping Use: Never used   Substance and Sexual Activity    Alcohol use: Yes     Alcohol/week: 0.0 standard drinks     Comment: rarely    Drug use: No    Sexual activity: Not on file   Other Topics Concern    Not on file   Social History Narrative    Not on file     Social Determinants of Health     Financial Resource Strain: Medium Risk    Difficulty of Paying Living Expenses: Somewhat hard   Food Insecurity: Food Insecurity Present    Worried About Running Out of Food in the Last Year: Sometimes true    Ran Out of Food in the Last Year: Sometimes true   Transportation Needs: Unknown    Lack of Transportation (Medical): Not on file    Lack of Transportation (Non-Medical):  No   Physical Activity: Not on file   Stress: Not on file   Social Connections: Not on file   Intimate Partner Violence: Not on file   Housing Stability: Unknown    Unable to Pay for Housing in the Last Year: Not on file    Number of Places Lived in the Last Year: Not on file    Unstable Housing in the Last Year: No        Family History   Problem Relation Age of Onset    High Cholesterol Mother     Breast Cancer Mother 54    Colon Cancer Mother     Stomach Cancer Mother     Cancer Mother         stomach,spine,colon    Diabetes Maternal Grandmother     High Blood Pressure Maternal Grandmother     High Cholesterol Maternal Grandmother     Breast Cancer Maternal Grandmother 79    Cancer Maternal Grandfather 60        throat    Ovarian Cancer Other            Vitals: 02/16/23 1005   BP: 126/72   Pulse: 83   Resp: 16   Temp: 97.2 °F (36.2 °C)   TempSrc: Temporal   SpO2: 97%   Weight: (!) 376 lb (170.6 kg)   Height: 5' 2\" (1.575 m)     Estimated body mass index is 68.77 kg/m² as calculated from the following:    Height as of this encounter: 5' 2\" (1.575 m). Weight as of this encounter: 376 lb (170.6 kg).     Most Recent Labs  CBC  Lab Results   Component Value Date/Time    WBC 11.1 01/19/2023 10:15 AM    WBC 10.3 11/16/2022 10:51 AM    WBC 14.6 09/18/2022 10:29 AM    RBC 4.86 01/19/2023 10:15 AM    RBC 4.90 11/16/2022 10:51 AM    RBC 5.32 09/18/2022 10:29 AM    RBC 4.78 11/11/2021 07:55 AM    HGB 13.4 01/19/2023 10:15 AM    HGB 13.9 11/16/2022 10:51 AM    HGB 15.0 09/18/2022 10:29 AM    HCT 41.8 01/19/2023 10:15 AM    HCT 43.5 11/16/2022 10:51 AM    HCT 46.0 09/18/2022 10:29 AM    MCV 86.0 01/19/2023 10:15 AM    MCV 88.8 11/16/2022 10:51 AM    MCV 86.5 09/18/2022 10:29 AM     01/19/2023 10:15 AM     11/16/2022 10:51 AM     09/18/2022 10:29 AM      CMP  Lab Results   Component Value Date/Time     01/19/2023 10:15 AM     11/16/2022 10:51 AM     09/18/2022 10:29 AM    K 3.9 01/19/2023 10:15 AM    K 4.3 11/16/2022 10:51 AM    K 3.5 09/18/2022 10:29 AM    K 4.3 07/16/2018 08:00 AM    K 3.9 07/15/2018 07:23 AM    K 4.4 07/14/2018 07:07 AM    CL 98 01/19/2023 10:15 AM     11/16/2022 10:51 AM     09/18/2022 10:29 AM    CO2 28 01/19/2023 10:15 AM    CO2 28 11/16/2022 10:51 AM    CO2 19 09/18/2022 10:29 AM    ANIONGAP 11 01/19/2023 10:15 AM    ANIONGAP 13 11/16/2022 10:51 AM    ANIONGAP 15 09/18/2022 10:29 AM    GLUCOSE 136 01/19/2023 10:15 AM    GLUCOSE 92 11/16/2022 10:51 AM    GLUCOSE 133 09/18/2022 10:29 AM    GLUCOSE 80 04/20/2012 04:36 AM    GLUCOSE 80 03/08/2011 08:33 AM    BUN 11 01/19/2023 10:15 AM    BUN 11 11/16/2022 10:51 AM    BUN 12 09/18/2022 10:29 AM    CREATININE 0.6 01/19/2023 10:15 AM    CREATININE 0.7 11/16/2022 10:51 AM    CREATININE 0.8 09/18/2022 10:29 AM    LABGLOM >60 01/19/2023 10:15 AM    LABGLOM >60 11/16/2022 10:51 AM    LABGLOM >60 09/18/2022 10:29 AM    LABGLOM >60 09/15/2022 10:59 AM    LABGLOM >60 05/12/2022 10:26 AM    GFRAA >60 09/18/2022 10:29 AM    GFRAA >60 09/15/2022 10:59 AM    GFRAA >60 05/12/2022 10:26 AM    CALCIUM 9.4 01/19/2023 10:15 AM    CALCIUM 9.5 11/16/2022 10:51 AM    CALCIUM 9.4 09/18/2022 10:29 AM    PROT 7.4 01/19/2023 10:15 AM    PROT 7.9 11/16/2022 10:51 AM    PROT 7.8 09/18/2022 10:29 AM    LABALBU 3.8 01/19/2023 10:15 AM    LABALBU 4.0 11/16/2022 10:51 AM    LABALBU 4.1 09/18/2022 10:29 AM    LABALBU 3.6 04/20/2012 04:36 AM    LABALBU 4.1 03/08/2011 08:33 AM    BILITOT 0.6 01/19/2023 10:15 AM    BILITOT 0.6 11/16/2022 10:51 AM    BILITOT 0.7 09/18/2022 10:29 AM    ALKPHOS 108 01/19/2023 10:15 AM    ALKPHOS 109 11/16/2022 10:51 AM    ALKPHOS 115 09/18/2022 10:29 AM    AST 16 01/19/2023 10:15 AM    AST 15 11/16/2022 10:51 AM    AST 20 09/18/2022 10:29 AM    ALT 23 01/19/2023 10:15 AM    ALT 21 11/16/2022 10:51 AM    ALT 32 09/18/2022 10:29 AM     A1C  Lab Results   Component Value Date/Time    LABA1C 5.6 11/16/2022 10:51 AM    LABA1C 5.8 05/13/2021 12:00 PM    LABA1C 5.6 08/08/2019 12:30 PM     TSH  Lab Results   Component Value Date/Time    TSH 3.300 11/16/2022 10:51 AM    TSH 2.960 03/15/2022 12:00 PM    TSH 3.240 05/13/2021 12:00 PM     FREET4  Lab Results   Component Value Date/Time    U7BYEVU 15.2 02/08/2012 04:00 PM     LIPID  Lab Results   Component Value Date/Time    CHOL 156 11/16/2022 10:51 AM    CHOL 172 05/13/2021 12:00 PM    CHOL 170 08/08/2019 12:30 PM    HDL 45 11/16/2022 10:51 AM    HDL 43 05/13/2021 12:00 PM    HDL 50 08/08/2019 12:30 PM    LDLCALC 66 11/16/2022 10:51 AM    LDLCALC 80 05/13/2021 12:00 PM    LDLCALC 68 08/08/2019 12:30 PM    TRIG 225 11/16/2022 10:51 AM    TRIG 243 05/13/2021 12:00 PM    TRIG 262 08/08/2019 12:30 PM     VITAMIN D  Lab Results   Component Value Date/Time    VITD25 18 11/16/2022 10:51 AM    VITD25 18 03/15/2022 12:00 PM    VITD25 18 05/13/2021 12:00 PM     SALOME   Lab Results   Component Value Date/Time    SALOME NEGATIVE 10/15/2019 12:00 PM     RHEUMATOID FACTOR  Lab Results   Component Value Date/Time    RF <10 10/15/2019 12:00 PM       UA  Lab Results   Component Value Date/Time    COLORU Yellow 09/18/2022 10:29 AM    COLORU Yellow 01/15/2020 12:30 AM    COLORU Yellow 05/05/2018 06:55 PM    CLARITYU Clear 09/18/2022 10:29 AM    CLARITYU Clear 01/15/2020 12:30 AM    CLARITYU Clear 05/05/2018 06:55 PM    GLUCOSEU Negative 09/18/2022 10:29 AM    GLUCOSEU Negative 01/15/2020 12:30 AM    GLUCOSEU neg 01/14/2020 12:23 PM    GLUCOSEU Negative 05/05/2018 06:55 PM    BILIRUBINUR SMALL 09/18/2022 10:29 AM    BILIRUBINUR Negative 01/15/2020 12:30 AM    BILIRUBINUR neg 01/14/2020 12:23 PM    BILIRUBINUR Negative 05/05/2018 06:55 PM    KETUA Negative 09/18/2022 10:29 AM    KETUA Negative 01/15/2020 12:30 AM    KETUA neg 01/14/2020 12:23 PM    KETUA Negative 05/05/2018 06:55 PM    SPECGRAV >=1.030 09/18/2022 10:29 AM    SPECGRAV 1.015 01/15/2020 12:30 AM    SPECGRAV 1.025 01/14/2020 12:23 PM    SPECGRAV >=1.030 05/05/2018 06:55 PM    BLOODU Negative 09/18/2022 10:29 AM    BLOODU TRACE-INTACT 01/15/2020 12:30 AM    BLOODU neg 01/14/2020 12:23 PM    BLOODU LARGE 05/05/2018 06:55 PM    PHUR 6.0 09/18/2022 10:29 AM    PHUR 6.0 01/15/2020 12:30 AM    PHUR 5.5 01/14/2020 12:23 PM    PHUR 6.0 05/05/2018 06:55 PM    PROTEINU 100 09/18/2022 10:29 AM    PROTEINU Negative 01/15/2020 12:30 AM    PROTEINU trace 01/14/2020 12:23 PM    PROTEINU Negative 05/05/2018 06:55 PM    UROBILINOGEN 0.2 09/18/2022 10:29 AM    UROBILINOGEN 0.2 01/15/2020 12:30 AM    UROBILINOGEN 0.2 05/05/2018 06:55 PM    NITRU Negative 09/18/2022 10:29 AM    NITRU Negative 01/15/2020 12:30 AM    NITRU Negative 05/05/2018 06:55 PM    LEUKOCYTESUR SMALL 09/18/2022 10:29 AM    LEUKOCYTESUR Negative 01/15/2020 12:30 AM LEUKOCYTESUR neg 01/14/2020 12:23 PM    LEUKOCYTESUR Negative 05/05/2018 06:55 PM       Physical Exam  Vitals and nursing note reviewed. Constitutional:       Appearance: Normal appearance. She is obese. HENT:      Head: Normocephalic and atraumatic. Right Ear: External ear normal.      Left Ear: External ear normal.   Eyes:      Extraocular Movements: Extraocular movements intact. Conjunctiva/sclera: Conjunctivae normal.      Pupils: Pupils are equal, round, and reactive to light. Cardiovascular:      Rate and Rhythm: Normal rate and regular rhythm. Pulses: Normal pulses. Heart sounds: Normal heart sounds. Pulmonary:      Effort: Pulmonary effort is normal.      Breath sounds: Normal breath sounds. Abdominal:      General: Bowel sounds are normal.      Palpations: Abdomen is soft. Musculoskeletal:         General: Normal range of motion. Cervical back: Normal range of motion and neck supple. Skin:     General: Skin is warm and dry. Capillary Refill: Capillary refill takes less than 2 seconds. Neurological:      General: No focal deficit present. Mental Status: She is alert and oriented to person, place, and time. Psychiatric:         Mood and Affect: Mood normal.         Behavior: Behavior normal.         Thought Content: Thought content normal.       ASSESSMENT/PLAN:  Jose Martinez was seen today for anxiety and discuss medications. Diagnoses and all orders for this visit:    Anxiety  -     venlafaxine (EFFEXOR XR) 37.5 MG extended release capsule;  Take 1 capsule by mouth daily  -     hydrOXYzine pamoate (VISTARIL) 50 MG capsule; TAKE 1 CAPSULE BY MOUTH EVERY NIGHT AT BEDTIME  We will taper the lexapro (1 tablet every other day for a week then start effexor) and start effexor     Morbid obesity ([address]  Continue to follow with bariatrics     Malignant neoplasm of upper-outer quadrant of left breast in female, estrogen receptor positive ([address]   Continue to follow with oncology       As above. Call or go to the nearest ED immediately if symptoms worsen or persist.  Educational materials and/or home exercises printed for patient's review and were included in patient instructions on his/her After Visit Summary and given to patient at the end of visit. Counseled regarding above diagnosis, including possible risks and complications,  especially if left uncontrolled. Counseled regarding the possible side effects, risks, benefits and alternatives to treatment; patient and/or guardian verbalizes understanding, agrees, feels comfortable with and wishes to proceed with above treatment plan. Advised patient to call with any new medication issues, and read all Rx info from pharmacy to assure aware of all possible risks and side effects of medication before taking. Reviewed age and gender appropriate health screening exams and vaccinations. Advised patient regarding importance of keeping up with recommended health maintenance and to schedule as soon as possible if overdue, as this is important in assessing for undiagnosed pathology, especially cancer, as well as protecting against potentially harmful/life threatening disease. Patient and/or guardian verbalizes understanding and agrees with above counseling, assessment and plan. All questions answered. Return in about 6 weeks (around 3/30/2023) for anxiety, depression. An  electronic signature was used to authenticate this note. --Raina Perez DO on 2/16/2023 at 10:48 AM    This document was prepared at least partially through the use of voice recognition software. Although effort is taken to assure the accuracy of this document, it is possible that grammatical, syntax,  or spelling errors may occur.

## 2023-03-03 ENCOUNTER — HOSPITAL ENCOUNTER (OUTPATIENT)
Dept: CT IMAGING | Age: 48
End: 2023-03-03
Payer: MEDICAID

## 2023-03-03 ENCOUNTER — OFFICE VISIT (OUTPATIENT)
Dept: HEMATOLOGY | Age: 48
End: 2023-03-03
Payer: MEDICAID

## 2023-03-03 VITALS
TEMPERATURE: 97 F | WEIGHT: 293 LBS | DIASTOLIC BLOOD PRESSURE: 70 MMHG | SYSTOLIC BLOOD PRESSURE: 147 MMHG | OXYGEN SATURATION: 94 % | HEART RATE: 93 BPM | RESPIRATION RATE: 16 BRPM | BODY MASS INDEX: 53.92 KG/M2 | HEIGHT: 62 IN

## 2023-03-03 DIAGNOSIS — K76.0 NAFLD (NONALCOHOLIC FATTY LIVER DISEASE): ICD-10-CM

## 2023-03-03 DIAGNOSIS — Z09 FOLLOW-UP EXAM: Primary | ICD-10-CM

## 2023-03-03 PROCEDURE — 99212 OFFICE O/P EST SF 10 MIN: CPT | Performed by: TRANSPLANT SURGERY

## 2023-03-03 PROCEDURE — 6360000004 HC RX CONTRAST MEDICATION: Performed by: RADIOLOGY

## 2023-03-03 PROCEDURE — 74160 CT ABDOMEN W/CONTRAST: CPT

## 2023-03-03 PROCEDURE — 2580000003 HC RX 258: Performed by: RADIOLOGY

## 2023-03-03 RX ORDER — SODIUM CHLORIDE 0.9 % (FLUSH) 0.9 %
10 SYRINGE (ML) INJECTION ONCE
Status: COMPLETED | OUTPATIENT
Start: 2023-03-03 | End: 2023-03-03

## 2023-03-03 RX ADMIN — IOPAMIDOL 75 ML: 755 INJECTION, SOLUTION INTRAVENOUS at 12:22

## 2023-03-03 RX ADMIN — Medication 10 ML: at 12:22

## 2023-03-03 NOTE — PROGRESS NOTES
Hepatobiliary and Pancreatic Surgery Attending History and Physical    Patient's Name/Date of Birth: Booker Buchanan /1975 (89 y.o.)    Date: March 3, 2023     CC: 8mm pancreatic head lesion with NAFLD    HPI:  Patient is a very pleasant 40year old unfortunate female whom recently underwent a left mastectomy for breast cancer where 3 positive lymph nodes were found. She saw Dr. Ghada Franco for surgical weightloss. Physical Exam:  BP (!) 147/70   Pulse 93   Temp 97 °F (36.1 °C) (Temporal)   Resp 16   Ht 5' 2\" (1.575 m)   Wt (!) 372 lb (168.7 kg)   LMP 10/24/2021   SpO2 94%   BMI 68.04 kg/m²       PSYCH: mood and affect normal, alert and oriented x 3: No apparent distress, comfortable  EYES: Sclera white, pupils equal round and reactive to light  ENMT:  Hearing normal, trachea midline, ears externally intact  LYMPH: no obvious lympadenopathy in neck. RESP: Respiratory effort was normal with no retractions or use of accessory muscles. CV:  No pedal edema  GI/ Abdomen: Soft, nondistended, nontender, no guarding, no peritoneal signs  MSK: no clubbing/ no cyanosis/ gaitnormal       Assessment/Plan:  8mm pancreatic head cyst, NALFD, morbid obesity BMI 66 (was 65)  - I reviewed their images prior to our office visit and we also reviewed them together today. - we discussed that she does not have any worrisome features. It also appears present in January 2020.  - CT scan yearly for 5 years.  (present since 2020 - year 3/5)  - US in 6 months due to NAFLD    20 Minutes of which greater than 50% was spent counseling or coordinating her care. Thank you for the consultation allowing me to take part in Ms. Suarez' care.      Please send a copy of my note to Dr. Zarina Akbar    Electronically signed by Marilou Heard MD on 3/3/2023 at 1:06 PM

## 2023-03-13 ENCOUNTER — OFFICE VISIT (OUTPATIENT)
Dept: BARIATRICS/WEIGHT MGMT | Age: 48
End: 2023-03-13
Payer: MEDICAID

## 2023-03-13 VITALS
BODY MASS INDEX: 51.91 KG/M2 | DIASTOLIC BLOOD PRESSURE: 103 MMHG | HEART RATE: 95 BPM | SYSTOLIC BLOOD PRESSURE: 187 MMHG | TEMPERATURE: 97.7 F | HEIGHT: 63 IN | RESPIRATION RATE: 20 BRPM | WEIGHT: 293 LBS

## 2023-03-13 DIAGNOSIS — F50.9 COMPULSIVE EATING PATTERNS: Primary | ICD-10-CM

## 2023-03-13 DIAGNOSIS — E66.01 MORBID OBESITY DUE TO EXCESS CALORIES (HCC): ICD-10-CM

## 2023-03-13 DIAGNOSIS — Z71.3 DIETARY COUNSELING: ICD-10-CM

## 2023-03-13 DIAGNOSIS — K21.9 GASTROESOPHAGEAL REFLUX DISEASE WITHOUT ESOPHAGITIS: ICD-10-CM

## 2023-03-13 PROCEDURE — 90837 PSYTX W PT 60 MINUTES: CPT | Performed by: SOCIAL WORKER

## 2023-03-13 PROCEDURE — G8417 CALC BMI ABV UP PARAM F/U: HCPCS | Performed by: NURSE PRACTITIONER

## 2023-03-13 PROCEDURE — 99213 OFFICE O/P EST LOW 20 MIN: CPT | Performed by: NURSE PRACTITIONER

## 2023-03-13 PROCEDURE — G8482 FLU IMMUNIZE ORDER/ADMIN: HCPCS | Performed by: NURSE PRACTITIONER

## 2023-03-13 PROCEDURE — 99211 OFF/OP EST MAY X REQ PHY/QHP: CPT

## 2023-03-13 PROCEDURE — 1036F TOBACCO NON-USER: CPT | Performed by: NURSE PRACTITIONER

## 2023-03-13 PROCEDURE — 1036F TOBACCO NON-USER: CPT | Performed by: SOCIAL WORKER

## 2023-03-13 PROCEDURE — G8427 DOCREV CUR MEDS BY ELIG CLIN: HCPCS | Performed by: NURSE PRACTITIONER

## 2023-03-13 RX ORDER — ONDANSETRON 4 MG/1
4 TABLET, FILM COATED ORAL DAILY PRN
Qty: 30 TABLET | Refills: 0 | Status: SHIPPED | OUTPATIENT
Start: 2023-03-13

## 2023-03-13 NOTE — PROGRESS NOTES
Rigoberto Busch  3/13/2023  Bariatric Weight loss appointment for Obesity  Nutrition Education Session      Subjective:   Rigoberto Busch is a 56 y.o. female here for pre-surgical dietary education today. Patient is accompanied by herself at today's visit. Patient reports that they are doing good following their previous dietary education. Questions today include none. She is frustrated with her weight loss. She reports that she cut out her pop. She was drinking 2-3 cans per day,  has cut it down to 1 per day. She reports that she is eating less and doesn't feel like she is eating enough. Weight=(!) 373 lb (169.2 kg)  Today's weight represents a total weight loss to date of 1 pounds. Patient is 21 pounds away from their pre-surgical weight loss goal.    Since the last dietary visit they have tried the following to lose weight: she has also switched to effexor from lexapro    Patients previous 24 hour dietary recall includes:  Breakfast: scrambled eggs, cheese - in a tortilla with ketchup  Snack: none  Lunch: turkey with cheese  Snack: none  Dinner: spaghetti and meatballs  Snack: none  Water intake: 64 ounces  Other beverages: 2 diet lynn berry   Exercise: she has been trying to walk, but has bone pain due to her medications    Prior to Admission medications    Medication Sig Start Date End Date Taking?  Authorizing Provider   ondansetron (ZOFRAN) 4 MG tablet TAKE 1 TABLET BY MOUTH DAILY AS NEEDED FOR NAUSEA OR VOMITING 3/13/23  Yes Nedra Bowen MD   venlafaxine (EFFEXOR XR) 37.5 MG extended release capsule Take 1 capsule by mouth daily 2/16/23  Yes Katya Ferris DO   hydrOXYzine pamoate (VISTARIL) 50 MG capsule TAKE 1 CAPSULE BY MOUTH EVERY NIGHT AT BEDTIME 2/16/23  Yes Katya Ferris DO   escitalopram (LEXAPRO) 10 MG tablet TAKE 1 TABLET BY MOUTH EVERY NIGHT 1/19/23  Yes Katya Ferris DO   anastrozole (ARIMIDEX) 1 MG tablet TAKE 1 TABLET BY MOUTH DAILY 12/14/22  Yes Nedra Bowen MD Handicap Placard MISC by Does not apply route Cannot walk more than 200 feet  Expires 11/2027 11/16/22  Yes Katya Ferris DO   vitamin D (ERGOCALCIFEROL) 1.25 MG (80567 UT) CAPS capsule TAKE 1 CAPSULE BY MOUTH 1 TIME A WEEK 11/16/22  Yes Katya Ferris DO   Cholecalciferol (VITAMIN D3) 50 MCG (2000 UT) TABS TAKE 1 TABLET BY MOUTH DAILY 11/16/22  Yes Katya Ferris DO   ondansetron (ZOFRAN) 4 MG tablet Take 1 tablet by mouth 3 times daily as needed for Nausea or Vomiting 9/18/22  Yes Yesenia Ta MD   pantoprazole (PROTONIX) 40 MG tablet Take 1 tablet by mouth daily 6/29/22  Yes Evelyn Rangel MD   dicyclomine (BENTYL) 10 MG capsule Take 1 capsule by mouth 4 times daily 6/29/22  Yes Evelyn Rangel MD         Allergies   Allergen Reactions    Vancomycin Nausea And Vomiting     Red man syndrome, had high fever and upset stomach     Past Medical History:   Diagnosis Date    Abscess of right breast 06/19/2018    Anxiety     Arthritis     Breast abscess 07/11/2018    Cancer (HCC)     Fatigue     GERD (gastroesophageal reflux disease)     Hyperlipidemia     Morbid obesity due to excess calories ([facility] [address]     Obesity 06/04/2013       Past Surgical History:   Procedure Laterality Date    ANKLE FRACTURE SURGERY  4/20/2012    right with hardware, subsequently removed    [address] Left 2020    BREAST BIOPSY Right 2018    CHOLECYSTECTOMY      2003    COLONOSCOPY N/A 5/11/2022    COLORECTAL CANCER SCREENING, NOT HIGH RISK performed by Evelyn Rangel MD at [place] [address]  7/25/2014    retrograde pyelogram, ureteroscopy, laser lithotripsy, left stent placement    FRACTURE SURGERY      ankle    MASTECTOMY Left 8/18/2021    LEFT BREAST SIMPLE MASTECTOMY WITH SENTINEL LYMPH NODE BIOPSY performed by Evelyn Rangel MD at [facility] [address] [facility] HEMATOMA SEROMA/FLUID COLLECTION Right 6/20/2018    RIGHT BREAST INCISION AND DRAINAGE POSSIBLE WOUND VAC performed by Evelyn Rangel MD at [facility]    PA MASTOTOMY W/EXPLORATION/DRAINAGE ABSCESS DEEP Right 7/11/2018    INCISION AND DRAINAGE RIGHT BREAST, WITH APPLICATION OF WOUND VAC (PATIENT HSS WOUND VAC WILL BRING IT)  performed by Tra Ross MD at [address] [place] [address]    SALPINGO-OOPHORECTOMY  11/11/2021    Lap BSO, EMBx    SKIN GRAFT  1991    Left leg    UPPER GASTROINTESTINAL ENDOSCOPY      UPPER GASTROINTESTINAL ENDOSCOPY N/A 5/11/2022    EGD BIOPSY performed by Tra Ross MD at [address] ASP BREAST CYST LEFT Left 9/1/2021    US BREAST CYST ASPIRATION LEFT 9/1/2021 [facility]    US ASP BREAST CYST LEFT Left 9/7/2021    US BREAST CYST ASPIRATION LEFT [facility]    US ASP BREAST CYST LEFT Left 9/17/2021    US BREAST CYST ASPIRATION LEFT 9/17/2021 [facility]    US ASP BREAST CYST LEFT Left 10/22/2021    US BREAST CYST ASPIRATION LEFT 10/22/2021 [facility]    US BREAST BIOPSY W LOC DEVICE 1ST LESION LEFT  9/17/2020    US BREAST NEEDLE BIOPSY LEFT 9/17/2020 [facility]    US BREAST BIOPSY W LOC DEVICE 1ST LESION LEFT Left 7/1/2021    US BREAST NEEDLE BIOPSY LEFT 7/1/2021 [facility]       Current Outpatient Medications   Medication Sig Dispense Refill    ondansetron (ZOFRAN) 4 MG tablet TAKE 1 TABLET BY MOUTH DAILY AS NEEDED FOR NAUSEA OR VOMITING 30 tablet 0    venlafaxine (EFFEXOR XR) 37.5 MG extended release capsule Take 1 capsule by mouth daily 30 capsule 3    hydrOXYzine pamoate (VISTARIL) 50 MG capsule TAKE 1 CAPSULE BY MOUTH EVERY NIGHT AT BEDTIME 90 capsule 1    escitalopram (LEXAPRO) 10 MG tablet TAKE 1 TABLET BY MOUTH EVERY NIGHT 30 tablet 1    anastrozole (ARIMIDEX) 1 MG tablet TAKE 1 TABLET BY MOUTH DAILY 90 tablet 0    Handicap Placard MISC by Does not apply route Cannot walk more than 200 feet  Expires 11/2027 1 each 0    vitamin D (ERGOCALCIFEROL) 1.25 MG (28594 UT) CAPS capsule TAKE 1 CAPSULE BY MOUTH 1 TIME A WEEK 12 capsule 1    Cholecalciferol (VITAMIN D3) 50 MCG (2000 UT) TABS TAKE 1 TABLET BY MOUTH DAILY 90 tablet 1    ondansetron (ZOFRAN) 4 MG tablet Take 1 tablet by mouth 3 times daily as needed for Nausea or Vomiting 15 tablet 0    pantoprazole (PROTONIX) 40 MG tablet Take 1 tablet by mouth daily 30 tablet 1    dicyclomine (BENTYL) 10 MG capsule Take 1 capsule by mouth 4 times daily 120 capsule 3     No current facility-administered medications for this visit.        Allergies   Allergen Reactions    Vancomycin Nausea And Vomiting     Red man syndrome, had high fever and upset stomach       Family History   Problem Relation Age of Onset    High Cholesterol Mother     Breast Cancer Mother 54    Colon Cancer Mother     Stomach Cancer Mother     Cancer Mother         stomach,spine,colon    Diabetes Maternal Grandmother     High Blood Pressure Maternal Grandmother     High Cholesterol Maternal Grandmother     Breast Cancer Maternal Grandmother 79    Cancer Maternal Grandfather 60        throat    Ovarian Cancer Other        Social History     Socioeconomic History    Marital status: Single     Spouse name: Not on file    Number of children: 2    Years of education: Not on file    Highest education level: Not on file   Occupational History    Not on file   Tobacco Use    Smoking status: Former     Packs/day: 0.25     Years: 4.00     Pack years: 1.00     Types: Cigarettes     Quit date: 2008     Years since quittin.5    Smokeless tobacco: Never   Vaping Use    Vaping Use: Never used   Substance and Sexual Activity    Alcohol use: Not Currently     Comment: rarely    Drug use: No    Sexual activity: Not on file   Other Topics Concern    Not on file   Social History Narrative    Not on file     Social Determinants of Health     Financial Resource Strain: Medium Risk    Difficulty of Paying Living Expenses: Somewhat hard   Food Insecurity: Food Insecurity Present    Worried About Running Out of Food in the Last Year: Sometimes true    Ran Out of Food in the Last Year: Sometimes true   Transportation Needs: Unknown    Lack of Transportation (Medical): Not on file    Lack of Transportation (Non-Medical): No   Physical Activity: Not on file   Stress: Not on file   Social Connections: Not on file   Intimate Partner Violence: Not on file   Housing Stability: Unknown    Unable to Pay for Housing in the Last Year: Not on file    Number of Places Lived in the Last Year: Not on file    Unstable Housing in the Last Year: No           A complete 10 system review was performed and are otherwise negative unless mentioned in the above HPI. Specific negatives are listed below but may not include all those reviewed.     General ROS: negative obtundation, AMS  ENT ROS: negative rhinorrhea, epistaxis  Allergy and Immunology ROS: negative itchy/watery eyes or nasal congestion  Hematological and Lymphatic ROS: negative spontaneous bleeding or bruising  Endocrine ROS: negative  lethargy, mood swings, palpitations or polydipsia/polyuria  Respiratory ROS: negative sputum changes, stridor, tachypnea or wheezing  Cardiovascular ROS: negative for - loss of consciousness, murmur or orthopnea  Gastrointestinal ROS: negative for - hematochezia or hematemesis  Genito-Urinary ROS: negative for -  genital discharge or hematuria  Musculoskeletal ROS: negative for - focal weakness, gangrene  Psych/Neuro ROS: negative for - visual or auditory hallucinations, suicidal ideation    Physical exam:   BP (!) 187/103 (Site: Right Lower Arm, Position: Sitting, Cuff Size: Large Adult)   Pulse 95   Temp 97.7 °F (36.5 °C) (Temporal)   Resp 20   Ht 5' 3\" (1.6 m)   Wt (!) 373 lb (169.2 kg)   LMP 10/24/2021   BMI 66.07 kg/m²   General appearance: AAO, NAD  Eyes: PERRL, EOMI, red conjunctiva  Lungs: Equal chest rise bilateral  Chest wall: atraumatic, no tenderness, no ecchymosis or abrasions  Heart: reg rate, no murmur  Abdomen: soft, non distended, tender minimal, no hernias, no peritioneal signs  Extremities: full ROM all 4 ext, no gross motor or sensory deficits  Pulses: 2+ distal  Skin: warm and dry  Neurologic: spontanous eye opening, purposeful, follows complex commands  Psych: No tremor, no suicidal ideation, no hallucinations      Assessment/Plan:   1. Compulsive eating patterns  See below    2. Gastroesophageal reflux disease without esophagitis  See below    3. Dietary counseling  Patient given VLDL diet as well as the low fat diet as well. Recent CT scan showed moderate fatty liver disease. She is to keep a journal of everything she eats and drinks. 4. Morbid obesity due to excess calories ([address]  See below    Will repeat weight check in 1 month. Continue to read food labels and make smart food choices    Increase physical activity at home    I have spent 20 minutes educating patient on nutrition. All questions were answered to patients  satisfaction. They verbalize the importance of pre surgical weight loss at this time. Follow up as scheduled.     Provider Signature: Electronically signed by JIHAN Sultana CNP

## 2023-03-13 NOTE — PROGRESS NOTES
INDIVIDUAL SESSION: EVALUATION/PSYCHOEDUCATION (2nd visit)    Keon Christianson is a 56 y.o.  single ,   female,   Patient identify verified by Name and . Those attending session : patient      Chief Complaint   Patient presents with    Follow-up     2nd visit follow up          DX:   Encounter Diagnosis   Name Primary? Compulsive eating patterns Yes          Wt Readings from Last 3 Encounters:   23 (!) 372 lb (168.7 kg)   23 (!) 376 lb (170.6 kg)   23 (!) 377 lb (171 kg)         Narrative: Mike Valdovinos stated that she did not complete the home work and was not sure if she could talk about her emotional patterns with food. She shared she has many obstacles(medical problems, family issues)  since she started the process here but is now seeing therapist at [facility] (Chema Cespedes). She stated with she will complete the handouts and work toward reaching her pre-surgery goal weight going forward. Mental Status Exam: appearance:  appropriately dressed and appropriately groomed, behavior:  normal, attitude:  cooperative, speech:  appropriate, mood:  depressed, affect:  congruent with mood, thought content:  no evidence of psychosis, thought process:  logical and coherent, orientation:  oriented in all spheres, memory:  recent:  good and remote:  good, insight:  fair , judgment:  fair , and cognitive:  intact and intelligent    RISK ASSESSMENT    Suicide screen: denies current suicidal ideation, plan and intent    Self Injurious Behavior: denies    Homicide screen: denies current homicidal ideation, plan and intent    TREATMENT PLAN:  Goal: Increase understanding of role of emotional factors contributing to issues with food and obesity and strategies to cope. Interventions in session:  Began Reviewing \"Why We Eat\", \"Reality Journal\" and \"Identifying and Handling Cravings\" and processed pt. insights.   Provided psychoeducation/reinforcement  regarding the benefits of identifying patterns of emotional eating prior to having bariatric surgery,  attending support groups and practicing meal prep/planning to support new behaviors after surgery. .    Assignments/Recommendations: Continue follow-up with SW for education further evaluation. Continue with entries in hand-outs \"Why We Eat\", \"Reality Journal\" and \"Identifying and Handling Cravings\". Attend [facility] support group on March 21 at 5:00 pm. Follow up with referrals/present providers/all scheduled appointment at [facility]. Next steps: Schedule follow up with me for  60MIN in 6 weeks and Continue with dietitian and bariatric support group    Bariatric Surgery: Based on the information gathered through the interview process - there is no current evidence of mental health or substance abuse issues that would impact on the patient receiving bariatric surgery.     Patient and/or family/guardian verbalizes understanding of and agreement with treatment recommendations and plan: yes    Start time: 9:00 am         End time: 10:00 am     Visit Time: [address], [place]

## 2023-03-13 NOTE — PATIENT INSTRUCTIONS
What is the next step to proceed with weight loss surgery? Please be aware that any co-pays or deductibles may be requested prior to testing and / or procedures. You will need to schedule a psychological evaluation for weight loss surgery. Patients will be required to complete all psychological testing as required by the mental health provider. Patients must also follow all of the provider's recommendations before weight loss surgery can be scheduled. The evaluation must be done a standard way for weight loss surgery. We strongly recommend that you contact one of our preferred providers listed below to arrange this:      Rosalina Samson, [facility]  [address] [place], [place]   [phone]    [facility] and [address]   [phone]    Dr. Belinda Mason, PhD    Shanda Munson. [place], [place]    [phone]      You will also need to plan on attending a 2 hour [facility] class [facility] prior to your [facility]. We will schedule this for you when we schedule your surgery. Please remember to have your labs drawn 10 days prior to your first scheduled dietary appointment. Please remember, that while we will submit your case to insurance for surgery authorization, it is your responsibility to know if your plan covers weight loss surgery and keep up-to-date with changes to your insurance coverage. We will do everything possible to help you get approved for weight loss surgery, but cannot guarantee an approval.     Please note that you will not be submitted to your insurance company until all pre-operative testing requirements are met.

## 2023-03-13 NOTE — PATIENT INSTRUCTIONS
Assignments/Recommendations: Continue follow-up with SW for education further evaluation. Continue with entries in hand-outs \"Why We Eat\", \"Reality Journal\" and \"Identifying and Handling Cravings\". Attend [facility] support group on March 21 at 5:00 pm. Follow up with referrals/present providers/all scheduled appointment at [facility].

## 2023-03-15 ENCOUNTER — TELEPHONE (OUTPATIENT)
Dept: BARIATRICS/WEIGHT MGMT | Age: 48
End: 2023-03-15

## 2023-03-15 NOTE — TELEPHONE ENCOUNTER
Patient called with questions about weight loss medication as she had discussed with Glenda Sue last week. I explained that Tejas Cameron was off but I will ask her to call patient when she is back next week.

## 2023-03-16 RX ORDER — ANASTROZOLE 1 MG/1
1 TABLET ORAL DAILY
Qty: 90 TABLET | Refills: 0 | Status: SHIPPED | OUTPATIENT
Start: 2023-03-16

## 2023-04-07 ENCOUNTER — OFFICE VISIT (OUTPATIENT)
Dept: BARIATRICS/WEIGHT MGMT | Age: 48
End: 2023-04-07
Payer: MEDICAID

## 2023-04-07 VITALS
TEMPERATURE: 97.2 F | RESPIRATION RATE: 20 BRPM | BODY MASS INDEX: 51.91 KG/M2 | SYSTOLIC BLOOD PRESSURE: 144 MMHG | HEART RATE: 91 BPM | DIASTOLIC BLOOD PRESSURE: 51 MMHG | HEIGHT: 63 IN | WEIGHT: 293 LBS

## 2023-04-07 DIAGNOSIS — E66.01 MORBID OBESITY DUE TO EXCESS CALORIES (HCC): Primary | ICD-10-CM

## 2023-04-07 DIAGNOSIS — F50.9 COMPULSIVE EATING PATTERNS: ICD-10-CM

## 2023-04-07 PROCEDURE — 1036F TOBACCO NON-USER: CPT | Performed by: NURSE PRACTITIONER

## 2023-04-07 PROCEDURE — G8417 CALC BMI ABV UP PARAM F/U: HCPCS | Performed by: NURSE PRACTITIONER

## 2023-04-07 PROCEDURE — G8427 DOCREV CUR MEDS BY ELIG CLIN: HCPCS | Performed by: NURSE PRACTITIONER

## 2023-04-07 PROCEDURE — 99214 OFFICE O/P EST MOD 30 MIN: CPT | Performed by: NURSE PRACTITIONER

## 2023-04-07 PROCEDURE — 99211 OFF/OP EST MAY X REQ PHY/QHP: CPT | Performed by: NURSE PRACTITIONER

## 2023-04-07 RX ORDER — PHENTERMINE HYDROCHLORIDE 37.5 MG/1
37.5 TABLET ORAL
Qty: 30 TABLET | Refills: 0 | Status: SHIPPED | OUTPATIENT
Start: 2023-04-07 | End: 2023-05-07

## 2023-04-21 ENCOUNTER — TELEPHONE (OUTPATIENT)
Dept: BREAST CENTER | Age: 48
End: 2023-04-21

## 2023-04-21 NOTE — TELEPHONE ENCOUNTER
Patient did not show for her appointment today in the breast clinic. Left message with call back number to reschedule.

## 2023-04-24 ENCOUNTER — TELEPHONE (OUTPATIENT)
Dept: BARIATRICS/WEIGHT MGMT | Age: 48
End: 2023-04-24

## 2023-04-24 NOTE — TELEPHONE ENCOUNTER
Patient called regarding a med loss medication you were giving her and told her to get it in 7 days. She said her son had been sick and she was unable to get it. She wondered if she could get it now from her pharmacy.

## 2023-05-16 ENCOUNTER — TELEPHONE (OUTPATIENT)
Dept: BARIATRICS/WEIGHT MGMT | Age: 48
End: 2023-05-16

## 2023-05-16 NOTE — TELEPHONE ENCOUNTER
Patient states she lost her weight loss RX and would like to get it filled. Her pharmacy is [facility] in [place].

## 2023-05-19 ENCOUNTER — OFFICE VISIT (OUTPATIENT)
Dept: BREAST CENTER | Age: 48
End: 2023-05-19
Payer: MEDICAID

## 2023-05-19 VITALS
RESPIRATION RATE: 14 BRPM | SYSTOLIC BLOOD PRESSURE: 146 MMHG | HEART RATE: 83 BPM | OXYGEN SATURATION: 95 % | DIASTOLIC BLOOD PRESSURE: 70 MMHG | TEMPERATURE: 98.2 F | BODY MASS INDEX: 51.91 KG/M2 | HEIGHT: 63 IN | WEIGHT: 293 LBS

## 2023-05-19 DIAGNOSIS — Z85.3 PERSONAL HISTORY OF BREAST CANCER: Primary | ICD-10-CM

## 2023-05-19 PROCEDURE — G8417 CALC BMI ABV UP PARAM F/U: HCPCS | Performed by: SURGERY

## 2023-05-19 PROCEDURE — 1036F TOBACCO NON-USER: CPT | Performed by: SURGERY

## 2023-05-19 PROCEDURE — 99213 OFFICE O/P EST LOW 20 MIN: CPT | Performed by: SURGERY

## 2023-05-19 PROCEDURE — G8428 CUR MEDS NOT DOCUMENT: HCPCS | Performed by: SURGERY

## 2023-05-19 ASSESSMENT — ENCOUNTER SYMPTOMS
SHORTNESS OF BREATH: 0
BACK PAIN: 0
ALLERGIC/IMMUNOLOGIC NEGATIVE: 1
ANAL BLEEDING: 0
NAUSEA: 1
RESPIRATORY NEGATIVE: 1
COUGH: 0
BLOOD IN STOOL: 0
EYES NEGATIVE: 1
CONSTIPATION: 0
ABDOMINAL PAIN: 0
DIARRHEA: 0
VOMITING: 0
ABDOMINAL DISTENTION: 0

## 2023-05-19 NOTE — PROGRESS NOTES
[facility]  PROGRESS NOTE  ATTENDING NOTE    Chief Complaint   Patient presents with    Breast Cancer     6 month follow up - personal history breast cancer-Pain off and on. In axilla and chest wall. S: 43-year-old female who presents for her follow-up appointment for her left breast cancer. She still has some pain on and off. She was unable to do occupational therapy as she was in too much pain. She seems to have a better outlook in life right now as she has been doing [place] with a friend of hers. 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 She continues to do monthly self breast exams. She is complaining of edema on the entire left side of her body including hip and legs. She is continue to follow-up for her gastric bypass surgery but she is very nervous. She is completing her psychiatric appointments and she is trying a medication to help with her fatty liver. Review of Systems   Constitutional: Negative. Negative for activity change, appetite change and unexpected weight change. HENT: Negative. Eyes: Negative. Respiratory: Negative. Negative for cough and shortness of breath. Cardiovascular:  Positive for leg swelling (left side). Negative for chest pain (chest wall and left axilla). Gastrointestinal:  Positive for nausea (med related). Negative for abdominal distention, abdominal pain, anal bleeding, blood in stool, constipation, diarrhea and vomiting. Endocrine: Negative. Genitourinary: Negative. Musculoskeletal:  Positive for arthralgias (d/t anastrozole) and myalgias (chronic). Negative for back pain, gait problem and joint swelling. Leg cramps   Skin: Negative. Allergic/Immunologic: Negative. Neurological: Negative. Negative for dizziness, weakness and headaches. Hematological: Negative. Psychiatric/Behavioral: Negative.   Negative for confusion,

## 2023-05-19 NOTE — TELEPHONE ENCOUNTER
Patient phoned and stated she found her Rx and took it to the pharmacy. She made a f/up appt in one month.

## 2023-05-22 ENCOUNTER — OFFICE VISIT (OUTPATIENT)
Dept: ONCOLOGY | Age: 48
End: 2023-05-22
Payer: MEDICAID

## 2023-05-22 ENCOUNTER — HOSPITAL ENCOUNTER (OUTPATIENT)
Dept: INFUSION THERAPY | Age: 48
Discharge: HOME | End: 2023-05-22
Payer: MEDICAID

## 2023-05-22 VITALS
SYSTOLIC BLOOD PRESSURE: 142 MMHG | HEART RATE: 76 BPM | TEMPERATURE: 97.3 F | BODY MASS INDEX: 51.91 KG/M2 | WEIGHT: 293 LBS | HEIGHT: 63 IN | OXYGEN SATURATION: 95 % | DIASTOLIC BLOOD PRESSURE: 82 MMHG

## 2023-05-22 DIAGNOSIS — Z85.3 PERSONAL HISTORY OF BREAST CANCER: ICD-10-CM

## 2023-05-22 DIAGNOSIS — Z79.811 USE OF AROMATASE INHIBITORS: ICD-10-CM

## 2023-05-22 DIAGNOSIS — R10.12 LEFT UPPER QUADRANT ABDOMINAL PAIN: Primary | ICD-10-CM

## 2023-05-22 LAB
ALBUMIN SERPL-MCNC: 3.5 G/DL (ref 3.5–5.2)
ALP SERPL-CCNC: 121 U/L (ref 35–104)
ALT SERPL-CCNC: 21 U/L (ref 0–32)
ANION GAP SERPL CALCULATED.3IONS-SCNC: 9 MMOL/L (ref 7–16)
AST SERPL-CCNC: 26 U/L (ref 0–31)
BASOPHILS # BLD: 0.03 E9/L (ref 0–0.2)
BASOPHILS NFR BLD: 0.3 % (ref 0–2)
BILIRUB SERPL-MCNC: 0.4 MG/DL (ref 0–1.2)
BUN SERPL-MCNC: 15 MG/DL (ref 6–20)
CALCIUM SERPL-MCNC: 9.2 MG/DL (ref 8.6–10.2)
CHLORIDE SERPL-SCNC: 102 MMOL/L (ref 98–107)
CO2 SERPL-SCNC: 26 MMOL/L (ref 22–29)
CREAT SERPL-MCNC: 0.7 MG/DL (ref 0.5–1)
EOSINOPHIL # BLD: 0.14 E9/L (ref 0.05–0.5)
EOSINOPHIL NFR BLD: 1.3 % (ref 0–6)
ERYTHROCYTE [DISTWIDTH] IN BLOOD BY AUTOMATED COUNT: 14.6 FL (ref 11.5–15)
GLUCOSE SERPL-MCNC: 115 MG/DL (ref 74–99)
HCT VFR BLD AUTO: 44.7 % (ref 34–48)
HGB BLD-MCNC: 13.8 G/DL (ref 11.5–15.5)
IMM GRANULOCYTES # BLD: 0.04 E9/L
IMM GRANULOCYTES NFR BLD: 0.4 % (ref 0–5)
LYMPHOCYTES # BLD: 2.13 E9/L (ref 1.5–4)
LYMPHOCYTES NFR BLD: 19.4 % (ref 20–42)
MCH RBC QN AUTO: 27.7 PG (ref 26–35)
MCHC RBC AUTO-ENTMCNC: 30.9 % (ref 32–34.5)
MCV RBC AUTO: 89.6 FL (ref 80–99.9)
MONOCYTES # BLD: 0.61 E9/L (ref 0.1–0.95)
MONOCYTES NFR BLD: 5.6 % (ref 2–12)
NEUTROPHILS # BLD: 8.01 E9/L (ref 1.8–7.3)
NEUTS SEG NFR BLD: 73 % (ref 43–80)
PLATELET # BLD AUTO: 311 E9/L (ref 130–450)
PMV BLD AUTO: 10.4 FL (ref 7–12)
POTASSIUM SERPL-SCNC: 5 MMOL/L (ref 3.5–5)
PROT SERPL-MCNC: 7.5 G/DL (ref 6.4–8.3)
RBC # BLD AUTO: 4.99 E12/L (ref 3.5–5.5)
SODIUM SERPL-SCNC: 137 MMOL/L (ref 132–146)
WBC # BLD: 11 E9/L (ref 4.5–11.5)

## 2023-05-22 PROCEDURE — 36415 COLL VENOUS BLD VENIPUNCTURE: CPT

## 2023-05-22 PROCEDURE — 1036F TOBACCO NON-USER: CPT | Performed by: INTERNAL MEDICINE

## 2023-05-22 PROCEDURE — 85025 COMPLETE CBC W/AUTO DIFF WBC: CPT

## 2023-05-22 PROCEDURE — 99214 OFFICE O/P EST MOD 30 MIN: CPT | Performed by: INTERNAL MEDICINE

## 2023-05-22 PROCEDURE — G8427 DOCREV CUR MEDS BY ELIG CLIN: HCPCS | Performed by: INTERNAL MEDICINE

## 2023-05-22 PROCEDURE — 99213 OFFICE O/P EST LOW 20 MIN: CPT

## 2023-05-22 PROCEDURE — G8417 CALC BMI ABV UP PARAM F/U: HCPCS | Performed by: INTERNAL MEDICINE

## 2023-05-22 PROCEDURE — 80053 COMPREHEN METABOLIC PANEL: CPT

## 2023-05-22 NOTE — PROGRESS NOTES
Department of [facility] Oncology  Attending Clinic Note    Reason for Visit: Follow-up on a patient with Left Breast Cancer    PCP:  Nii Trotter DO    History of Present Illness:  53year old female with Left Breast Cancer    Breast cancer risk factors include family hx on mother's side, mom with breast CA, family hx of colon CA and age and gender    Bilateral Diagnostic Mammogram/Left Breast U/S on 07/01/2021  Left sided ill defined hypoechoic region at the 2 o'clock position measuring 2 cm. On 07/01/2021 Left breast, 2:00 core needle biopsy:   Invasive, moderately differentiated mammary carcinoma (grade 2)     Comment: Microscopic examination shows an invasive carcinoma with linear growth pattern dispersed throughout fibrous stroma. The tumor has a Alexandria histologic score of 3 (tubule formation) +2 (nuclear pleomorphism) +1 (mitotic count) = 6. The tumor cells are immunoreactive with pankeratin and E-cadherin which favors ductal origin. The p63 highlights myoepithelial cells in benign ducts. Intradepartmental consultation is obtained. Breast Cancer Marker Studies:   Estrogen Receptors (ER): 60%   Progesterone Receptors (WV): 60%   Her-2/markie: Negative/1+     CXR 07/09/2021 noted no pneumonia or pleural effusion    Left axillary US 07/14/2021 noted no LN    Left simple mastectomy, sentinel lymph node biopsy, injection of methylene blue dye 08/18/2021  A.   Left breast, total mastectomy:   - Invasive carcinoma with mixed ductal and lobular features, bicentric, grade 2;   - Lobular carcinoma in situ and atypical lobular hyperplasia;   - Rare small ducts showing atypical ductal hyperplasia;   - Fibrocystic changes with usual ductal hyperplasia without atypia, adenosis, columnar cell change, fibroadenomatoid nodule, ductal ectasia, microcysts and stromal fibrosis;   - Changes of previous biopsy sites (2).     B.  [place] lymph nodes #1, biopsy:   - Three of six (3/6) lymph nodes positive for

## 2023-05-24 ENCOUNTER — TELEPHONE (OUTPATIENT)
Dept: BREAST CENTER | Age: 48
End: 2023-05-24

## 2023-05-24 NOTE — TELEPHONE ENCOUNTER
Received call from patient stating she had seen Dr Adwoa Ward last week and forgot to mention she has been having a burning and soreness under her left mastectomy site incision. She has since seen Dr Velia Weinstein who felt it warranted having a CT of abd and pelvis done. Patient is willing to have the scan done but would like to have Dr Adwoa Ward be aware and if needed follow up on this. [employer] I will forward this message to Dr Adwoa Ward so she is aware.     Electronically signed by Huang Covarrubias RN on 5/24/23 at 10:04 AM EDT

## 2023-06-01 ENCOUNTER — TELEPHONE (OUTPATIENT)
Dept: INFUSION THERAPY | Age: 48
End: 2023-06-01

## 2023-06-01 NOTE — TELEPHONE ENCOUNTER
Returned pt call back regarding questions about her alkphos being 121. Let pt know that Dr. Lui Dia is aware and not concerned.

## 2023-06-06 DIAGNOSIS — E55.9 VITAMIN D DEFICIENCY: ICD-10-CM

## 2023-06-06 RX ORDER — ERGOCALCIFEROL 1.25 MG/1
CAPSULE ORAL
Qty: 12 CAPSULE | Refills: 1 | Status: SHIPPED | OUTPATIENT
Start: 2023-06-06

## 2023-06-20 ENCOUNTER — TELEPHONE (OUTPATIENT)
Dept: BARIATRICS/WEIGHT MGMT | Age: 48
End: 2023-06-20

## 2023-06-20 NOTE — TELEPHONE ENCOUNTER
Patient did not show for an appointment today with Emilee Osorio.   I called but could not leave a message, RICHIE.

## 2023-06-28 ENCOUNTER — OFFICE VISIT (OUTPATIENT)
Dept: BARIATRICS/WEIGHT MGMT | Age: 48
End: 2023-06-28
Payer: MEDICAID

## 2023-06-28 DIAGNOSIS — F41.9 ANXIETY: ICD-10-CM

## 2023-06-28 DIAGNOSIS — F50.9 COMPULSIVE EATING PATTERNS: Primary | ICD-10-CM

## 2023-06-28 PROCEDURE — 90837 PSYTX W PT 60 MINUTES: CPT | Performed by: SOCIAL WORKER

## 2023-06-28 PROCEDURE — 1036F TOBACCO NON-USER: CPT | Performed by: SOCIAL WORKER

## 2023-06-28 RX ORDER — VENLAFAXINE HYDROCHLORIDE 37.5 MG/1
37.5 CAPSULE, EXTENDED RELEASE ORAL DAILY
Qty: 30 CAPSULE | Refills: 0 | Status: SHIPPED
Start: 2023-06-28 | End: 2023-08-02

## 2023-06-29 RX ORDER — ANASTROZOLE 1 MG/1
1 TABLET ORAL DAILY
Qty: 90 TABLET | Refills: 0 | Status: SHIPPED | OUTPATIENT
Start: 2023-06-29

## 2023-07-10 DIAGNOSIS — R92.2 DENSE BREAST: ICD-10-CM

## 2023-07-10 DIAGNOSIS — Z85.3 PERSONAL HISTORY OF BREAST CANCER: Primary | ICD-10-CM

## 2023-07-17 ENCOUNTER — HOSPITAL ENCOUNTER (OUTPATIENT)
Dept: GENERAL RADIOLOGY | Age: 48
Discharge: HOME | End: 2023-07-19
Attending: SURGERY
Payer: MEDICAID

## 2023-07-17 VITALS — HEIGHT: 63 IN | BODY MASS INDEX: 51.91 KG/M2 | WEIGHT: 293 LBS

## 2023-07-17 DIAGNOSIS — Z85.3 PERSONAL HISTORY OF BREAST CANCER: ICD-10-CM

## 2023-07-17 DIAGNOSIS — R92.2 DENSE BREAST: ICD-10-CM

## 2023-07-17 PROCEDURE — 76641 ULTRASOUND BREAST COMPLETE: CPT

## 2023-07-31 ENCOUNTER — OFFICE VISIT (OUTPATIENT)
Dept: BARIATRICS/WEIGHT MGMT | Age: 48
End: 2023-07-31
Payer: MEDICAID

## 2023-07-31 VITALS
HEIGHT: 63 IN | TEMPERATURE: 96.9 F | HEART RATE: 91 BPM | SYSTOLIC BLOOD PRESSURE: 118 MMHG | RESPIRATION RATE: 20 BRPM | BODY MASS INDEX: 51.91 KG/M2 | WEIGHT: 293 LBS | DIASTOLIC BLOOD PRESSURE: 70 MMHG

## 2023-07-31 DIAGNOSIS — E66.01 MORBID OBESITY DUE TO EXCESS CALORIES (HCC): Primary | ICD-10-CM

## 2023-07-31 PROCEDURE — 99211 OFF/OP EST MAY X REQ PHY/QHP: CPT

## 2023-07-31 PROCEDURE — G8427 DOCREV CUR MEDS BY ELIG CLIN: HCPCS | Performed by: NURSE PRACTITIONER

## 2023-07-31 PROCEDURE — 1036F TOBACCO NON-USER: CPT | Performed by: NURSE PRACTITIONER

## 2023-07-31 PROCEDURE — 99213 OFFICE O/P EST LOW 20 MIN: CPT | Performed by: NURSE PRACTITIONER

## 2023-07-31 PROCEDURE — G8417 CALC BMI ABV UP PARAM F/U: HCPCS | Performed by: NURSE PRACTITIONER

## 2023-07-31 NOTE — PATIENT INSTRUCTIONS
Breakfast -     one high protein shake                            + 20 grams of fiber. Do this by either eating 12 tablespoons of the original, plain Fiber One cereal every day or 4 tablespoons of wheat dextrin powder (Benefiber or a generic brand) every day. Work up to this amount slowly by starting with only one-eighth to one-fourth of the target amount and then adding another one-eighth to one-fourth every one or two weeks until reaching the target. Lunch -           one high protein shake                           + one a fat snack item     Dinner -          one frozen meal                           + one snack item     Shake options (<200 lynsey, >25 grams/protein) :  Nectar, Pure Protein, Premier, Fairlife, Boost Max, Amplidata Max, BeneProtein and [employer] (which is lactose-free) are milk-based options; Nectar, Premier Protein Clear, IsoPure Protein Drink, and Protein 2 O are water-based options; (Premier Protein Clear, the water-based option, comes in a 20 oz bottle with 20 grams of protein and 90 calories. So you have to drink three each day which increases the cost.)  (Disclaimer: Dietary supplements rarely have their listed ingredients and the amount of each verified by a third party other. Sometimes they give verification for their claims to be GMO and gluten free and to be organic.  However, even such verifications as these may still be untrustworthy.)     Fat snack options (<150 lynsey, >11 grams of fat): 22 almonds or cashews, 1 1/2 tablespoon of a oil-based dressing or 4 tablespoons of Matthew dressing on a bed of salad greens, 1 1/2 tablespoons of peanut butter, 1 Cranberry [place] Amplidata options (<100 lynsey, no sweets): fruit, low fat/high protein [place] yogurt, mozzarella cheese stick, nuts, salad with dressing, peanut butter, chips/crackers/pretzels     Frozen meal options (<350 lynsey):  Weight Watchers Smart Ones, Jammit, Healthy Choice, Elena's, Sunita's, etc     Food substitutes for

## 2023-07-31 NOTE — PROGRESS NOTES
Josias Frost  7/31/2023  Bariatric Weight loss appointment for Obesity        Subjective:   Josias Frost is a 53 y.o. female here for pre-surgical dietary education today. Patient is accompanied by herself at today's visit. Patient reports that they are doing good following their previous dietary education. She is here today to discuss weight loss as well as surgery. Weight=(!) 375 lb (170.1 kg)  Today's weight represents a total weight loss to date of +1 pounds. Patient is 23 pounds away from their pre-surgical weight loss goal.    Since the last dietary visit they have tried the following to lose weight: she reports that she does not drink pop anymore. She does tell me that she has been picking at food all day. At dinner time she doesn't eat as much either. She reports that her mom recently passed away about 1 month ago. She is frustrated about not being able to lose weight. CT scan 3/3/2023 - did show a diffuse fatty liver. She is having a repeat scan this week. She is also on arimidex which is for her breast cancer. She does not feel well on this medication. She is always very tired and has no energy. Prior to Admission medications    Medication Sig Start Date End Date Taking?  Authorizing Provider   anastrozole (ARIMIDEX) 1 MG tablet TAKE 1 TABLET BY MOUTH DAILY 6/29/23  Yes Josiane Reyes MD   venlafaxine (EFFEXOR XR) 37.5 MG extended release capsule TAKE 1 CAPSULE BY MOUTH DAILY 6/28/23  Yes Wilner Cloud,    hydrOXYzine pamoate (VISTARIL) 50 MG capsule TAKE 1 CAPSULE BY MOUTH EVERY NIGHT AT BEDTIME 2/16/23  Yes Katya Ferris DO   Handicap Placard MISC by Does not apply route Cannot walk more than 200 feet  Expires 11/2027 11/16/22  Yes Katya Ferris DO   Cholecalciferol (VITAMIN D3) 50 MCG (2000 UT) TABS TAKE 1 TABLET BY MOUTH DAILY 11/16/22  Yes Katya Ferris DO   ondansetron (ZOFRAN) 4 MG tablet Take 1 tablet by mouth 3 times daily as needed for Nausea or Vomiting

## 2023-08-02 ENCOUNTER — HOSPITAL ENCOUNTER (OUTPATIENT)
Age: 48
Discharge: HOME | End: 2023-08-02
Attending: INTERNAL MEDICINE
Payer: MEDICAID

## 2023-08-02 ENCOUNTER — HOSPITAL ENCOUNTER (OUTPATIENT)
Dept: CT IMAGING | Age: 48
Discharge: HOME | End: 2023-08-04
Attending: INTERNAL MEDICINE
Payer: MEDICAID

## 2023-08-02 DIAGNOSIS — F41.9 ANXIETY: ICD-10-CM

## 2023-08-02 DIAGNOSIS — E66.01 MORBID OBESITY DUE TO EXCESS CALORIES (HCC): ICD-10-CM

## 2023-08-02 DIAGNOSIS — R10.12 LEFT UPPER QUADRANT ABDOMINAL PAIN: ICD-10-CM

## 2023-08-02 LAB
25(OH)D3 SERPL-MCNC: 20.5 NG/ML (ref 30–100)
ALBUMIN SERPL-MCNC: 4 G/DL (ref 3.5–5.2)
ALP SERPL-CCNC: 121 U/L (ref 35–104)
ALT SERPL-CCNC: 21 U/L (ref 0–32)
ANION GAP SERPL CALCULATED.3IONS-SCNC: 16 MMOL/L (ref 7–16)
AST SERPL-CCNC: 14 U/L (ref 0–31)
BILIRUB SERPL-MCNC: 0.5 MG/DL (ref 0–1.2)
BUN SERPL-MCNC: 15 MG/DL (ref 6–20)
CALCIUM SERPL-MCNC: 9 MG/DL (ref 8.6–10.2)
CHLORIDE SERPL-SCNC: 104 MMOL/L (ref 98–107)
CHOLEST SERPL-MCNC: 171 MG/DL
CO2 SERPL-SCNC: 23 MMOL/L (ref 22–29)
CREAT SERPL-MCNC: 0.6 MG/DL (ref 0.5–1)
ERYTHROCYTE [DISTWIDTH] IN BLOOD BY AUTOMATED COUNT: 14.7 % (ref 11.5–15)
FERRITIN SERPL-MCNC: 128 NG/ML
FOLATE SERPL-MCNC: 10.4 NG/ML (ref 4.8–24.2)
GFR SERPL CREATININE-BSD FRML MDRD: >60 ML/MIN/1.73M2
GLUCOSE SERPL-MCNC: 111 MG/DL (ref 74–99)
HBA1C MFR BLD: 6 % (ref 4–5.6)
HCT VFR BLD AUTO: 46.1 % (ref 34–48)
HDLC SERPL-MCNC: 49 MG/DL
HGB BLD-MCNC: 13.9 G/DL (ref 11.5–15.5)
LDLC SERPL CALC-MCNC: 77 MG/DL
MCH RBC QN AUTO: 27.5 PG (ref 26–35)
MCHC RBC AUTO-ENTMCNC: 30.2 G/DL (ref 32–34.5)
MCV RBC AUTO: 91.3 FL (ref 80–99.9)
PLATELET # BLD AUTO: 333 K/UL (ref 130–450)
PMV BLD AUTO: 9.9 FL (ref 7–12)
POTASSIUM SERPL-SCNC: 4.1 MMOL/L (ref 3.5–5)
PREALB SERPL-MCNC: 27 MG/DL (ref 20–40)
PROT SERPL-MCNC: 7.7 G/DL (ref 6.4–8.3)
RBC # BLD AUTO: 5.05 M/UL (ref 3.5–5.5)
SODIUM SERPL-SCNC: 143 MMOL/L (ref 132–146)
TRIGL SERPL-MCNC: 226 MG/DL
VIT B12 SERPL-MCNC: 330 PG/ML (ref 211–946)
VLDLC SERPL CALC-MCNC: 45 MG/DL
WBC OTHER # BLD: 11 K/UL (ref 4.5–11.5)

## 2023-08-02 PROCEDURE — 82306 VITAMIN D 25 HYDROXY: CPT

## 2023-08-02 PROCEDURE — 82607 VITAMIN B-12: CPT

## 2023-08-02 PROCEDURE — 80061 LIPID PANEL: CPT

## 2023-08-02 PROCEDURE — 82746 ASSAY OF FOLIC ACID SERUM: CPT

## 2023-08-02 PROCEDURE — 84425 ASSAY OF VITAMIN B-1: CPT

## 2023-08-02 PROCEDURE — 82728 ASSAY OF FERRITIN: CPT

## 2023-08-02 PROCEDURE — 84590 ASSAY OF VITAMIN A: CPT

## 2023-08-02 PROCEDURE — 85027 COMPLETE CBC AUTOMATED: CPT

## 2023-08-02 PROCEDURE — 36415 COLL VENOUS BLD VENIPUNCTURE: CPT

## 2023-08-02 PROCEDURE — 6360000004 HC RX CONTRAST MEDICATION: Performed by: RADIOLOGY

## 2023-08-02 PROCEDURE — 83036 HEMOGLOBIN GLYCOSYLATED A1C: CPT

## 2023-08-02 PROCEDURE — 80053 COMPREHEN METABOLIC PANEL: CPT

## 2023-08-02 PROCEDURE — G0480 DRUG TEST DEF 1-7 CLASSES: HCPCS

## 2023-08-02 PROCEDURE — 82525 ASSAY OF COPPER: CPT

## 2023-08-02 PROCEDURE — 74177 CT ABD & PELVIS W/CONTRAST: CPT

## 2023-08-02 PROCEDURE — 84630 ASSAY OF ZINC: CPT

## 2023-08-02 PROCEDURE — 84134 ASSAY OF PREALBUMIN: CPT

## 2023-08-02 RX ORDER — SODIUM CHLORIDE 0.9 % (FLUSH) 0.9 %
10 SYRINGE (ML) INJECTION
Status: ACTIVE | OUTPATIENT
Start: 2023-08-02 | End: 2023-08-03

## 2023-08-02 RX ORDER — VENLAFAXINE HYDROCHLORIDE 37.5 MG/1
37.5 CAPSULE, EXTENDED RELEASE ORAL DAILY
Qty: 30 CAPSULE | Refills: 0 | Status: SHIPPED | OUTPATIENT
Start: 2023-08-02

## 2023-08-02 RX ADMIN — IOPAMIDOL 75 ML: 755 INJECTION, SOLUTION INTRAVENOUS at 12:34

## 2023-08-02 RX ADMIN — IOPAMIDOL 18 ML: 755 INJECTION, SOLUTION INTRAVENOUS at 12:34

## 2023-08-04 LAB — COPPER SERPL-MCNC: 137.4 UG/DL (ref 80–155)

## 2023-08-05 LAB
VIT B1 PYROPHOSHATE BLD-SCNC: 183 NMOL/L (ref 70–180)
ZINC SERPL-MCNC: 102.9 UG/DL (ref 60–120)

## 2023-08-06 LAB
COTININE: <5 NG/ML
NICOTINE: <5 NG/ML
RETINYL PALMITATE: 0.02 MG/L (ref 0–0.1)
VITAMIN A LEVEL: 0.62 MG/L (ref 0.3–1.2)
VITAMIN A, INTERP: NORMAL

## 2023-08-11 ENCOUNTER — OFFICE VISIT (OUTPATIENT)
Dept: FAMILY MEDICINE CLINIC | Age: 48
End: 2023-08-11
Payer: MEDICAID

## 2023-08-11 VITALS
OXYGEN SATURATION: 94 % | TEMPERATURE: 97.7 F | HEART RATE: 63 BPM | RESPIRATION RATE: 18 BRPM | BODY MASS INDEX: 51.91 KG/M2 | DIASTOLIC BLOOD PRESSURE: 76 MMHG | WEIGHT: 293 LBS | SYSTOLIC BLOOD PRESSURE: 136 MMHG | HEIGHT: 63 IN

## 2023-08-11 DIAGNOSIS — H92.02 OTALGIA, LEFT: Primary | ICD-10-CM

## 2023-08-11 PROCEDURE — 99213 OFFICE O/P EST LOW 20 MIN: CPT

## 2023-08-11 PROCEDURE — 1036F TOBACCO NON-USER: CPT

## 2023-08-11 PROCEDURE — G8427 DOCREV CUR MEDS BY ELIG CLIN: HCPCS

## 2023-08-11 PROCEDURE — G8417 CALC BMI ABV UP PARAM F/U: HCPCS

## 2023-08-11 RX ORDER — METHYLPREDNISOLONE 4 MG/1
TABLET ORAL
Qty: 1 KIT | Refills: 0 | Status: SHIPPED | OUTPATIENT
Start: 2023-08-11

## 2023-08-31 ENCOUNTER — TELEPHONE (OUTPATIENT)
Dept: HEMATOLOGY | Age: 48
End: 2023-08-31

## 2023-08-31 NOTE — TELEPHONE ENCOUNTER
Spoke to patient regarding US appt. It was determined that she had recent abdominal/pelvic CT scan completed. Cancelled US appt and scheduled follow up appt with Dr Farzana Huynh

## 2023-09-10 DIAGNOSIS — F41.9 ANXIETY: ICD-10-CM

## 2023-09-11 RX ORDER — VENLAFAXINE HYDROCHLORIDE 37.5 MG/1
37.5 CAPSULE, EXTENDED RELEASE ORAL DAILY
Qty: 30 CAPSULE | Refills: 0 | Status: SHIPPED | OUTPATIENT
Start: 2023-09-11

## 2023-09-27 ENCOUNTER — INITIAL CONSULT (OUTPATIENT)
Dept: BARIATRICS/WEIGHT MGMT | Age: 48
End: 2023-09-27
Payer: MEDICAID

## 2023-09-27 VITALS — WEIGHT: 293 LBS | HEIGHT: 63 IN | BODY MASS INDEX: 51.91 KG/M2

## 2023-09-27 DIAGNOSIS — C50.412 MALIGNANT NEOPLASM OF UPPER-OUTER QUADRANT OF LEFT BREAST IN FEMALE, ESTROGEN RECEPTOR POSITIVE (HCC): ICD-10-CM

## 2023-09-27 DIAGNOSIS — E66.01 MORBID OBESITY DUE TO EXCESS CALORIES (HCC): Primary | ICD-10-CM

## 2023-09-27 DIAGNOSIS — Z17.0 MALIGNANT NEOPLASM OF UPPER-OUTER QUADRANT OF LEFT BREAST IN FEMALE, ESTROGEN RECEPTOR POSITIVE (HCC): ICD-10-CM

## 2023-09-27 DIAGNOSIS — Z71.3 DIETARY COUNSELING: ICD-10-CM

## 2023-09-27 DIAGNOSIS — E78.1 HYPERTRIGLYCERIDEMIA: ICD-10-CM

## 2023-09-27 PROCEDURE — 97803 MED NUTRITION INDIV SUBSEQ: CPT | Performed by: DIETITIAN, REGISTERED

## 2023-09-27 PROCEDURE — G8417 CALC BMI ABV UP PARAM F/U: HCPCS | Performed by: DIETITIAN, REGISTERED

## 2023-09-27 PROCEDURE — G8428 CUR MEDS NOT DOCUMENT: HCPCS | Performed by: DIETITIAN, REGISTERED

## 2023-09-27 NOTE — PROGRESS NOTES
Builders not a good protein for bariatric patients due to it taking up to seven hours to fully digest)    Egg Protein:  Egg Protein is made from pure egg whites. It is fat-free, fast digesting and rich in Branched Chain Amino Acids and Glutamic Acid. 100% protein. Soy Protein:  Fast digesting form of protein with a solid Branched chain Amino Acid profile. Made from non-animal sources, ideal for Lactose Intolerant and Vegans! Avoid Soy-Protein Isolate Powders. Recommend soy protein without added hormones. Collagen Protein:  Is the component of human connective tissue found in skin, hair and nails and has no Essential Amino Acids to help build protein levels. Avoid. Protein Supplement Drinks Comparisons    We do not endorse these stores or products. These are only to help you with your   lifestyle changes. (All serving sizes are 1 scoop, 1 can or 1 packet)  Product Source Cost  (approx.) Calories Fat  (grams) Protein  (grams) Sugars  (grams)              Cate Coleman - Whey Protein         [employer] $45.00 110 0 25 0   Maribel Mourab - Egg Protein [employer] $25.00 120 0 24 0   Iso Pure Powder [facility] / [employer] $ 40.00 100-105 0 25 0   Nectar Protein [employer] $ 30.00 80 0 23 0   BeneProtein [facility] $ 42.00     case of 6 25 0 6 0   ProCel and UpCal D  Plus Phone order  [phone] $ 50.00  case of 6 28 0 5 0-1     Bariatric Advantage [phone]   $50.00 150 1.5 - 2.0 27 .5   Bariatric Fusion [phone] $35.00 138 0 27 <1   WheyBolic Extreme 60  GN $ 45.00 90 0.5 20 0.5   Fernanda Naegeli. com $35.00 138 0 27 <1   When choosing a supplement: Do not consume Ensure, Glycerna, Boost or any other high-calorie nutrition supplement on the market. We recommend that the fat content of your protein drink be less than 2 grams of fat, less than 2 grams of sugar and that it be 200 or less calories per serving.  We also recommend that you are able to consume

## 2023-10-06 ENCOUNTER — OFFICE VISIT (OUTPATIENT)
Dept: HEMATOLOGY | Age: 48
End: 2023-10-06
Payer: MEDICAID

## 2023-10-06 VITALS
RESPIRATION RATE: 15 BRPM | SYSTOLIC BLOOD PRESSURE: 155 MMHG | HEART RATE: 92 BPM | BODY MASS INDEX: 51.91 KG/M2 | DIASTOLIC BLOOD PRESSURE: 99 MMHG | HEIGHT: 63 IN | TEMPERATURE: 98.2 F | WEIGHT: 293 LBS | OXYGEN SATURATION: 94 %

## 2023-10-06 DIAGNOSIS — Z09 FOLLOW-UP EXAM: ICD-10-CM

## 2023-10-06 DIAGNOSIS — K86.2 PANCREATIC CYST: ICD-10-CM

## 2023-10-06 DIAGNOSIS — K76.0 NAFLD (NONALCOHOLIC FATTY LIVER DISEASE): Primary | ICD-10-CM

## 2023-10-06 PROCEDURE — 99212 OFFICE O/P EST SF 10 MIN: CPT | Performed by: TRANSPLANT SURGERY

## 2023-10-12 DIAGNOSIS — F41.9 ANXIETY: ICD-10-CM

## 2023-10-12 RX ORDER — VENLAFAXINE HYDROCHLORIDE 37.5 MG/1
37.5 CAPSULE, EXTENDED RELEASE ORAL DAILY
Qty: 30 CAPSULE | Refills: 0 | Status: SHIPPED | OUTPATIENT
Start: 2023-10-12

## 2023-10-23 DIAGNOSIS — Z85.3 PERSONAL HISTORY OF BREAST CANCER: Primary | ICD-10-CM

## 2023-10-23 RX ORDER — ANASTROZOLE 1 MG/1
1 TABLET ORAL DAILY
Qty: 90 TABLET | Refills: 0 | Status: SHIPPED | OUTPATIENT
Start: 2023-10-23

## 2023-10-30 ENCOUNTER — HOSPITAL ENCOUNTER (OUTPATIENT)
Dept: INFUSION THERAPY | Age: 48
End: 2023-10-30

## 2023-10-31 ENCOUNTER — TELEPHONE (OUTPATIENT)
Dept: BARIATRICS/WEIGHT MGMT | Age: 48
End: 2023-10-31

## 2023-10-31 NOTE — TELEPHONE ENCOUNTER
Pt was scheduled for a diet consult today but did not show. Called pt and rescheduled the appt for 11/21/23.

## 2023-11-02 ENCOUNTER — HOSPITAL ENCOUNTER (OUTPATIENT)
Dept: GENERAL RADIOLOGY | Age: 48
Discharge: HOME | End: 2023-11-04
Payer: MEDICAID

## 2023-11-02 ENCOUNTER — TELEPHONE (OUTPATIENT)
Dept: BREAST CENTER | Age: 48
End: 2023-11-02

## 2023-11-02 VITALS — HEIGHT: 63 IN | WEIGHT: 293 LBS | BODY MASS INDEX: 51.91 KG/M2

## 2023-11-02 DIAGNOSIS — Z85.3 PERSONAL HISTORY OF BREAST CANCER: ICD-10-CM

## 2023-11-02 DIAGNOSIS — N63.12 MASS OF UPPER INNER QUADRANT OF RIGHT BREAST: Primary | ICD-10-CM

## 2023-11-02 PROCEDURE — 77063 BREAST TOMOSYNTHESIS BI: CPT

## 2023-11-02 NOTE — TELEPHONE ENCOUNTER
Patient informed RN that she felt a lump in the UIQ of right breast as she had a mastectomy its on the chest wall. RN advised an US should be ordered to further evaluate the area. Patient was in agreement. RN advised Moisés Goodwin would be in contact with patient to schedule.        Electronically signed by Quique Mejia RN on 11/2/23 at 2:04 PM EDT

## 2023-11-06 ENCOUNTER — HOSPITAL ENCOUNTER (OUTPATIENT)
Dept: INFUSION THERAPY | Age: 48
End: 2023-11-06

## 2023-11-15 ENCOUNTER — HOSPITAL ENCOUNTER (OUTPATIENT)
Dept: INFUSION THERAPY | Age: 48
End: 2023-11-15

## 2023-11-15 DIAGNOSIS — F41.9 ANXIETY: ICD-10-CM

## 2023-11-16 RX ORDER — VENLAFAXINE HYDROCHLORIDE 37.5 MG/1
37.5 CAPSULE, EXTENDED RELEASE ORAL DAILY
Qty: 30 CAPSULE | Refills: 0 | Status: SHIPPED | OUTPATIENT
Start: 2023-11-16

## 2023-11-27 ENCOUNTER — HOSPITAL ENCOUNTER (OUTPATIENT)
Dept: INFUSION THERAPY | Age: 48
End: 2023-11-27

## 2023-12-04 ENCOUNTER — TELEPHONE (OUTPATIENT)
Dept: BARIATRICS/WEIGHT MGMT | Age: 48
End: 2023-12-04

## 2023-12-04 NOTE — TELEPHONE ENCOUNTER
Pt was scheduled for wt check consult today but did not show. Called and pt was not available. Voicemail was full so left SMS notification with number [phone] for pt to call [facility].

## 2024-01-11 ENCOUNTER — TELEPHONE (OUTPATIENT)
Dept: HEMATOLOGY | Age: 49
End: 2024-01-11

## 2024-01-11 NOTE — TELEPHONE ENCOUNTER
I scheduled patient for her 6 month US at [facility] on 2/12/24 at 10:30am.  I called patient and she stated she will see appt in my chart and I made her a follow up on 2/16/24 at 11:00am at [facility] at [facility].  She verbalized understanding and she confirmed these appts.    Electronically signed by Claudia Serrano RN on 1/11/2024 at 9:51 AM

## 2024-01-12 ENCOUNTER — HOSPITAL ENCOUNTER (OUTPATIENT)
Dept: GENERAL RADIOLOGY | Age: 49
Discharge: HOME | End: 2024-01-12
Attending: SURGERY
Payer: COMMERCIAL

## 2024-01-12 DIAGNOSIS — N63.22 MASS OF UPPER INNER QUADRANT OF LEFT BREAST: ICD-10-CM

## 2024-01-12 DIAGNOSIS — N63.22 MASS OF UPPER INNER QUADRANT OF LEFT BREAST: Primary | ICD-10-CM

## 2024-01-12 PROCEDURE — 76642 ULTRASOUND BREAST LIMITED: CPT

## 2024-01-15 ENCOUNTER — HOSPITAL ENCOUNTER (OUTPATIENT)
Dept: INFUSION THERAPY | Age: 49
End: 2024-01-15

## 2024-01-21 DIAGNOSIS — F41.9 ANXIETY: ICD-10-CM

## 2024-01-22 RX ORDER — ANASTROZOLE 1 MG/1
1 TABLET ORAL DAILY
Qty: 90 TABLET | Refills: 0 | Status: SHIPPED | OUTPATIENT
Start: 2024-01-22

## 2024-01-22 RX ORDER — VENLAFAXINE HYDROCHLORIDE 37.5 MG/1
37.5 CAPSULE, EXTENDED RELEASE ORAL DAILY
Qty: 30 CAPSULE | Refills: 0 | Status: SHIPPED | OUTPATIENT
Start: 2024-01-22

## 2024-01-31 ENCOUNTER — OFFICE VISIT (OUTPATIENT)
Dept: FAMILY MEDICINE CLINIC | Age: 49
End: 2024-01-31
Payer: MEDICAID

## 2024-01-31 VITALS
RESPIRATION RATE: 20 BRPM | DIASTOLIC BLOOD PRESSURE: 80 MMHG | WEIGHT: 293 LBS | SYSTOLIC BLOOD PRESSURE: 132 MMHG | HEIGHT: 63 IN | HEART RATE: 91 BPM | BODY MASS INDEX: 51.91 KG/M2 | TEMPERATURE: 97.3 F | OXYGEN SATURATION: 97 %

## 2024-01-31 DIAGNOSIS — E55.9 VITAMIN D DEFICIENCY: Primary | ICD-10-CM

## 2024-01-31 DIAGNOSIS — H65.92 FLUID LEVEL BEHIND TYMPANIC MEMBRANE OF LEFT EAR: ICD-10-CM

## 2024-01-31 DIAGNOSIS — R53.83 FATIGUE, UNSPECIFIED TYPE: ICD-10-CM

## 2024-01-31 DIAGNOSIS — G47.19 EXCESSIVE DAYTIME SLEEPINESS: ICD-10-CM

## 2024-01-31 DIAGNOSIS — F41.9 ANXIETY: ICD-10-CM

## 2024-01-31 DIAGNOSIS — R73.03 PREDIABETES: ICD-10-CM

## 2024-01-31 DIAGNOSIS — H92.02 OTALGIA, LEFT: ICD-10-CM

## 2024-01-31 DIAGNOSIS — E78.1 HYPERTRIGLYCERIDEMIA: ICD-10-CM

## 2024-01-31 DIAGNOSIS — E53.8 VITAMIN B12 DEFICIENCY: ICD-10-CM

## 2024-01-31 PROCEDURE — 1036F TOBACCO NON-USER: CPT | Performed by: STUDENT IN AN ORGANIZED HEALTH CARE EDUCATION/TRAINING PROGRAM

## 2024-01-31 PROCEDURE — 99214 OFFICE O/P EST MOD 30 MIN: CPT | Performed by: STUDENT IN AN ORGANIZED HEALTH CARE EDUCATION/TRAINING PROGRAM

## 2024-01-31 PROCEDURE — G8484 FLU IMMUNIZE NO ADMIN: HCPCS | Performed by: STUDENT IN AN ORGANIZED HEALTH CARE EDUCATION/TRAINING PROGRAM

## 2024-01-31 PROCEDURE — G8417 CALC BMI ABV UP PARAM F/U: HCPCS | Performed by: STUDENT IN AN ORGANIZED HEALTH CARE EDUCATION/TRAINING PROGRAM

## 2024-01-31 PROCEDURE — G8427 DOCREV CUR MEDS BY ELIG CLIN: HCPCS | Performed by: STUDENT IN AN ORGANIZED HEALTH CARE EDUCATION/TRAINING PROGRAM

## 2024-01-31 RX ORDER — VENLAFAXINE HYDROCHLORIDE 37.5 MG/1
37.5 CAPSULE, EXTENDED RELEASE ORAL DAILY
Qty: 90 CAPSULE | Refills: 0 | Status: SHIPPED | OUTPATIENT
Start: 2024-01-31

## 2024-01-31 RX ORDER — METHYLPREDNISOLONE 4 MG/1
TABLET ORAL
Qty: 1 KIT | Refills: 0 | Status: SHIPPED | OUTPATIENT
Start: 2024-01-31

## 2024-01-31 ASSESSMENT — ENCOUNTER SYMPTOMS
VOMITING: 0
SHORTNESS OF BREATH: 0
SORE THROAT: 0
COUGH: 0
RHINORRHEA: 0
ABDOMINAL PAIN: 0
NAUSEA: 0
CONSTIPATION: 0
BACK PAIN: 0
SINUS PAIN: 0
EYE PAIN: 0
EYE REDNESS: 0
SINUS PRESSURE: 0
DIARRHEA: 0

## 2024-01-31 ASSESSMENT — PATIENT HEALTH QUESTIONNAIRE - PHQ9
1. LITTLE INTEREST OR PLEASURE IN DOING THINGS: 0
SUM OF ALL RESPONSES TO PHQ QUESTIONS 1-9: 0
SUM OF ALL RESPONSES TO PHQ9 QUESTIONS 1 & 2: 0
SUM OF ALL RESPONSES TO PHQ QUESTIONS 1-9: 0
SUM OF ALL RESPONSES TO PHQ QUESTIONS 1-9: 0
2. FEELING DOWN, DEPRESSED OR HOPELESS: 0
SUM OF ALL RESPONSES TO PHQ QUESTIONS 1-9: 0

## 2024-01-31 NOTE — PROGRESS NOTES
Negative 09/18/2022 10:29 AM    BLOODU TRACE-INTACT 01/15/2020 12:30 AM    BLOODU neg 01/14/2020 12:23 PM    BLOODU LARGE 05/05/2018 06:55 PM    PHUR 6.0 09/18/2022 10:29 AM    PHUR 6.0 01/15/2020 12:30 AM    PHUR 5.5 01/14/2020 12:23 PM    PHUR 6.0 05/05/2018 06:55 PM    PROTEINU 100 09/18/2022 10:29 AM    PROTEINU Negative 01/15/2020 12:30 AM    PROTEINU trace 01/14/2020 12:23 PM    PROTEINU Negative 05/05/2018 06:55 PM    UROBILINOGEN 0.2 09/18/2022 10:29 AM    UROBILINOGEN 0.2 01/15/2020 12:30 AM    UROBILINOGEN 0.2 05/05/2018 06:55 PM    NITRU Negative 09/18/2022 10:29 AM    NITRU Negative 01/15/2020 12:30 AM    NITRU Negative 05/05/2018 06:55 PM    LEUKOCYTESUR SMALL 09/18/2022 10:29 AM    LEUKOCYTESUR Negative 01/15/2020 12:30 AM    LEUKOCYTESUR neg 01/14/2020 12:23 PM    LEUKOCYTESUR Negative 05/05/2018 06:55 PM       Physical Exam  Vitals and nursing note reviewed.   Constitutional:       General: She is not in acute distress.     Appearance: Normal appearance. She is obese. She is not ill-appearing.   HENT:      Head: Normocephalic and atraumatic.      Right Ear: External ear normal. A middle ear effusion is present.      Left Ear: External ear normal. A middle ear effusion is present.   Eyes:      Extraocular Movements: Extraocular movements intact.      Conjunctiva/sclera: Conjunctivae normal.      Pupils: Pupils are equal, round, and reactive to light.   Cardiovascular:      Rate and Rhythm: Normal rate and regular rhythm.      Pulses: Normal pulses.      Heart sounds: Normal heart sounds.   Pulmonary:      Effort: Pulmonary effort is normal.      Breath sounds: Normal breath sounds.   Abdominal:      General: Bowel sounds are normal.      Palpations: Abdomen is soft.   Musculoskeletal:         General: Normal range of motion.      Cervical back: Normal range of motion and neck supple.   Skin:     General: Skin is warm and dry.      Capillary Refill: Capillary refill takes less than 2 seconds.

## 2024-02-05 ENCOUNTER — HOSPITAL ENCOUNTER (OUTPATIENT)
Dept: INFUSION THERAPY | Age: 49
End: 2024-02-05

## 2024-02-12 ENCOUNTER — HOSPITAL ENCOUNTER (OUTPATIENT)
Dept: INFUSION THERAPY | Age: 49
Discharge: HOME | End: 2024-02-12
Payer: MEDICAID

## 2024-02-12 ENCOUNTER — HOSPITAL ENCOUNTER (OUTPATIENT)
Dept: ULTRASOUND IMAGING | Age: 49
Discharge: HOME | End: 2024-02-14
Attending: TRANSPLANT SURGERY
Payer: MEDICAID

## 2024-02-12 ENCOUNTER — OFFICE VISIT (OUTPATIENT)
Dept: ONCOLOGY | Age: 49
End: 2024-02-12
Payer: MEDICAID

## 2024-02-12 VITALS
DIASTOLIC BLOOD PRESSURE: 84 MMHG | TEMPERATURE: 98 F | OXYGEN SATURATION: 94 % | SYSTOLIC BLOOD PRESSURE: 142 MMHG | WEIGHT: 293 LBS | BODY MASS INDEX: 51.91 KG/M2 | HEIGHT: 63 IN | HEART RATE: 97 BPM

## 2024-02-12 DIAGNOSIS — Z85.3 PERSONAL HISTORY OF BREAST CANCER: ICD-10-CM

## 2024-02-12 DIAGNOSIS — K76.0 NAFLD (NONALCOHOLIC FATTY LIVER DISEASE): ICD-10-CM

## 2024-02-12 DIAGNOSIS — Z79.811 USE OF AROMATASE INHIBITORS: Primary | ICD-10-CM

## 2024-02-12 LAB
ALBUMIN SERPL-MCNC: 4.1 G/DL (ref 3.5–5.2)
ALP SERPL-CCNC: 115 U/L (ref 35–104)
ALT SERPL-CCNC: 33 U/L (ref 0–32)
ANION GAP SERPL CALCULATED.3IONS-SCNC: 14 MMOL/L (ref 7–16)
AST SERPL-CCNC: 21 U/L (ref 0–31)
BASOPHILS # BLD: 0.04 K/UL (ref 0–0.2)
BASOPHILS NFR BLD: 0 % (ref 0–2)
BILIRUB SERPL-MCNC: 0.7 MG/DL (ref 0–1.2)
BUN SERPL-MCNC: 16 MG/DL (ref 6–20)
CALCIUM SERPL-MCNC: 9 MG/DL (ref 8.6–10.2)
CHLORIDE SERPL-SCNC: 103 MMOL/L (ref 98–107)
CO2 SERPL-SCNC: 24 MMOL/L (ref 22–29)
CREAT SERPL-MCNC: 0.8 MG/DL (ref 0.5–1)
EOSINOPHIL # BLD: 0.14 K/UL (ref 0.05–0.5)
EOSINOPHILS RELATIVE PERCENT: 1 % (ref 0–6)
ERYTHROCYTE [DISTWIDTH] IN BLOOD BY AUTOMATED COUNT: 14.4 % (ref 11.5–15)
GFR SERPL CREATININE-BSD FRML MDRD: >60 ML/MIN/1.73M2
GLUCOSE SERPL-MCNC: 102 MG/DL (ref 74–99)
HCT VFR BLD AUTO: 47.9 % (ref 34–48)
HGB BLD-MCNC: 15.2 G/DL (ref 11.5–15.5)
IMM GRANULOCYTES # BLD AUTO: 0.04 K/UL (ref 0–0.58)
IMM GRANULOCYTES NFR BLD: 0 % (ref 0–5)
LYMPHOCYTES NFR BLD: 2.79 K/UL (ref 1.5–4)
LYMPHOCYTES RELATIVE PERCENT: 24 % (ref 20–42)
MCH RBC QN AUTO: 28.1 PG (ref 26–35)
MCHC RBC AUTO-ENTMCNC: 31.7 G/DL (ref 32–34.5)
MCV RBC AUTO: 88.7 FL (ref 80–99.9)
MONOCYTES NFR BLD: 0.49 K/UL (ref 0.1–0.95)
MONOCYTES NFR BLD: 4 % (ref 2–12)
NEUTROPHILS NFR BLD: 70 % (ref 43–80)
NEUTS SEG NFR BLD: 7.94 K/UL (ref 1.8–7.3)
PLATELET # BLD AUTO: 357 K/UL (ref 130–450)
PMV BLD AUTO: 9.5 FL (ref 7–12)
POTASSIUM SERPL-SCNC: 4.2 MMOL/L (ref 3.5–5)
PROT SERPL-MCNC: 7.9 G/DL (ref 6.4–8.3)
RBC # BLD AUTO: 5.4 M/UL (ref 3.5–5.5)
SODIUM SERPL-SCNC: 141 MMOL/L (ref 132–146)
WBC OTHER # BLD: 11.4 K/UL (ref 4.5–11.5)

## 2024-02-12 PROCEDURE — 1036F TOBACCO NON-USER: CPT | Performed by: CLINICAL NURSE SPECIALIST

## 2024-02-12 PROCEDURE — 76705 ECHO EXAM OF ABDOMEN: CPT

## 2024-02-12 PROCEDURE — 99213 OFFICE O/P EST LOW 20 MIN: CPT

## 2024-02-12 PROCEDURE — 80053 COMPREHEN METABOLIC PANEL: CPT

## 2024-02-12 PROCEDURE — 85025 COMPLETE CBC W/AUTO DIFF WBC: CPT

## 2024-02-12 PROCEDURE — 36415 COLL VENOUS BLD VENIPUNCTURE: CPT

## 2024-02-12 PROCEDURE — 99213 OFFICE O/P EST LOW 20 MIN: CPT | Performed by: CLINICAL NURSE SPECIALIST

## 2024-02-12 PROCEDURE — G8484 FLU IMMUNIZE NO ADMIN: HCPCS | Performed by: CLINICAL NURSE SPECIALIST

## 2024-02-12 PROCEDURE — G8427 DOCREV CUR MEDS BY ELIG CLIN: HCPCS | Performed by: CLINICAL NURSE SPECIALIST

## 2024-02-12 PROCEDURE — G8417 CALC BMI ABV UP PARAM F/U: HCPCS | Performed by: CLINICAL NURSE SPECIALIST

## 2024-02-12 NOTE — PROGRESS NOTES
Department of [facility] Med Oncology  Attending Clinic Note    Reason for Visit: Follow-up on a patient with Left Breast Cancer    PCP:  Katya Ferris DO    History of Present Illness:  48 year old female with Left Breast Cancer    Breast cancer risk factors include family hx on mother's side, mom with breast CA, family hx of colon CA and age and gender    Bilateral Diagnostic Mammogram/Left Breast U/S on 07/01/2021  Left sided ill defined hypoechoic region at the 2 o'clock position measuring 2 cm.     On 07/01/2021 Left breast, 2:00 core needle biopsy:   Invasive, moderately differentiated mammary carcinoma (grade 2)     Comment: Microscopic examination shows an invasive carcinoma with linear growth pattern dispersed throughout fibrous stroma.  The tumor has a [place] histologic score of 3 (tubule formation) +2 (nuclear pleomorphism) +1 (mitotic count) = 6.  The tumor cells are immunoreactive with pankeratin and E-cadherin which favors ductal origin.  The p63 highlights myoepithelial cells in benign ducts.  Intradepartmental consultation is obtained.     Breast Cancer Marker Studies:   Estrogen Receptors (ER): 60%   Progesterone Receptors (MA): 60%   Her-2/markie: Negative/1+     CXR 07/09/2021 noted no pneumonia or pleural effusion    Left axillary US 07/14/2021 noted no LN    Left simple mastectomy, sentinel lymph node biopsy, injection of methylene blue dye 08/18/2021  A.  Left breast, total mastectomy:   - Invasive carcinoma with mixed ductal and lobular features, bicentric, grade 2;   - Lobular carcinoma in situ and atypical lobular hyperplasia;   - Rare small ducts showing atypical ductal hyperplasia;   - Fibrocystic changes with usual ductal hyperplasia without atypia, adenosis, columnar cell change, fibroadenomatoid nodule, ductal ectasia, microcysts and stromal fibrosis;   - Changes of previous biopsy sites (2).     B.  Zenia lymph nodes #1, biopsy:   - Three of six (3/6) lymph nodes positive for

## 2024-02-16 ENCOUNTER — TELEPHONE (OUTPATIENT)
Dept: FAMILY MEDICINE CLINIC | Age: 49
End: 2024-02-16

## 2024-02-16 NOTE — TELEPHONE ENCOUNTER
Pt called in stating she is still nto feeling better from visit on 01/31/2024. Pt stated her son had flu A about two weeks ago and pt has similar symptoms. She stated she went to walk-in on 02/09/2024 and got diagnosed with bronchitis but states she still thinks she had the flu. Pt stated she has chest congestion , dry cough, bilateral ear pain, and drainage. Pt has 3 doses left of doxycycline, did take medrol dose pack but had to stop it due to it interfering with medication and causing her to have increased hot flashes and was taken off of it by her oncologist. Pt is wondering if there is something else she can do or take? Please advise

## 2024-02-19 DIAGNOSIS — R06.2 WHEEZING: Primary | ICD-10-CM

## 2024-02-19 RX ORDER — ALBUTEROL SULFATE 90 UG/1
2 AEROSOL, METERED RESPIRATORY (INHALATION) 4 TIMES DAILY PRN
Qty: 54 G | Refills: 1 | Status: SHIPPED | OUTPATIENT
Start: 2024-02-19

## 2024-02-19 NOTE — TELEPHONE ENCOUNTER
Pt stated she finished antibiotic and is feeling better. Pt stated she is having chest congestion, wheezing, and fluid in her ear. I advised pt to try her Flonase to see if that helps and to ask oncology if she is able to take mucinex to help with congestion and stated if she can take that to try that for her chest congestion. Pt is just wondering if an inhaler can be sent in to help with wheezing? Please advise

## 2024-02-26 ENCOUNTER — INITIAL CONSULT (OUTPATIENT)
Dept: BARIATRICS/WEIGHT MGMT | Age: 49
End: 2024-02-26
Payer: MEDICAID

## 2024-02-26 VITALS — HEIGHT: 63 IN | BODY MASS INDEX: 51.91 KG/M2 | WEIGHT: 293 LBS

## 2024-02-26 DIAGNOSIS — E66.01 MORBID OBESITY DUE TO EXCESS CALORIES (HCC): Primary | ICD-10-CM

## 2024-02-26 DIAGNOSIS — Z71.3 DIETARY COUNSELING: ICD-10-CM

## 2024-02-26 PROCEDURE — 97803 MED NUTRITION INDIV SUBSEQ: CPT | Performed by: DIETITIAN, REGISTERED

## 2024-02-26 PROCEDURE — G8428 CUR MEDS NOT DOCUMENT: HCPCS | Performed by: DIETITIAN, REGISTERED

## 2024-02-26 PROCEDURE — G8417 CALC BMI ABV UP PARAM F/U: HCPCS | Performed by: DIETITIAN, REGISTERED

## 2024-02-26 NOTE — PATIENT INSTRUCTIONS
Goal/Instructions:  Follow the pre operative liver shrinking diet carefully.  See this on pages 9, 10, and 11 in the Patient Guide to Bariatric Surgery  Follow up in 3 weeks.    Call your registered dietitian at [phone] for any questions or concerns.

## 2024-02-26 NOTE — PROGRESS NOTES
NOTE:  This patient received Medical Nutrition Management; initial assessment and intervention , in an individual, face-to-face encounter with an RD/LD on 2/26/24 at the [facility].        WEIGHT:  Weight - Scale: (!) 170.1 kg (375 lb)    Pt was in th office for his/her 5th weight Loss appointment.  Pt is aware he/she must meet his/her pre-op weight loss goal in order to proceed with weight loss surgery.  Rd / Ld faxed a copy of what was reviewed with the pt to her PCP.  Pt verbalized understanding.     Rd// Ld enc the following at today's visit for successful post-surgical Weight Maintenance    1. Weigh yourself daily and record it.   2. Keep documented food records daily   3. Just be more active in day to day routine   4. Higher protein intake and a higher fiber intake. Not a high protein or a high fiber diet just a higher intake.  5.Eliminate empty calorie consumption    Popeye Mark addressed the following with the pt:  - Popeye Mark enc pt to comply with nutrition recommendations  - Rd / Ld enc Participation in support group meetings  - Popeye Mark enc pt to go back to maintenance of food records and weight monitoring records   - Popeye Mark reviewed the importance of adequate sleep and stress management  - Popeye Mark reviewed nonfood strategies to cope with emotions and stress  - Popeye Mark encouraged pt to practice the following: Mindful eating: Eating slowly: Focusing   on the eating experience without distraction  - Popeye Mark enc. pt to pay attention to hunger and fullness  - Rd / Ld enc meal planning  - Rd / Ld  Enc pt to chose nutrient dense whole foods instead of soft, high calorie foods  - Rd / Ld enc not dr inkling large amounts of fluids with or immediately after meals    Portion control ,meal planning and avoiding empty calorie consumption.     Rd / Ld reviewed insurance company weight loss requirement on 2/26/24. Pt verbalized understanding.     Please be aware at each visit you have been instructed

## 2024-03-04 ENCOUNTER — OFFICE VISIT (OUTPATIENT)
Dept: SLEEP MEDICINE | Age: 49
End: 2024-03-04
Payer: MEDICAID

## 2024-03-04 VITALS
BODY MASS INDEX: 51.91 KG/M2 | WEIGHT: 293 LBS | OXYGEN SATURATION: 98 % | SYSTOLIC BLOOD PRESSURE: 156 MMHG | HEIGHT: 63 IN | HEART RATE: 97 BPM | RESPIRATION RATE: 18 BRPM | DIASTOLIC BLOOD PRESSURE: 73 MMHG

## 2024-03-04 DIAGNOSIS — G47.10 HYPERSOMNOLENCE: ICD-10-CM

## 2024-03-04 DIAGNOSIS — R06.83 SNORING: Primary | ICD-10-CM

## 2024-03-04 PROCEDURE — G8417 CALC BMI ABV UP PARAM F/U: HCPCS | Performed by: INTERNAL MEDICINE

## 2024-03-04 PROCEDURE — 99204 OFFICE O/P NEW MOD 45 MIN: CPT | Performed by: INTERNAL MEDICINE

## 2024-03-04 PROCEDURE — G8484 FLU IMMUNIZE NO ADMIN: HCPCS | Performed by: INTERNAL MEDICINE

## 2024-03-04 PROCEDURE — 1036F TOBACCO NON-USER: CPT | Performed by: INTERNAL MEDICINE

## 2024-03-04 PROCEDURE — G8428 CUR MEDS NOT DOCUMENT: HCPCS | Performed by: INTERNAL MEDICINE

## 2024-03-04 ASSESSMENT — SLEEP AND FATIGUE QUESTIONNAIRES
HOW LIKELY ARE YOU TO NOD OFF OR FALL ASLEEP IN A CAR, WHILE STOPPED FOR A FEW MINUTES IN TRAFFIC: 0
NECK CIRCUMFERENCE (INCHES): 18.25
ESS TOTAL SCORE: 9
HOW LIKELY ARE YOU TO NOD OFF OR FALL ASLEEP WHILE SITTING INACTIVE IN A PUBLIC PLACE: 0
HOW LIKELY ARE YOU TO NOD OFF OR FALL ASLEEP WHEN YOU ARE A PASSENGER IN A CAR FOR AN HOUR WITHOUT A BREAK: 0
HOW LIKELY ARE YOU TO NOD OFF OR FALL ASLEEP WHILE WATCHING TV: 2
HOW LIKELY ARE YOU TO NOD OFF OR FALL ASLEEP WHILE SITTING AND READING: 2
HOW LIKELY ARE YOU TO NOD OFF OR FALL ASLEEP WHILE SITTING QUIETLY AFTER LUNCH WITHOUT ALCOHOL: 2
HOW LIKELY ARE YOU TO NOD OFF OR FALL ASLEEP WHILE SITTING AND TALKING TO SOMEONE: 0
HOW LIKELY ARE YOU TO NOD OFF OR FALL ASLEEP WHILE LYING DOWN TO REST IN THE AFTERNOON WHEN CIRCUMSTANCES PERMIT: 3

## 2024-03-04 NOTE — PROGRESS NOTES
daily 90 capsule 0    methylPREDNISolone (MEDROL DOSEPACK) 4 MG tablet Take by mouth. (Patient not taking: Reported on 2/12/2024) 1 kit 0    anastrozole (ARIMIDEX) 1 MG tablet TAKE 1 TABLET BY MOUTH DAILY 90 tablet 0    vitamin D (ERGOCALCIFEROL) 1.25 MG (39919 UT) CAPS capsule TAKE 1 CAPSULE BY MOUTH 1 TIME A WEEK 12 capsule 1    hydrOXYzine pamoate (VISTARIL) 50 MG capsule TAKE 1 CAPSULE BY MOUTH EVERY NIGHT AT BEDTIME 90 capsule 1    Handicap Placard MISC by Does not apply route Cannot walk more than 200 feet  Expires 11/2027 1 each 0    Cholecalciferol (VITAMIN D3) 50 MCG (2000 UT) TABS TAKE 1 TABLET BY MOUTH DAILY 90 tablet 1    ondansetron (ZOFRAN) 4 MG tablet Take 1 tablet by mouth 3 times daily as needed for Nausea or Vomiting 15 tablet 0    pantoprazole (PROTONIX) 40 MG tablet Take 1 tablet by mouth daily 30 tablet 1    dicyclomine (BENTYL) 10 MG capsule Take 1 capsule by mouth 4 times daily 120 capsule 3     No current facility-administered medications for this visit.        Review of Systems  (Sleep ROS above)  Review of Systems         Objective:   Physical Exam  BP (!) 156/73   Pulse 97   Resp 18   Ht 1.6 m (5' 3\")   Wt (!) 172.2 kg (379 lb 10.1 oz)   LMP 10/24/2021   SpO2 98%   BMI 67.25 kg/m²        Physical Exam  Vitals reviewed.   Constitutional:       Appearance: Normal appearance.   HENT:      Head: Atraumatic.   Eyes:      Extraocular Movements: Extraocular movements intact.   Cardiovascular:      Rate and Rhythm: Normal rate and regular rhythm.   Pulmonary:      Effort: Pulmonary effort is normal.      Breath sounds: Normal breath sounds.   Musculoskeletal:         General: No signs of injury.   Skin:     General: Skin is warm and dry.   Neurological:      General: No focal deficit present.      Mental Status: She is alert.   Psychiatric:         Mood and Affect: Mood normal.                 3/4/2024     9:21 AM   Sleep Medicine   Sitting and reading 2   Watching TV 2   Sitting, inactive

## 2024-03-18 ENCOUNTER — TELEPHONE (OUTPATIENT)
Dept: BARIATRICS/WEIGHT MGMT | Age: 49
End: 2024-03-18

## 2024-03-18 NOTE — TELEPHONE ENCOUNTER
Pt was scheduled for weight loss nutrition consult today but did not show.  Called and discussed with pt and rescheduled appt for 4/3/24.

## 2024-03-22 ENCOUNTER — TRANSCRIBE ORDERS (OUTPATIENT)
Dept: SLEEP CENTER | Age: 49
End: 2024-03-22

## 2024-03-22 DIAGNOSIS — G47.10 HYPERSOMNIA, UNSPECIFIED: Primary | ICD-10-CM

## 2024-04-05 ENCOUNTER — OFFICE VISIT (OUTPATIENT)
Dept: BREAST CENTER | Age: 49
End: 2024-04-05
Payer: MEDICAID

## 2024-04-05 ENCOUNTER — TELEPHONE (OUTPATIENT)
Dept: FAMILY MEDICINE CLINIC | Age: 49
End: 2024-04-05

## 2024-04-05 VITALS
DIASTOLIC BLOOD PRESSURE: 86 MMHG | TEMPERATURE: 97.8 F | SYSTOLIC BLOOD PRESSURE: 146 MMHG | HEART RATE: 95 BPM | RESPIRATION RATE: 20 BRPM | HEIGHT: 63 IN | BODY MASS INDEX: 51.91 KG/M2 | OXYGEN SATURATION: 96 % | WEIGHT: 293 LBS

## 2024-04-05 DIAGNOSIS — C50.412 MALIGNANT NEOPLASM OF UPPER-OUTER QUADRANT OF LEFT BREAST IN FEMALE, ESTROGEN RECEPTOR POSITIVE (HCC): ICD-10-CM

## 2024-04-05 DIAGNOSIS — Z17.0 MALIGNANT NEOPLASM OF UPPER-OUTER QUADRANT OF LEFT BREAST IN FEMALE, ESTROGEN RECEPTOR POSITIVE (HCC): ICD-10-CM

## 2024-04-05 DIAGNOSIS — Z85.3 HISTORY OF BREAST CANCER: Primary | ICD-10-CM

## 2024-04-05 PROCEDURE — 1036F TOBACCO NON-USER: CPT | Performed by: SURGERY

## 2024-04-05 PROCEDURE — G8427 DOCREV CUR MEDS BY ELIG CLIN: HCPCS | Performed by: SURGERY

## 2024-04-05 PROCEDURE — 99213 OFFICE O/P EST LOW 20 MIN: CPT | Performed by: SURGERY

## 2024-04-05 PROCEDURE — 99212 OFFICE O/P EST SF 10 MIN: CPT | Performed by: SURGERY

## 2024-04-05 PROCEDURE — G8417 CALC BMI ABV UP PARAM F/U: HCPCS | Performed by: SURGERY

## 2024-04-05 ASSESSMENT — ENCOUNTER SYMPTOMS
ANAL BLEEDING: 0
ALLERGIC/IMMUNOLOGIC NEGATIVE: 1
BLOOD IN STOOL: 0
SHORTNESS OF BREATH: 0
DIARRHEA: 1
ABDOMINAL PAIN: 1
ABDOMINAL DISTENTION: 0
RESPIRATORY NEGATIVE: 1
BACK PAIN: 0
VOMITING: 0
CONSTIPATION: 0
EYES NEGATIVE: 1
COUGH: 0
NAUSEA: 1

## 2024-04-05 NOTE — PROGRESS NOTES
[facility]  PROGRESS NOTE  ATTENDING NOTE    Chief Complaint   Patient presents with    personal history breast cancer     Follow up - personal history breast cancer, c/o tenderness left chest area. 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  She presents for her routine follow-up.  She still has some intermittent pain in her left breast which she is palpating her rib when she does so.  She still has not undergone weight loss surgery.  She claims that she has been the same weight for the last year or 2.  She knows that she has to get over this jacob psychologically 2.  She is seeing a counselor.  She continues to go to weight loss center.  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  She is very concerned about her children as her 14-year-old son was caught recently smoking and stealing alcohol from the grocery store.  She has been having some stomach issues with epigastric pain and diarrhea.  She states the bentyl didn't really help with that.  She denies hematochezia or melena.    Review of Systems   Constitutional: Negative.  Negative for activity change, appetite change and unexpected weight change.   HENT: Negative.     Eyes: Negative.    Respiratory: Negative.  Negative for cough and shortness of breath.    Cardiovascular: Negative.  Negative for chest pain and leg swelling.   Gastrointestinal:  Positive for abdominal pain, diarrhea and nausea. Negative for abdominal distention, anal bleeding, blood in stool, constipation and vomiting.   Endocrine: Negative.    Genitourinary: Negative.    Musculoskeletal: Negative.  Negative for arthralgias, back pain, gait problem, joint swelling and myalgias.   Skin: Negative.    Allergic/Immunologic: Negative.    Neurological: Negative.  Negative for dizziness, weakness and headaches.   Hematological:

## 2024-04-05 NOTE — TELEPHONE ENCOUNTER
Pt stated that she is having stomach cramps and diarrhea. No fever. Symptoms started 4 days ago, Pt requesting bentyl. Pt taking zofran and its not helping with cramps. No OTC.

## 2024-04-08 RX ORDER — DICYCLOMINE HCL 20 MG
20 TABLET ORAL 4 TIMES DAILY
Qty: 120 TABLET | Refills: 0 | Status: SHIPPED | OUTPATIENT
Start: 2024-04-08

## 2024-05-01 ENCOUNTER — HOSPITAL ENCOUNTER (OUTPATIENT)
Dept: GENERAL RADIOLOGY | Age: 49
Discharge: HOME | End: 2024-05-03
Payer: MEDICAID

## 2024-05-01 DIAGNOSIS — Z79.811 USE OF AROMATASE INHIBITORS: ICD-10-CM

## 2024-05-01 PROCEDURE — 77080 DXA BONE DENSITY AXIAL: CPT

## 2024-05-13 ENCOUNTER — HOSPITAL ENCOUNTER (EMERGENCY)
Age: 49
Discharge: ANOTHER HOSPITAL | End: 2024-05-13
Attending: EMERGENCY MEDICINE
Payer: MEDICAID

## 2024-05-13 ENCOUNTER — HOSPITAL ENCOUNTER (OUTPATIENT)
Age: 49
Setting detail: OBSERVATION
Discharge: HOME | End: 2024-05-14
Attending: STUDENT IN AN ORGANIZED HEALTH CARE EDUCATION/TRAINING PROGRAM | Admitting: STUDENT IN AN ORGANIZED HEALTH CARE EDUCATION/TRAINING PROGRAM
Payer: MEDICAID

## 2024-05-13 VITALS
BODY MASS INDEX: 53.92 KG/M2 | WEIGHT: 293 LBS | SYSTOLIC BLOOD PRESSURE: 194 MMHG | OXYGEN SATURATION: 95 % | DIASTOLIC BLOOD PRESSURE: 90 MMHG | TEMPERATURE: 98.3 F | RESPIRATION RATE: 20 BRPM | HEIGHT: 62 IN | HEART RATE: 106 BPM

## 2024-05-13 DIAGNOSIS — E87.6 HYPOKALEMIA: ICD-10-CM

## 2024-05-13 DIAGNOSIS — R19.7 NAUSEA VOMITING AND DIARRHEA: Primary | ICD-10-CM

## 2024-05-13 DIAGNOSIS — R11.2 NAUSEA VOMITING AND DIARRHEA: Primary | ICD-10-CM

## 2024-05-13 DIAGNOSIS — E83.51 HYPOCALCEMIA: ICD-10-CM

## 2024-05-13 PROBLEM — D64.9 NORMOCYTIC ANEMIA: Status: ACTIVE | Noted: 2024-05-13

## 2024-05-13 LAB
ALBUMIN SERPL-MCNC: 1.8 G/DL (ref 3.5–5.2)
ALBUMIN SERPL-MCNC: 3.8 G/DL (ref 3.5–5.2)
ALP SERPL-CCNC: 104 U/L (ref 35–104)
ALP SERPL-CCNC: 49 U/L (ref 35–104)
ALT SERPL-CCNC: 11 U/L (ref 0–32)
ALT SERPL-CCNC: 21 U/L (ref 0–32)
ANION GAP SERPL CALCULATED.3IONS-SCNC: 11 MMOL/L (ref 7–16)
ANION GAP SERPL CALCULATED.3IONS-SCNC: 8 MMOL/L (ref 7–16)
AST SERPL-CCNC: 18 U/L (ref 0–31)
AST SERPL-CCNC: 9 U/L (ref 0–31)
BACTERIA URNS QL MICRO: ABNORMAL
BASOPHILS # BLD: 0.03 K/UL (ref 0–0.2)
BASOPHILS NFR BLD: 0 % (ref 0–2)
BILIRUB DIRECT SERPL-MCNC: 0.2 MG/DL (ref 0–0.3)
BILIRUB INDIRECT SERPL-MCNC: 0.9 MG/DL (ref 0–1)
BILIRUB SERPL-MCNC: 0.4 MG/DL (ref 0–1.2)
BILIRUB SERPL-MCNC: 1.1 MG/DL (ref 0–1.2)
BILIRUB UR QL STRIP: NEGATIVE
BUN SERPL-MCNC: 10 MG/DL (ref 6–20)
BUN SERPL-MCNC: 13 MG/DL (ref 6–20)
CALCIUM SERPL-MCNC: 4.2 MG/DL (ref 8.6–10.2)
CALCIUM SERPL-MCNC: 8.7 MG/DL (ref 8.6–10.2)
CHLORIDE SERPL-SCNC: 101 MMOL/L (ref 98–107)
CHLORIDE SERPL-SCNC: 120 MMOL/L (ref 98–107)
CLARITY UR: CLEAR
CO2 SERPL-SCNC: 16 MMOL/L (ref 22–29)
CO2 SERPL-SCNC: 21 MMOL/L (ref 22–29)
COLOR UR: YELLOW
CREAT SERPL-MCNC: 0.5 MG/DL (ref 0.5–1)
CREAT SERPL-MCNC: 0.8 MG/DL (ref 0.5–1)
EKG ATRIAL RATE: 97 BPM
EKG P AXIS: 52 DEGREES
EKG P-R INTERVAL: 178 MS
EKG Q-T INTERVAL: 346 MS
EKG QRS DURATION: 90 MS
EKG QTC CALCULATION (BAZETT): 439 MS
EKG R AXIS: -40 DEGREES
EKG T AXIS: 38 DEGREES
EKG VENTRICULAR RATE: 97 BPM
EOSINOPHIL # BLD: 0.09 K/UL (ref 0.05–0.5)
EOSINOPHILS RELATIVE PERCENT: 1 % (ref 0–6)
EPI CELLS #/AREA URNS HPF: ABNORMAL /HPF
ERYTHROCYTE [DISTWIDTH] IN BLOOD BY AUTOMATED COUNT: 14.6 % (ref 11.5–15)
GFR, ESTIMATED: >90 ML/MIN/1.73M2
GFR, ESTIMATED: >90 ML/MIN/1.73M2
GLUCOSE BLD-MCNC: 144 MG/DL (ref 74–99)
GLUCOSE SERPL-MCNC: 103 MG/DL (ref 74–99)
GLUCOSE SERPL-MCNC: 67 MG/DL (ref 74–99)
GLUCOSE UR STRIP-MCNC: 100 MG/DL
HCT VFR BLD AUTO: 35.6 % (ref 34–48)
HGB BLD-MCNC: 11.2 G/DL (ref 11.5–15.5)
HGB UR QL STRIP.AUTO: NEGATIVE
IMM GRANULOCYTES # BLD AUTO: 0.06 K/UL (ref 0–0.58)
IMM GRANULOCYTES NFR BLD: 0 % (ref 0–5)
KETONES UR STRIP-MCNC: NEGATIVE MG/DL
LEUKOCYTE ESTERASE UR QL STRIP: ABNORMAL
LYMPHOCYTES NFR BLD: 2.06 K/UL (ref 1.5–4)
LYMPHOCYTES RELATIVE PERCENT: 14 % (ref 20–42)
MAGNESIUM SERPL-MCNC: 1.9 MG/DL (ref 1.6–2.6)
MCH RBC QN AUTO: 28.5 PG (ref 26–35)
MCHC RBC AUTO-ENTMCNC: 31.5 G/DL (ref 32–34.5)
MCV RBC AUTO: 90.6 FL (ref 80–99.9)
MONOCYTES NFR BLD: 0.9 K/UL (ref 0.1–0.95)
MONOCYTES NFR BLD: 6 % (ref 2–12)
NEUTROPHILS NFR BLD: 78 % (ref 43–80)
NEUTS SEG NFR BLD: 11.31 K/UL (ref 1.8–7.3)
NITRITE UR QL STRIP: NEGATIVE
PH UR STRIP: 6 [PH] (ref 5–9)
PHOSPHATE SERPL-MCNC: 3.1 MG/DL (ref 2.5–4.5)
PLATELET # BLD AUTO: 257 K/UL (ref 130–450)
PMV BLD AUTO: 9.4 FL (ref 7–12)
POTASSIUM SERPL-SCNC: 2.1 MMOL/L (ref 3.5–5)
POTASSIUM SERPL-SCNC: 3.9 MMOL/L (ref 3.5–5)
PROT SERPL-MCNC: 3.5 G/DL (ref 6.4–8.3)
PROT SERPL-MCNC: 7.3 G/DL (ref 6.4–8.3)
PROT UR STRIP-MCNC: NEGATIVE MG/DL
RBC # BLD AUTO: 3.93 M/UL (ref 3.5–5.5)
RBC #/AREA URNS HPF: ABNORMAL /HPF
SODIUM SERPL-SCNC: 133 MMOL/L (ref 132–146)
SODIUM SERPL-SCNC: 144 MMOL/L (ref 132–146)
SP GR UR STRIP: >1.03 (ref 1–1.03)
UROBILINOGEN UR STRIP-ACNC: 0.2 EU/DL (ref 0–1)
WBC #/AREA URNS HPF: ABNORMAL /HPF
WBC OTHER # BLD: 14.5 K/UL (ref 4.5–11.5)

## 2024-05-13 PROCEDURE — 99222 1ST HOSP IP/OBS MODERATE 55: CPT | Performed by: STUDENT IN AN ORGANIZED HEALTH CARE EDUCATION/TRAINING PROGRAM

## 2024-05-13 PROCEDURE — 96372 THER/PROPH/DIAG INJ SC/IM: CPT

## 2024-05-13 PROCEDURE — 87324 CLOSTRIDIUM AG IA: CPT

## 2024-05-13 PROCEDURE — 82962 GLUCOSE BLOOD TEST: CPT

## 2024-05-13 PROCEDURE — 96360 HYDRATION IV INFUSION INIT: CPT

## 2024-05-13 PROCEDURE — 6360000002 HC RX W HCPCS: Performed by: EMERGENCY MEDICINE

## 2024-05-13 PROCEDURE — 6360000002 HC RX W HCPCS: Performed by: STUDENT IN AN ORGANIZED HEALTH CARE EDUCATION/TRAINING PROGRAM

## 2024-05-13 PROCEDURE — 2500000003 HC RX 250 WO HCPCS: Performed by: EMERGENCY MEDICINE

## 2024-05-13 PROCEDURE — 96375 TX/PRO/DX INJ NEW DRUG ADDON: CPT

## 2024-05-13 PROCEDURE — 96361 HYDRATE IV INFUSION ADD-ON: CPT

## 2024-05-13 PROCEDURE — 99285 EMERGENCY DEPT VISIT HI MDM: CPT

## 2024-05-13 PROCEDURE — 93010 ELECTROCARDIOGRAM REPORT: CPT | Performed by: INTERNAL MEDICINE

## 2024-05-13 PROCEDURE — G0378 HOSPITAL OBSERVATION PER HR: HCPCS

## 2024-05-13 PROCEDURE — 87427 SHIGA-LIKE TOXIN AG IA: CPT

## 2024-05-13 PROCEDURE — 80053 COMPREHEN METABOLIC PANEL: CPT

## 2024-05-13 PROCEDURE — 2580000003 HC RX 258: Performed by: STUDENT IN AN ORGANIZED HEALTH CARE EDUCATION/TRAINING PROGRAM

## 2024-05-13 PROCEDURE — 96368 THER/DIAG CONCURRENT INF: CPT

## 2024-05-13 PROCEDURE — 83735 ASSAY OF MAGNESIUM: CPT

## 2024-05-13 PROCEDURE — 96366 THER/PROPH/DIAG IV INF ADDON: CPT

## 2024-05-13 PROCEDURE — 81001 URINALYSIS AUTO W/SCOPE: CPT

## 2024-05-13 PROCEDURE — 2580000003 HC RX 258: Performed by: EMERGENCY MEDICINE

## 2024-05-13 PROCEDURE — 84100 ASSAY OF PHOSPHORUS: CPT

## 2024-05-13 PROCEDURE — 93005 ELECTROCARDIOGRAM TRACING: CPT | Performed by: EMERGENCY MEDICINE

## 2024-05-13 PROCEDURE — 87045 FECES CULTURE AEROBIC BACT: CPT

## 2024-05-13 PROCEDURE — 96365 THER/PROPH/DIAG IV INF INIT: CPT

## 2024-05-13 PROCEDURE — 87046 STOOL CULTR AEROBIC BACT EA: CPT

## 2024-05-13 PROCEDURE — 87449 NOS EACH ORGANISM AG IA: CPT

## 2024-05-13 PROCEDURE — 6370000000 HC RX 637 (ALT 250 FOR IP): Performed by: STUDENT IN AN ORGANIZED HEALTH CARE EDUCATION/TRAINING PROGRAM

## 2024-05-13 PROCEDURE — 6370000000 HC RX 637 (ALT 250 FOR IP): Performed by: EMERGENCY MEDICINE

## 2024-05-13 PROCEDURE — G0379 DIRECT REFER HOSPITAL OBSERV: HCPCS

## 2024-05-13 PROCEDURE — 82248 BILIRUBIN DIRECT: CPT

## 2024-05-13 PROCEDURE — 85025 COMPLETE CBC W/AUTO DIFF WBC: CPT

## 2024-05-13 RX ORDER — POTASSIUM CHLORIDE 7.45 MG/ML
10 INJECTION INTRAVENOUS ONCE
Status: COMPLETED | OUTPATIENT
Start: 2024-05-13 | End: 2024-05-13

## 2024-05-13 RX ORDER — SODIUM CHLORIDE 0.9 % (FLUSH) 0.9 %
5-40 SYRINGE (ML) INJECTION PRN
Status: DISCONTINUED | OUTPATIENT
Start: 2024-05-13 | End: 2024-05-14 | Stop reason: HOSPADM

## 2024-05-13 RX ORDER — POTASSIUM CHLORIDE 20 MEQ/1
40 TABLET, EXTENDED RELEASE ORAL PRN
Status: DISCONTINUED | OUTPATIENT
Start: 2024-05-13 | End: 2024-05-14 | Stop reason: HOSPADM

## 2024-05-13 RX ORDER — ONDANSETRON 4 MG/1
4 TABLET, ORALLY DISINTEGRATING ORAL EVERY 8 HOURS PRN
Status: DISCONTINUED | OUTPATIENT
Start: 2024-05-13 | End: 2024-05-14 | Stop reason: HOSPADM

## 2024-05-13 RX ORDER — POTASSIUM CHLORIDE 20 MEQ/1
40 TABLET, EXTENDED RELEASE ORAL ONCE
Status: COMPLETED | OUTPATIENT
Start: 2024-05-13 | End: 2024-05-13

## 2024-05-13 RX ORDER — DICYCLOMINE HYDROCHLORIDE 10 MG/1
10 CAPSULE ORAL 4 TIMES DAILY
Status: DISCONTINUED | OUTPATIENT
Start: 2024-05-13 | End: 2024-05-14 | Stop reason: HOSPADM

## 2024-05-13 RX ORDER — DICYCLOMINE HYDROCHLORIDE 10 MG/ML
20 INJECTION INTRAMUSCULAR ONCE
Status: COMPLETED | OUTPATIENT
Start: 2024-05-13 | End: 2024-05-13

## 2024-05-13 RX ORDER — MAGNESIUM SULFATE IN WATER 40 MG/ML
2000 INJECTION, SOLUTION INTRAVENOUS PRN
Status: DISCONTINUED | OUTPATIENT
Start: 2024-05-13 | End: 2024-05-14 | Stop reason: HOSPADM

## 2024-05-13 RX ORDER — 0.9 % SODIUM CHLORIDE 0.9 %
1000 INTRAVENOUS SOLUTION INTRAVENOUS ONCE
Status: COMPLETED | OUTPATIENT
Start: 2024-05-13 | End: 2024-05-13

## 2024-05-13 RX ORDER — POTASSIUM CHLORIDE 7.45 MG/ML
10 INJECTION INTRAVENOUS ONCE
Status: DISCONTINUED | OUTPATIENT
Start: 2024-05-13 | End: 2024-05-13 | Stop reason: HOSPADM

## 2024-05-13 RX ORDER — ENOXAPARIN SODIUM 100 MG/ML
40 INJECTION SUBCUTANEOUS 2 TIMES DAILY
Status: DISCONTINUED | OUTPATIENT
Start: 2024-05-13 | End: 2024-05-14 | Stop reason: HOSPADM

## 2024-05-13 RX ORDER — SODIUM CHLORIDE 9 MG/ML
INJECTION, SOLUTION INTRAVENOUS PRN
Status: DISCONTINUED | OUTPATIENT
Start: 2024-05-13 | End: 2024-05-14 | Stop reason: HOSPADM

## 2024-05-13 RX ORDER — SODIUM CHLORIDE 0.9 % (FLUSH) 0.9 %
5-40 SYRINGE (ML) INJECTION EVERY 12 HOURS SCHEDULED
Status: DISCONTINUED | OUTPATIENT
Start: 2024-05-13 | End: 2024-05-14 | Stop reason: HOSPADM

## 2024-05-13 RX ORDER — SODIUM CHLORIDE, SODIUM LACTATE, POTASSIUM CHLORIDE, CALCIUM CHLORIDE 600; 310; 30; 20 MG/100ML; MG/100ML; MG/100ML; MG/100ML
INJECTION, SOLUTION INTRAVENOUS CONTINUOUS
Status: DISCONTINUED | OUTPATIENT
Start: 2024-05-13 | End: 2024-05-13

## 2024-05-13 RX ORDER — METRONIDAZOLE 500 MG/100ML
500 INJECTION, SOLUTION INTRAVENOUS ONCE
Status: COMPLETED | OUTPATIENT
Start: 2024-05-13 | End: 2024-05-13

## 2024-05-13 RX ORDER — LANOLIN ALCOHOL/MO/W.PET/CERES
3 CREAM (GRAM) TOPICAL NIGHTLY PRN
Status: DISCONTINUED | OUTPATIENT
Start: 2024-05-13 | End: 2024-05-14 | Stop reason: HOSPADM

## 2024-05-13 RX ORDER — PANTOPRAZOLE SODIUM 40 MG/1
40 TABLET, DELAYED RELEASE ORAL
Status: DISCONTINUED | OUTPATIENT
Start: 2024-05-13 | End: 2024-05-14 | Stop reason: HOSPADM

## 2024-05-13 RX ORDER — DEXTROSE MONOHYDRATE 25 G/50ML
25 INJECTION, SOLUTION INTRAVENOUS ONCE
Status: COMPLETED | OUTPATIENT
Start: 2024-05-13 | End: 2024-05-13

## 2024-05-13 RX ORDER — SODIUM CHLORIDE, SODIUM LACTATE, POTASSIUM CHLORIDE, CALCIUM CHLORIDE 600; 310; 30; 20 MG/100ML; MG/100ML; MG/100ML; MG/100ML
INJECTION, SOLUTION INTRAVENOUS CONTINUOUS
Status: DISCONTINUED | OUTPATIENT
Start: 2024-05-13 | End: 2024-05-14 | Stop reason: HOSPADM

## 2024-05-13 RX ORDER — ACETAMINOPHEN 325 MG/1
650 TABLET ORAL EVERY 6 HOURS PRN
Status: DISCONTINUED | OUTPATIENT
Start: 2024-05-13 | End: 2024-05-14 | Stop reason: HOSPADM

## 2024-05-13 RX ORDER — POTASSIUM CHLORIDE 7.45 MG/ML
10 INJECTION INTRAVENOUS PRN
Status: DISCONTINUED | OUTPATIENT
Start: 2024-05-13 | End: 2024-05-14 | Stop reason: HOSPADM

## 2024-05-13 RX ORDER — ACETAMINOPHEN 650 MG/1
650 SUPPOSITORY RECTAL EVERY 6 HOURS PRN
Status: DISCONTINUED | OUTPATIENT
Start: 2024-05-13 | End: 2024-05-14 | Stop reason: HOSPADM

## 2024-05-13 RX ORDER — ONDANSETRON 2 MG/ML
4 INJECTION INTRAMUSCULAR; INTRAVENOUS EVERY 6 HOURS PRN
Status: DISCONTINUED | OUTPATIENT
Start: 2024-05-13 | End: 2024-05-14 | Stop reason: HOSPADM

## 2024-05-13 RX ORDER — ONDANSETRON 2 MG/ML
4 INJECTION INTRAMUSCULAR; INTRAVENOUS ONCE
Status: COMPLETED | OUTPATIENT
Start: 2024-05-13 | End: 2024-05-13

## 2024-05-13 RX ORDER — POLYETHYLENE GLYCOL 3350 17 G/17G
17 POWDER, FOR SOLUTION ORAL DAILY PRN
Status: DISCONTINUED | OUTPATIENT
Start: 2024-05-13 | End: 2024-05-14 | Stop reason: HOSPADM

## 2024-05-13 RX ADMIN — DICYCLOMINE HYDROCHLORIDE 20 MG: 10 INJECTION, SOLUTION INTRAMUSCULAR at 11:13

## 2024-05-13 RX ADMIN — SODIUM CHLORIDE, PRESERVATIVE FREE 10 ML: 5 INJECTION INTRAVENOUS at 20:40

## 2024-05-13 RX ADMIN — POTASSIUM CHLORIDE 10 MEQ: 7.46 INJECTION, SOLUTION INTRAVENOUS at 13:15

## 2024-05-13 RX ADMIN — SODIUM CHLORIDE, POTASSIUM CHLORIDE, SODIUM LACTATE AND CALCIUM CHLORIDE: 600; 310; 30; 20 INJECTION, SOLUTION INTRAVENOUS at 17:50

## 2024-05-13 RX ADMIN — POTASSIUM CHLORIDE 10 MEQ: 7.46 INJECTION, SOLUTION INTRAVENOUS at 15:15

## 2024-05-13 RX ADMIN — ENOXAPARIN SODIUM 40 MG: 100 INJECTION SUBCUTANEOUS at 20:39

## 2024-05-13 RX ADMIN — POTASSIUM CHLORIDE 40 MEQ: 1500 TABLET, EXTENDED RELEASE ORAL at 13:08

## 2024-05-13 RX ADMIN — ONDANSETRON 4 MG: 2 INJECTION INTRAMUSCULAR; INTRAVENOUS at 11:11

## 2024-05-13 RX ADMIN — DICYCLOMINE HYDROCHLORIDE 10 MG: 10 CAPSULE ORAL at 20:39

## 2024-05-13 RX ADMIN — SODIUM CHLORIDE 1000 ML: 9 INJECTION, SOLUTION INTRAVENOUS at 11:02

## 2024-05-13 RX ADMIN — METRONIDAZOLE 500 MG: 500 INJECTION, SOLUTION INTRAVENOUS at 15:12

## 2024-05-13 RX ADMIN — DEXTROSE MONOHYDRATE 25 G: 25 INJECTION, SOLUTION INTRAVENOUS at 14:05

## 2024-05-13 RX ADMIN — CALCIUM GLUCONATE 1000 MG: 98 INJECTION, SOLUTION INTRAVENOUS at 13:19

## 2024-05-13 ASSESSMENT — PAIN DESCRIPTION - DESCRIPTORS
DESCRIPTORS: CRAMPING;DISCOMFORT
DESCRIPTORS: ACHING;CRAMPING

## 2024-05-13 ASSESSMENT — PAIN - FUNCTIONAL ASSESSMENT
PAIN_FUNCTIONAL_ASSESSMENT: ACTIVITIES ARE NOT PREVENTED
PAIN_FUNCTIONAL_ASSESSMENT: 0-10

## 2024-05-13 ASSESSMENT — PAIN DESCRIPTION - FREQUENCY: FREQUENCY: CONTINUOUS

## 2024-05-13 ASSESSMENT — PAIN SCALES - GENERAL
PAINLEVEL_OUTOF10: 4
PAINLEVEL_OUTOF10: 10
PAINLEVEL_OUTOF10: 0

## 2024-05-13 ASSESSMENT — PAIN DESCRIPTION - LOCATION
LOCATION: GENERALIZED;ABDOMEN
LOCATION: ABDOMEN

## 2024-05-13 ASSESSMENT — PAIN DESCRIPTION - ONSET: ONSET: SUDDEN

## 2024-05-13 ASSESSMENT — PAIN DESCRIPTION - PAIN TYPE: TYPE: ACUTE PAIN

## 2024-05-13 NOTE — ED PROVIDER NOTES
HPI:  5/13/24,   Time: 10:26 AM EDT       Myriam Suarez is a 49 y.o. female presenting to the ED for n/v/d, beginning 3 days ago.  The complaint has been persistent, moderate in severity, and worsened by nothing.  States greater than 15 episodes of diarrhea a day.  2 episodes of emesis over past 3 days.  Subjective fevers.  Some abdominal cramping.  No cough or congestion.  Recent antibiotics week and half ago for UTI.    Review of Systems:   Pertinent positives and negatives are stated within HPI, all other systems reviewed and are negative.          --------------------------------------------- PAST HISTORY ---------------------------------------------  Past Medical History:  has a past medical history of Abscess of right breast, Anxiety, Arthritis, BRCA1 negative, BRCA2 negative, Breast abscess, Breast cancer (HCC), Cancer (HCC), Fatigue, GERD (gastroesophageal reflux disease), History of therapeutic radiation, Hyperlipidemia, Morbid obesity due to excess calories (HCC), and Obesity.    Past Surgical History:  has a past surgical history that includes Skin graft (1991); Ankle fracture surgery (4/20/2012); Cholecystectomy; Cystoscopy (7/25/2014); pr i&d hematoma seroma/fluid collection (Right, 6/20/2018); pr mastotomy w/exploration/drainage abscess deep (Right, 7/11/2018); US BREAST BIOPSY W LOC DEVICE 1ST LESION LEFT (9/17/2020); Breast biopsy (Left, 2020); Breast biopsy (Right, 2018); US BREAST BIOPSY W LOC DEVICE 1ST LESION LEFT (Left, 7/1/2021); Mastectomy (Left, 8/18/2021); US ASP BREAST CYST LEFT (Left, 9/1/2021); US ASP BREAST CYST LEFT (Left, 9/7/2021); US ASP BREAST CYST LEFT (Left, 9/17/2021); US ASP BREAST CYST LEFT (Left, 10/22/2021); Salpingo-oophorectomy (11/11/2021); Appendectomy (1984); Upper gastrointestinal endoscopy; fracture surgery; Upper gastrointestinal endoscopy (N/A, 5/11/2022); and Colonoscopy (N/A, 5/11/2022).    Social History:  reports that she quit smoking about 15 years ago. Her 
pain, shoulder

## 2024-05-13 NOTE — H&P
[place] [facility] History and Physical      CHIEF COMPLAINT:  n/v/d    History of Present Illness: 49 y.o. female with history of breast CA s/p mastectomy 2021, morbid obesity,  presents with 3 days of n/v/d. Ate a larger amount of hipolito cheese on 5/10 than normal, starting to feel unwell at that time. Reports 15+ episodes of loose stools daily since that time, 2 total episodes of emesis since symptoms onset both associated with when she eats. Noting more reflux/foul smelling burping. She was recently treated for presumed UTI with bactrim x3d but was then told she did not actually have UTI, completed abx ~7 days ago. `Reports chills but denies fevers. Denies CP, SOA, urinary symptoms (did report some burning ~2 weeks ago at time of presumed UTI but now resolved)    Decision was made to admit and IM was contacted for hospitalization.    Informant(s) for H&P: patient, chart review    REVIEW OF SYSTEMS:  A comprehensive review of systems was negative except for: what is in the HPI      PMH:  Past Medical History:   Diagnosis Date    Abscess of right breast 06/19/2018    Anxiety     Arthritis     BRCA1 negative     BRCA2 negative     Breast abscess 07/11/2018    Breast cancer (HCC)     Cancer (HCC)     Fatigue     GERD (gastroesophageal reflux disease)     History of therapeutic radiation     Hyperlipidemia     Morbid obesity due to excess calories (HCC)     Obesity 06/04/2013       Surgical History:  Past Surgical History:   Procedure Laterality Date    ANKLE FRACTURE SURGERY  4/20/2012    right with hardware, subsequently removed    APPENDECTOMY  1984    BREAST BIOPSY Left 2020    BREAST BIOPSY Right 2018    CHOLECYSTECTOMY      2003    COLONOSCOPY N/A 5/11/2022    COLORECTAL CANCER SCREENING, NOT HIGH RISK performed by Liseth Toledo MD at [facility]    CYSTOSCOPY  7/25/2014    retrograde pyelogram, ureteroscopy, laser lithotripsy, left stent placement    FRACTURE SURGERY      ankle    MASTECTOMY

## 2024-05-13 NOTE — ED NOTES
Potassium IV possibly infiltrated. 22G in the right AC removed by jam up. Provider notified. RN contatcted pharmacy. Instructed to place ice pack on site. Ice pack provided client states she didn't need it.

## 2024-05-13 NOTE — PROGRESS NOTES
Dr. Reyes notified that patient is on the floor and home medications are up to date in epic. Also notified him that [facility] did not check magnesium or phosphorus levels, orders received

## 2024-05-13 NOTE — PROGRESS NOTES
4 Eyes Skin Assessment     NAME:  Myriam Suarez  YOB: 1975  MEDICAL RECORD NUMBER:  [id number]    The patient is being assessed for  Admission    I agree that at least one RN has performed a thorough Head to Toe Skin Assessment on the patient. ALL assessment sites listed below have been assessed.      Areas assessed by both nurses:    Head, Face, Ears, Shoulders, Back, Chest, Arms, Elbows, Hands, Sacrum. Buttock, Coccyx, Ischium, Legs. Feet and Heels, and Under Medical Devices         Does the Patient have a Wound? No noted wound(s)       Jacques Prevention initiated by RN: No  Wound Care Orders initiated by RN: No    Pressure Injury (Stage 3,4, Unstageable, DTI, NWPT, and Complex wounds) if present, place Wound referral order by RN under : No    New Ostomies, if present place, Ostomy referral order under : No     Nurse 1 eSignature: Electronically signed by Juana Larson RN on 5/13/24 at 5:51 PM EDT    **SHARE this note so that the co-signing nurse can place an eSignature**    Nurse 2 eSignature: Electronically signed by Se Santana RN on 5/13/24 at 5:55 PM EDT

## 2024-05-14 VITALS
OXYGEN SATURATION: 99 % | DIASTOLIC BLOOD PRESSURE: 71 MMHG | BODY MASS INDEX: 53.92 KG/M2 | WEIGHT: 293 LBS | RESPIRATION RATE: 18 BRPM | HEIGHT: 62 IN | TEMPERATURE: 97.8 F | SYSTOLIC BLOOD PRESSURE: 131 MMHG | HEART RATE: 80 BPM

## 2024-05-14 LAB
ALBUMIN SERPL-MCNC: 3.8 G/DL (ref 3.5–5.2)
ALP SERPL-CCNC: 100 U/L (ref 35–104)
ALT SERPL-CCNC: 24 U/L (ref 0–32)
ANION GAP SERPL CALCULATED.3IONS-SCNC: 10 MMOL/L (ref 7–16)
AST SERPL-CCNC: 23 U/L (ref 0–31)
BASOPHILS # BLD: 0.03 K/UL (ref 0–0.2)
BASOPHILS NFR BLD: 0 % (ref 0–2)
BILIRUB DIRECT SERPL-MCNC: <0.2 MG/DL (ref 0–0.3)
BILIRUB INDIRECT SERPL-MCNC: NORMAL MG/DL (ref 0–1)
BILIRUB SERPL-MCNC: 0.7 MG/DL (ref 0–1.2)
BUN SERPL-MCNC: 9 MG/DL (ref 6–20)
C DIFF GDH + TOXINS A+B STL QL IA.RAPID: NEGATIVE
CALCIUM SERPL-MCNC: 8.7 MG/DL (ref 8.6–10.2)
CHLORIDE SERPL-SCNC: 103 MMOL/L (ref 98–107)
CO2 SERPL-SCNC: 24 MMOL/L (ref 22–29)
CREAT SERPL-MCNC: 0.7 MG/DL (ref 0.5–1)
EOSINOPHIL # BLD: 0.13 K/UL (ref 0.05–0.5)
EOSINOPHILS RELATIVE PERCENT: 1 % (ref 0–6)
ERYTHROCYTE [DISTWIDTH] IN BLOOD BY AUTOMATED COUNT: 14.6 % (ref 11.5–15)
GFR, ESTIMATED: >90 ML/MIN/1.73M2
GLUCOSE SERPL-MCNC: 102 MG/DL (ref 74–99)
HCT VFR BLD AUTO: 44.1 % (ref 34–48)
HGB BLD-MCNC: 13.9 G/DL (ref 11.5–15.5)
IMM GRANULOCYTES # BLD AUTO: 0.07 K/UL (ref 0–0.58)
IMM GRANULOCYTES NFR BLD: 1 % (ref 0–5)
LYMPHOCYTES NFR BLD: 2.6 K/UL (ref 1.5–4)
LYMPHOCYTES RELATIVE PERCENT: 21 % (ref 20–42)
MCH RBC QN AUTO: 28.7 PG (ref 26–35)
MCHC RBC AUTO-ENTMCNC: 31.5 G/DL (ref 32–34.5)
MCV RBC AUTO: 90.9 FL (ref 80–99.9)
MONOCYTES NFR BLD: 0.63 K/UL (ref 0.1–0.95)
MONOCYTES NFR BLD: 5 % (ref 2–12)
NEUTROPHILS NFR BLD: 73 % (ref 43–80)
NEUTS SEG NFR BLD: 9.14 K/UL (ref 1.8–7.3)
PLATELET # BLD AUTO: 328 K/UL (ref 130–450)
PMV BLD AUTO: 9.7 FL (ref 7–12)
POTASSIUM SERPL-SCNC: 3.8 MMOL/L (ref 3.5–5)
PROT SERPL-MCNC: 7.2 G/DL (ref 6.4–8.3)
RBC # BLD AUTO: 4.85 M/UL (ref 3.5–5.5)
SODIUM SERPL-SCNC: 137 MMOL/L (ref 132–146)
SPECIMEN DESCRIPTION: NORMAL
WBC OTHER # BLD: 12.6 K/UL (ref 4.5–11.5)

## 2024-05-14 PROCEDURE — 96361 HYDRATE IV INFUSION ADD-ON: CPT

## 2024-05-14 PROCEDURE — 99232 SBSQ HOSP IP/OBS MODERATE 35: CPT | Performed by: STUDENT IN AN ORGANIZED HEALTH CARE EDUCATION/TRAINING PROGRAM

## 2024-05-14 PROCEDURE — 85025 COMPLETE CBC W/AUTO DIFF WBC: CPT

## 2024-05-14 PROCEDURE — 6360000002 HC RX W HCPCS: Performed by: STUDENT IN AN ORGANIZED HEALTH CARE EDUCATION/TRAINING PROGRAM

## 2024-05-14 PROCEDURE — 96372 THER/PROPH/DIAG INJ SC/IM: CPT

## 2024-05-14 PROCEDURE — 80053 COMPREHEN METABOLIC PANEL: CPT

## 2024-05-14 PROCEDURE — 2580000003 HC RX 258: Performed by: STUDENT IN AN ORGANIZED HEALTH CARE EDUCATION/TRAINING PROGRAM

## 2024-05-14 PROCEDURE — 6370000000 HC RX 637 (ALT 250 FOR IP): Performed by: STUDENT IN AN ORGANIZED HEALTH CARE EDUCATION/TRAINING PROGRAM

## 2024-05-14 PROCEDURE — 36415 COLL VENOUS BLD VENIPUNCTURE: CPT

## 2024-05-14 PROCEDURE — 82248 BILIRUBIN DIRECT: CPT

## 2024-05-14 PROCEDURE — G0378 HOSPITAL OBSERVATION PER HR: HCPCS

## 2024-05-14 RX ORDER — PANTOPRAZOLE SODIUM 40 MG/1
40 TABLET, DELAYED RELEASE ORAL
Qty: 30 TABLET | Refills: 3 | Status: SHIPPED | OUTPATIENT
Start: 2024-05-15

## 2024-05-14 RX ORDER — PANTOPRAZOLE SODIUM 40 MG/1
40 TABLET, DELAYED RELEASE ORAL
Qty: 30 TABLET | Refills: 3 | Status: SHIPPED | OUTPATIENT
Start: 2024-05-15 | End: 2024-05-14

## 2024-05-14 RX ADMIN — DICYCLOMINE HYDROCHLORIDE 10 MG: 10 CAPSULE ORAL at 13:37

## 2024-05-14 RX ADMIN — SODIUM CHLORIDE, POTASSIUM CHLORIDE, SODIUM LACTATE AND CALCIUM CHLORIDE: 600; 310; 30; 20 INJECTION, SOLUTION INTRAVENOUS at 05:22

## 2024-05-14 RX ADMIN — PANTOPRAZOLE SODIUM 40 MG: 40 TABLET, DELAYED RELEASE ORAL at 05:23

## 2024-05-14 RX ADMIN — ENOXAPARIN SODIUM 40 MG: 100 INJECTION SUBCUTANEOUS at 08:18

## 2024-05-14 RX ADMIN — ACETAMINOPHEN 650 MG: 325 TABLET ORAL at 05:23

## 2024-05-14 RX ADMIN — DICYCLOMINE HYDROCHLORIDE 10 MG: 10 CAPSULE ORAL at 08:17

## 2024-05-14 ASSESSMENT — PAIN DESCRIPTION - LOCATION: LOCATION: HEAD

## 2024-05-14 ASSESSMENT — PAIN DESCRIPTION - DESCRIPTORS: DESCRIPTORS: ACHING;DISCOMFORT

## 2024-05-14 ASSESSMENT — PAIN SCALES - GENERAL
PAINLEVEL_OUTOF10: 6
PAINLEVEL_OUTOF10: 3

## 2024-05-14 NOTE — PROGRESS NOTES
Upon removal of IV site, patient asked this RN to check an old IV site that was discomforting to her. Offered to call the physician prior to her leaving and she politely declined. Informed her to monitor this area and call her PCP if it worsens or continues to cause discomfort. Patient verbalized understanding.

## 2024-05-14 NOTE — PLAN OF CARE
Problem: Discharge Planning  Goal: Discharge to home or other facility with appropriate resources  5/13/2024 2221 by Lexi Mario RN  Outcome: Progressing  5/13/2024 1713 by Juana Larson RN  Outcome: Progressing     Problem: Pain  Goal: Verbalizes/displays adequate comfort level or baseline comfort level  5/13/2024 2221 by Lexi Mario RN  Outcome: Progressing  5/13/2024 1713 by Juana Larson RN  Outcome: Progressing     Problem: Safety - Adult  Goal: Free from fall injury  5/13/2024 2221 by Lexi Mario RN  Outcome: Progressing  5/13/2024 1713 by Juana Larson RN  Outcome: Progressing     Problem: ABCDS Injury Assessment  Goal: Absence of physical injury  5/13/2024 2221 by Lexi Mario RN  Outcome: Progressing  5/13/2024 1713 by Juana Larson RN  Outcome: Progressing

## 2024-05-14 NOTE — PROGRESS NOTES
[facility] Hospitalist Progress Note    Admitting Date and Time: 5/13/2024  4:13 PM  Admit Dx: Diarrhea [R19.7]    Subjective:  Patient is being followed for Diarrhea [R19.7]   Pt feels okay  Per RN: no additional concerns    ROS: denies fever, chills, cp, sob, n/v, HA unless stated above.      sodium chloride flush  5-40 mL IntraVENous 2 times per day    enoxaparin  40 mg SubCUTAneous BID    dicyclomine  10 mg Oral 4x Daily    pantoprazole  40 mg Oral QAM AC     sodium chloride flush, 5-40 mL, PRN  sodium chloride, , PRN  potassium chloride, 40 mEq, PRN   Or  potassium alternative oral replacement, 40 mEq, PRN   Or  potassium chloride, 10 mEq, PRN  magnesium sulfate, 2,000 mg, PRN  ondansetron, 4 mg, Q8H PRN   Or  ondansetron, 4 mg, Q6H PRN  polyethylene glycol, 17 g, Daily PRN  acetaminophen, 650 mg, Q6H PRN   Or  acetaminophen, 650 mg, Q6H PRN  melatonin, 3 mg, Nightly PRN         Objective:  BP (!) 125/56   Pulse 83   Temp 98 °F (36.7 °C) (Oral)   Resp 18   Ht 1.575 m (5' 2.01\")   Wt (!) 174 kg (383 lb 8 oz)   LMP 10/24/2021   SpO2 99%   BMI 70.12 kg/m²     General Appearance: alert and oriented to person, place and time and in NAD, laying comfortably in bed  Skin: warm and dry  Head: normocephalic and atraumatic  Eyes: PERRL, EOMI, conjunctivae normal  Neck: neck supple, trachea midline   Pulmonary/Chest: CTAB, no w/r/r, normal air movement, no respiratory distress  Cardiovascular: RRR, no murmurs  Abdomen: obese, mildly tender epigastrif  Extremities: no cyanosis, no clubbing and no edema  Neurologic: no cranial nerve deficit and speech normal      Recent Labs     05/13/24  1115 05/13/24  1654    133   K 2.1* 3.9   * 101   CO2 16* 21*   BUN 10 13   CREATININE 0.5 0.8   GLUCOSE 67* 103*   CALCIUM 4.2* 8.7       Recent Labs     05/13/24  1115   WBC 14.5*   RBC 3.93   HGB 11.2*   HCT 35.6   MCV 90.6   MCH 28.5   MCHC 31.5*   RDW 14.6      MPV 9.4       Radiology:  No orders to

## 2024-05-14 NOTE — DISCHARGE SUMMARY
viral GE vs food poisoning. Recent treatment with abx, C diff and stool studies ordered. Cont bentyl for cramping. Start PPI for likely GERD, ?hx of PUD. Cont IVF for dehydration and advance diet as tolerated. May benefit from eventual f/u with GI regarding reported hx of small gastric ulcer  Hypokalemia- K+ 2.1 on admit, 2/2 above, monitor and replete prn. Check Mg and Phos, replete prn  Hypocalcemia- Ca 4.2 corrected for hypoalbuminemia 6.4 on admit, monitor and replete prn  Acute anemia- Hgb 11.2 on admit, no signs of acute bleeding, monitor and transfuse <7  Leukocytosis- WBC 14.5 on admit, possibly reactive, infectious w/u as above. S/p IV flagyl x1 in ED, defer further abx for now, follow Cx, monitor for fevers but remains afebrile at this time. Recent abx for UTI though later told not a UTI, recheck UA  Anxiety- hold effexor given current GI upset (would take with food in future), cont hydroxyzine  Breast CA (ER+ PA+ HER2-)- s/p mastectomy left breast, on Arimidex and will hold given GI sxs (would take with food in future)  Morbid obesity- BMI 69, long-term wt loss goals deferred to PCP  _________________________  **Most recent hospitalist plan**    Discharge Exam:  See hospitalist PN/H&P from date of service     No intake/output data recorded.  I/O this shift:  In: 240 [P.O.:240]  Out: -       LABS:  Recent Labs     05/13/24  1115 05/13/24  1654 05/14/24  1115    133 137   K 2.1* 3.9 3.8   * 101 103   CO2 16* 21* 24   BUN 10 13 9   CREATININE 0.5 0.8 0.7   GLUCOSE 67* 103* 102*   CALCIUM 4.2* 8.7 8.7       Recent Labs     05/13/24  1115 05/14/24  1115   WBC 14.5* 12.6*   RBC 3.93 4.85   HGB 11.2* 13.9   HCT 35.6 44.1   MCV 90.6 90.9   MCH 28.5 28.7   MCHC 31.5* 31.5*   RDW 14.6 14.6    328   MPV 9.4 9.7       Recent Labs     05/13/24  1450   POCGLU 144*       Imaging:  No results found.    Patient Instructions:      Medication List        START taking these medications      pantoprazole 40

## 2024-05-15 NOTE — PROGRESS NOTES
SPIRITUAL HEALTH SERVICES - [facility] Encounter    Name: Myriam Suarez                  Referral: Routine Visit    Sacraments  Anointed (Last Rites): No  Apostolic [place]: No  Confession: No  Communion: No     Assessment:  Patient receptive to  visit.      Intervention:   prayed for and blessed patient. Patient said she has not been baptized.      Outcome:  Patient expressed gratitude for visit.    Plan:  Chaplains will remain available to offer spiritual and emotional support as needed.      Electronically signed by Chaplain Gopal, on 5/14/2024 at 8:11 PM.  Spiritual Care Department  [facility]  [phone]

## 2024-05-17 LAB
MICROORGANISM SPEC CULT: NORMAL
MICROORGANISM SPEC CULT: NORMAL
SPECIMEN DESCRIPTION: NORMAL

## 2024-06-02 DIAGNOSIS — F41.9 ANXIETY: ICD-10-CM

## 2024-06-03 RX ORDER — ANASTROZOLE 1 MG/1
1 TABLET ORAL DAILY
Qty: 90 TABLET | Refills: 0 | Status: SHIPPED | OUTPATIENT
Start: 2024-06-03

## 2024-06-03 RX ORDER — VENLAFAXINE HYDROCHLORIDE 37.5 MG/1
37.5 CAPSULE, EXTENDED RELEASE ORAL DAILY
Qty: 90 CAPSULE | Refills: 0 | Status: SHIPPED | OUTPATIENT
Start: 2024-06-03

## 2024-06-10 ENCOUNTER — OFFICE VISIT (OUTPATIENT)
Dept: FAMILY MEDICINE CLINIC | Age: 49
End: 2024-06-10
Payer: MEDICAID

## 2024-06-10 VITALS
TEMPERATURE: 97.3 F | RESPIRATION RATE: 18 BRPM | WEIGHT: 293 LBS | HEART RATE: 92 BPM | BODY MASS INDEX: 68.94 KG/M2 | SYSTOLIC BLOOD PRESSURE: 130 MMHG | OXYGEN SATURATION: 97 % | DIASTOLIC BLOOD PRESSURE: 74 MMHG

## 2024-06-10 DIAGNOSIS — E78.1 HYPERTRIGLYCERIDEMIA: ICD-10-CM

## 2024-06-10 DIAGNOSIS — E53.8 VITAMIN B12 DEFICIENCY: ICD-10-CM

## 2024-06-10 DIAGNOSIS — R10.84 GENERALIZED ABDOMINAL PAIN: Primary | ICD-10-CM

## 2024-06-10 DIAGNOSIS — R53.83 FATIGUE, UNSPECIFIED TYPE: ICD-10-CM

## 2024-06-10 DIAGNOSIS — E55.9 VITAMIN D DEFICIENCY: ICD-10-CM

## 2024-06-10 DIAGNOSIS — Z17.0 MALIGNANT NEOPLASM OF UPPER-OUTER QUADRANT OF LEFT BREAST IN FEMALE, ESTROGEN RECEPTOR POSITIVE (HCC): ICD-10-CM

## 2024-06-10 DIAGNOSIS — C50.412 MALIGNANT NEOPLASM OF UPPER-OUTER QUADRANT OF LEFT BREAST IN FEMALE, ESTROGEN RECEPTOR POSITIVE (HCC): ICD-10-CM

## 2024-06-10 DIAGNOSIS — R73.03 PREDIABETES: ICD-10-CM

## 2024-06-10 PROCEDURE — 1036F TOBACCO NON-USER: CPT | Performed by: STUDENT IN AN ORGANIZED HEALTH CARE EDUCATION/TRAINING PROGRAM

## 2024-06-10 PROCEDURE — 99214 OFFICE O/P EST MOD 30 MIN: CPT | Performed by: STUDENT IN AN ORGANIZED HEALTH CARE EDUCATION/TRAINING PROGRAM

## 2024-06-10 PROCEDURE — G8417 CALC BMI ABV UP PARAM F/U: HCPCS | Performed by: STUDENT IN AN ORGANIZED HEALTH CARE EDUCATION/TRAINING PROGRAM

## 2024-06-10 PROCEDURE — G8427 DOCREV CUR MEDS BY ELIG CLIN: HCPCS | Performed by: STUDENT IN AN ORGANIZED HEALTH CARE EDUCATION/TRAINING PROGRAM

## 2024-06-10 SDOH — ECONOMIC STABILITY: INCOME INSECURITY: HOW HARD IS IT FOR YOU TO PAY FOR THE VERY BASICS LIKE FOOD, HOUSING, MEDICAL CARE, AND HEATING?: NOT HARD AT ALL

## 2024-06-10 SDOH — ECONOMIC STABILITY: FOOD INSECURITY: WITHIN THE PAST 12 MONTHS, THE FOOD YOU BOUGHT JUST DIDN'T LAST AND YOU DIDN'T HAVE MONEY TO GET MORE.: NEVER TRUE

## 2024-06-10 SDOH — ECONOMIC STABILITY: FOOD INSECURITY: WITHIN THE PAST 12 MONTHS, YOU WORRIED THAT YOUR FOOD WOULD RUN OUT BEFORE YOU GOT MONEY TO BUY MORE.: NEVER TRUE

## 2024-06-10 ASSESSMENT — ENCOUNTER SYMPTOMS
SHORTNESS OF BREATH: 0
SORE THROAT: 0
SINUS PAIN: 0
NAUSEA: 1
RHINORRHEA: 0
BACK PAIN: 0
SINUS PRESSURE: 0
COUGH: 0
EYE PAIN: 0
EYE REDNESS: 0
VOMITING: 1
CONSTIPATION: 0
ABDOMINAL PAIN: 1
DIARRHEA: 0

## 2024-06-10 NOTE — PROGRESS NOTES
SALOME NEGATIVE 10/15/2019 12:00 PM     RHEUMATOID FACTOR  Lab Results   Component Value Date/Time    RF <10 10/15/2019 12:00 PM     UA  Lab Results   Component Value Date/Time    COLORU Yellow 05/13/2024 05:41 PM    COLORU Yellow 09/18/2022 10:29 AM    COLORU Yellow 01/15/2020 12:30 AM    CLARITYU Clear 09/18/2022 10:29 AM    CLARITYU Clear 01/15/2020 12:30 AM    CLARITYU Clear 05/05/2018 06:55 PM    GLUCOSEU 100 05/13/2024 05:41 PM    GLUCOSEU Negative 09/18/2022 10:29 AM    GLUCOSEU Negative 01/15/2020 12:30 AM    BILIRUBINUR NEGATIVE 05/13/2024 05:41 PM    BILIRUBINUR SMALL 09/18/2022 10:29 AM    BILIRUBINUR Negative 01/15/2020 12:30 AM    BILIRUBINUR neg 01/14/2020 12:23 PM    BILIRUBINUR Negative 05/05/2018 06:55 PM    KETUA NEGATIVE 05/13/2024 05:41 PM    KETUA Negative 09/18/2022 10:29 AM    KETUA Negative 01/15/2020 12:30 AM    SPECGRAV 1.025 01/14/2020 12:23 PM    BLOODU Negative 09/18/2022 10:29 AM    BLOODU TRACE-INTACT 01/15/2020 12:30 AM    BLOODU neg 01/14/2020 12:23 PM    BLOODU LARGE 05/05/2018 06:55 PM    PHUR 6.0 05/13/2024 05:41 PM    PHUR 6.0 09/18/2022 10:29 AM    PHUR 6.0 01/15/2020 12:30 AM    PHUR 5.5 01/14/2020 12:23 PM    PHUR 6.0 05/05/2018 06:55 PM    PROTEINU NEGATIVE 05/13/2024 05:41 PM    PROTEINU 100 09/18/2022 10:29 AM    PROTEINU Negative 01/15/2020 12:30 AM    UROBILINOGEN 0.2 05/13/2024 05:41 PM    UROBILINOGEN 0.2 09/18/2022 10:29 AM    UROBILINOGEN 0.2 01/15/2020 12:30 AM    NITRU NEGATIVE 05/13/2024 05:41 PM    NITRU Negative 09/18/2022 10:29 AM    NITRU Negative 01/15/2020 12:30 AM    LEUKOCYTESUR SMALL 05/13/2024 05:41 PM    LEUKOCYTESUR SMALL 09/18/2022 10:29 AM    LEUKOCYTESUR Negative 01/15/2020 12:30 AM       Physical Exam  Vitals and nursing note reviewed.   Constitutional:       General: She is not in acute distress.     Appearance: Normal appearance. She is obese. She is not ill-appearing.   HENT:      Head: Normocephalic and atraumatic.      Right Ear: External ear

## 2024-06-12 ENCOUNTER — TELEPHONE (OUTPATIENT)
Dept: FAMILY MEDICINE CLINIC | Age: 49
End: 2024-06-12

## 2024-06-12 DIAGNOSIS — Z17.0 MALIGNANT NEOPLASM OF UPPER-OUTER QUADRANT OF LEFT BREAST IN FEMALE, ESTROGEN RECEPTOR POSITIVE (HCC): ICD-10-CM

## 2024-06-12 DIAGNOSIS — R73.03 PREDIABETES: ICD-10-CM

## 2024-06-12 DIAGNOSIS — C50.412 MALIGNANT NEOPLASM OF UPPER-OUTER QUADRANT OF LEFT BREAST IN FEMALE, ESTROGEN RECEPTOR POSITIVE (HCC): ICD-10-CM

## 2024-06-12 NOTE — TELEPHONE ENCOUNTER
Pt called in stating that her insurance needs diagnosis of Ozempic to be elevated A1C not weight loss to be covered. Prior auth is needed.    no

## 2024-06-13 LAB
ALBUMIN SERPL-MCNC: 3.7 G/DL
ALBUMIN SERPL-MCNC: 3.7 G/DL
ALP BLD-CCNC: 102 U/L
ALP BLD-CCNC: 102 U/L
ALT SERPL-CCNC: 17 U/L
ALT SERPL-CCNC: 17 U/L
ANION GAP SERPL CALCULATED.3IONS-SCNC: NORMAL MMOL/L
ANION GAP SERPL CALCULATED.3IONS-SCNC: NORMAL MMOL/L
AST SERPL-CCNC: 15 U/L
AST SERPL-CCNC: 15 U/L
BASOPHILS ABSOLUTE: 45 /ΜL
BASOPHILS ABSOLUTE: 45 /ΜL
BASOPHILS RELATIVE PERCENT: 0.4 %
BILIRUB SERPL-MCNC: 0.6 MG/DL (ref 0.1–1.4)
BILIRUB SERPL-MCNC: 0.6 MG/DL (ref 0.1–1.4)
BUN BLDV-MCNC: 11 MG/DL
BUN BLDV-MCNC: 11 MG/DL
CALCIUM SERPL-MCNC: 8.9 MG/DL
CALCIUM SERPL-MCNC: 8.9 MG/DL
CHLORIDE BLD-SCNC: 104 MMOL/L
CHLORIDE BLD-SCNC: 104 MMOL/L
CHOLESTEROL, TOTAL: 162 MG/DL
CHOLESTEROL, TOTAL: 162 MG/DL
CHOLESTEROL/HDL RATIO: 3.4
CHOLESTEROL/HDL RATIO: 3.4
CO2: 24 MMOL/L
CO2: 24 MMOL/L
CREAT SERPL-MCNC: 0.7 MG/DL
CREAT SERPL-MCNC: 0.7 MG/DL
EGFR: 107
EGFR: 107
EOSINOPHILS ABSOLUTE: 136 /ΜL
EOSINOPHILS ABSOLUTE: 136 /ΜL
EOSINOPHILS RELATIVE PERCENT: 0.4 %
EOSINOPHILS RELATIVE PERCENT: 1.2 %
ESTIMATED AVERAGE GLUCOSE: NORMAL
ESTIMATED AVERAGE GLUCOSE: NORMAL
GLUCOSE BLD-MCNC: 113 MG/DL
GLUCOSE BLD-MCNC: 113 MG/DL
HBA1C MFR BLD: 6.1 %
HBA1C MFR BLD: 6.1 %
HCT VFR BLD CALC: 42.6 % (ref 36–46)
HCT VFR BLD CALC: 42.6 % (ref 36–46)
HDLC SERPL-MCNC: 48 MG/DL (ref 35–70)
HDLC SERPL-MCNC: 48 MG/DL (ref 35–70)
HEMOGLOBIN: 14 G/DL (ref 12–16)
HEMOGLOBIN: 14 G/DL (ref 12–16)
LDL CHOLESTEROL CALCULATED: 81 MG/DL (ref 0–160)
LDL CHOLESTEROL CALCULATED: 81 MG/DL (ref 0–160)
LIPASE: 17 UNITS/L
LIPASE: 17 UNITS/L
LYMPHOCYTES ABSOLUTE: 2023 /ΜL
LYMPHOCYTES ABSOLUTE: 2023 /ΜL
LYMPHOCYTES RELATIVE PERCENT: 17.9 %
LYMPHOCYTES RELATIVE PERCENT: 17.9 %
MCH RBC QN AUTO: 28.9 PG
MCH RBC QN AUTO: 28.9 PG
MCHC RBC AUTO-ENTMCNC: 32.9 G/DL
MCHC RBC AUTO-ENTMCNC: 32.9 G/DL
MCV RBC AUTO: 88 FL
MCV RBC AUTO: 88 FL
MONOCYTES ABSOLUTE: 486 /ΜL
MONOCYTES ABSOLUTE: 486 /ΜL
MONOCYTES RELATIVE PERCENT: 4.3 %
MONOCYTES RELATIVE PERCENT: 4.3 %
NEUTROPHILS ABSOLUTE: 8611 /ΜL
NEUTROPHILS ABSOLUTE: 8611 /ΜL
NEUTROPHILS RELATIVE PERCENT: 76.2 %
NEUTROPHILS RELATIVE PERCENT: 76.2 %
NONHDLC SERPL-MCNC: 114 MG/DL
NONHDLC SERPL-MCNC: 114 MG/DL
PDW BLD-RTO: 14.6 %
PLATELET # BLD: 320 K/ΜL
PLATELET # BLD: 320 K/ΜL
PMV BLD AUTO: 10.2 FL
PMV BLD AUTO: 320 FL
POTASSIUM SERPL-SCNC: 4.1 MMOL/L
POTASSIUM SERPL-SCNC: 4.1 MMOL/L
RBC # BLD: 4.84 10^6/ΜL
RBC # BLD: 4.84 10^6/ΜL
SODIUM BLD-SCNC: 139 MMOL/L
SODIUM BLD-SCNC: 139 MMOL/L
TOTAL PROTEIN: 7
TOTAL PROTEIN: 7
TRIGL SERPL-MCNC: 239 MG/DL
TRIGL SERPL-MCNC: 239 MG/DL
TSH SERPL DL<=0.05 MIU/L-ACNC: 2.91 UIU/ML
TSH SERPL DL<=0.05 MIU/L-ACNC: 2.91 UIU/ML
VITAMIN B-12: 241
VITAMIN B-12: 241
VITAMIN D 25-HYDROXY: 22
VITAMIN D2, 25 HYDROXY: NORMAL
VITAMIN D3,25 HYDROXY: NORMAL
VLDLC SERPL CALC-MCNC: NORMAL MG/DL
VLDLC SERPL CALC-MCNC: NORMAL MG/DL
WBC # BLD: 11.3 10^3/ML
WBC # BLD: 11.3 10^3/ML

## 2024-06-14 DIAGNOSIS — R10.84 GENERALIZED ABDOMINAL PAIN: ICD-10-CM

## 2024-06-14 DIAGNOSIS — R73.03 PREDIABETES: ICD-10-CM

## 2024-06-14 DIAGNOSIS — R53.83 FATIGUE, UNSPECIFIED TYPE: ICD-10-CM

## 2024-06-14 DIAGNOSIS — E55.9 VITAMIN D DEFICIENCY: ICD-10-CM

## 2024-06-14 DIAGNOSIS — E78.1 HYPERTRIGLYCERIDEMIA: ICD-10-CM

## 2024-06-14 DIAGNOSIS — E53.8 VITAMIN B12 DEFICIENCY: ICD-10-CM

## 2024-06-14 LAB
VITAMIN D 25-HYDROXY: 22
VITAMIN D2, 25 HYDROXY: NORMAL
VITAMIN D3,25 HYDROXY: NORMAL

## 2024-06-20 ENCOUNTER — TELEPHONE (OUTPATIENT)
Dept: SURGERY | Age: 49
End: 2024-06-20

## 2024-06-20 NOTE — TELEPHONE ENCOUNTER
MA left a message with pt to see if she would like to reschedule missed appointment with Dr. Paris MA awaiting return call from pt.  Electronically signed by Shanelle Ortega on 6/20/2024 at 8:27 AM

## 2024-06-25 ENCOUNTER — TELEPHONE (OUTPATIENT)
Dept: FAMILY MEDICINE CLINIC | Age: 49
End: 2024-06-25

## 2024-06-25 DIAGNOSIS — E55.9 VITAMIN D DEFICIENCY: Primary | ICD-10-CM

## 2024-06-25 DIAGNOSIS — E53.8 VITAMIN B12 DEFICIENCY: Primary | ICD-10-CM

## 2024-06-25 DIAGNOSIS — E55.9 VITAMIN D DEFICIENCY: ICD-10-CM

## 2024-06-25 DIAGNOSIS — E78.1 HYPERTRIGLYCERIDEMIA: ICD-10-CM

## 2024-06-25 DIAGNOSIS — E53.8 VITAMIN B12 DEFICIENCY: ICD-10-CM

## 2024-06-25 DIAGNOSIS — R53.83 FATIGUE, UNSPECIFIED TYPE: ICD-10-CM

## 2024-06-25 DIAGNOSIS — R73.03 PREDIABETES: ICD-10-CM

## 2024-06-25 RX ORDER — ERGOCALCIFEROL 1.25 MG/1
50000 CAPSULE ORAL WEEKLY
Qty: 12 CAPSULE | Refills: 1 | Status: SHIPPED | OUTPATIENT
Start: 2024-06-25

## 2024-06-25 RX ORDER — LANOLIN ALCOHOL/MO/W.PET/CERES
1000 CREAM (GRAM) TOPICAL DAILY
Qty: 30 TABLET | Refills: 3 | Status: SHIPPED | OUTPATIENT
Start: 2024-06-25

## 2024-07-08 ENCOUNTER — TELEPHONE (OUTPATIENT)
Dept: HEMATOLOGY | Age: 49
End: 2024-07-08

## 2024-07-08 DIAGNOSIS — K86.2 PANCREATIC CYST: ICD-10-CM

## 2024-07-08 DIAGNOSIS — K76.0 NAFLD (NONALCOHOLIC FATTY LIVER DISEASE): Primary | ICD-10-CM

## 2024-07-08 NOTE — TELEPHONE ENCOUNTER
CT approved through [facility] provider portal. Auth# [id number]  Valid 7/8/24- 8/22/24  Myriam is scheduled for CT at [facility] on 7/18/24. Arrive at 1pm, scan 130pm. NPO 4 hours prior. Labs will need to be completed prior to scan. Order in Epic. Called and spoke to Myriam who confirmed appt information and expressed understanding of instructions for scan. A follow up appt was scheduled with Dr. Chow.

## 2024-08-08 ENCOUNTER — TELEPHONE (OUTPATIENT)
Dept: ORTHOPEDIC SURGERY | Age: 49
End: 2024-08-08

## 2024-08-14 ENCOUNTER — TELEPHONE (OUTPATIENT)
Dept: ONCOLOGY | Age: 49
End: 2024-08-14

## 2024-08-14 NOTE — TELEPHONE ENCOUNTER
Pt called asking what she  she can due about the side effects From the  anastrozole (ARIMIDEX)  says she's experiencing joint pain every where states its becoming unbearable pt says she rather not come off of it but would like to know if she can be given anything to help with the join tpain

## 2024-08-23 NOTE — PROGRESS NOTES
Department of [facility] Med Oncology  Attending Clinic Note    Reason for Visit: Follow-up on a patient with Left Breast Cancer    PCP:  Katya Ferris DO    History of Present Illness:  48 year old female with Left Breast Cancer    Breast cancer risk factors include family hx on mother's side, mom with breast CA, family hx of colon CA and age and gender    Bilateral Diagnostic Mammogram/Left Breast U/S on 07/01/2021  Left sided ill defined hypoechoic region at the 2 o'clock position measuring 2 cm.     On 07/01/2021 Left breast, 2:00 core needle biopsy:   Invasive, moderately differentiated mammary carcinoma (grade 2)     Comment: Microscopic examination shows an invasive carcinoma with linear growth pattern dispersed throughout fibrous stroma.  The tumor has a Rutledge histologic score of 3 (tubule formation) +2 (nuclear pleomorphism) +1 (mitotic count) = 6.  The tumor cells are immunoreactive with pankeratin and E-cadherin which favors ductal origin.  The p63 highlights myoepithelial cells in benign ducts.  Intradepartmental consultation is obtained.     Breast Cancer Marker Studies:   Estrogen Receptors (ER): 60%   Progesterone Receptors (NH): 60%   Her-2/markie: Negative/1+     CXR 07/09/2021 noted no pneumonia or pleural effusion    Left axillary US 07/14/2021 noted no LN    Left simple mastectomy, sentinel lymph node biopsy, injection of methylene blue dye 08/18/2021  A.  Left breast, total mastectomy:   - Invasive carcinoma with mixed ductal and lobular features, bicentric, grade 2;   - Lobular carcinoma in situ and atypical lobular hyperplasia;   - Rare small ducts showing atypical ductal hyperplasia;   - Fibrocystic changes with usual ductal hyperplasia without atypia, adenosis, columnar cell change, fibroadenomatoid nodule, ductal ectasia, microcysts and stromal fibrosis;   - Changes of previous biopsy sites (2).     B.  [place] lymph nodes #1, biopsy:   - Three of six (3/6) lymph nodes positive for  0    Size of largest shelia metastatic deposit: 2 mm    Extranodal extension: Not identified   Total number of lymph nodes examined: 6   Number of sentinel nodes examined: 6   DISTANT METASTASIS: Not applicable   PATHOLOGIC STAGE (pTNM, AJCC 8th Edition)   TNM descriptors: m (multiple foci of invasive carcinoma)   mpT2 pN1mi     Left sided IDC/ILC T2N1mi M0 ER/OH positive HER2 negative.     CT chest/abdomen/pelvis 09/14/2021 Postsurgical changes in the left breast with the fluid collection and inflammation as noted likely postoperative hematoma/seroma. No evidence of metastatic disease.  Non-specific 8 mm hypodense lesion at the head of the pancreas; HBP team following.  CT abdomen 03/15/2022: Stable 9 mm low-density indeterminate lesion in the head of the pancreas which is stable and unchanged when compared to prior examinations.  Diffuse fatty infiltration identified of the liver  Stable pancreatic head cyst. US in 6 months due to hepatic steatosis  CT scan yearly for 5 years (present since 2020 - year 3/5)  NM Bone Scan 09/14/2021 without metastatic disease.     NCCN guidelines reviewed  TAILORx suggested that in women ?50 years and with an intermediate RS (11-25), chemotherapy plus endocrine therapy was associated with a lower rate of distant recurrence relative to endocrine therapy alone, particularly for those with high-intermediate scores (21 to 25) or clinical risk features. However, this was based only on exploratory subset analyses, and moreover, some of the benefit hypothetically may have been due to ovarian suppression in premenopausal women. Ovarian suppression was not assessed as a treatment strategy in this trial, however. In light of limitations in the data, either chemotherapy and endocrine therapy, or endocrine therapy only (with ovarian suppression in premenopausal women) are acceptable options.  FSH 7.6 09/22/2021 Estradiol 45.9 09/22/2021    She was leaning more to proceed with endocrine therapy

## 2024-08-26 ENCOUNTER — OFFICE VISIT (OUTPATIENT)
Dept: ONCOLOGY | Age: 49
End: 2024-08-26
Payer: MEDICAID

## 2024-08-26 ENCOUNTER — HOSPITAL ENCOUNTER (OUTPATIENT)
Dept: INFUSION THERAPY | Age: 49
Discharge: HOME | End: 2024-08-26
Payer: MEDICAID

## 2024-08-26 VITALS
WEIGHT: 293 LBS | BODY MASS INDEX: 53.92 KG/M2 | TEMPERATURE: 97 F | RESPIRATION RATE: 18 BRPM | HEART RATE: 88 BPM | HEIGHT: 62 IN | OXYGEN SATURATION: 94 % | SYSTOLIC BLOOD PRESSURE: 141 MMHG | DIASTOLIC BLOOD PRESSURE: 82 MMHG

## 2024-08-26 DIAGNOSIS — Z79.811 USE OF AROMATASE INHIBITORS: ICD-10-CM

## 2024-08-26 DIAGNOSIS — Z85.3 PERSONAL HISTORY OF BREAST CANCER: Primary | ICD-10-CM

## 2024-08-26 DIAGNOSIS — Z85.3 PERSONAL HISTORY OF BREAST CANCER: ICD-10-CM

## 2024-08-26 LAB
ALBUMIN SERPL-MCNC: 3.8 G/DL (ref 3.5–5.2)
ALP SERPL-CCNC: 117 U/L (ref 35–104)
ALT SERPL-CCNC: 23 U/L (ref 0–32)
ANION GAP SERPL CALCULATED.3IONS-SCNC: 11 MMOL/L (ref 7–16)
AST SERPL-CCNC: 16 U/L (ref 0–31)
BASOPHILS # BLD: 0.04 K/UL (ref 0–0.2)
BASOPHILS NFR BLD: 0 % (ref 0–2)
BILIRUB SERPL-MCNC: 0.4 MG/DL (ref 0–1.2)
BUN SERPL-MCNC: 13 MG/DL (ref 6–20)
CALCIUM SERPL-MCNC: 9.1 MG/DL (ref 8.6–10.2)
CHLORIDE SERPL-SCNC: 103 MMOL/L (ref 98–107)
CO2 SERPL-SCNC: 26 MMOL/L (ref 22–29)
CREAT SERPL-MCNC: 0.7 MG/DL (ref 0.5–1)
EOSINOPHIL # BLD: 0.12 K/UL (ref 0.05–0.5)
EOSINOPHILS RELATIVE PERCENT: 1 % (ref 0–6)
ERYTHROCYTE [DISTWIDTH] IN BLOOD BY AUTOMATED COUNT: 14.6 % (ref 11.5–15)
GFR, ESTIMATED: >90 ML/MIN/1.73M2
GLUCOSE SERPL-MCNC: 136 MG/DL (ref 74–99)
HCT VFR BLD AUTO: 44.9 % (ref 34–48)
HGB BLD-MCNC: 14.4 G/DL (ref 11.5–15.5)
IMM GRANULOCYTES # BLD AUTO: 0.03 K/UL (ref 0–0.58)
IMM GRANULOCYTES NFR BLD: 0 % (ref 0–5)
LYMPHOCYTES NFR BLD: 2.14 K/UL (ref 1.5–4)
LYMPHOCYTES RELATIVE PERCENT: 21 % (ref 20–42)
MCH RBC QN AUTO: 29.1 PG (ref 26–35)
MCHC RBC AUTO-ENTMCNC: 32.1 G/DL (ref 32–34.5)
MCV RBC AUTO: 90.7 FL (ref 80–99.9)
MONOCYTES NFR BLD: 0.51 K/UL (ref 0.1–0.95)
MONOCYTES NFR BLD: 5 % (ref 2–12)
NEUTROPHILS NFR BLD: 72 % (ref 43–80)
NEUTS SEG NFR BLD: 7.41 K/UL (ref 1.8–7.3)
PLATELET # BLD AUTO: 309 K/UL (ref 130–450)
PMV BLD AUTO: 10.6 FL (ref 7–12)
POTASSIUM SERPL-SCNC: 4.1 MMOL/L (ref 3.5–5)
PROT SERPL-MCNC: 7.6 G/DL (ref 6.4–8.3)
RBC # BLD AUTO: 4.95 M/UL (ref 3.5–5.5)
SODIUM SERPL-SCNC: 140 MMOL/L (ref 132–146)
WBC OTHER # BLD: 10.3 K/UL (ref 4.5–11.5)

## 2024-08-26 PROCEDURE — 36415 COLL VENOUS BLD VENIPUNCTURE: CPT

## 2024-08-26 PROCEDURE — G8427 DOCREV CUR MEDS BY ELIG CLIN: HCPCS | Performed by: CLINICAL NURSE SPECIALIST

## 2024-08-26 PROCEDURE — G8417 CALC BMI ABV UP PARAM F/U: HCPCS | Performed by: CLINICAL NURSE SPECIALIST

## 2024-08-26 PROCEDURE — 80053 COMPREHEN METABOLIC PANEL: CPT

## 2024-08-26 PROCEDURE — 99213 OFFICE O/P EST LOW 20 MIN: CPT | Performed by: CLINICAL NURSE SPECIALIST

## 2024-08-26 PROCEDURE — 99212 OFFICE O/P EST SF 10 MIN: CPT

## 2024-08-26 PROCEDURE — 1036F TOBACCO NON-USER: CPT | Performed by: CLINICAL NURSE SPECIALIST

## 2024-08-26 PROCEDURE — 85025 COMPLETE CBC W/AUTO DIFF WBC: CPT

## 2024-08-26 RX ORDER — ANASTROZOLE 1 MG/1
1 TABLET ORAL DAILY
Qty: 90 TABLET | Refills: 0 | Status: SHIPPED | OUTPATIENT
Start: 2024-08-26

## 2024-09-03 DIAGNOSIS — M25.561 CHRONIC PAIN OF RIGHT KNEE: Primary | ICD-10-CM

## 2024-09-03 DIAGNOSIS — F41.9 ANXIETY: ICD-10-CM

## 2024-09-03 DIAGNOSIS — G89.29 CHRONIC PAIN OF RIGHT KNEE: Primary | ICD-10-CM

## 2024-09-03 RX ORDER — TIZANIDINE 2 MG/1
2 TABLET ORAL 2 TIMES DAILY PRN
Qty: 10 TABLET | Refills: 0 | Status: SHIPPED | OUTPATIENT
Start: 2024-09-03

## 2024-09-03 RX ORDER — VENLAFAXINE HYDROCHLORIDE 37.5 MG/1
37.5 CAPSULE, EXTENDED RELEASE ORAL DAILY
Qty: 90 CAPSULE | Refills: 0 | Status: SHIPPED | OUTPATIENT
Start: 2024-09-03

## 2024-09-03 RX ORDER — VENLAFAXINE HYDROCHLORIDE 37.5 MG/1
37.5 CAPSULE, EXTENDED RELEASE ORAL DAILY
Qty: 90 CAPSULE | Refills: 0 | OUTPATIENT
Start: 2024-09-03

## 2024-09-17 ENCOUNTER — TELEPHONE (OUTPATIENT)
Dept: HEMATOLOGY | Age: 49
End: 2024-09-17

## 2024-09-24 DIAGNOSIS — R09.81 SINUS CONGESTION: ICD-10-CM

## 2024-09-24 RX ORDER — LORATADINE 10 MG/1
10 TABLET ORAL DAILY
Qty: 30 TABLET | Refills: 1 | Status: SHIPPED | OUTPATIENT
Start: 2024-09-24

## 2024-10-22 ENCOUNTER — TELEPHONE (OUTPATIENT)
Dept: ORTHOPEDIC SURGERY | Age: 49
End: 2024-10-22

## 2024-10-22 NOTE — TELEPHONE ENCOUNTER
Referral received for patient via internal work-queue.     Referral reason/diagnosis: Nondisplaced fracture of triquetrum (cuneiform) bone, left wrist, initial encounter for closed fracture     Routed to providers for recommendations.    Future Appointments   Date Time Provider Department Center   11/5/2024 10:00 AM [facility] ABDU ARTHUR RM 3 [facility] ABDU [facility] SE Rad/Car   11/7/2024  9:30 AM SE CT SCAN 3 SEYZ CT [facility] Rad/Car   11/8/2024 11:30 AM Juan Chow III, MD SE [facility] Surg HP   2/26/2025  9:15 AM [facility] MED ONC FAST TRACK 1 [facility]Z Med Onc [facility]   2/26/2025  9:30 AM Miriam Perez APRN MED ONC [facility]       Electronically signed by Tessy Quintana on 10/22/2024 at 8:21 AM

## 2024-11-05 ENCOUNTER — HOSPITAL ENCOUNTER (OUTPATIENT)
Dept: GENERAL RADIOLOGY | Age: 49
Discharge: HOME | End: 2024-11-07
Payer: MEDICAID

## 2024-11-05 VITALS — HEIGHT: 62 IN | BODY MASS INDEX: 53.92 KG/M2 | WEIGHT: 293 LBS

## 2024-11-05 DIAGNOSIS — Z85.3 PERSONAL HISTORY OF BREAST CANCER: ICD-10-CM

## 2024-11-05 PROCEDURE — 77063 BREAST TOMOSYNTHESIS BI: CPT

## 2024-11-08 ENCOUNTER — OFFICE VISIT (OUTPATIENT)
Dept: FAMILY MEDICINE CLINIC | Age: 49
End: 2024-11-08

## 2024-11-08 VITALS
WEIGHT: 293 LBS | TEMPERATURE: 97.6 F | BODY MASS INDEX: 69.5 KG/M2 | RESPIRATION RATE: 18 BRPM | HEART RATE: 79 BPM | SYSTOLIC BLOOD PRESSURE: 120 MMHG | OXYGEN SATURATION: 98 % | DIASTOLIC BLOOD PRESSURE: 86 MMHG

## 2024-11-08 DIAGNOSIS — J01.90 ACUTE BACTERIAL SINUSITIS: Primary | ICD-10-CM

## 2024-11-08 DIAGNOSIS — B96.89 ACUTE BACTERIAL SINUSITIS: Primary | ICD-10-CM

## 2024-11-08 DIAGNOSIS — R68.89 FLU-LIKE SYMPTOMS: ICD-10-CM

## 2024-11-08 PROBLEM — E87.6 HYPOKALEMIA: Status: RESOLVED | Noted: 2024-05-13 | Resolved: 2024-11-08

## 2024-11-08 PROBLEM — R19.7 DIARRHEA: Status: RESOLVED | Noted: 2024-05-13 | Resolved: 2024-11-08

## 2024-11-08 PROBLEM — E83.51 HYPOCALCEMIA: Status: RESOLVED | Noted: 2024-05-13 | Resolved: 2024-11-08

## 2024-11-08 LAB
INFLUENZA A ANTIBODY: NEGATIVE
INFLUENZA B ANTIBODY: NEGATIVE
Lab: NORMAL
PERFORMING INSTRUMENT: NORMAL
QC PASS/FAIL: NORMAL
SARS-COV-2, POC: NORMAL

## 2024-11-08 RX ORDER — AZITHROMYCIN 250 MG/1
250 TABLET, FILM COATED ORAL SEE ADMIN INSTRUCTIONS
Qty: 6 TABLET | Refills: 0 | Status: SHIPPED | OUTPATIENT
Start: 2024-11-08 | End: 2024-11-13

## 2024-11-08 RX ORDER — METHYLPREDNISOLONE 4 MG/1
TABLET ORAL
Qty: 1 KIT | Refills: 0 | Status: SHIPPED | OUTPATIENT
Start: 2024-11-08

## 2024-11-08 RX ORDER — BENZONATATE 100 MG/1
100 CAPSULE ORAL 2 TIMES DAILY PRN
Qty: 20 CAPSULE | Refills: 0 | Status: SHIPPED | OUTPATIENT
Start: 2024-11-08 | End: 2024-11-15

## 2024-11-08 RX ORDER — IBUPROFEN 800 MG/1
TABLET, FILM COATED ORAL EVERY 6 HOURS PRN
COMMUNITY
Start: 2024-10-19

## 2024-11-08 NOTE — PROGRESS NOTES
alert and oriented to person, place, and time.   Psychiatric:         Mood and Affect: Mood is anxious and depressed. Affect is tearful.         Behavior: Behavior normal.         Thought Content: Thought content normal.          Testing:   (All laboratory and radiology results have been personally reviewed by myself)  Labs:  Results for orders placed or performed in visit on 11/08/24   POCT COVID-19, Antigen   Result Value Ref Range    SARS-COV-2, POC Not-Detected Not Detected    Lot Number [id number]     QC Pass/Fail pass     Performing Instrument BD Veritor    POCT Influenza A/B   Result Value Ref Range    Influenza A Ab negative     Influenza B Ab negative        Imaging:  All Radiology results interpreted by Radiologist unless otherwise noted.  No orders to display       Assessment / Plan:   The patient's vitals, allergies, medications, and past medical history have been reviewed.  Myriam was seen today for cold symptoms and chest congestion.    Diagnoses and all orders for this visit:    Acute bacterial sinusitis  -     methylPREDNISolone (MEDROL DOSEPACK) 4 MG tablet; Take by mouth.  -     benzonatate (TESSALON) 100 MG capsule; Take 1 capsule by mouth 2 times daily as needed for Cough  -     azithromycin (ZITHROMAX) 250 MG tablet; Take 1 tablet by mouth See Admin Instructions for 5 days 500mg on day 1 followed by 250mg on days 2 - 5    Flu-like symptoms  -     POCT COVID-19, Antigen  -     POCT Influenza A/B        - Patient is directed to take the prescribed medication as ordered. Discussed taking vitamin D, vitamin C, and zinc supplementation.     - Advised to follow current CDC guidelines. Increase fluids and rest. Symptomatic relief discussed including Tylenol prn pain/fever.  Red flag symptoms were discussed with the patient today.  The patient is directed to go the ED if symptoms worsen or change. Pt verbalizes understanding and is in agreement with plan of care. All questions answered.    SIGNATURE:

## 2024-11-27 ENCOUNTER — TELEPHONE (OUTPATIENT)
Dept: ADMINISTRATIVE | Age: 49
End: 2024-11-27

## 2024-11-27 NOTE — TELEPHONE ENCOUNTER
CT ABD PELVIS DENIED.  See denial letter under Media Tab.  A review can be done.      Does your doctor want to talk to someone? Your doctor can call the doctor who looked at  your case at [phone].  Reference case# [id number]    Patient is scheduled to have this CT on 12/03/24.  If the review won't be completed, the test will need to be cancelled.  Please advise decision.  Thank you.

## 2024-12-02 ENCOUNTER — TELEPHONE (OUTPATIENT)
Dept: ADMINISTRATIVE | Age: 49
End: 2024-12-02

## 2024-12-02 NOTE — TELEPHONE ENCOUNTER
CT ABD PELVIS DENIED.  See denial letter under Media Tab.  A review can be done.       Does your doctor want to talk to someone? Your doctor can call the doctor who looked at  your case at [phone].  Reference case# [id number]     Patient is scheduled to have this CT on 12/03/24.  If the review won't be completed, the test will need to be cancelled.  Please advise decision.  Thank you.

## 2024-12-06 NOTE — TELEPHONE ENCOUNTER
2/16/2023  Visit date not found Detail Level: Detailed Patient Specific Counseling (Will Not Stick From Patient To Patient): The lesion on the nose is a fibrous papule.  Patient reassured of benign nature of lesion.

## 2024-12-12 DIAGNOSIS — F41.9 ANXIETY: ICD-10-CM

## 2024-12-12 RX ORDER — VENLAFAXINE HYDROCHLORIDE 37.5 MG/1
37.5 CAPSULE, EXTENDED RELEASE ORAL DAILY
Qty: 90 CAPSULE | Refills: 0 | Status: SHIPPED | OUTPATIENT
Start: 2024-12-12

## 2024-12-12 NOTE — TELEPHONE ENCOUNTER
Name of Medication(s) Requested:  Requested Prescriptions     Pending Prescriptions Disp Refills    venlafaxine (EFFEXOR XR) 37.5 MG extended release capsule [Pharmacy Med Name: VENLAFAXINE ER 37.5MG CAPSULES] 90 capsule 0     Sig: TAKE 1 CAPSULE BY MOUTH DAILY       Medication is on current medication list Yes    Dosage and directions were verified? Yes    Quantity verified: 90 day supply     Pharmacy Verified?  Yes    Last Appointment:  11/8/2024    Future appts:  Future Appointments   Date Time Provider Department Center   2/26/2025  9:15 AM Y[facility] MED ONC FAST TRACK 1 YZ Med Onc [facility]   2/26/2025  9:30 AM Miriam Perez APRN MED ONC [facility]        (If no appt send self scheduling link. .REFILLAPPT)  Scheduling request sent?     [] Yes  [x] No    Does patient need updated?  [] Yes  [x] No

## 2024-12-13 RX ORDER — ANASTROZOLE 1 MG/1
1 TABLET ORAL DAILY
Qty: 90 TABLET | Refills: 0 | Status: SHIPPED | OUTPATIENT
Start: 2024-12-13

## 2024-12-16 ENCOUNTER — TELEPHONE (OUTPATIENT)
Dept: HEMATOLOGY | Age: 49
End: 2024-12-16

## 2024-12-16 NOTE — TELEPHONE ENCOUNTER
The patient insurance [facility] would not cover a CT abdomen and pelvis, but they did cover CT abdomen. The approval is scanned into the patients chart. I sent the patient a NXVISION message with schedulings phone number for her to call and get the CT scheduled and then call the office for a follow up  Electronically signed by Yolie Rogers MA on 12/16/2024 at 10:38 AM

## 2024-12-18 RX ORDER — IBUPROFEN 800 MG/1
800 TABLET, FILM COATED ORAL EVERY 8 HOURS PRN
Qty: 120 TABLET | Refills: 1 | Status: SHIPPED | OUTPATIENT
Start: 2024-12-18

## 2024-12-18 NOTE — TELEPHONE ENCOUNTER
Pt called in wondering if there is something that could be called in for knee pain. Pt states that she hurt her knee and has been using Ibuprofen 800 with relief but is concerned about using it too much. She does state that she sees the orthopaedic at [facility] but they told her call us for something for the inflammation. Ibuprofen does give her relief but she is concerned about taking it too much. Please advise. If you are are agreeable to her continuing Ibuprofen she will need a refill.

## 2024-12-18 NOTE — TELEPHONE ENCOUNTER
Pt aware. Medication will need refilled. Medication pending.    Name of Medication(s) Requested:  Requested Prescriptions     Pending Prescriptions Disp Refills    ibuprofen (ADVIL;MOTRIN) 800 MG tablet 120 tablet 1     Sig: Take 1 tablet by mouth every 6 hours as needed for Pain       Medication is on current medication list Yes    Dosage and directions were verified? Yes    Quantity verified: 90 day supply     Pharmacy Verified?  Yes    Last Appointment:  11/8/2024    Future appts:  Future Appointments   Date Time Provider Department Center   2/26/2025  9:15 AM YZ MED ONC FAST TRACK 1 YZ Med Onc [facility]   2/26/2025  9:30 AM Miriam Perez APRN MED ONC [facility]        (If no appt send self scheduling link. .REFILLAPPT)  Scheduling request sent?     [] Yes  [x] No    Does patient need updated?  [x] Yes  [] No

## 2025-01-31 ENCOUNTER — TELEPHONE (OUTPATIENT)
Dept: HEMATOLOGY | Age: 50
End: 2025-01-31

## 2025-01-31 DIAGNOSIS — K86.2 PANCREATIC CYST: Primary | ICD-10-CM

## 2025-01-31 NOTE — TELEPHONE ENCOUNTER
I called the patients insurance company and spoke with Nevaeh OCONNELL, clinical reviewer and she stated that the only cpt code that they would cover for CT scan is 01496. The order in the patients chart has been updated. Cpt code 90552 was authorized. Auth#[id number]. Valid from 01/31/25 through 03/17/2025  Electronically signed by Yolie Rogers MA on 1/31/2025 at 2:09 PM    I did call the patient to let her know about the approval. I left her a voicemail with schedulings phone number, she is going to call and schedule the ct scan and call us for a follow up  Electronically signed by Yolie Rogers MA on 1/31/2025 at 2:16 PM

## 2025-02-21 ENCOUNTER — OFFICE VISIT (OUTPATIENT)
Dept: HEMATOLOGY | Age: 50
End: 2025-02-21
Payer: MEDICAID

## 2025-02-21 ENCOUNTER — HOSPITAL ENCOUNTER (OUTPATIENT)
Dept: CT IMAGING | Age: 50
Discharge: HOME | End: 2025-02-23
Attending: TRANSPLANT SURGERY
Payer: MEDICAID

## 2025-02-21 VITALS
DIASTOLIC BLOOD PRESSURE: 81 MMHG | OXYGEN SATURATION: 94 % | BODY MASS INDEX: 53.92 KG/M2 | RESPIRATION RATE: 15 BRPM | HEIGHT: 62 IN | WEIGHT: 293 LBS | SYSTOLIC BLOOD PRESSURE: 165 MMHG | TEMPERATURE: 97.2 F | HEART RATE: 95 BPM

## 2025-02-21 DIAGNOSIS — K76.0 NAFLD (NONALCOHOLIC FATTY LIVER DISEASE): ICD-10-CM

## 2025-02-21 DIAGNOSIS — K86.2 PANCREATIC CYST: ICD-10-CM

## 2025-02-21 DIAGNOSIS — Z09 FOLLOW-UP EXAM: ICD-10-CM

## 2025-02-21 PROCEDURE — 6360000004 HC RX CONTRAST MEDICATION: Performed by: RADIOLOGY

## 2025-02-21 PROCEDURE — 99212 OFFICE O/P EST SF 10 MIN: CPT | Performed by: TRANSPLANT SURGERY

## 2025-02-21 PROCEDURE — G8427 DOCREV CUR MEDS BY ELIG CLIN: HCPCS | Performed by: TRANSPLANT SURGERY

## 2025-02-21 PROCEDURE — 99213 OFFICE O/P EST LOW 20 MIN: CPT | Performed by: TRANSPLANT SURGERY

## 2025-02-21 PROCEDURE — G8417 CALC BMI ABV UP PARAM F/U: HCPCS | Performed by: TRANSPLANT SURGERY

## 2025-02-21 PROCEDURE — 2500000003 HC RX 250 WO HCPCS: Performed by: RADIOLOGY

## 2025-02-21 PROCEDURE — 1036F TOBACCO NON-USER: CPT | Performed by: TRANSPLANT SURGERY

## 2025-02-21 PROCEDURE — 74177 CT ABD & PELVIS W/CONTRAST: CPT

## 2025-02-21 RX ORDER — IOPAMIDOL 755 MG/ML
85 INJECTION, SOLUTION INTRAVASCULAR
Status: COMPLETED | OUTPATIENT
Start: 2025-02-21 | End: 2025-02-21

## 2025-02-21 RX ORDER — SODIUM CHLORIDE 0.9 % (FLUSH) 0.9 %
10 SYRINGE (ML) INJECTION PRN
Status: DISCONTINUED | OUTPATIENT
Start: 2025-02-21 | End: 2025-02-24 | Stop reason: HOSPADM

## 2025-02-21 RX ADMIN — Medication 10 ML: at 09:44

## 2025-02-21 RX ADMIN — IOPAMIDOL 85 ML: 755 INJECTION, SOLUTION INTRAVENOUS at 09:48

## 2025-02-21 NOTE — PROGRESS NOTES
Hepatobiliary and Pancreatic Surgery Attending History and Physical    Patient's Name/Date of Birth: Myiram Suarez /1975 (49 y.o.)    Date: February 21, 2025     CC: 8mm pancreatic head lesion with NAFLD    HPI:  Patient is a very pleasant 49 year old unfortunate female whom underwent a left mastectomy for breast cancer where 3 positive lymph nodes were found.   She is currently on Arimidex.  She was also following with medical weight loss and was also slated to undergo gastric bypass with Dr. Forde for surgical weightloss.  She subsequently did not undergo the gastric bypass.  Currently her transaminases remain normal, however her body mass index continues to increase.  At the time of our first visit her BMI was 65 and is now increased to 71.    Physical Exam:  BP (!) 165/81   Pulse 95   Temp 97.2 °F (36.2 °C) (Temporal)   Resp 15   Ht 1.575 m (5' 2\")   Wt (!) 176.5 kg (389 lb 1.6 oz)   LMP 10/24/2021   SpO2 94%   BMI 71.17 kg/m² .    PSYCH: mood and affect normal, alert and oriented x 3: No apparent distress, comfortable  EYES: Sclera white, pupils equal round and reactive to light  ENMT:  Hearing normal, trachea midline, ears externally intact  LYMPH: no obvious lympadenopathy in neck.   RESP: Respiratory effort was normal with no retractions or use of accessory muscles.  CV:  No pedal edema  GI/ Abdomen: Soft, nondistended, nontender, no guarding, no peritoneal signs  MSK: no clubbing/ no cyanosis/ gaitnormal    Assessment & Plan   Assessment/Plan:  8mm pancreatic head cyst, NALFD, morbid obesity BMI 71 (was 65), umbilical fissure is 1cm  - I reviewed their images prior to our office visit and we also reviewed them together today.  - we discussed that she does not have any worrisome features.  It also appears present in January 2020.  - referral back to Medical weight loss - BMI worsening  -Her umbilical fissure is 1 cm which this has me concerned that she has underlying hepatic fibrosis.  -I

## 2025-02-24 DIAGNOSIS — Z85.3 PERSONAL HISTORY OF BREAST CANCER: Primary | ICD-10-CM

## 2025-02-26 ENCOUNTER — HOSPITAL ENCOUNTER (OUTPATIENT)
Dept: INFUSION THERAPY | Age: 50
End: 2025-02-26

## 2025-03-03 ENCOUNTER — HOSPITAL ENCOUNTER (OUTPATIENT)
Dept: INFUSION THERAPY | Age: 50
Discharge: HOME | End: 2025-03-03
Payer: MEDICAID

## 2025-03-03 ENCOUNTER — OFFICE VISIT (OUTPATIENT)
Dept: ONCOLOGY | Age: 50
End: 2025-03-03
Payer: MEDICAID

## 2025-03-03 VITALS
WEIGHT: 293 LBS | HEART RATE: 92 BPM | SYSTOLIC BLOOD PRESSURE: 130 MMHG | TEMPERATURE: 97.1 F | OXYGEN SATURATION: 94 % | BODY MASS INDEX: 53.92 KG/M2 | HEIGHT: 62 IN | DIASTOLIC BLOOD PRESSURE: 90 MMHG

## 2025-03-03 DIAGNOSIS — Z85.3 PERSONAL HISTORY OF BREAST CANCER: ICD-10-CM

## 2025-03-03 DIAGNOSIS — Z12.31 SCREENING MAMMOGRAM FOR BREAST CANCER: Primary | ICD-10-CM

## 2025-03-03 LAB
ALBUMIN SERPL-MCNC: 3.9 G/DL (ref 3.5–5.2)
ALP SERPL-CCNC: 113 U/L (ref 35–104)
ALT SERPL-CCNC: 27 U/L (ref 0–32)
ANION GAP SERPL CALCULATED.3IONS-SCNC: 13 MMOL/L (ref 7–16)
AST SERPL-CCNC: 21 U/L (ref 0–31)
BASOPHILS # BLD: 0.04 K/UL (ref 0–0.2)
BASOPHILS NFR BLD: 0 % (ref 0–2)
BILIRUB SERPL-MCNC: 0.7 MG/DL (ref 0–1.2)
BUN SERPL-MCNC: 11 MG/DL (ref 6–20)
CALCIUM SERPL-MCNC: 9 MG/DL (ref 8.6–10.2)
CHLORIDE SERPL-SCNC: 97 MMOL/L (ref 98–107)
CO2 SERPL-SCNC: 24 MMOL/L (ref 22–29)
CREAT SERPL-MCNC: 0.7 MG/DL (ref 0.5–1)
EOSINOPHIL # BLD: 0.14 K/UL (ref 0.05–0.5)
EOSINOPHILS RELATIVE PERCENT: 1 % (ref 0–6)
ERYTHROCYTE [DISTWIDTH] IN BLOOD BY AUTOMATED COUNT: 14.4 % (ref 11.5–15)
GFR, ESTIMATED: >90 ML/MIN/1.73M2
GLUCOSE SERPL-MCNC: 122 MG/DL (ref 74–99)
HCT VFR BLD AUTO: 45.8 % (ref 34–48)
HGB BLD-MCNC: 14.4 G/DL (ref 11.5–15.5)
IMM GRANULOCYTES # BLD AUTO: 0.06 K/UL (ref 0–0.58)
IMM GRANULOCYTES NFR BLD: 1 % (ref 0–5)
LYMPHOCYTES NFR BLD: 2.56 K/UL (ref 1.5–4)
LYMPHOCYTES RELATIVE PERCENT: 20 % (ref 20–42)
MCH RBC QN AUTO: 28.7 PG (ref 26–35)
MCHC RBC AUTO-ENTMCNC: 31.4 G/DL (ref 32–34.5)
MCV RBC AUTO: 91.4 FL (ref 80–99.9)
MONOCYTES NFR BLD: 0.66 K/UL (ref 0.1–0.95)
MONOCYTES NFR BLD: 5 % (ref 2–12)
NEUTROPHILS NFR BLD: 72 % (ref 43–80)
NEUTS SEG NFR BLD: 9.06 K/UL (ref 1.8–7.3)
PLATELET # BLD AUTO: 331 K/UL (ref 130–450)
PMV BLD AUTO: 11 FL (ref 7–12)
POTASSIUM SERPL-SCNC: 3.9 MMOL/L (ref 3.5–5)
PROT SERPL-MCNC: 7.5 G/DL (ref 6.4–8.3)
RBC # BLD AUTO: 5.01 M/UL (ref 3.5–5.5)
SODIUM SERPL-SCNC: 134 MMOL/L (ref 132–146)
WBC OTHER # BLD: 12.5 K/UL (ref 4.5–11.5)

## 2025-03-03 PROCEDURE — 99213 OFFICE O/P EST LOW 20 MIN: CPT

## 2025-03-03 PROCEDURE — 36415 COLL VENOUS BLD VENIPUNCTURE: CPT

## 2025-03-03 PROCEDURE — G8427 DOCREV CUR MEDS BY ELIG CLIN: HCPCS | Performed by: CLINICAL NURSE SPECIALIST

## 2025-03-03 PROCEDURE — 99213 OFFICE O/P EST LOW 20 MIN: CPT | Performed by: CLINICAL NURSE SPECIALIST

## 2025-03-03 PROCEDURE — 85025 COMPLETE CBC W/AUTO DIFF WBC: CPT

## 2025-03-03 PROCEDURE — G8417 CALC BMI ABV UP PARAM F/U: HCPCS | Performed by: CLINICAL NURSE SPECIALIST

## 2025-03-03 PROCEDURE — 1036F TOBACCO NON-USER: CPT | Performed by: CLINICAL NURSE SPECIALIST

## 2025-03-03 PROCEDURE — 80053 COMPREHEN METABOLIC PANEL: CPT

## 2025-03-03 RX ORDER — ANASTROZOLE 1 MG/1
1 TABLET ORAL DAILY
Qty: 90 TABLET | Refills: 0 | Status: SHIPPED | OUTPATIENT
Start: 2025-03-03

## 2025-03-03 NOTE — PROGRESS NOTES
Patient did stop at check out window, ok'd to leave without AVS.  Patient has MYCHART.    
pain or SOB. No N.V.    Review of Systems:  CONSTITUTIONAL: No fever. Fair appetite and energy level.   ENMT: Eyes: No diplopia; Nose: No epistaxis. Mouth: No sore throat.  RESPIRATORY: No hemoptysis, shortness of breath, cough.   CARDIOVASCULAR: No chest pain, palpitations.  GASTROINTESTINAL: no abd pain   GENITOURINARY: No dysuria, urinary frequency, hematuria.   NEURO: No syncope, presyncope, headache.  MSK: Mild arthralgias.  Remainder:  ROS NEGATIVE    Past Medical History:      Diagnosis Date    Abscess of right breast 06/19/2018    Anxiety     Arthritis     BRCA1 negative     BRCA2 negative     Breast abscess 07/11/2018    Breast cancer (HCC)     Cancer (HCC)     Fatigue     GERD (gastroesophageal reflux disease)     History of therapeutic radiation     Hyperlipidemia     Morbid obesity due to excess calories     Obesity 06/04/2013     Medications:  Reviewed and reconciled.    Allergies:  Allergies   Allergen Reactions    Vancomycin Nausea And Vomiting     Red man syndrome, had high fever and upset stomach     Physical Exam:  LMP 10/24/2021   Vitals:    03/03/25 1034   BP: (!) 130/90   Pulse:    Temp:    SpO2:                GENERAL: Alert, oriented x 3, not in acute distress.  GI: LUQ tenderness to palpation  EXTREMITIES: Without clubbing or cyanosis or edema.  ECOG 1    Lab Results   Component Value Date    WBC 10.3 08/26/2024    HGB 14.4 08/26/2024    HCT 44.9 08/26/2024    MCV 90.7 08/26/2024     08/26/2024     Lab Results   Component Value Date     08/26/2024    K 4.1 08/26/2024     08/26/2024    CO2 26 08/26/2024    BUN 13 08/26/2024    CREATININE 0.7 08/26/2024    GLUCOSE 136 (H) 08/26/2024    CALCIUM 9.1 08/26/2024    BILITOT 0.4 08/26/2024    ALKPHOS 117 (H) 08/26/2024    AST 16 08/26/2024    ALT 23 08/26/2024    LABGLOM >90 08/26/2024    GFRAA >60 09/18/2022     Impression/Plan:  47 y/o female with Left IDC/ILC breast cancer, T2N1mi(sn)M0 ER+MS+HER2-, grade 2, Stage IB  Genetic

## 2025-03-16 DIAGNOSIS — F41.9 ANXIETY: ICD-10-CM

## 2025-03-17 RX ORDER — VENLAFAXINE HYDROCHLORIDE 37.5 MG/1
37.5 CAPSULE, EXTENDED RELEASE ORAL DAILY
Qty: 90 CAPSULE | Refills: 0 | Status: SHIPPED | OUTPATIENT
Start: 2025-03-17

## 2025-03-17 NOTE — TELEPHONE ENCOUNTER
Name of Medication(s) Requested:  Requested Prescriptions     Pending Prescriptions Disp Refills    venlafaxine (EFFEXOR XR) 37.5 MG extended release capsule [Pharmacy Med Name: VENLAFAXINE ER 37.5MG CAPSULES] 90 capsule 0     Sig: TAKE 1 CAPSULE BY MOUTH DAILY       Medication is on current medication list Yes    Dosage and directions were verified? Yes    Quantity verified: 90 day supply     Pharmacy Verified?  Yes    Last Appointment:  11/8/2024    Future appts:  Future Appointments   Date Time Provider Department Center   3/19/2025 10:30 AM Thom Esteves RD [facility]   4/4/2025 10:00 AM Liseth Toledo MD [facility]   9/3/2025  9:30 AM [facility] ONC FAST TRACK 2 [facility] Med Onc [facility]   9/3/2025  9:45 AM Miriam Perez APRN MED ONC [facility]        (If no appt send self scheduling link. .REFILLAPPT)  Scheduling request sent?     [] Yes  [x] No    Does patient need updated?  [] Yes  [x] No

## 2025-04-02 ENCOUNTER — OFFICE VISIT (OUTPATIENT)
Dept: BARIATRICS/WEIGHT MGMT | Age: 50
End: 2025-04-02
Payer: MEDICAID

## 2025-04-02 ENCOUNTER — TELEPHONE (OUTPATIENT)
Dept: BARIATRICS/WEIGHT MGMT | Age: 50
End: 2025-04-02

## 2025-04-02 ENCOUNTER — INITIAL CONSULT (OUTPATIENT)
Age: 50
End: 2025-04-02

## 2025-04-02 VITALS — HEIGHT: 63 IN | BODY MASS INDEX: 51.91 KG/M2 | WEIGHT: 293 LBS

## 2025-04-02 VITALS
HEART RATE: 83 BPM | SYSTOLIC BLOOD PRESSURE: 164 MMHG | RESPIRATION RATE: 20 BRPM | DIASTOLIC BLOOD PRESSURE: 94 MMHG | BODY MASS INDEX: 51.91 KG/M2 | TEMPERATURE: 97.9 F | HEIGHT: 63 IN | WEIGHT: 293 LBS

## 2025-04-02 DIAGNOSIS — E66.01 MORBID OBESITY DUE TO EXCESS CALORIES: Primary | ICD-10-CM

## 2025-04-02 DIAGNOSIS — R73.03 PRE-DIABETES: ICD-10-CM

## 2025-04-02 DIAGNOSIS — R73.01 ELEVATED FASTING BLOOD SUGAR: ICD-10-CM

## 2025-04-02 DIAGNOSIS — Z71.3 DIETARY COUNSELING: ICD-10-CM

## 2025-04-02 PROCEDURE — 1036F TOBACCO NON-USER: CPT | Performed by: NURSE PRACTITIONER

## 2025-04-02 PROCEDURE — G8427 DOCREV CUR MEDS BY ELIG CLIN: HCPCS | Performed by: NURSE PRACTITIONER

## 2025-04-02 PROCEDURE — 99211 OFF/OP EST MAY X REQ PHY/QHP: CPT

## 2025-04-02 PROCEDURE — 99213 OFFICE O/P EST LOW 20 MIN: CPT | Performed by: NURSE PRACTITIONER

## 2025-04-02 PROCEDURE — G8417 CALC BMI ABV UP PARAM F/U: HCPCS | Performed by: NURSE PRACTITIONER

## 2025-04-02 ASSESSMENT — ENCOUNTER SYMPTOMS
WHEEZING: 0
COUGH: 0
DIARRHEA: 0
VOMITING: 0
SHORTNESS OF BREATH: 0
NAUSEA: 0
CONSTIPATION: 0

## 2025-04-02 NOTE — PROGRESS NOTES
Chief Complaint   Patient presents with    Weight Management       HPI:  Patient presents today for discussion of weight loss medication. She is interested in a GLP-1 at this time. Patient informed that the GLP-1 medications for weight loss are not on her formulary. She does not have a diagnosis of diabetes. 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 She is on arimidex which also causes her to have joint pain. She reports that she is always fatigued. She reports that she feels like she is lazy, at times she will only walk from the car to her couch. She reports that she often does not go to the kitchen to eat because she is so tired. Her PCP has tried to get her a GLP-1 approved last year, however it was denied by her insurance.     She was given a script for phentermine in the past, however she was too afraid to take it.    Last Surgical Weight Loss:      4/2/2025    11:04 AM   Surgical Weight Loss Tracker   Date 7/29/2022   Visit Type  New Consult   Height 5' 3\"   Initial Weight 374 lb   Initial BMI 66.2   Ideal Body Weight 147 lb   Initial Excess Body Weight (EBW) 227 lb   Pre-Surgical Weight 386 lb   Pre Surgery BMI 68.4   Preop Weight Change -12 lb   Preop % Weight Change -3%   Pre-Surgical EBW 14 lb 15 oz           Prior to Visit Medications    Medication Sig Taking? Authorizing Provider   metFORMIN (GLUCOPHAGE) 500 MG tablet Take 2 tablets by mouth 2 times daily (with meals) Yes Nery Manrique APRN - CNP   venlafaxine (EFFEXOR XR) 37.5 MG extended release capsule TAKE 1 CAPSULE BY MOUTH DAILY Yes Katya Ferris DO   anastrozole (ARIMIDEX) 1 MG tablet Take 1 tablet by mouth daily Yes Mirima Perez APRN   ibuprofen (ADVIL;MOTRIN) 800 MG tablet Take 1 tablet by mouth every 8 hours as needed for Pain Yes Katya Ferris DO   loratadine (CLARITIN) 10 MG tablet Take 1 tablet by mouth daily Yes Katya Ferris DO   tiZANidine (ZANAFLEX) 2 MG tablet Take 1 tablet by mouth 2 times daily as needed

## 2025-04-02 NOTE — TELEPHONE ENCOUNTER
Called and spoke with patient about what still needed done for sx. She was unable to talk at that time because she had kids in the car but she agreed [facility] She needs egd, labs and psych clearance.

## 2025-04-02 NOTE — PATIENT INSTRUCTIONS
Goal/Instructions:  Follow the pre operative liver shrinking diet carefully.  See this on pages 9, 10, and 11 in the Patient Guide to Bariatric Surgery  Follow up in 3 weeks.    Call your registered dietitian at [phone] for any questions or concerns.      Weight Loss Appointment: 6      Wt Readings from Last 3 Encounters:   04/02/25 (!) 175.1 kg (386 lb)   03/03/25 (!) 174.2 kg (384 lb)   02/21/25 (!) 176.5 kg (389 lb 1.6 oz)        (!) 175.1 kg (386 lb)  IBW: 147 lbs          % IBW: 262 %       % EBWL: 0%           ABW: 206 lbs  % ABW: 187 %       BMI: Body mass index is 68.38 kg/m².         Patient's 24 Hour Recall:  Breakfast: 2 cups coffee with minimal vanilla creamer  Snack: none  Lunch: 1 bean burrito ([employer])   Snack: none  Dinner: Spaghetti and meatballs (1 serving per box of pasta)  Snack: Sugar sweetened tea - 1 glass   Water Intake: 70-80 oz  Other Beverages: Iced tea - 1 glass   Exercise: ADL's - none    Education: Discussed the benefit of keeping food record, both before and after surgery.  Discussed how this can help in making proper changes with food intake to decrease calories and lose weight.      Weight loss goal for next follow-up appointment: 10-15 lbs     Patient has established the following three goals for the next follow-up appointment.  1.   Avoid all fast foods  2.   Avoid all sugar sweetened beverages  3.   Eat 2-3 cups of vegetables every day and 1 serving of fruit    Patient has established the following exercise goal for next follow-up appointment:  ADL's - Do any exercise that you can tolerate - walking or chair exercises - 10-30 minutes per day

## 2025-04-02 NOTE — PATIENT INSTRUCTIONS
What is the next step to proceed with weight loss surgery?    Please be aware that any co-pays or deductibles may be requested prior to testing and / or procedures.    You will need to schedule a psychological evaluation for weight loss surgery.  Patients will be required to complete all psychological testing as required by the mental health provider. Patients must also follow all of the provider's recommendations before weight loss surgery can be scheduled.     The evaluation must be done a standard way for weight loss surgery. We strongly recommend that you contact one of our preferred providers listed below to arrange this:      Dr. Herlinda Mendoza, PhD , [facility]  [address], [place], [place]    [phone]      Dr. Garett Garcia, PhD     [address] [place], [place]      [phone]    Dr. Ewa Steven, [facility]  [address], [place], [place]   [phone]    Claudia Hester, [facility] [address] , [address] [place] [place]  [phone]    Stephen Lazo, [facility] [address], [place]    [phone]        You will also need to plan on attending a 2 hour nutrition class at [facility] prior to your surgery.  We will schedule this for you when we schedule your surgery.    Please remember to have your labs drawn 10 days prior to your first scheduled dietary appointment.    Please remember, that while we will submit your case to insurance for surgery authorization, it is your responsibility to know if your plan covers weight loss surgery and keep up-to-date with changes to your insurance coverage.  We will do everything possible to help you get approved for weight loss surgery, but cannot guarantee an approval.     Please note that you will not be submitted to your insurance company until all pre-operative testing requirements are met.    In order to promote healing and prevent gastric

## 2025-04-02 NOTE — PROGRESS NOTES
Initial Consult for this pt was completed on previous day of service 8/9/22. This patient received Medical Nutrition Management; initial assessment and intervention, in an individual, face-to-face encounter with an RD/LD at a prior appointment at the [facility].      Weight Loss Assessment: Completed by Nutrition Services RD/LD Certified in Adult Weight Management:  Phone Number:  [phone]  Fax Number:   [phone]    Myriam MOLINA Daniela   4/2/25  Weight Loss Appointment: 6      Wt Readings from Last 3 Encounters:   04/02/25 (!) 175.1 kg (386 lb)   03/03/25 (!) 174.2 kg (384 lb)   02/21/25 (!) 176.5 kg (389 lb 1.6 oz)        (!) 175.1 kg (386 lb)  IBW: 147 lbs          % IBW: 262 %       % EBWL: 0%           ABW: 206 lbs  % ABW: 187 %       BMI: Body mass index is 68.38 kg/m².         Patient's 24 Hour Recall:  Breakfast: 2 cups coffee with minimal vanilla creamer  Snack: none  Lunch: 1 bean burrito ([employer])   Snack: none  Dinner: Spaghetti and meatballs (1 serving per box of pasta)  Snack: Sugar sweetened tea - 1 glass   Water Intake: 70-80 oz  Other Beverages: Iced tea - 1 glass   Exercise: ADL's - none    Education: Discussed the benefit of keeping food record, both before and after surgery.  Discussed how this can help in making proper changes with food intake to decrease calories and lose weight.      Weight loss goal for next follow-up appointment: 10-15 lbs     Patient has established the following three goals for the next follow-up appointment.  1.   Avoid all fast foods  2.   Avoid all sugar sweetened beverages  3.   Eat 2-3 cups of vegetables every day and 1 serving of fruit    Patient has established the following exercise goal for next follow-up appointment:  ADL's - Do any exercise that you can tolerate - walking or chair exercises - 10-30 minutes per day    Did the patient keep food records:No.  Pt was instructed by the [employer] that she / he needs to keep daily food

## 2025-05-04 NOTE — TELEPHONE ENCOUNTER
CLAY Shearer called and spoke to [facility] and scheduled PT for Left simple mastectomy with SLNB, poss ALND on 8/18/2021 @ 2pm with Dr. Shayy Thompson. NL injection @ 11:30am. PT is not taking ASA/blood thinner products. PT verbalized she understood prep instructions, and NPO after midnight. PT verbalized that she understood appointment date/time, as well as to arrive 1.5 hours prior to NL procedure. PT advised PAT will call to go over all medication and advise on where to park and enter the hospital at for procedure. PT has been told to make sure they have a ride to and from procedure as they are not allowed to drive after procedure. PT procedure letter was mailed to them with all instructions also on it. MA instructed PT to call the office at [phone] with any questions, comments, or concerns about the procedure. MA scheduled patient post-op appointment for 09/10/2021 @ 10am in [facility] with .       Electronically signed by Latisha Sánchez MA on 7/28/21 at 1:16 PM EDT
40w

## 2025-05-27 DIAGNOSIS — F41.9 ANXIETY: ICD-10-CM

## 2025-05-27 RX ORDER — VENLAFAXINE HYDROCHLORIDE 37.5 MG/1
37.5 CAPSULE, EXTENDED RELEASE ORAL DAILY
Qty: 90 CAPSULE | Refills: 0 | Status: SHIPPED | OUTPATIENT
Start: 2025-05-27

## 2025-05-27 NOTE — TELEPHONE ENCOUNTER
Name of Medication(s) Requested:  Requested Prescriptions     Pending Prescriptions Disp Refills    venlafaxine (EFFEXOR XR) 37.5 MG extended release capsule [Pharmacy Med Name: VENLAFAXINE ER 37.5MG CAPSULES] 90 capsule 0     Sig: TAKE 1 CAPSULE BY MOUTH DAILY       Medication is on current medication list Yes    Dosage and directions were verified? Yes    Quantity verified: 90 day supply     Pharmacy Verified?  Yes    Last Appointment:  11/8/2024    Future appts:  Future Appointments   Date Time Provider Department Center   5/29/2025 10:00 AM Nery Manrique APRN - CNP Surg Weight [facility]   6/13/2025 10:30 AM [facility]   9/3/2025  9:30 AM [facility] MED ONC FAST TRACK 2 [facility] Med Onc [facility]   9/3/2025  9:45 AM Miriam Perez APRN MED ONC [facility]        (If no appt send self scheduling link. .REFILLAPPT)  Scheduling request sent?     [] Yes  [x] No    Does patient need updated?  [] Yes  [x] No

## 2025-06-12 ENCOUNTER — TELEPHONE (OUTPATIENT)
Dept: FAMILY MEDICINE CLINIC | Age: 50
End: 2025-06-12

## 2025-06-12 NOTE — TELEPHONE ENCOUNTER
[facility] called for nurse triage. Pt called in stating she felt sluggish and did not feel well after eating breakfast and decided to check her blood sugar. Pt stated sugar this morning was 408 1 hour after eating breakfast, she stated she had coffee with German vanilla creamer and a cinnamon bagel with plain cream cheese. Pt stated she did check it an hour later and it went down to 263. Pt would like to know if she should come in for an appt and to check her A1c? Please advise

## 2025-06-26 DIAGNOSIS — K76.0 NAFLD (NONALCOHOLIC FATTY LIVER DISEASE): Primary | ICD-10-CM

## 2025-07-01 RX ORDER — ANASTROZOLE 1 MG/1
1 TABLET ORAL DAILY
Qty: 90 TABLET | Refills: 0 | Status: SHIPPED | OUTPATIENT
Start: 2025-07-01

## 2025-07-03 ENCOUNTER — TELEPHONE (OUTPATIENT)
Age: 50
End: 2025-07-03

## 2025-07-03 NOTE — TELEPHONE ENCOUNTER
Patient already scheduled from my chart 6 month u/s on Monday 7- at [facility]   Scan 1100  NPO 8 hours

## 2025-07-03 NOTE — TELEPHONE ENCOUNTER
Insurance coverage: [facility]    EGD:Needs done  USGB:S/P  Labs:Needs done    Last visit to provider/NP: 4/2/25    Medical clearance: Not done    Cardiac clearance good through: Needs done    Psych clearance good through: Needs done          Labs needed: Negative Nicotine and all labs.                                                 What needs completed as of : See above, all needs done. ( Spoke with on 4/2/25 to schedule all and pt could not talk at the time and would call me back, pt has yet to call back ) Dietary good through 9/27/25.  MS

## 2025-07-11 ENCOUNTER — TELEPHONE (OUTPATIENT)
Age: 50
End: 2025-07-11

## 2025-07-11 NOTE — TELEPHONE ENCOUNTER
Patient made 6 month u/s at [facility]. F/U with [facility] for Friday 8-8-2025 at 1115 with Dr. Chow

## 2025-07-16 LAB
ESTIMATED AVERAGE GLUCOSE: 126
HBA1C MFR BLD: 6 %

## 2025-07-25 ENCOUNTER — TELEPHONE (OUTPATIENT)
Age: 50
End: 2025-07-25

## 2025-07-26 ASSESSMENT — PATIENT HEALTH QUESTIONNAIRE - PHQ9
2. FEELING DOWN, DEPRESSED OR HOPELESS: SEVERAL DAYS
SUM OF ALL RESPONSES TO PHQ QUESTIONS 1-9: 1
1. LITTLE INTEREST OR PLEASURE IN DOING THINGS: NOT AT ALL

## 2025-07-29 ASSESSMENT — PATIENT HEALTH QUESTIONNAIRE - PHQ9
1. LITTLE INTEREST OR PLEASURE IN DOING THINGS: NOT AT ALL
2. FEELING DOWN, DEPRESSED OR HOPELESS: SEVERAL DAYS
SUM OF ALL RESPONSES TO PHQ9 QUESTIONS 1 & 2: 1

## 2025-08-20 DIAGNOSIS — F41.9 ANXIETY: ICD-10-CM

## 2025-08-21 RX ORDER — VENLAFAXINE HYDROCHLORIDE 37.5 MG/1
37.5 CAPSULE, EXTENDED RELEASE ORAL DAILY
Qty: 30 CAPSULE | Refills: 0 | Status: SHIPPED | OUTPATIENT
Start: 2025-08-21

## 2025-08-27 ENCOUNTER — HOSPITAL ENCOUNTER (OUTPATIENT)
Dept: ULTRASOUND IMAGING | Age: 50
Discharge: HOME | End: 2025-08-29
Attending: TRANSPLANT SURGERY
Payer: MEDICAID

## 2025-08-27 DIAGNOSIS — K76.0 NAFLD (NONALCOHOLIC FATTY LIVER DISEASE): ICD-10-CM

## 2025-08-27 PROCEDURE — 76705 ECHO EXAM OF ABDOMEN: CPT

## 2025-09-03 ENCOUNTER — TELEMEDICINE (OUTPATIENT)
Dept: FAMILY MEDICINE CLINIC | Age: 50
End: 2025-09-03
Payer: MEDICAID

## 2025-09-03 DIAGNOSIS — J01.90 ACUTE BACTERIAL SINUSITIS: Primary | ICD-10-CM

## 2025-09-03 DIAGNOSIS — E66.01 MORBID OBESITY (HCC): ICD-10-CM

## 2025-09-03 DIAGNOSIS — B96.89 ACUTE BACTERIAL SINUSITIS: Primary | ICD-10-CM

## 2025-09-03 DIAGNOSIS — K86.9 PANCREATIC LESION: ICD-10-CM

## 2025-09-03 DIAGNOSIS — G47.33 OSA (OBSTRUCTIVE SLEEP APNEA): ICD-10-CM

## 2025-09-03 PROBLEM — G89.18 ACUTE PAIN AFTER MASTECTOMY: Status: RESOLVED | Noted: 2021-08-18 | Resolved: 2025-09-03

## 2025-09-03 PROBLEM — R19.7 INTRACTABLE DIARRHEA: Status: RESOLVED | Noted: 2024-05-13 | Resolved: 2025-09-03

## 2025-09-03 PROBLEM — Z90.10 ACUTE PAIN AFTER MASTECTOMY: Status: RESOLVED | Noted: 2021-08-18 | Resolved: 2025-09-03

## 2025-09-03 PROBLEM — T81.49XA SURGICAL SITE INFECTION: Status: RESOLVED | Noted: 2021-10-04 | Resolved: 2025-09-03

## 2025-09-03 PROCEDURE — 99214 OFFICE O/P EST MOD 30 MIN: CPT | Performed by: STUDENT IN AN ORGANIZED HEALTH CARE EDUCATION/TRAINING PROGRAM

## 2025-09-03 PROCEDURE — G8427 DOCREV CUR MEDS BY ELIG CLIN: HCPCS | Performed by: STUDENT IN AN ORGANIZED HEALTH CARE EDUCATION/TRAINING PROGRAM

## 2025-09-03 PROCEDURE — 1036F TOBACCO NON-USER: CPT | Performed by: STUDENT IN AN ORGANIZED HEALTH CARE EDUCATION/TRAINING PROGRAM

## 2025-09-03 PROCEDURE — G8417 CALC BMI ABV UP PARAM F/U: HCPCS | Performed by: STUDENT IN AN ORGANIZED HEALTH CARE EDUCATION/TRAINING PROGRAM

## 2025-09-03 RX ORDER — AMOXICILLIN 500 MG/1
500 CAPSULE ORAL 2 TIMES DAILY
Qty: 20 CAPSULE | Refills: 0 | Status: SHIPPED | OUTPATIENT
Start: 2025-09-03 | End: 2025-09-03

## 2025-09-03 RX ORDER — FLUTICASONE PROPIONATE 50 MCG
2 SPRAY, SUSPENSION (ML) NASAL DAILY
Qty: 48 G | Refills: 1 | Status: SHIPPED | OUTPATIENT
Start: 2025-09-03

## 2025-09-03 RX ORDER — METHYLPREDNISOLONE 4 MG/1
TABLET ORAL
Qty: 1 KIT | Refills: 0 | Status: SHIPPED | OUTPATIENT
Start: 2025-09-03

## 2025-09-03 RX ORDER — AZITHROMYCIN 250 MG/1
250 TABLET, FILM COATED ORAL SEE ADMIN INSTRUCTIONS
Qty: 6 TABLET | Refills: 0 | Status: SHIPPED | OUTPATIENT
Start: 2025-09-03 | End: 2025-09-08

## 2025-09-03 SDOH — ECONOMIC STABILITY: TRANSPORTATION INSECURITY
IN THE PAST 12 MONTHS, HAS LACK OF TRANSPORTATION KEPT YOU FROM MEETINGS, WORK, OR FROM GETTING THINGS NEEDED FOR DAILY LIVING?: NO

## 2025-09-03 SDOH — ECONOMIC STABILITY: FOOD INSECURITY: WITHIN THE PAST 12 MONTHS, YOU WORRIED THAT YOUR FOOD WOULD RUN OUT BEFORE YOU GOT MONEY TO BUY MORE.: NEVER TRUE

## 2025-09-03 SDOH — ECONOMIC STABILITY: FOOD INSECURITY: WITHIN THE PAST 12 MONTHS, THE FOOD YOU BOUGHT JUST DIDN'T LAST AND YOU DIDN'T HAVE MONEY TO GET MORE.: NEVER TRUE

## 2025-09-03 SDOH — ECONOMIC STABILITY: TRANSPORTATION INSECURITY
IN THE PAST 12 MONTHS, HAS THE LACK OF TRANSPORTATION KEPT YOU FROM MEDICAL APPOINTMENTS OR FROM GETTING MEDICATIONS?: NO

## 2025-09-03 SDOH — ECONOMIC STABILITY: INCOME INSECURITY: IN THE LAST 12 MONTHS, WAS THERE A TIME WHEN YOU WERE NOT ABLE TO PAY THE MORTGAGE OR RENT ON TIME?: NO

## 2025-09-03 ASSESSMENT — ENCOUNTER SYMPTOMS
ABDOMINAL PAIN: 0
SHORTNESS OF BREATH: 0
SINUS PRESSURE: 1
NAUSEA: 0
DIARRHEA: 0
COUGH: 1
EYE PAIN: 0
EYE REDNESS: 0
RHINORRHEA: 0
VOMITING: 0
SINUS PAIN: 1
SORE THROAT: 0
BACK PAIN: 0
CONSTIPATION: 0

## 2025-09-04 ENCOUNTER — TELEPHONE (OUTPATIENT)
Age: 50
End: 2025-09-04

## (undated) DEVICE — SURGICAL PROCEDURE PACK BASIC

## (undated) DEVICE — ELECTRODE PT RET AD L9FT HI MOIST COND ADH HYDRGEL CORDED

## (undated) DEVICE — PLASMABLADE PS210-030S 3.0S LOCK: Brand: PLASMABLADE™

## (undated) DEVICE — EXTRAS MASTECTOMY

## (undated) DEVICE — PACK,UNIV, II AURORA: Brand: MEDLINE

## (undated) DEVICE — CONNECTOR IRRIGATION AUXILIARY H2O JET W/ PRT MTL THRD HYDR

## (undated) DEVICE — GLOVE SURG SZ 65 THK91MIL LTX FREE SYN POLYISOPRENE

## (undated) DEVICE — SET INST MINOR PROCEDURE

## (undated) DEVICE — DRESSING PETRO W3XL8IN OIL EMUL N ADH GZ KNIT IMPREG CELOS

## (undated) DEVICE — STAPLER SKIN L39MM DIA0.53MM CRWN 5.7MM S STL FIX HD PROX

## (undated) DEVICE — CONTAINER VACUTAINER ANAER CULTURE SWAB

## (undated) DEVICE — DRAPE THER FLUID WARMING 66X44 IN FLAT SLUSH DBL DISC ORS

## (undated) DEVICE — FORCEPS BX L160CM JAW DIA2.4MM YEL L CAP W/ NDL DISP RAD

## (undated) DEVICE — TUBING, SUCTION, 3/16" X 12', STRAIGHT: Brand: MEDLINE

## (undated) DEVICE — AGENT HEMSTAT 5GM ARISTA AH

## (undated) DEVICE — 1.5L THIN WALL CAN: Brand: CRD

## (undated) DEVICE — SPONGE LAP W18XL18IN WHT COT 4 PLY FLD STRUNG RADPQ DISP ST

## (undated) DEVICE — ADHESIVE SKIN CLOSURE TOP 36 CC HI VISC DERMBND MINI

## (undated) DEVICE — TOWEL,OR,DSP,ST,BLUE,STD,6/PK,12PK/CS: Brand: MEDLINE

## (undated) DEVICE — APPLICATOR MEDICATED 26 CC SOLUTION HI LT ORNG CHLORAPREP

## (undated) DEVICE — DRAIN SURG 15FR L3/16IN DIA4.7MM SIL CHN RND HUBLESS FULL

## (undated) DEVICE — SPONGE GZ W4XL4IN RAYON POLY FILL CVR W/ NONWOVEN FAB

## (undated) DEVICE — DRIP REDUCTION MANIFOLD

## (undated) DEVICE — STANDARD HYPODERMIC NEEDLE,ALUMINUM HUB: Brand: MONOJECT

## (undated) DEVICE — PROBE GAM TRUNODE DISP EACH

## (undated) DEVICE — GLOVE ORANGE PI 7   MSG9070

## (undated) DEVICE — CONTAINER SPEC 60ML PH 7NEUTRAL BUFF FRMLN RDY TO USE

## (undated) DEVICE — TRAP,MUCUS SPECIMEN,40CC: Brand: MEDLINE

## (undated) DEVICE — Device

## (undated) DEVICE — APPLIER LIG CLP M L11IN TI STR RNG HNDL FOR 20 CLP DISP

## (undated) DEVICE — CANISTER NEG PRSS 1000ML W/ GEL INFOVAC

## (undated) DEVICE — MARKER SURG MARGIN STD 6 CLR INK ASST CORR CLP

## (undated) DEVICE — SWAB SPEC COLL SHFT L5.25IN POLYUR FOAM TIP SFT DBL MEDIA

## (undated) DEVICE — Z DISCONTINUED NO SUB IDED TUBING ETCO2 AD L6.5FT NSL ORAL CVD PRNG NONFLARED TIP OVR

## (undated) DEVICE — SYRINGE MED 10ML TRNSLUC BRL PLUNG BLK MRK POLYPR CTRL

## (undated) DEVICE — BANDAGE COMPR W6INXL10YD ST M E WHITE/BEIGE

## (undated) DEVICE — STRIP,CLOSURE,WOUND,MEDI-STRIP,1/2X4: Brand: MEDLINE

## (undated) DEVICE — GOWN,SIRUS,FABRNF,L,20/CS: Brand: MEDLINE

## (undated) DEVICE — DRESSING NEG PRSS L W15XH3.3XL26CM BLK POLYUR DRP PD

## (undated) DEVICE — PIN SAFETY 2

## (undated) DEVICE — SHEET,DRAPE,53X77,STERILE: Brand: MEDLINE

## (undated) DEVICE — GAUZE,SPONGE,4"X4",8PLY,STRL,LF,10/TRAY: Brand: MEDLINE

## (undated) DEVICE — BANDAGE,GAUZE,4.5"X4.1YD,STERILE,LF: Brand: MEDLINE

## (undated) DEVICE — BITEBLOCK 54FR W/ DENT RIM BLOX

## (undated) DEVICE — DEFENDO AIR WATER SUCTION AND BIOPSY VALVE KIT FOR  OLYMPUS: Brand: DEFENDO AIR/WATER/SUCTION AND BIOPSY VALVE

## (undated) DEVICE — PACK,UNIVERSAL,NO GOWNS: Brand: MEDLINE